# Patient Record
Sex: MALE | Race: WHITE | NOT HISPANIC OR LATINO | Employment: UNEMPLOYED | URBAN - METROPOLITAN AREA
[De-identification: names, ages, dates, MRNs, and addresses within clinical notes are randomized per-mention and may not be internally consistent; named-entity substitution may affect disease eponyms.]

---

## 2017-07-24 ENCOUNTER — ALLSCRIPTS OFFICE VISIT (OUTPATIENT)
Dept: OTHER | Facility: OTHER | Age: 37
End: 2017-07-24

## 2017-07-24 DIAGNOSIS — I10 ESSENTIAL (PRIMARY) HYPERTENSION: ICD-10-CM

## 2017-07-25 ENCOUNTER — GENERIC CONVERSION - ENCOUNTER (OUTPATIENT)
Dept: OTHER | Facility: OTHER | Age: 37
End: 2017-07-25

## 2017-07-25 LAB
A/G RATIO (HISTORICAL): 1.3 (ref 1.2–2.2)
ALBUMIN SERPL BCP-MCNC: 4.1 G/DL (ref 3.5–5.5)
ALP SERPL-CCNC: 74 IU/L (ref 39–117)
ALT SERPL W P-5'-P-CCNC: 65 IU/L (ref 0–44)
AST SERPL W P-5'-P-CCNC: 51 IU/L (ref 0–40)
BILIRUB SERPL-MCNC: 0.7 MG/DL (ref 0–1.2)
BUN SERPL-MCNC: 9 MG/DL (ref 6–20)
BUN/CREA RATIO (HISTORICAL): 12 (ref 9–20)
CALCIUM SERPL-MCNC: 9.6 MG/DL (ref 8.7–10.2)
CHLORIDE SERPL-SCNC: 100 MMOL/L (ref 96–106)
CHOLEST SERPL-MCNC: 187 MG/DL (ref 100–199)
CHOLEST/HDLC SERPL: 5.8 RATIO UNITS (ref 0–5)
CO2 SERPL-SCNC: 24 MMOL/L (ref 18–29)
CREAT SERPL-MCNC: 0.74 MG/DL (ref 0.76–1.27)
EGFR AFRICAN AMERICAN (HISTORICAL): 137 ML/MIN/1.73
EGFR-AMERICAN CALC (HISTORICAL): 119 ML/MIN/1.73
GLUCOSE SERPL-MCNC: 104 MG/DL (ref 65–99)
HDLC SERPL-MCNC: 32 MG/DL
LDLC SERPL CALC-MCNC: 128 MG/DL (ref 0–99)
POTASSIUM SERPL-SCNC: 4.9 MMOL/L (ref 3.5–5.2)
SODIUM SERPL-SCNC: 139 MMOL/L (ref 134–144)
TOT. GLOBULIN, SERUM (HISTORICAL): 3.2 G/DL (ref 1.5–4.5)
TOTAL PROTEIN (HISTORICAL): 7.3 G/DL (ref 6–8.5)
TRIGL SERPL-MCNC: 137 MG/DL (ref 0–149)
VLDLC SERPL CALC-MCNC: 27 MG/DL (ref 5–40)

## 2017-07-26 LAB — TSH SERPL DL<=0.05 MIU/L-ACNC: 1.8 UIU/ML (ref 0.45–4.5)

## 2017-07-28 ENCOUNTER — GENERIC CONVERSION - ENCOUNTER (OUTPATIENT)
Dept: OTHER | Facility: OTHER | Age: 37
End: 2017-07-28

## 2017-08-14 ENCOUNTER — APPOINTMENT (OUTPATIENT)
Dept: NUTRITION | Facility: HOSPITAL | Age: 37
End: 2017-08-14
Payer: COMMERCIAL

## 2017-08-14 PROCEDURE — 97802 MEDICAL NUTRITION INDIV IN: CPT

## 2017-08-25 ENCOUNTER — ALLSCRIPTS OFFICE VISIT (OUTPATIENT)
Dept: OTHER | Facility: OTHER | Age: 37
End: 2017-08-25

## 2017-12-04 ENCOUNTER — GENERIC CONVERSION - ENCOUNTER (OUTPATIENT)
Dept: OTHER | Facility: OTHER | Age: 37
End: 2017-12-04

## 2017-12-14 ENCOUNTER — ALLSCRIPTS OFFICE VISIT (OUTPATIENT)
Dept: OTHER | Facility: OTHER | Age: 37
End: 2017-12-14

## 2017-12-15 ENCOUNTER — GENERIC CONVERSION - ENCOUNTER (OUTPATIENT)
Dept: OTHER | Facility: OTHER | Age: 37
End: 2017-12-15

## 2017-12-15 NOTE — PROGRESS NOTES
Assessment  1  Stomach pain (536 8) (R10 9)   2  Benign essential hypertension (401 1) (I10)    Plan  Stomach pain    · 1 - Tye Sotelo MD (Gastroenterology) Co-Management  *  Status: Active  Requested for:[id number]  Care Summary provided  : Yes    Discussion/Summary    Abdominal pain w/ diarrhea: symptoms chronic w/ bouts of constant diarrhea multiple times a month  DIscussed referral to GI as pt will likely need to be scoped  Referral given  Dry mouth: discussed that pt does not have thrush and that his mouth is just dry  Encouraged more water intake  HTN: pt not here for HTN but noted on vitals to have /90 with repeat 170/90  States he takes his lisinopril regularly and that his BP last week was 120s/80s  Advised pt to take medication tomorrow and come in for BP check to make sure medication is working appropriately for him  The treatment plan was reviewed with the patient/guardian  The patient/guardian understands and agrees with the treatment plan      Chief Complaint  Patient presents with stomach pains  Also concerned about thrush  History of Present Illness  HPI: pt here for stomach pain  knows its gas pain but it feels like someone is ripping up his bowels  he gets relief when he runs to the bathroom and lets the gas out  this happens 2-3x a month  he would have bouts of diarrhea for a couple days then regular bowel movements  no blood or mucous in stool  states has had stomach issues since he was 16 but has just dealt with it  never saw a gastroenterologist  also thinks he has oral thrush as states his tongue is sometimes white  Review of Systems   Constitutional: no fever or chills, feels well, no tiredness, no recent weight loss or weight gain  ENT: no complaints of earache, no loss of hearing, no nosebleeds or nasal discharge, no sore throat or hoarseness    Cardiovascular: no complaints of slow or fast heart rate, no chest pain, no palpitations, no leg claudication or lower extremity edema  Respiratory: no complaints of shortness of breath, no wheezing or cough, no dyspnea on exertion, no orthopnea or PND  Gastrointestinal: as noted in HPI  Genitourinary: no complaints of dysuria or incontinence, no hesitancy, no nocturia, no genital lesion, no inadequacy of penile erection  Musculoskeletal: no complaints of arthralgia, no myalgia, no joint swelling or stiffness, no limb pain or swelling  Integumentary: no complaints of skin rash or lesion, no itching or dry skin, no skin wounds  Neurological: no complaints of headache, no confusion, no numbness or tingling, no dizziness or fainting  ROS reviewed  Active Problems  1  Accidental Intraoperative Endocrine Puncture (998 2)   2  ACP Staging Stage 1 Hypertension: 140-159 / 90-99 (401 9)   3  Aggressive Periodontitis (523 30)   4  Anxiety (300 00) (F41 9)   5  Benign essential hypertension (401 1) (I10)   6  BMI 50 0-59 9, adult (V85 43) (Z68 43)   7  Classic migraine with aura (346 00) (G43 109)   8  Depression (311) (F32 9)   9  Depression screen (V79 0) (Z13 89)   10  Encounter for weight loss counseling (V65 3) (Z71 3)   11  Facial pain (784 0) (R51)   12  GERD (gastroesophageal reflux disease) (530 81) (K21 9)   13  Infectious gastroenteritis (009 0) (A09)   14  Lumbago (724 2) (M54 5)   15  Methicillin resistant Staphylococcus aureus septicemia (038 12) (A41 02)   16  Migraine headache (346 90) (G43 909)   17  Mixed hyperlipidemia (272 2) (E78 2)   18  Morbid obesity (278 01) (E66 01)   19  Morbid or severe obesity due to excess calories (278 01) (E66 01)   20  Need for influenza vaccination (V04 81) (Z23)   21  Nonvenomous Insect Bite Of Elbow (913 4)   22  Nonvenomous Insect Bite Of Knee (916 4)   23  Obesity (278 00) (E66 9)   24  Sciatica (724 3) (M54 30)   25  TMJ arthralgia (524 62) (M26 629)   26  Tropical Ulcer (707 9)   27  Upper respiratory infection (465 9) (J06 9)   28   Visit for suture removal (V58 32) (Z48 02)    Past Medical History  1  History of Bell's palsy (V12 49) (Z86 69)  Active Problems And Past Medical History Reviewed: The active problems and past medical history were reviewed and updated today  Family History  Mother    1  Family history of diabetes mellitus (V18 0) (Z83 3)   2  Family history of malignant neoplasm (V16 9) (Z80 9)   3  Family history of osteoporosis (V17 81) (Z82 62)  Family History    4  Family history of Colon Cancer (V16 0)   5  Family history of Diabetes Mellitus (V18 0)   6  Family history of Hyperlipidemia   7  Family history of Hypertension (V17 49)   8  Family history of Hypothyroidism   9  Family history of Reported Family History Of Heart Disease   10  Family history of Reported Family History Of Ischemic Heart Disease   11  Family history of Valvular Heart Disease  Family History Reviewed: The family history was reviewed and updated today  Social History   · Denied: History of Alcohol Use (History)   · Always uses seat belt   · Denied: History of Drug Use   · Former smoker (V15 82) (V48 112)   · Never A Smoker   · Occasional alcohol use  The social history was reviewed and updated today  The social history was reviewed and is unchanged  Surgical History  1  History of Surgery Scrotum Drainage Of Scrotal Wall Abscess  Surgical History Reviewed: The surgical history was reviewed and updated today  Current Meds   1  Lisinopril 10 MG Oral Tablet; TAKE 1 TABLET DAILY AS DIRECTED; Therapy: [id number] to (Evaluate:22Mar2018)  Requested for: 22Nov2017; Last Rx:22Nov2017 Ordered   2  Omeprazole 40 MG Oral Capsule Delayed Release; Take 1 tablet daily; Therapy: [id number] to (Last Rx:22Nov2017)  Requested for: 22Nov2017 Ordered    The medication list was reviewed and updated today  Allergies  1  Erythromycin TABS   2  Macrolides   3  Pertussin LIQD   4  Pertussis Vaccine   5  Vancomycin HCl CAPS   6  Zithromax TABS   7  Zosyn SOLN  8  Bee sting   9   No Known Latex Allergies   10  Other   11  Pollen    Vitals   Recorded: 09YCV8863 02:15PM   Temperature 99 1 F   Heart Rate 321   Systolic 433   Diastolic 90   Height 5 ft 9 5 in   Weight 315 lb    BMI Calculated 45 85   BSA Calculated 2 52   O2 Saturation 97     Physical Exam   Constitutional  General appearance: No acute distress, well appearing and well nourished  Ears, Nose, Mouth, and Throat  External inspection of ears and nose: Abnormal    Oropharynx: Abnormal   The tongue was dry  -- no thrush noted, mouth is dry  Pulmonary  Auscultation of lungs: Clear to auscultation, equal breath sounds bilaterally, no wheezes, no rales, no rhonci  Cardiovascular  Auscultation of heart: Normal rate and rhythm, normal S1 and S2, without murmurs  Abdomen  Abdomen: Abnormal  -- 3/4 generalized abdominal tenderness  Liver and spleen: No hepatomegaly or splenomegaly  Attending Note  Attending Note [facility]: Attending Note: I interviewed, took the history and examined the patient,-- I supervised the Resident-- and-- I agree with the Resident management plan as it was presented to me  Level of Participation: I was present in clinic and examined the patient  I agree with the Resident's note  Future Appointments    Date/Time Provider Specialty Site   12/15/2017 11:15 AM Lilian Emery MD Family Medicine [facility]     Signatures   Electronically signed by : Latonya Cope MD; Dec 14 2017  3:09PM EST                       (Author)    Electronically signed by :  GIOVANA Cabello ; Dec 14 2017  3:12PM EST                       (Co-author)

## 2018-01-13 VITALS
HEART RATE: 88 BPM | HEIGHT: 70 IN | RESPIRATION RATE: 18 BRPM | TEMPERATURE: 97.5 F | SYSTOLIC BLOOD PRESSURE: 160 MMHG | DIASTOLIC BLOOD PRESSURE: 72 MMHG | OXYGEN SATURATION: 96 % | BODY MASS INDEX: 45.1 KG/M2 | WEIGHT: 315 LBS

## 2018-01-13 VITALS
BODY MASS INDEX: 45.1 KG/M2 | HEART RATE: 102 BPM | RESPIRATION RATE: 18 BRPM | TEMPERATURE: 97.8 F | WEIGHT: 315 LBS | SYSTOLIC BLOOD PRESSURE: 142 MMHG | HEIGHT: 70 IN | DIASTOLIC BLOOD PRESSURE: 78 MMHG

## 2018-01-16 NOTE — RESULT NOTES
Verified Results  (1) LIPID PANEL, FASTING [id number] 09:44AM Mely Charles     Test Name Result Flag Reference   Cholesterol, Total 187 mg/dL  100-199   Triglycerides 137 mg/dL  0-149   HDL Cholesterol 32 mg/dL L >39   VLDL Cholesterol Dayo 27 mg/dL  5-40   LDL Cholesterol Calc 128 mg/dL H 0-99   T  Chol/HDL Ratio 5 8 ratio units H 0 0-5 0   T  Chol/HDL Ratio                                                             Men  Women                                               1/2 Avg  Risk  3 4    3 3                                                   Avg Risk  5 0    4 4                                                2X Avg  Risk  9 6    7 1                                                3X Avg  Risk 23 4   11 0

## 2018-01-19 ENCOUNTER — GENERIC CONVERSION - ENCOUNTER (OUTPATIENT)
Dept: OTHER | Facility: OTHER | Age: 38
End: 2018-01-19

## 2018-01-19 DIAGNOSIS — R19.7 DIARRHEA: ICD-10-CM

## 2018-01-23 VITALS
DIASTOLIC BLOOD PRESSURE: 90 MMHG | OXYGEN SATURATION: 97 % | SYSTOLIC BLOOD PRESSURE: 176 MMHG | HEIGHT: 70 IN | BODY MASS INDEX: 45.1 KG/M2 | TEMPERATURE: 99.1 F | WEIGHT: 315 LBS | HEART RATE: 113 BPM

## 2018-01-23 NOTE — MISCELLANEOUS
Message  Return to work or school:   Coy Villatoro is under my professional care   He was seen in my office on 12/14/2017   He is able to return to work on  12/16/2017       Dr Julieanne Mcardle, MD       Signatures   Electronically signed by : Filipe Mccall MD; Dec 14 2017  3:16PM EST                       (Author)

## 2018-01-24 VITALS
BODY MASS INDEX: 47.74 KG/M2 | DIASTOLIC BLOOD PRESSURE: 80 MMHG | WEIGHT: 315 LBS | SYSTOLIC BLOOD PRESSURE: 130 MMHG | OXYGEN SATURATION: 97 % | HEART RATE: 72 BPM | TEMPERATURE: 97.6 F | RESPIRATION RATE: 14 BRPM

## 2018-01-24 VITALS
DIASTOLIC BLOOD PRESSURE: 80 MMHG | SYSTOLIC BLOOD PRESSURE: 152 MMHG | HEIGHT: 70 IN | OXYGEN SATURATION: 97 % | BODY MASS INDEX: 45.1 KG/M2 | HEART RATE: 114 BPM | WEIGHT: 315 LBS | TEMPERATURE: 98 F | RESPIRATION RATE: 16 BRPM

## 2018-01-24 VITALS
RESPIRATION RATE: 16 BRPM | SYSTOLIC BLOOD PRESSURE: 128 MMHG | TEMPERATURE: 98.2 F | DIASTOLIC BLOOD PRESSURE: 78 MMHG | BODY MASS INDEX: 45.1 KG/M2 | HEIGHT: 70 IN | WEIGHT: 315 LBS

## 2018-01-31 RX ORDER — OMEPRAZOLE 40 MG/1
40 CAPSULE, DELAYED RELEASE ORAL EVERY MORNING
COMMUNITY
End: 2018-05-01 | Stop reason: SDUPTHER

## 2018-01-31 RX ORDER — NAPROXEN SODIUM 220 MG
CAPSULE ORAL AS NEEDED
COMMUNITY
End: 2022-04-05 | Stop reason: HOSPADM

## 2018-01-31 RX ORDER — ZINC GLUCONATE 50 MG
50 TABLET ORAL DAILY
COMMUNITY
End: 2018-09-18 | Stop reason: ALTCHOICE

## 2018-01-31 RX ORDER — DIPHENOXYLATE HYDROCHLORIDE AND ATROPINE SULFATE 2.5; .025 MG/1; MG/1
1 TABLET ORAL DAILY
COMMUNITY

## 2018-01-31 RX ORDER — LISINOPRIL 20 MG/1
20 TABLET ORAL EVERY MORNING
COMMUNITY
End: 2018-04-20 | Stop reason: SDUPTHER

## 2018-01-31 RX ORDER — MULTIVIT WITH MINERALS/LUTEIN
1000 TABLET ORAL DAILY
COMMUNITY
End: 2021-10-13

## 2018-01-31 NOTE — PRE-PROCEDURE INSTRUCTIONS
Pre-Surgery Instructions:   Medication Instructions    Ascorbic Acid (VITAMIN C) 1000 MG tablet Patient was instructed by Physician and understands   lisinopril (ZESTRIL) 20 mg tablet Instructed patient per Anesthesia Guidelines   multivitamin [facility]) TABS Patient was instructed by Physician and understands   Naproxen Sodium (ALEVE) 220 MG CAPS Patient was instructed by Physician and understands   omeprazole (PriLOSEC) 40 MG capsule Instructed patient per Anesthesia Guidelines   zinc gluconate 50 mg tablet Patient was instructed by Physician and understands

## 2018-02-01 LAB
ENDOMYSIUM IGA SER QL: NEGATIVE
GLIADIN PEPTIDE IGA SER-ACNC: 5 UNITS (ref 0–19)
GLIADIN PEPTIDE IGG SER-ACNC: 1 UNITS (ref 0–19)
IGA SERPL-MCNC: 428 MG/DL (ref 90–386)
TSH SERPL DL<=0.005 MIU/L-ACNC: 1.02 UIU/ML (ref 0.45–4.5)
TTG IGA SER-ACNC: <2 U/ML (ref 0–3)
TTG IGG SER-ACNC: <2 U/ML (ref 0–5)

## 2018-02-02 ENCOUNTER — HOSPITAL ENCOUNTER (OUTPATIENT)
Facility: AMBULARY SURGERY CENTER | Age: 38
Setting detail: OUTPATIENT SURGERY
Discharge: HOME | End: 2018-02-02
Attending: INTERNAL MEDICINE | Admitting: INTERNAL MEDICINE
Payer: COMMERCIAL

## 2018-02-02 ENCOUNTER — ANESTHESIA (OUTPATIENT)
Dept: GASTROENTEROLOGY | Facility: AMBULARY SURGERY CENTER | Age: 38
End: 2018-02-02
Payer: COMMERCIAL

## 2018-02-02 VITALS
HEART RATE: 78 BPM | DIASTOLIC BLOOD PRESSURE: 59 MMHG | TEMPERATURE: 97.7 F | OXYGEN SATURATION: 98 % | RESPIRATION RATE: 18 BRPM | SYSTOLIC BLOOD PRESSURE: 127 MMHG

## 2018-02-02 DIAGNOSIS — R19.7 DIARRHEA: ICD-10-CM

## 2018-02-02 DIAGNOSIS — K21.9 GASTRO-ESOPHAGEAL REFLUX DISEASE WITHOUT ESOPHAGITIS: ICD-10-CM

## 2018-02-02 PROCEDURE — 88305 TISSUE EXAM BY PATHOLOGIST: CPT | Performed by: PATHOLOGY

## 2018-02-02 PROCEDURE — 88342 IMHCHEM/IMCYTCHM 1ST ANTB: CPT | Performed by: PATHOLOGY

## 2018-02-02 PROCEDURE — 88305 TISSUE EXAM BY PATHOLOGIST: CPT | Performed by: INTERNAL MEDICINE

## 2018-02-02 PROCEDURE — 88342 IMHCHEM/IMCYTCHM 1ST ANTB: CPT | Performed by: INTERNAL MEDICINE

## 2018-02-02 PROCEDURE — 45380 COLONOSCOPY AND BIOPSY: CPT | Performed by: INTERNAL MEDICINE

## 2018-02-02 PROCEDURE — 43239 EGD BIOPSY SINGLE/MULTIPLE: CPT | Performed by: INTERNAL MEDICINE

## 2018-02-02 RX ORDER — LIDOCAINE HYDROCHLORIDE 10 MG/ML
INJECTION, SOLUTION INFILTRATION; PERINEURAL AS NEEDED
Status: DISCONTINUED | OUTPATIENT
Start: 2018-02-02 | End: 2018-02-02 | Stop reason: SURG

## 2018-02-02 RX ORDER — MIDAZOLAM HYDROCHLORIDE 1 MG/ML
INJECTION INTRAMUSCULAR; INTRAVENOUS AS NEEDED
Status: DISCONTINUED | OUTPATIENT
Start: 2018-02-02 | End: 2018-02-02 | Stop reason: SURG

## 2018-02-02 RX ORDER — SODIUM CHLORIDE, SODIUM LACTATE, POTASSIUM CHLORIDE, CALCIUM CHLORIDE 600; 310; 30; 20 MG/100ML; MG/100ML; MG/100ML; MG/100ML
100 INJECTION, SOLUTION INTRAVENOUS CONTINUOUS
Status: DISCONTINUED | OUTPATIENT
Start: 2018-02-02 | End: 2018-02-02 | Stop reason: HOSPADM

## 2018-02-02 RX ORDER — PROPOFOL 10 MG/ML
INJECTION, EMULSION INTRAVENOUS AS NEEDED
Status: DISCONTINUED | OUTPATIENT
Start: 2018-02-02 | End: 2018-02-02 | Stop reason: SURG

## 2018-02-02 RX ORDER — PROPOFOL 10 MG/ML
INJECTION, EMULSION INTRAVENOUS CONTINUOUS PRN
Status: DISCONTINUED | OUTPATIENT
Start: 2018-02-02 | End: 2018-02-02 | Stop reason: SURG

## 2018-02-02 RX ADMIN — PROPOFOL 80 MG: 10 INJECTION, EMULSION INTRAVENOUS at 11:51

## 2018-02-02 RX ADMIN — PROPOFOL 100 MCG/KG/MIN: 10 INJECTION, EMULSION INTRAVENOUS at 11:51

## 2018-02-02 RX ADMIN — MIDAZOLAM HYDROCHLORIDE 2 MG: 1 INJECTION, SOLUTION INTRAMUSCULAR; INTRAVENOUS at 11:51

## 2018-02-02 RX ADMIN — LIDOCAINE HYDROCHLORIDE 100 MG: 10 INJECTION, SOLUTION INFILTRATION; PERINEURAL at 11:51

## 2018-02-02 RX ADMIN — SODIUM CHLORIDE, SODIUM LACTATE, POTASSIUM CHLORIDE, AND CALCIUM CHLORIDE: .6; .31; .03; .02 INJECTION, SOLUTION INTRAVENOUS at 11:37

## 2018-02-02 NOTE — ANESTHESIA PREPROCEDURE EVALUATION
Review of Systems/Medical History  Patient summary reviewed  Chart reviewed  No history of anesthetic complications     Cardiovascular  Hypertension ,    Pulmonary    Comment: Former smoker     GI/Hepatic    GERD ,             Endo/Other    Obesity  morbid obesity   GYN       Hematology   Musculoskeletal       Neurology   Psychology           Physical Exam    Airway    Mallampati score: II  TM Distance: >3 FB  Neck ROM: full     Dental   No notable dental hx     Cardiovascular  Cardiovascular exam normal    Pulmonary  Pulmonary exam normal     Other Findings        Anesthesia Plan  ASA Score- 3     Anesthesia Type- IV sedation with anesthesia with ASA Monitors  Additional Monitors:   Airway Plan:         Plan Factors-    Induction-     Postoperative Plan-     Informed Consent- Anesthetic plan and risks discussed with patient

## 2018-02-02 NOTE — OP NOTE
ESOPHAGOGASTRODUODENOSCOPY    PROCEDURE: EGD/ Biopsy    INDICATIONS: GERD    POST-OP DIAGNOSIS: See the impression below    SEDATION: Monitored anesthesia care, check anesthesia records    PHYSICAL EXAM:    Vitals:    02/02/18 1214   BP: 121/58   Pulse: 82   Resp: 20   Temp:    SpO2: 97%    There is no height or weight on file to calculate BMI  General: NAD  Heart: S1 & S2 normal, RRR  Lungs: CTA, No rales or rhonchi  Abdomen: Soft, nontender, nondistended, good bowel sounds    CONSENT:  Informed consent was obtained for the procedure, including sedation after explaining the risks and benefits of the procedure  Risks including but not limited to bleeding, perforation, infection, aspiration were discussed in detail  Also explained about less than 100% sensitivity with the exam and other alternatives  PREPARATION:   EKG tracing, pulse oximetry, blood pressure were monitored throughout the procedure  Patient was identified by myself both verbally and by visual inspection of ID band  DESCRIPTION:   Patient was placed in the left lateral decubitus position and was sedated with the above medication  The gastroscope was introduced in to the oropharynx and the esophagus was intubated under direct visualization  Scope was passed down the esophagus up to 2nd part of the duodenum  A careful inspection was made as the gastroscope was withdrawn, including a retroflexed view of the stomach; findings and interventions are described below  FINDINGS:    #1  Esophagus and GEJ- small sliding hiatal hernia    #2  Stomach- erythema was noted in the gastric body  Biopsies done to check for H pylori  #3  Duodenum- normal mucosa  Biopsies done  IMPRESSIONS:      As above    RECOMMENDATIONS:     Check pathology    Continue omeprazole    COMPLICATIONS:  None; patient tolerated the procedure well            DISPOSITION: PACU           CONDITION: Stable

## 2018-02-02 NOTE — OP NOTE
COLONOSCOPY    PROCEDURE: Colonoscopy/ Biopsy  Polypectomny (Cold Biopsy)    INDICATIONS: Diarrhea    POST-OP DIAGNOSIS: See the impression below    SEDATION: Monitored anesthesia care, check anesthesia records    PHYSICAL EXAM:    /58   Pulse 82   Temp 97 7 °F (36 5 °C) (Tympanic)   Resp 20   SpO2 97%   There is no height or weight on file to calculate BMI  General: NAD  Heart: S1 & S2 normal, RRR  Lungs: CTA, No rales or rhonchi  Abdomen: Soft, nontender, nondistended, good bowel sounds    CONSENT:  Informed consent was obtained for the procedure, including sedation after explaining the risks and benefits of the procedure  Risks including but not limited to bleeding, perforation, infection, aspiration were discussed in detail  Also explained about less than 100%$ sensitivity with the exam and other alternatives  PREPARATION:   EKG tracing, pulse oximetry, blood pressure were monitored throughout the procedure  Patient was identified by myself both verbally and by visual inspection of ID band  DESCRIPTION:   Patient was placed in the left lateral decubitus position and was sedated with the above medication  Digital rectal examination was performed  The colonoscope was introduced in to the anal canal and advanced up to terminal ileum  A careful inspection was made as the colonoscope was withdrawn, including a retroflexed view of the rectum; findings and interventions are described below  Appropriate photodocumentation was obtained  The quality of the colonic preparation was adequate  FINDINGS:    1  Terminal ileum-normal mucosa    2  Cecum and ileocecal valve-normal    3  Sigmoid colon-few diverticuli seen    4  Transverse colon-diminutive polyp was removed with cold biopsy forceps    5  Random colon biopsies were done         IMPRESSIONS:      As above    RECOMMENDATIONS:    Check pathology    COMPLICATIONS:  None; patient tolerated the procedure well      DISPOSITION: PACU CONDITION: Stable

## 2018-02-02 NOTE — H&P
History and Physical - [facility]  Josefina Chiarantyecenia 40 y o  male MRN: [id number]        HPI: 40year-old obese male with history of hypertension reports having stomach issues for few years  He is complaining about 3-4 soft bowel movements every day with episodes of loose watery bowel movements few times a week associated with cramping pain in the lower abdomen  He denies any blood or mucus in the stool  He has problems with acid reflux for which she takes omeprazole regularly  Denies any difficulty swallowing  Good appetite, no recent weight loss  Denies any nausea or vomiting  Historical Information   Past Medical History:   Diagnosis Date    GERD (gastroesophageal reflux disease)     Hypertension     Irregular heart beat     Neurocardiogenic syncope     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     pt symptomatic with extreme and sudden environmental temp  fluctuations     Past Surgical History:   Procedure Laterality Date    TESTICLE SURGERY Left     infected testicle     Social History   History   Alcohol Use    Yes     Comment: occ     History   Drug Use No     History   Smoking Status    Former Smoker    Quit date: 2010   Smokeless Tobacco    Never Used     Family History   Problem Relation Age of Onset    Supraventricular tachycardia Mother     Atrial fibrillation Mother     No Known Problems Father     ADD / ADHD Son        Meds/Allergies     Prescriptions Prior to Admission   Medication    Ascorbic Acid (VITAMIN C) 1000 MG tablet    lisinopril (ZESTRIL) 20 mg tablet    multivitamin (THERAGRAN) TABS    Naproxen Sodium (ALEVE) 220 MG CAPS    omeprazole (PriLOSEC) 40 MG capsule    zinc gluconate 50 mg tablet       Allergies   Allergen Reactions    Macrolides And Ketolides Anaphylaxis    Pertussis Vaccines Anaphylaxis    Zithromax [Azithromycin] Anaphylaxis       Objective     Blood pressure 137/70, pulse 84, temperature 97 7 °F (36 5 °C), temperature source Tympanic, resp  rate 20, SpO2 96 %      PHYSICAL EXAM:    Gen: NAD  CV: S1 & S2 normal, RRR  CHEST: Clear to auscultate  ABD: soft, NT/ND, good bowel sounds  EXT: no edema    ASSESSMENT:     GERD, abdominal pain, diarrhea    PLAN:    EGD and colonoscopy

## 2018-02-15 LAB
C DIFF TOX GENS STL QL NAA+PROBE: NEGATIVE
CAMPY SP DNA.DIARRHEA STL QL NAA+PROBE: NEGATIVE
EC STX1 + STX2 GENES STL NAA+PROBE: NEGATIVE
Lab: NORMAL
SALMONELLA DNA SPEC QL NAA+PROBE: NEGATIVE
SHIGELLA DNA SPEC QL NAA+PROBE: NEGATIVE
WBC SPEC QL GRAM STN: NORMAL

## 2018-03-05 ENCOUNTER — TELEPHONE (OUTPATIENT)
Dept: FAMILY MEDICINE CLINIC | Facility: CLINIC | Age: 38
End: 2018-03-05

## 2018-03-06 NOTE — TELEPHONE ENCOUNTER
Paper work was placed on my desk  I did not see patient for medical leave  Please direct to Physician who originally filled out paperwork  Paper work handed to nursing staff  Thanks

## 2018-04-06 ENCOUNTER — TELEPHONE (OUTPATIENT)
Dept: FAMILY MEDICINE CLINIC | Facility: CLINIC | Age: 38
End: 2018-04-06

## 2018-04-06 NOTE — TELEPHONE ENCOUNTER
Pt states that he had dropped off paper work for fmla that needed to be redone due to it not being done correctly the first time,  I did not see the form in the pts chart   Can you please call pt or complete the form

## 2018-04-09 NOTE — TELEPHONE ENCOUNTER
Patient saw Dr Val Moss for abd pain  The diability forms were received from the patient and placed on Dr Natividad Wharton desmonica for completion

## 2018-04-10 NOTE — TELEPHONE ENCOUNTER
I wont be in the office until Thursday afternoon  If patient needs forms, please have PCP fill out if possible   Thank you

## 2018-04-20 DIAGNOSIS — I10 HYPERTENSION, UNSPECIFIED TYPE: Primary | ICD-10-CM

## 2018-04-20 DIAGNOSIS — K21.9 GASTROESOPHAGEAL REFLUX DISEASE WITHOUT ESOPHAGITIS: ICD-10-CM

## 2018-04-20 RX ORDER — OMEPRAZOLE 40 MG/1
40 CAPSULE, DELAYED RELEASE ORAL EVERY MORNING
Qty: 90 CAPSULE | Refills: 3 | Status: CANCELLED | OUTPATIENT
Start: 2018-04-20

## 2018-05-01 DIAGNOSIS — K21.9 GASTROESOPHAGEAL REFLUX DISEASE, ESOPHAGITIS PRESENCE NOT SPECIFIED: Primary | ICD-10-CM

## 2018-05-01 RX ORDER — OMEPRAZOLE 40 MG/1
40 CAPSULE, DELAYED RELEASE ORAL EVERY MORNING
Qty: 30 CAPSULE | Refills: 5 | Status: SHIPPED | OUTPATIENT
Start: 2018-05-01 | End: 2018-12-14 | Stop reason: SDUPTHER

## 2018-05-01 RX ORDER — LISINOPRIL 20 MG/1
20 TABLET ORAL EVERY MORNING
Qty: 90 TABLET | Refills: 3 | Status: SHIPPED | OUTPATIENT
Start: 2018-05-01 | End: 2019-05-01 | Stop reason: SDUPTHER

## 2018-09-18 ENCOUNTER — OFFICE VISIT (OUTPATIENT)
Dept: FAMILY MEDICINE CLINIC | Facility: CLINIC | Age: 38
End: 2018-09-18
Payer: COMMERCIAL

## 2018-09-18 VITALS
HEIGHT: 69 IN | TEMPERATURE: 98.8 F | RESPIRATION RATE: 20 BRPM | BODY MASS INDEX: 46.65 KG/M2 | HEART RATE: 98 BPM | SYSTOLIC BLOOD PRESSURE: 148 MMHG | OXYGEN SATURATION: 96 % | WEIGHT: 315 LBS | DIASTOLIC BLOOD PRESSURE: 100 MMHG

## 2018-09-18 DIAGNOSIS — E66.01 MORBID OBESITY (HCC): ICD-10-CM

## 2018-09-18 DIAGNOSIS — I10 ESSENTIAL HYPERTENSION: ICD-10-CM

## 2018-09-18 DIAGNOSIS — R29.818 SUSPECTED SLEEP APNEA: ICD-10-CM

## 2018-09-18 DIAGNOSIS — Z23 NEED FOR IMMUNIZATION AGAINST INFLUENZA: ICD-10-CM

## 2018-09-18 DIAGNOSIS — R00.0 TACHYCARDIA: ICD-10-CM

## 2018-09-18 DIAGNOSIS — Z00.00 WELL ADULT EXAM: Primary | ICD-10-CM

## 2018-09-18 PROBLEM — R19.7 DIARRHEA: Status: ACTIVE | Noted: 2018-01-19

## 2018-09-18 PROBLEM — K21.9 GERD (GASTROESOPHAGEAL REFLUX DISEASE): Status: ACTIVE | Noted: 2017-07-24

## 2018-09-18 PROBLEM — K58.0 IRRITABLE BOWEL SYNDROME WITH DIARRHEA: Status: ACTIVE | Noted: 2018-01-19

## 2018-09-18 PROCEDURE — 99395 PREV VISIT EST AGE 18-39: CPT | Performed by: FAMILY MEDICINE

## 2018-09-18 PROCEDURE — 90686 IIV4 VACC NO PRSV 0.5 ML IM: CPT | Performed by: FAMILY MEDICINE

## 2018-09-18 PROCEDURE — 90471 IMMUNIZATION ADMIN: CPT | Performed by: FAMILY MEDICINE

## 2018-09-18 PROCEDURE — 3725F SCREEN DEPRESSION PERFORMED: CPT | Performed by: FAMILY MEDICINE

## 2018-09-18 RX ORDER — CARVEDILOL 3.12 MG/1
3.12 TABLET ORAL 2 TIMES DAILY WITH MEALS
Qty: 30 TABLET | Refills: 0 | Status: SHIPPED | OUTPATIENT
Start: 2018-09-18 | End: 2018-10-11 | Stop reason: SDUPTHER

## 2018-09-18 RX ORDER — DICYCLOMINE HCL 20 MG
1 TABLET ORAL 3 TIMES DAILY
COMMUNITY
Start: 2018-01-19 | End: 2019-07-26 | Stop reason: SDUPTHER

## 2018-09-18 NOTE — PROGRESS NOTES
ASSESSMENT & PLAN    Diagnoses and all orders for this visit:    Well adult exam    BMI 50 0-59 9, adult ([facility])/Morbid obesity ([facility] )  Patient has gained weight since last appointment  Morbid obesity likely 2/2 to excessive caloric intake and sedentary lifestyle  Patient does not readily admit this although on further counseling patient does acknowledge that he eats large portions and throughout the day  He does not usually exercise  I advised strict healthy well-balanced diet with strict caloric restriction to 2385-0339 calories a day  Advised at least 45 min of daily cardiovascular weight-bearing exercise with moderate to heavy intensity  I advised patient to not eat more than 1 portion at any given meal   Advised healthy snacks low in calories  Advised patient to log all food eaten throughout the day so that patient can visualize average caloric intake  -     Ambulatory referral to Nutrition Services; Future  -     Diagnostic Sleep Study; Future  -     Lipid panel; Future  -     Comprehensive metabolic panel; Future  -     Lipid panel  -     Comprehensive metabolic panel    Essential hypertension  Patient's blood pressure not at goal today  /100  Will add Coreg as patient is tachycardic during examination  -     carvedilol (COREG) 3 125 mg tablet; Take 1 tablet (3 125 mg total) by mouth 2 (two) times a day with meals  -     Diagnostic Sleep Study; Future  -     Lipid panel; Future  -     Comprehensive metabolic panel; Future  -     Lipid panel  -     Comprehensive metabolic panel    Suspected sleep apnea  -     Diagnostic Sleep Study; Future    Tachycardia  -     carvedilol (COREG) 3 125 mg tablet; Take 1 tablet (3 125 mg total) by mouth 2 (two) times a day with meals  -     Comprehensive metabolic panel;  Future  -     Comprehensive metabolic panel    Need for immunization against influenza  -     SYRINGE/SINGLE-DOSE VIAL: influenza vaccine, [id number], quadrivalent, 0 5 mL, preservative-free, for patients 3+ yr (FLUZONE)    Other orders  -     dicyclomine (BENTYL) 20 mg tablet; Take 1 tablet by mouth 3 (three) times a day  -     Discontinue: Na Sulfate-K Sulfate-Mg Sulf (SUPREP BOWEL PREP KIT) 17 5-3 13-1 6 GM/180ML SOLN; Take by mouth    Patient follow-up in 1 month for evaluation of the above chronic conditions  Patient to present sooner if there is any worsening of condition  Assessment/Plan     Counseled on lifestyle modifications related to diet and exercise to include, but not limited to: Increased consumption of fruits & vegetables, decreased consumption of processed foods (fast food, junk food, soda), increased daily water intake, goal physical activity of 3 times weekly at least 30 minutes in duration and at a minimum of moderate intensity  Leidy Aponte acknowledged understanding and agreement with the treatment plan  No Follow-up on file  SUBJECTIVE    HPI:    Visit Type: Health Maintenance    Social History   Marital Status:     Household Members: 2 children, wife    Employment Status: unemployed   Tobacco Use: No      Alcohol Use: No        Drug Use: No        General Reported Health:    Dental: Brushes teeth 2 time(s) per day, flosses 0 time(s) per day, last dental: unknown   Vision: Last eye exam approximately 1 year ago   Hearing Loss: No   Immunizations: reviewed      Lifestyle     Diet:    Fruits & Vegetables: minimal     Protein 3-4 time(s) a day    Water intake of 1-2L per day    Soda Intake: minimal     Fast Food: daily     Junk Food: daily    Exercise:    Weekly Exercise: minimal     Reproductive Health   Sexually Active: sexually active with spouse   Contraception: No    STD Testing: not indicated     Screening   Mammogram:   Colon Cancer:  Recent colonoscopy showed 1 polyp benign   Prostate Cancer:  Not indicated      Other HPI:    Patient has multiple medical conditions  Patient has gained weight since last appointment at [facility]    He initially states that he undergo severe caloric control to 2000 calories a day and exercises 2 hr a day  He does not know why he continues to gain weight  On further questioning patient admitted that he eats fast food and and on healthy diet daily  He does not calorie restricted and eats multiple portions with each sitting  Patient does not maintain a moderate to heavy cardiovascular exercise regimen daily  Patient has been compliant with his blood pressure medication  Patient's blood pressure continues to be elevated  Patient states that he is getting short of breath and dyspneic upon 3 flights of stairs  He states that he will sweat profusely  Reviewed, and if applicable, updated allergies, medications, past medical history, past surgical history, family history, social history, problem list      Review of Systems   Constitution: Positive for weight gain  Negative for chills, decreased appetite, diaphoresis, fever, weakness, malaise/fatigue and weight loss  Eyes: Negative  Cardiovascular: Positive for dyspnea on exertion (Three flights of stairs)  Negative for chest pain, claudication, irregular heartbeat, leg swelling, orthopnea, palpitations and syncope  Endocrine: Positive for heat intolerance  Negative for polydipsia, polyphagia and polyuria  Hematologic/Lymphatic: Negative  Musculoskeletal: Negative  Gastrointestinal: Positive for bloating, diarrhea, excessive appetite and heartburn  Negative for abdominal pain, change in bowel habit, nausea and vomiting  Genitourinary: Negative  Neurological: Negative  Psychiatric/Behavioral: Negative  Allergic/Immunologic: Negative  OBJECTIVE    Vitals:    09/18/18 0943   BP: 148/100   Pulse: 98   Resp: 20   Temp: 98 8 °F (37 1 °C)   SpO2: 96%       Physical Exam   Constitutional: He is oriented to person, place, and time  He appears well-developed and well-nourished  No distress     Morbidly obese, unkempt with odor   HENT: Head: Normocephalic and atraumatic  Right Ear: Tympanic membrane and external ear normal    Left Ear: Tympanic membrane and external ear normal    Nose: Nose normal    Mouth/Throat: Oropharynx is clear and moist  No oropharyngeal exudate  Eyes: Conjunctivae are normal  Pupils are equal, round, and reactive to light  Neck: Normal range of motion  Neck supple  No tracheal deviation present  Cardiovascular: Normal heart sounds and intact distal pulses  Tachycardia present  Exam reveals no gallop and no friction rub  No murmur heard  Pulmonary/Chest: Effort normal and breath sounds normal  No stridor  No respiratory distress  He has no wheezes  He has no rales  Abdominal: Soft  He exhibits no distension  There is no tenderness  Genitourinary:   Genitourinary Comments: Deferred   Musculoskeletal: He exhibits edema (Trace bilateral lower extremity)  He exhibits no tenderness or deformity  Lymphadenopathy:     He has no cervical adenopathy  Neurological: He is alert and oriented to person, place, and time  No cranial nerve deficit  Coordination normal    Skin: Skin is warm  No rash noted  He is diaphoretic  No erythema  No pallor     Diaphoretic   Psychiatric:   Patient's thought process is somewhat tangential

## 2018-09-20 LAB
ALBUMIN SERPL-MCNC: 4.5 G/DL (ref 3.5–5.5)
ALBUMIN/GLOB SERPL: 1.4 {RATIO} (ref 1.2–2.2)
ALP SERPL-CCNC: 86 IU/L (ref 39–117)
ALT SERPL-CCNC: 58 IU/L (ref 0–44)
AST SERPL-CCNC: 39 IU/L (ref 0–40)
BILIRUB SERPL-MCNC: 0.7 MG/DL (ref 0–1.2)
BUN SERPL-MCNC: 13 MG/DL (ref 6–20)
BUN/CREAT SERPL: 16 (ref 9–20)
CALCIUM SERPL-MCNC: 9.7 MG/DL (ref 8.7–10.2)
CHLORIDE SERPL-SCNC: 100 MMOL/L (ref 96–106)
CHOLEST SERPL-MCNC: 194 MG/DL (ref 100–199)
CHOLEST/HDLC SERPL: 5.9 RATIO (ref 0–5)
CO2 SERPL-SCNC: 22 MMOL/L (ref 20–29)
CREAT SERPL-MCNC: 0.83 MG/DL (ref 0.76–1.27)
GLOBULIN SER-MCNC: 3.2 G/DL (ref 1.5–4.5)
GLUCOSE SERPL-MCNC: 113 MG/DL (ref 65–99)
HDLC SERPL-MCNC: 33 MG/DL
LDLC SERPL CALC-MCNC: 117 MG/DL (ref 0–99)
POTASSIUM SERPL-SCNC: 4.5 MMOL/L (ref 3.5–5.2)
PROT SERPL-MCNC: 7.7 G/DL (ref 6–8.5)
SL AMB EGFR AFRICAN AMERICAN: 130 ML/MIN/1.73
SL AMB EGFR NON AFRICAN AMERICAN: 112 ML/MIN/1.73
SL AMB VLDL CHOLESTEROL CALC: 44 MG/DL (ref 5–40)
SODIUM SERPL-SCNC: 137 MMOL/L (ref 134–144)
TRIGL SERPL-MCNC: 220 MG/DL (ref 0–149)

## 2018-10-11 DIAGNOSIS — R00.0 TACHYCARDIA: ICD-10-CM

## 2018-10-11 DIAGNOSIS — I10 ESSENTIAL HYPERTENSION: ICD-10-CM

## 2018-10-11 RX ORDER — CARVEDILOL 3.12 MG/1
3.12 TABLET ORAL 2 TIMES DAILY WITH MEALS
Qty: 60 TABLET | Refills: 3 | Status: SHIPPED | OUTPATIENT
Start: 2018-10-11 | End: 2019-02-22 | Stop reason: SDUPTHER

## 2018-10-24 ENCOUNTER — OFFICE VISIT (OUTPATIENT)
Dept: FAMILY MEDICINE CLINIC | Facility: CLINIC | Age: 38
End: 2018-10-24
Payer: COMMERCIAL

## 2018-10-24 VITALS
OXYGEN SATURATION: 95 % | DIASTOLIC BLOOD PRESSURE: 70 MMHG | SYSTOLIC BLOOD PRESSURE: 132 MMHG | HEART RATE: 98 BPM | RESPIRATION RATE: 16 BRPM | BODY MASS INDEX: 53.04 KG/M2 | WEIGHT: 315 LBS

## 2018-10-24 DIAGNOSIS — I10 BENIGN ESSENTIAL HYPERTENSION: Primary | ICD-10-CM

## 2018-10-24 PROCEDURE — 3078F DIAST BP <80 MM HG: CPT | Performed by: FAMILY MEDICINE

## 2018-10-24 PROCEDURE — 99213 OFFICE O/P EST LOW 20 MIN: CPT | Performed by: FAMILY MEDICINE

## 2018-10-24 PROCEDURE — 3075F SYST BP GE 130 - 139MM HG: CPT | Performed by: FAMILY MEDICINE

## 2018-10-24 NOTE — PROGRESS NOTES
[facility] 41 y o  male  MRN [id number]    Assessment/Plan    Diagnoses and all orders for this visit:    Benign essential hypertension  Blood pressure 132/70  Patient's blood pressure is at goal today  I advised continuation of blood pressure medication  Will not make any changes today  Instructed to continue lisinopril 20 mg daily and Coreg 3 125 mg b i d   Patient return to clinic in 3 months for re-evaluation  Patient is to continue taking his blood pressure measurements daily  Patient advised to call if blood pressure is consistently running high  Greater than or equal to 150/90  Salvador Subramanian MD    Subjective     HPI  Isac Erickson 41 y o  male, PMHx morbid obesity, hypertension, hyperlipidemia, GERD, presents to clinic for evaluation of hypertension  Patient has been compliant with medication  He is currently on lisinopril 20 mg daily and Coreg whole 3 125 mg b i d   Patient is doing well he reports no side effects  Patient has been attempting to diet and exercise since our last appointment  Past Medical History:   Diagnosis Date    Bell's palsy     Depression     GERD (gastroesophageal reflux disease)     Hypertension     Irregular heart beat     Neurocardiogenic syncope     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     pt symptomatic with extreme and sudden environmental temp  fluctuations       Past Surgical History:   Procedure Laterality Date    COLONOSCOPY N/A 2/2/2018    Procedure: COLONOSCOPY;  Surgeon: Shakira Jacobs MD;  Location: [place] [address] GI LAB; Service: Gastroenterology    ESOPHAGOGASTRODUODENOSCOPY N/A 2/2/2018    Procedure: ESOPHAGOGASTRODUODENOSCOPY (EGD); Surgeon: Shakira Jacobs MD;  [facility];   Service: Gastroenterology    TESTICLE SURGERY Left     infected testicle       Family History   Problem Relation Age of Onset    Supraventricular tachycardia Mother     Atrial fibrillation Mother     Diabetes type II Mother  Cancer Mother     Osteoporosis Mother     Ulcerative colitis Father     Liver disease Father     ADD / ADHD Son     Colon cancer Family     Diabetes type II Family     Hyperlipidemia Family     Hypertension Family     Hypothyroidism Family     Heart disease Family        History   Alcohol Use    Yes     Comment: occ       History   Drug Use No       History   Smoking Status    Former Smoker    Quit date: 2010   Smokeless Tobacco    Never Used       Social History     Allergies   Allergen Reactions    Macrolides And Ketolides Anaphylaxis    Pertussis Vaccines Anaphylaxis    Zithromax [Azithromycin] Anaphylaxis    Bee Venom      Annotation - [id number]: wasp, hornet also    Erythromycin     Guaifenesin     Other      Annotation - [id number]:  violet    Pertussis Vaccine     Piperacillin Sod-Tazobactam So     Pollen Extract     Vancomycin        The following portions of the patient's history were reviewed and updated as appropriate: allergies, current medications, past family history, past medical history, past social history, past surgical history and problem list       Current Outpatient Prescriptions:     Ascorbic Acid (VITAMIN C) 1000 MG tablet, Take 1,000 mg by mouth daily, Disp: , Rfl:     carvedilol (COREG) 3 125 mg tablet, Take 1 tablet (3 125 mg total) by mouth 2 (two) times a day with meals, Disp: 60 tablet, Rfl: 3    dicyclomine (BENTYL) 20 mg tablet, Take 1 tablet by mouth 3 (three) times a day, Disp: , Rfl:     lisinopril (ZESTRIL) 20 mg tablet, Take 1 tablet (20 mg total) by mouth every morning, Disp: 90 tablet, Rfl: 3    multivitamin (THERAGRAN) TABS, Take 1 tablet by mouth daily, Disp: , Rfl:     Naproxen Sodium (ALEVE) 220 MG CAPS, Take by mouth, Disp: , Rfl:     omeprazole (PriLOSEC) 40 MG capsule, Take 1 capsule (40 mg total) by mouth every morning, Disp: 30 capsule, Rfl: 5    ROS  Review of Systems   Constitutional: Negative for chills, diaphoresis, fatigue and fever  HENT: Negative  Eyes: Negative  Respiratory: Negative  Cardiovascular: Negative  Gastrointestinal: Negative  Musculoskeletal: Negative  Skin: Negative  Neurological: Negative  Objective    Physical exam    Blood pressure 132/70, pulse 98, resp  rate 16, weight (!) 161 kg (354 lb), SpO2 95 %  Physical Exam   Constitutional: He is oriented to person, place, and time  He appears well-developed and well-nourished  No distress  HENT:   Head: Normocephalic and atraumatic  Right Ear: External ear normal    Left Ear: External ear normal    Mouth/Throat: No oropharyngeal exudate  Eyes: Pupils are equal, round, and reactive to light  Conjunctivae and EOM are normal  No scleral icterus  Neck: Normal range of motion  No tracheal deviation present  Cardiovascular: Normal rate, regular rhythm and normal heart sounds  Exam reveals no gallop and no friction rub  No murmur heard  Pulmonary/Chest: Effort normal  No stridor  No respiratory distress  He has no wheezes  He has no rales  Abdominal: Soft  Bowel sounds are normal  He exhibits no distension  Musculoskeletal: He exhibits no edema, tenderness or deformity  Neurological: He is alert and oriented to person, place, and time  Skin: Skin is warm and dry  No rash noted  He is not diaphoretic  No erythema  No pallor

## 2018-12-14 DIAGNOSIS — K21.9 GASTROESOPHAGEAL REFLUX DISEASE, ESOPHAGITIS PRESENCE NOT SPECIFIED: ICD-10-CM

## 2018-12-14 RX ORDER — OMEPRAZOLE 40 MG/1
40 CAPSULE, DELAYED RELEASE ORAL EVERY MORNING
Qty: 30 CAPSULE | Refills: 5 | Status: SHIPPED | OUTPATIENT
Start: 2018-12-14 | End: 2019-07-26 | Stop reason: SDUPTHER

## 2019-02-22 DIAGNOSIS — R00.0 TACHYCARDIA: ICD-10-CM

## 2019-02-22 DIAGNOSIS — I10 ESSENTIAL HYPERTENSION: ICD-10-CM

## 2019-02-26 RX ORDER — CARVEDILOL 3.12 MG/1
3.12 TABLET ORAL 2 TIMES DAILY WITH MEALS
Qty: 60 TABLET | Refills: 3 | Status: SHIPPED | OUTPATIENT
Start: 2019-02-26 | End: 2019-07-26 | Stop reason: SDUPTHER

## 2019-05-01 DIAGNOSIS — I10 HYPERTENSION, UNSPECIFIED TYPE: ICD-10-CM

## 2019-05-02 RX ORDER — LISINOPRIL 20 MG/1
20 TABLET ORAL EVERY MORNING
Qty: 90 TABLET | Refills: 3 | Status: SHIPPED | OUTPATIENT
Start: 2019-05-02 | End: 2019-07-26 | Stop reason: SDUPTHER

## 2019-07-22 ENCOUNTER — TELEPHONE (OUTPATIENT)
Dept: FAMILY MEDICINE CLINIC | Facility: CLINIC | Age: 39
End: 2019-07-22

## 2019-07-22 NOTE — TELEPHONE ENCOUNTER
Dr Gail Sosa    carvediol  Coreg 3 125 mg  Omeprazole 40 mg     [facility]    Last time pt was here was 2017 I make an apt with you

## 2019-07-26 ENCOUNTER — OFFICE VISIT (OUTPATIENT)
Dept: FAMILY MEDICINE CLINIC | Facility: CLINIC | Age: 39
End: 2019-07-26
Payer: COMMERCIAL

## 2019-07-26 VITALS
HEART RATE: 106 BPM | HEIGHT: 69 IN | SYSTOLIC BLOOD PRESSURE: 156 MMHG | BODY MASS INDEX: 46.65 KG/M2 | OXYGEN SATURATION: 92 % | WEIGHT: 315 LBS | RESPIRATION RATE: 20 BRPM | TEMPERATURE: 97 F | DIASTOLIC BLOOD PRESSURE: 80 MMHG

## 2019-07-26 DIAGNOSIS — R00.0 TACHYCARDIA: ICD-10-CM

## 2019-07-26 DIAGNOSIS — I10 HYPERTENSION, UNSPECIFIED TYPE: ICD-10-CM

## 2019-07-26 DIAGNOSIS — I10 ESSENTIAL HYPERTENSION: ICD-10-CM

## 2019-07-26 DIAGNOSIS — K58.0 IRRITABLE BOWEL SYNDROME WITH DIARRHEA: Primary | ICD-10-CM

## 2019-07-26 DIAGNOSIS — K21.9 GASTROESOPHAGEAL REFLUX DISEASE, ESOPHAGITIS PRESENCE NOT SPECIFIED: ICD-10-CM

## 2019-07-26 PROCEDURE — 3008F BODY MASS INDEX DOCD: CPT | Performed by: FAMILY MEDICINE

## 2019-07-26 PROCEDURE — 99213 OFFICE O/P EST LOW 20 MIN: CPT | Performed by: FAMILY MEDICINE

## 2019-07-26 PROCEDURE — 1036F TOBACCO NON-USER: CPT | Performed by: FAMILY MEDICINE

## 2019-07-26 RX ORDER — LISINOPRIL 20 MG/1
20 TABLET ORAL EVERY MORNING
Qty: 90 TABLET | Refills: 3 | Status: SHIPPED | OUTPATIENT
Start: 2019-07-26 | End: 2020-10-16 | Stop reason: SDUPTHER

## 2019-07-26 RX ORDER — OMEPRAZOLE 40 MG/1
40 CAPSULE, DELAYED RELEASE ORAL EVERY MORNING
Qty: 30 CAPSULE | Refills: 5 | Status: SHIPPED | OUTPATIENT
Start: 2019-07-26 | End: 2020-03-11 | Stop reason: SDUPTHER

## 2019-07-26 RX ORDER — CARVEDILOL 3.12 MG/1
3.12 TABLET ORAL 2 TIMES DAILY WITH MEALS
Qty: 60 TABLET | Refills: 3 | Status: SHIPPED | OUTPATIENT
Start: 2019-07-26 | End: 2019-12-16 | Stop reason: SDUPTHER

## 2019-07-26 RX ORDER — DICYCLOMINE HCL 20 MG
20 TABLET ORAL 3 TIMES DAILY
Qty: 90 TABLET | Refills: 1 | Status: SHIPPED | OUTPATIENT
Start: 2019-07-26 | End: 2021-02-11 | Stop reason: SDUPTHER

## 2019-07-26 NOTE — PROGRESS NOTES
Assessment/Plan:    Hypertension  Discussed importance of medication compliance  Check BP at home  Diagnoses and all orders for this visit:    Irritable bowel syndrome with diarrhea  -     dicyclomine (BENTYL) 20 mg tablet; Take 1 tablet (20 mg total) by mouth 3 (three) times a day    Essential hypertension  -     carvedilol (COREG) 3 125 mg tablet; Take 1 tablet (3 125 mg total) by mouth 2 (two) times a day with meals    Tachycardia  -     carvedilol (COREG) 3 125 mg tablet; Take 1 tablet (3 125 mg total) by mouth 2 (two) times a day with meals    Hypertension, unspecified type  -     lisinopril (ZESTRIL) 20 mg tablet; Take 1 tablet (20 mg total) by mouth every morning    Gastroesophageal reflux disease, esophagitis presence not specified  -     omeprazole (PriLOSEC) 40 MG capsule; Take 1 capsule (40 mg total) by mouth every morning          Subjective:      Patient ID: Juan Alberto Cleary is a 46 y o  male  Pt here for medication refill  Was told that he needed appt although he has been here in October  Pt has HTN on lisinopril and carvedilol, takes them but forgets some days  Did not take meds today, his BP is 150/90  Has had an intentional 20lb weight loss  No other issues      The following portions of the patient's history were reviewed and updated as appropriate: allergies, current medications, past family history, past medical history, past social history, past surgical history and problem list     Review of Systems   Constitutional: Negative  HENT: Negative  Respiratory: Negative  Gastrointestinal: Negative  Musculoskeletal: Negative  Neurological: Negative            Objective:      /80 (BP Location: Left arm, Patient Position: Sitting, Cuff Size: Adult)   Pulse (!) 106   Temp (!) 97 °F (36 1 °C) (Tympanic)   Resp 20   Ht 5' 8 5" (1 74 m)   Wt (!) 153 kg (336 lb 8 oz)   SpO2 92%   BMI 50 42 kg/m²          Physical Exam   Constitutional: He is oriented to person, place, and time  He appears well-developed and well-nourished  Morbidly obese   HENT:   Head: Normocephalic and atraumatic  Cardiovascular: Regular rhythm  Pulmonary/Chest: Effort normal and breath sounds normal    Abdominal: Soft  Bowel sounds are normal    Neurological: He is alert and oriented to person, place, and time  Skin: Skin is warm

## 2019-12-16 DIAGNOSIS — R00.0 TACHYCARDIA: ICD-10-CM

## 2019-12-16 DIAGNOSIS — I10 ESSENTIAL HYPERTENSION: ICD-10-CM

## 2019-12-16 RX ORDER — CARVEDILOL 3.12 MG/1
3.12 TABLET ORAL 2 TIMES DAILY WITH MEALS
Qty: 60 TABLET | Refills: 3 | Status: SHIPPED | OUTPATIENT
Start: 2019-12-16 | End: 2020-05-13 | Stop reason: SDUPTHER

## 2020-03-11 DIAGNOSIS — K21.9 GASTROESOPHAGEAL REFLUX DISEASE, ESOPHAGITIS PRESENCE NOT SPECIFIED: ICD-10-CM

## 2020-03-12 RX ORDER — OMEPRAZOLE 40 MG/1
40 CAPSULE, DELAYED RELEASE ORAL EVERY MORNING
Qty: 30 CAPSULE | Refills: 5 | Status: SHIPPED | OUTPATIENT
Start: 2020-03-12 | End: 2020-10-16 | Stop reason: SDUPTHER

## 2020-05-13 DIAGNOSIS — R00.0 TACHYCARDIA: ICD-10-CM

## 2020-05-13 DIAGNOSIS — I10 ESSENTIAL HYPERTENSION: ICD-10-CM

## 2020-05-13 RX ORDER — CARVEDILOL 3.12 MG/1
3.12 TABLET ORAL 2 TIMES DAILY WITH MEALS
Qty: 60 TABLET | Refills: 3 | Status: SHIPPED | OUTPATIENT
Start: 2020-05-13 | End: 2020-10-16 | Stop reason: SDUPTHER

## 2020-09-21 DIAGNOSIS — R00.0 TACHYCARDIA: ICD-10-CM

## 2020-09-21 DIAGNOSIS — I10 ESSENTIAL HYPERTENSION: ICD-10-CM

## 2020-09-21 DIAGNOSIS — I10 HYPERTENSION, UNSPECIFIED TYPE: ICD-10-CM

## 2020-09-21 RX ORDER — CARVEDILOL 3.12 MG/1
3.12 TABLET ORAL 2 TIMES DAILY WITH MEALS
Qty: 60 TABLET | Refills: 3 | OUTPATIENT
Start: 2020-09-21

## 2020-09-21 RX ORDER — LISINOPRIL 20 MG/1
20 TABLET ORAL EVERY MORNING
Qty: 90 TABLET | Refills: 3 | OUTPATIENT
Start: 2020-09-21

## 2020-10-09 DIAGNOSIS — I10 HYPERTENSION, UNSPECIFIED TYPE: ICD-10-CM

## 2020-10-09 DIAGNOSIS — K21.9 GASTROESOPHAGEAL REFLUX DISEASE: ICD-10-CM

## 2020-10-09 DIAGNOSIS — K58.0 IRRITABLE BOWEL SYNDROME WITH DIARRHEA: ICD-10-CM

## 2020-10-14 RX ORDER — DICYCLOMINE HCL 20 MG
20 TABLET ORAL 3 TIMES DAILY
Qty: 90 TABLET | Refills: 1 | OUTPATIENT
Start: 2020-10-14

## 2020-10-14 RX ORDER — COVID-19 ANTIGEN TEST
KIT MISCELLANEOUS
OUTPATIENT
Start: 2020-10-14

## 2020-10-14 RX ORDER — OMEPRAZOLE 40 MG/1
40 CAPSULE, DELAYED RELEASE ORAL EVERY MORNING
Qty: 30 CAPSULE | Refills: 5 | OUTPATIENT
Start: 2020-10-14

## 2020-10-14 RX ORDER — LISINOPRIL 20 MG/1
20 TABLET ORAL EVERY MORNING
Qty: 90 TABLET | Refills: 3 | OUTPATIENT
Start: 2020-10-14

## 2020-10-16 ENCOUNTER — OFFICE VISIT (OUTPATIENT)
Dept: FAMILY MEDICINE CLINIC | Facility: CLINIC | Age: 40
End: 2020-10-16
Payer: COMMERCIAL

## 2020-10-16 VITALS
OXYGEN SATURATION: 98 % | TEMPERATURE: 97.5 F | RESPIRATION RATE: 20 BRPM | SYSTOLIC BLOOD PRESSURE: 160 MMHG | BODY MASS INDEX: 46.65 KG/M2 | DIASTOLIC BLOOD PRESSURE: 100 MMHG | HEART RATE: 98 BPM | HEIGHT: 69 IN | WEIGHT: 315 LBS

## 2020-10-16 DIAGNOSIS — E78.5 DYSLIPIDEMIA: ICD-10-CM

## 2020-10-16 DIAGNOSIS — Z23 ENCOUNTER FOR IMMUNIZATION: ICD-10-CM

## 2020-10-16 DIAGNOSIS — K21.9 GASTROESOPHAGEAL REFLUX DISEASE: ICD-10-CM

## 2020-10-16 DIAGNOSIS — R00.0 TACHYCARDIA: ICD-10-CM

## 2020-10-16 DIAGNOSIS — I10 HYPERTENSION, UNSPECIFIED TYPE: Primary | ICD-10-CM

## 2020-10-16 DIAGNOSIS — I10 ESSENTIAL HYPERTENSION: ICD-10-CM

## 2020-10-16 PROCEDURE — 99212 OFFICE O/P EST SF 10 MIN: CPT | Performed by: FAMILY MEDICINE

## 2020-10-16 PROCEDURE — 90471 IMMUNIZATION ADMIN: CPT | Performed by: FAMILY MEDICINE

## 2020-10-16 PROCEDURE — 90682 RIV4 VACC RECOMBINANT DNA IM: CPT | Performed by: FAMILY MEDICINE

## 2020-10-16 RX ORDER — AMINO ACIDS/CHROMIUM
1 TABLET ORAL DAILY
COMMUNITY

## 2020-10-16 RX ORDER — CARVEDILOL 3.12 MG/1
3.12 TABLET ORAL 2 TIMES DAILY WITH MEALS
Qty: 60 TABLET | Refills: 3 | Status: SHIPPED | OUTPATIENT
Start: 2020-10-16 | End: 2021-02-17 | Stop reason: SDUPTHER

## 2020-10-16 RX ORDER — LISINOPRIL 20 MG/1
20 TABLET ORAL EVERY MORNING
Qty: 90 TABLET | Refills: 3 | Status: SHIPPED | OUTPATIENT
Start: 2020-10-16 | End: 2021-10-18

## 2020-10-16 RX ORDER — OMEPRAZOLE 40 MG/1
40 CAPSULE, DELAYED RELEASE ORAL EVERY MORNING
Qty: 30 CAPSULE | Refills: 5 | Status: SHIPPED | OUTPATIENT
Start: 2020-10-16 | End: 2021-05-03 | Stop reason: SDUPTHER

## 2021-01-28 ENCOUNTER — HOSPITAL ENCOUNTER (EMERGENCY)
Facility: HOSPITAL | Age: 41
Discharge: HOME | End: 2021-01-28
Attending: EMERGENCY MEDICINE | Admitting: EMERGENCY MEDICINE
Payer: COMMERCIAL

## 2021-01-28 ENCOUNTER — APPOINTMENT (EMERGENCY)
Dept: RADIOLOGY | Facility: HOSPITAL | Age: 41
End: 2021-01-28
Payer: COMMERCIAL

## 2021-01-28 VITALS
WEIGHT: 315 LBS | RESPIRATION RATE: 16 BRPM | SYSTOLIC BLOOD PRESSURE: 148 MMHG | OXYGEN SATURATION: 96 % | HEART RATE: 106 BPM | DIASTOLIC BLOOD PRESSURE: 76 MMHG | TEMPERATURE: 97.8 F | BODY MASS INDEX: 59.07 KG/M2

## 2021-01-28 DIAGNOSIS — K55.069 OMENTAL INFARCTION (HCC): ICD-10-CM

## 2021-01-28 DIAGNOSIS — R10.9 ABDOMINAL PAIN: Primary | ICD-10-CM

## 2021-01-28 DIAGNOSIS — K42.9 PERIUMBILICAL HERNIA: ICD-10-CM

## 2021-01-28 LAB
ALBUMIN SERPL BCP-MCNC: 2.8 G/DL (ref 3.5–5)
ALP SERPL-CCNC: 112 U/L (ref 46–116)
ALT SERPL W P-5'-P-CCNC: 89 U/L (ref 12–78)
ANION GAP SERPL CALCULATED.3IONS-SCNC: 5 MMOL/L (ref 4–13)
AST SERPL W P-5'-P-CCNC: 97 U/L (ref 5–45)
BASOPHILS # BLD AUTO: 0.04 THOUSANDS/ΜL (ref 0–0.1)
BASOPHILS NFR BLD AUTO: 1 % (ref 0–1)
BILIRUB SERPL-MCNC: 0.8 MG/DL (ref 0.2–1)
BUN SERPL-MCNC: 7 MG/DL (ref 5–25)
CALCIUM ALBUM COR SERPL-MCNC: 10.4 MG/DL (ref 8.3–10.1)
CALCIUM SERPL-MCNC: 9.4 MG/DL (ref 8.3–10.1)
CHLORIDE SERPL-SCNC: 99 MMOL/L (ref 100–108)
CO2 SERPL-SCNC: 31 MMOL/L (ref 21–32)
CREAT SERPL-MCNC: 0.92 MG/DL (ref 0.6–1.3)
EOSINOPHIL # BLD AUTO: 0.1 THOUSAND/ΜL (ref 0–0.61)
EOSINOPHIL NFR BLD AUTO: 2 % (ref 0–6)
ERYTHROCYTE [DISTWIDTH] IN BLOOD BY AUTOMATED COUNT: 12.9 % (ref 11.6–15.1)
GFR SERPL CREATININE-BSD FRML MDRD: 104 ML/MIN/1.73SQ M
GLUCOSE SERPL-MCNC: 293 MG/DL (ref 65–140)
HCT VFR BLD AUTO: 51.8 % (ref 36.5–49.3)
HGB BLD-MCNC: 16.1 G/DL (ref 12–17)
IMM GRANULOCYTES # BLD AUTO: 0.02 THOUSAND/UL (ref 0–0.2)
IMM GRANULOCYTES NFR BLD AUTO: 0 % (ref 0–2)
LIPASE SERPL-CCNC: 156 U/L (ref 73–393)
LYMPHOCYTES # BLD AUTO: 1.45 THOUSANDS/ΜL (ref 0.6–4.47)
LYMPHOCYTES NFR BLD AUTO: 21 % (ref 14–44)
MAGNESIUM SERPL-MCNC: 1.6 MG/DL (ref 1.6–2.6)
MCH RBC QN AUTO: 29.8 PG (ref 26.8–34.3)
MCHC RBC AUTO-ENTMCNC: 31.1 G/DL (ref 31.4–37.4)
MCV RBC AUTO: 96 FL (ref 82–98)
MONOCYTES # BLD AUTO: 0.38 THOUSAND/ΜL (ref 0.17–1.22)
MONOCYTES NFR BLD AUTO: 6 % (ref 4–12)
NEUTROPHILS # BLD AUTO: 4.77 THOUSANDS/ΜL (ref 1.85–7.62)
NEUTS SEG NFR BLD AUTO: 70 % (ref 43–75)
NRBC BLD AUTO-RTO: 0 /100 WBCS
PLATELET # BLD AUTO: 241 THOUSANDS/UL (ref 149–390)
PMV BLD AUTO: 10 FL (ref 8.9–12.7)
POTASSIUM SERPL-SCNC: 4.7 MMOL/L (ref 3.5–5.3)
PROT SERPL-MCNC: 8.5 G/DL (ref 6.4–8.2)
RBC # BLD AUTO: 5.4 MILLION/UL (ref 3.88–5.62)
SODIUM SERPL-SCNC: 135 MMOL/L (ref 136–145)
WBC # BLD AUTO: 6.76 THOUSAND/UL (ref 4.31–10.16)

## 2021-01-28 PROCEDURE — 80053 COMPREHEN METABOLIC PANEL: CPT | Performed by: EMERGENCY MEDICINE

## 2021-01-28 PROCEDURE — 83690 ASSAY OF LIPASE: CPT | Performed by: EMERGENCY MEDICINE

## 2021-01-28 PROCEDURE — 99285 EMERGENCY DEPT VISIT HI MDM: CPT | Performed by: EMERGENCY MEDICINE

## 2021-01-28 PROCEDURE — 36415 COLL VENOUS BLD VENIPUNCTURE: CPT | Performed by: EMERGENCY MEDICINE

## 2021-01-28 PROCEDURE — 83735 ASSAY OF MAGNESIUM: CPT | Performed by: EMERGENCY MEDICINE

## 2021-01-28 PROCEDURE — 99284 EMERGENCY DEPT VISIT MOD MDM: CPT

## 2021-01-28 PROCEDURE — 85025 COMPLETE CBC W/AUTO DIFF WBC: CPT | Performed by: EMERGENCY MEDICINE

## 2021-01-28 PROCEDURE — 74177 CT ABD & PELVIS W/CONTRAST: CPT

## 2021-01-28 PROCEDURE — G1004 CDSM NDSC: HCPCS

## 2021-01-28 RX ORDER — ONDANSETRON 4 MG/1
4 TABLET, ORALLY DISINTEGRATING ORAL EVERY 6 HOURS PRN
Qty: 9 TABLET | Refills: 0 | Status: SHIPPED | OUTPATIENT
Start: 2021-01-28 | End: 2021-10-13

## 2021-01-28 RX ORDER — OXYCODONE HYDROCHLORIDE AND ACETAMINOPHEN 5; 325 MG/1; MG/1
1 TABLET ORAL EVERY 8 HOURS PRN
Qty: 10 TABLET | Refills: 0 | Status: SHIPPED | OUTPATIENT
Start: 2021-01-28 | End: 2021-01-31

## 2021-01-28 RX ORDER — OXYCODONE HYDROCHLORIDE AND ACETAMINOPHEN 5; 325 MG/1; MG/1
1 TABLET ORAL ONCE
Status: COMPLETED | OUTPATIENT
Start: 2021-01-28 | End: 2021-01-28

## 2021-01-28 RX ADMIN — OXYCODONE HYDROCHLORIDE AND ACETAMINOPHEN 1 TABLET: 5; 325 TABLET ORAL at 20:33

## 2021-01-28 RX ADMIN — IOHEXOL 100 ML: 350 INJECTION, SOLUTION INTRAVENOUS at 19:19

## 2021-01-28 NOTE — ED PROVIDER NOTES
History  Chief Complaint   Patient presents with    Abdominal Pain     Pt c/o abd pain for 1 week around umbilicus  Feels a lump around the area  Intermittent constipation and diarrhea  81-year-old male presents with diffuse abdominal pain that has been ongoing for the past week sharp continuous nonradiating located primarily above his umbilicus where his hernia is  Denies constipation diarrhea fevers chills nausea vomiting dysuria urgency frequency or any other symptoms  Nothing makes the pain better or however movement and touching the hernia makes the abdominal pain worse worse it is currently 6/10  No cough congestion chest pain shortness of breath  History provided by:  Patient   used: No        Prior to Admission Medications   Prescriptions Last Dose Informant Patient Reported? Taking?    Ascorbic Acid (VITAMIN C) 1000 MG tablet Not Taking at Unknown time  Yes No   Sig: Take 1,000 mg by mouth daily   Chromium 1000 MCG TABS 1/28/2021 at Unknown time  Yes Yes   Sig: Take 1 tablet by mouth daily   Naproxen Sodium (ALEVE) 220 MG CAPS 1/28/2021 at Unknown time  Yes Yes   Sig: Take by mouth   carvedilol (COREG) 3 125 mg tablet 1/28/2021 at Unknown time  No Yes   Sig: Take 1 tablet (3 125 mg total) by mouth 2 (two) times a day with meals   dicyclomine (BENTYL) 20 mg tablet 1/28/2021 at Unknown time  No Yes   Sig: Take 1 tablet (20 mg total) by mouth 3 (three) times a day   lisinopril (ZESTRIL) 20 mg tablet 1/28/2021 at Unknown time  No Yes   Sig: Take 1 tablet (20 mg total) by mouth every morning   multivitamin (THERAGRAN) TABS 1/28/2021 at Unknown time  Yes Yes   Sig: Take 1 tablet by mouth daily   omeprazole (PriLOSEC) 40 MG capsule Not Taking at Unknown time  No No   Sig: Take 1 capsule (40 mg total) by mouth every morning   Patient not taking: Reported on 1/28/2021      Facility-Administered Medications: None       Past Medical History:   Diagnosis Date    Bell's palsy     Depression     GERD (gastroesophageal reflux disease)     Hypertension     Irregular heart beat     Neurocardiogenic syncope     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     pt symptomatic with extreme and sudden environmental temp  fluctuations       Past Surgical History:   Procedure Laterality Date    COLONOSCOPY N/A 2018    Procedure: COLONOSCOPY;  Surgeon: Gio Deras MD;  Location: [facility]; Service: Gastroenterology    ESOPHAGOGASTRODUODENOSCOPY N/A 2018    Procedure: ESOPHAGOGASTRODUODENOSCOPY (EGD); Surgeon: Gio Deras MD;  Location: [facility] GI LAB; Service: Gastroenterology    TESTICLE SURGERY Left     infected testicle       Family History   Problem Relation Age of Onset    Supraventricular tachycardia Mother     Atrial fibrillation Mother     Diabetes type II Mother     Cancer Mother     Osteoporosis Mother     Ulcerative colitis Father     Liver disease Father     ADD / ADHD Son     Colon cancer Family     Diabetes type II Family     Hyperlipidemia Family     Hypertension Family     Hypothyroidism Family     Heart disease Family      I have reviewed and agree with the history as documented  E-Cigarette/Vaping    E-Cigarette Use Never User      E-Cigarette/Vaping Substances     Social History     Tobacco Use    Smoking status: Former Smoker     Quit date:      Years since quittin 0    Smokeless tobacco: Never Used   Substance Use Topics    Alcohol use: Yes     Frequency: Monthly or less     Drinks per session: 1 or 2     Comment: occ    Drug use: No       Review of Systems   Constitutional: Negative  HENT: Negative  Eyes: Negative  Respiratory: Negative  Cardiovascular: Negative  Gastrointestinal: Positive for abdominal pain  Endocrine: Negative  Genitourinary: Negative  Musculoskeletal: Negative  Skin: Negative  Allergic/Immunologic: Negative  Neurological: Negative  Hematological: Negative      Psychiatric/Behavioral: Negative  All other systems reviewed and are negative  Physical Exam  Physical Exam  Vitals signs and nursing note reviewed  Constitutional:       Appearance: He is well-developed  HENT:      Head: Normocephalic and atraumatic  Eyes:      Pupils: Pupils are equal, round, and reactive to light  Neck:      Musculoskeletal: Normal range of motion and neck supple  Cardiovascular:      Rate and Rhythm: Normal rate and regular rhythm  Pulmonary:      Effort: Pulmonary effort is normal       Breath sounds: Normal breath sounds  Abdominal:      General: Bowel sounds are normal       Palpations: Abdomen is soft  Comments: Hernia noted above the umbilicus which is not reducible at this time  However no blue or ischemic appearance skin changes over the hernia noted  Bowel sounds present abdomen is otherwise soft non peritoneal    Musculoskeletal: Normal range of motion  Skin:     General: Skin is warm and dry  Capillary Refill: Capillary refill takes less than 2 seconds  Neurological:      Mental Status: He is alert and oriented to person, place, and time           Vital Signs  ED Triage Vitals [01/28/21 1706]   Temperature Pulse Respirations Blood Pressure SpO2   (!) 97 3 °F (36 3 °C) 97 20 143/86 100 %      Temp src Heart Rate Source Patient Position - Orthostatic VS BP Location FiO2 (%)   -- -- -- -- --      Pain Score       7           Vitals:    01/28/21 1706   BP: 143/86   Pulse: 97         Visual Acuity      ED Medications  Medications   iohexol (OMNIPAQUE) 350 MG/ML injection (SINGLE-DOSE) 100 mL (100 mL Intravenous Given 1/28/21 1919)   oxyCODONE-acetaminophen (PERCOCET) 5-325 mg per tablet 1 tablet (1 tablet Oral Given 1/28/21 2033)       Diagnostic Studies  Results Reviewed     Procedure Component Value Units Date/Time    Lipase [[id number]]  (Normal) Collected: 01/28/21 1823    Lab Status: Final result Specimen: Blood from Arm, Left Updated: 01/28/21 1849     Lipase 156 u/L Comprehensive metabolic panel [[id number]]  (Abnormal) Collected: 01/28/21 1823    Lab Status: Final result Specimen: Blood from Arm, Left Updated: 01/28/21 1849     Sodium 135 mmol/L      Potassium 4 7 mmol/L      Chloride 99 mmol/L      CO2 31 mmol/L      ANION GAP 5 mmol/L      BUN 7 mg/dL      Creatinine 0 92 mg/dL      Glucose 293 mg/dL      Calcium 9 4 mg/dL      Corrected Calcium 10 4 mg/dL      AST 97 U/L      ALT 89 U/L      Alkaline Phosphatase 112 U/L      Total Protein 8 5 g/dL      Albumin 2 8 g/dL      Total Bilirubin 0 80 mg/dL      eGFR 104 ml/min/1 73sq m     Narrative:      National Kidney Disease Foundation guidelines for Chronic Kidney Disease (CKD):     Stage 1 with normal or high GFR (GFR > 90 mL/min/1 73 square meters)    Stage 2 Mild CKD (GFR = 60-89 mL/min/1 73 square meters)    Stage 3A Moderate CKD (GFR = 45-59 mL/min/1 73 square meters)    Stage 3B Moderate CKD (GFR = 30-44 mL/min/1 73 square meters)    Stage 4 Severe CKD (GFR = 15-29 mL/min/1 73 square meters)    Stage 5 End Stage CKD (GFR <15 mL/min/1 73 square meters)  Note: GFR calculation is accurate only with a steady state creatinine    Magnesium [[id number]]  (Normal) Collected: 01/28/21 1823    Lab Status: Final result Specimen: Blood from Arm, Left Updated: 01/28/21 1849     Magnesium 1 6 mg/dL     CBC and differential [[id number]]  (Abnormal) Collected: 01/28/21 1823    Lab Status: Final result Specimen: Blood from Arm, Left Updated: 01/28/21 1828     WBC 6 76 Thousand/uL      RBC 5 40 Million/uL      Hemoglobin 16 1 g/dL      Hematocrit 51 8 %      MCV 96 fL      MCH 29 8 pg      MCHC 31 1 g/dL      RDW 12 9 %      MPV 10 0 fL      Platelets 268 Thousands/uL      nRBC 0 /100 WBCs      Neutrophils Relative 70 %      Immat GRANS % 0 %      Lymphocytes Relative 21 %      Monocytes Relative 6 %      Eosinophils Relative 2 %      Basophils Relative 1 %      Neutrophils Absolute 4 77 Thousands/µL      Immature Grans Absolute 0 02 Thousand/uL      Lymphocytes Absolute 1 45 Thousands/µL      Monocytes Absolute 0 38 Thousand/µL      Eosinophils Absolute 0 10 Thousand/µL      Basophils Absolute 0 04 Thousands/µL                  CT abdomen pelvis with contrast   Final Result by Stephanie Dennis MD (01/28 1949)      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 No Filed at: 01/28/2021 1749                    MDM  Number of Diagnoses or Management Options  Abdominal pain:   Omental infarction [facility]):   Periumbilical hernia:   Diagnosis management comments: Patient evaluated with labs CT of the abdomen pelvis  His abdominal exam was benign  I spoke to Dr Xu Tovar regarding the CT findings and the case and he recommended patient follow-up in his clinic within the week  Patient given strict instructions return to the ED for any worsening abdominal pain fevers vomiting he verbalized understanding of the instructions and had no further questions the time of discharge he was discharged on appropriate medications  He had stable vitals well-appearing at the time of discharge         Amount and/or Complexity of Data Reviewed  Clinical lab tests: ordered and reviewed  Tests in the radiology section of CPT®: ordered and reviewed  Tests in the medicine section of CPT®: ordered and reviewed    Patient Progress  Patient progress: stable      Disposition  Final diagnoses:   Abdominal pain   Omental infarction [facility])   Periumbilical hernia     Time reflects when diagnosis was documented in both MDM as applicable and the Disposition within this note     Time User Action Codes Description Comment    1/28/2021  8:10 PM Anepu, Kourtney Add [R10 9] Abdominal pain     1/28/2021  8:10 PM Anepu, Kourtney Add [K55 069] Omental infarction ([address] )     1/28/2021  8:10 PM Anepu, Carlos Jernigan Add [A65 9] Periumbilical hernia       ED Disposition     ED Disposition Condition Date/Time Comment    Discharge Stable Thu Jan 28, 2021  8:10 PM Maximilian Nicholasleón Erickson discharge to home/self care  Follow-up Information     Follow up With Specialties Details Why Contact Info Additional Information    [facility] Emergency Department Emergency Medicine  If symptoms worsen [address] [address] Emergency Department, [place], [place], [facility] Rizwan Huber MD General Surgery, Wound Care   [facility], [address] [place] [address] [phone]             Patient's Medications   Discharge Prescriptions    ONDANSETRON (ZOFRAN-ODT) 4 MG DISINTEGRATING TABLET    Take 1 tablet (4 mg total) by mouth every 6 (six) hours as needed for nausea or vomiting for up to 3 days       Start Date: 1/28/2021 End Date: 1/31/2021       Order Dose: 4 mg       Quantity: 9 tablet    Refills: 0    OXYCODONE-ACETAMINOPHEN (PERCOCET) 5-325 MG PER TABLET    Take 1 tablet by mouth every 8 (eight) hours as needed for moderate pain for up to 3 daysMax Daily Amount: 3 tablets       Start Date: 1/28/2021 End Date: 1/31/2021       Order Dose: 1 tablet       Quantity: 10 tablet    Refills: 0     No discharge procedures on file      PDMP Review     None          ED Provider  Electronically Signed by           Tip Zafar DO  01/28/21 2033

## 2021-01-29 NOTE — DISCHARGE INSTRUCTIONS
-please call general surgery clinic tomorrow at 8am as listed below  -please take percocet for pain, do not take any additional tylenol   Supplement pain with motrin or advil  -please return immediately to the ED in 12-24 hours if you have any worsening symptoms of abdominal pain, vomiting,fevers, chills,

## 2021-02-05 ENCOUNTER — CONSULT (OUTPATIENT)
Dept: SURGERY | Facility: CLINIC | Age: 41
End: 2021-02-05
Payer: COMMERCIAL

## 2021-02-05 VITALS
SYSTOLIC BLOOD PRESSURE: 144 MMHG | WEIGHT: 315 LBS | HEIGHT: 69 IN | DIASTOLIC BLOOD PRESSURE: 90 MMHG | HEART RATE: 92 BPM | BODY MASS INDEX: 46.65 KG/M2 | TEMPERATURE: 96.3 F

## 2021-02-05 DIAGNOSIS — I10 BENIGN ESSENTIAL HYPERTENSION: ICD-10-CM

## 2021-02-05 DIAGNOSIS — K43.9 VENTRAL HERNIA WITHOUT OBSTRUCTION OR GANGRENE: Primary | ICD-10-CM

## 2021-02-05 DIAGNOSIS — R19.7 DIARRHEA: ICD-10-CM

## 2021-02-05 DIAGNOSIS — K58.0 IRRITABLE BOWEL SYNDROME WITH DIARRHEA: ICD-10-CM

## 2021-02-05 DIAGNOSIS — E66.01 MORBID OBESITY (HCC): ICD-10-CM

## 2021-02-05 DIAGNOSIS — E66.01 MORBID OBESITY WITH BODY MASS INDEX (BMI) OF 50.0 TO 59.9 IN ADULT (HCC): ICD-10-CM

## 2021-02-05 DIAGNOSIS — K21.9 GERD (GASTROESOPHAGEAL REFLUX DISEASE): ICD-10-CM

## 2021-02-05 PROCEDURE — 99204 OFFICE O/P NEW MOD 45 MIN: CPT | Performed by: SPECIALIST

## 2021-02-05 RX ORDER — OXYCODONE HYDROCHLORIDE AND ACETAMINOPHEN 5; 325 MG/1; MG/1
1 TABLET ORAL EVERY 8 HOURS PRN
COMMUNITY
End: 2021-10-13

## 2021-02-05 NOTE — ASSESSMENT & PLAN NOTE
Weight reduction   follow-up with the nausea vomiting diarrhea constipation   acute onset of pain for possible surgery

## 2021-02-05 NOTE — PROGRESS NOTES
History and Physical Examination - General Surgery   [facility]  Jesus Pacheco 45 y o  male MRN: [id number]  Unit/Bed#:  Encounter: [id number] PCP: Marcos Vizcaino MD    History of Present Illness   Chief Complaint:    abdominal pain for 2 weeks patient went to ER last week thursday   I have ventral hernia I was in the ER   ER  Physician spoke to Dr Mingo Quesada regarding the CT findings and the case and he recommended patient follow-up in his clinic within the week  HPI:  Jesus Pacheco is a 45 y o  male who presents to office with  Supraumbilical recurrent abdominal pain mostly with cough  Has severe pain and came to the ER which showed  Nonobstructive says ventral hernia 2 in the defect hernia containing omentum and small bowel  some of the omentum  Over the transverse colon has some infarction and thickening this is not into the hernia sac     patient is here for follow-up   since last Thursday he is doing well except some pain on coughing has some diarrhea no nausea no vomiting no fever no chills     patient wants to lose weight he is trying but not able  I may need some help   I want to get the bariatric surgery done and want to lose weight and to gain back my life   at present patient is not working he used to work in [employer]     denies any sleep apnea    Historical Information   The following portions of the patient's history were reviewed and updated as appropriate  Past Medical History:   Diagnosis Date    Bell's palsy     Depression     GERD (gastroesophageal reflux disease)     Hypertension     Irregular heart beat     Neurocardiogenic syncope     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     pt symptomatic with extreme and sudden environmental temp  fluctuations     Past Surgical History:   Procedure Laterality Date    COLONOSCOPY N/A 2/2/2018    Procedure: COLONOSCOPY;  Surgeon: Dimitri Cardona MD;  Location: [facility] GI LAB;   Service: Gastroenterology   [facility] ESOPHAGOGASTRODUODENOSCOPY N/A 2018    Procedure: ESOPHAGOGASTRODUODENOSCOPY (EGD); Surgeon: Kelvin Wright MD;  Location: [facility] GI LAB;   Service: Gastroenterology    TESTICLE SURGERY Left     infected testicle     Social History   Social History     Substance and Sexual Activity   Alcohol Use Yes    Frequency: Monthly or less    Drinks per session: 1 or 2    Comment: occ     Social History     Substance and Sexual Activity   Drug Use No     Social History     Tobacco Use   Smoking Status Former Smoker    Quit date:     Years since quittin 1   Smokeless Tobacco Never Used     Family History:   Family History   Problem Relation Age of Onset    Supraventricular tachycardia Mother     Atrial fibrillation Mother     Diabetes type II Mother     Cancer Mother     Osteoporosis Mother     Ulcerative colitis Father     Liver disease Father     ADD / ADHD Son     Colon cancer Family     Diabetes type II Family     Hyperlipidemia Family     Hypertension Family     Hypothyroidism Family     Heart disease Family        Meds/Allergies   Allergies   Allergen Reactions    Macrolides And Ketolides Anaphylaxis    Pertussis Vaccines Anaphylaxis    Zithromax [Azithromycin] Anaphylaxis    Bee Venom      Annotation - [id number]: wasp, hornet also    Erythromycin     Guaifenesin     Other      Annotation - [id number]:  violet    Pertussis Vaccine     Piperacillin Sod-Tazobactam So     Pollen Extract     Vancomycin        Current Outpatient Medications:     carvedilol (COREG) 3 125 mg tablet, Take 1 tablet (3 125 mg total) by mouth 2 (two) times a day with meals, Disp: 60 tablet, Rfl: 3    Chromium 1000 MCG TABS, Take 1 tablet by mouth daily, Disp: , Rfl:     lisinopril (ZESTRIL) 20 mg tablet, Take 1 tablet (20 mg total) by mouth every morning, Disp: 90 tablet, Rfl: 3    multivitamin (THERAGRAN) TABS, Take 1 tablet by mouth daily, Disp: , Rfl:     Naproxen Sodium (ALEVE) 220 MG CAPS, Take by mouth, Disp: , Rfl:     omeprazole (PriLOSEC) 40 MG capsule, Take 1 capsule (40 mg total) by mouth every morning, Disp: 30 capsule, Rfl: 5    oxyCODONE-acetaminophen (PERCOCET) 5-325 mg per tablet, Take 1 tablet by mouth every 8 (eight) hours as needed for moderate pain, Disp: , Rfl:     Ascorbic Acid (VITAMIN C) 1000 MG tablet, Take 1,000 mg by mouth daily, Disp: , Rfl:     dicyclomine (BENTYL) 20 mg tablet, Take 1 tablet (20 mg total) by mouth 3 (three) times a day (Patient not taking: Reported on 2/5/2021), Disp: 90 tablet, Rfl: 1    ondansetron (ZOFRAN-ODT) 4 mg disintegrating tablet, Take 1 tablet (4 mg total) by mouth every 6 (six) hours as needed for nausea or vomiting for up to 3 days, Disp: 9 tablet, Rfl: 0     REVIEW OF SYSTEMS  Constitutional:  Denies fever or chills   Eyes:  Denies change in visual acuity   HENT:  Denies nasal congestion or sore throat   Respiratory:  Denies cough or shortness of breath   Cardiovascular:  Denies chest pain or edema   GI:   intermittent abdominal pain with coughing no, nausea, vomiting, bloody stools or  History of irritable bowel and persistent diarrhea   :  Denies dysuria, frequency, difficulty in micturition and nocturia  Musculoskeletal:  Denies back pain or joint pain   Neurologic:  Denies headache, focal weakness or sensory changes   Endocrine:  Denies polyuria or polydipsia   Lymphatic:  Denies swollen glands   Psychiatric:  Denies depression or anxiety     Objective   Current Vitals:   /90 (BP Location: Left arm, Patient Position: Sitting, Cuff Size: Large)   Pulse 92   Temp (!) 96 3 °F (35 7 °C) (Core)   Ht 5' 9" (1 753 m)   Wt (!) 182 kg (400 lb 12 8 oz)   BMI 59 19 kg/m²   Body mass index is 59 19 kg/m²  PHYSICAL EXAMS  General:  Patient is not in acute distress, laying in the bed comfortably, awake, alert responding to commands,   HEENT:  Both pupils normal-size atraumatic, normocephalic, nonicteric  Neck:  JVP not raised   Trachea central  Respiratory:  normal Breath sounds clear to auscultation,  Cardiovascular:  S1-S2 normal without any murmur   GI:  Abdomen soft nontender  Difficult examination umbilicus is not deformed   supraumbilical hernia  Soft mild tenderness  Musculoskeletal:  No back pain  Integument:  No skin rashes or ulceration  Lymphatic:  No cervical lymphadenopathy  Neurologic:  Patient is awake alert, responding to command, well-oriented to time and place and person moving all extremities ambulating well    Visit Diagnosis:   Diagnoses and all orders for this visit:    Ventral hernia without obstruction or gangrene  -     Ambulatory referral to Bariatric Surgery; Future  -     Ambulatory referral to Weight Management; Future    Morbid obesity ([facility] [address] )  -     Ambulatory referral to Bariatric Surgery; Future  -     Ambulatory referral to Weight Management; Future    Morbid obesity with body mass index (BMI) of 50 0 to 59 9 in [facility])  -     Ambulatory referral to Bariatric Surgery; Future  -     Ambulatory referral to Weight Management; Future    Benign essential hypertension  -     Ambulatory referral to Bariatric Surgery; Future  -     Ambulatory referral to Weight Management; Future    Irritable bowel syndrome with diarrhea  -     Ambulatory referral to Bariatric Surgery; Future  -     Ambulatory referral to Weight Management; Future    GERD (gastroesophageal reflux disease)  -     Ambulatory referral to Bariatric Surgery; Future  -     Ambulatory referral to Weight Management; Future    Diarrhea  -     Ambulatory referral to Bariatric Surgery; Future  -     Ambulatory referral to Weight Management; Future    Other orders  -     oxyCODONE-acetaminophen (PERCOCET) 5-325 mg per tablet;  Take 1 tablet by mouth every 8 (eight) hours as needed for moderate pain       Plan of care was discussed with patient in detail    Pertinent labs reviewed  Pertinent images and available reads personally reviewed  Procedure: Ct Abdomen Pelvis With Contrast    Result Date: 1/28/2021  Narrative: CT ABDOMEN AND PELVIS WITH IV CONTRAST INDICATION:   Abdominal pain, acute, nonlocalized abdominal pain  COMPARISON:  CT pelvis 5/16/2011  TECHNIQUE:  CT examination of the abdomen and pelvis was performed  Axial, sagittal, and coronal 2D reformatted images were created from the source data and submitted for interpretation  Radiation dose length product (DLP) for this visit:  2701 43 mGy-cm   This examination, like all CT scans performed in the [facility], was performed utilizing techniques to minimize radiation dose exposure, including the use of iterative reconstruction and automated exposure control  IV Contrast:  100 mL Omnipaque 350 Enteric Contrast:  Enteric contrast was not administered  FINDINGS: ABDOMEN LOWER CHEST:  No clinically significant abnormality identified in the visualized lower chest  LIVER/BILIARY TREE:  Hepatomegaly with fatty infiltration  No CT evidence of suspicious hepatic mass  Normal hepatic contours  No biliary dilatation  GALLBLADDER:  No calcified gallstones  No pericholecystic inflammatory change  SPLEEN:  Unremarkable  PANCREAS:  Unremarkable  ADRENAL GLANDS:  Unremarkable  KIDNEYS/URETERS:  Unremarkable  No hydronephrosis  STOMACH AND BOWEL:  Unremarkable  APPENDIX:  No findings to suggest appendicitis  ABDOMINOPELVIC CAVITY: Ill-defined fatty mass with surrounding fat stranding in the anterior mid abdomen measuring approximately 5 6 x 4 7 cm, anterior to the mid transverse colon, most compatible with omental infarct  No ascites  No pneumoperitoneum  No lymphadenopathy  VESSELS:  Unremarkable for patient's age  PELVIS REPRODUCTIVE ORGANS:  Unremarkable for patient's age  URINARY BLADDER:  Unremarkable  ABDOMINAL WALL/INGUINAL REGIONS:  Moderate-sized umbilical hernia containing a nonobstructed loop of small bowel and fluid  OSSEOUS STRUCTURES:  No acute fracture or destructive osseous lesion  Impression: Omental infarct in the anterior mid abdomen  Moderate-sized umbilical hernia containing nonobstructed small bowel loop and fluid  Hepatomegaly with fatty infiltration  Workstation performed: [id number]     Pertinent notes reviewed    Assessment/Plan   Assessment:   umbilical hernia nonobstructed content of hernia omentum and the small bowel   some omental infarction near the transverse colon   abdominal pain recurrent with coughing    Plan:  The risks, benefits, alternatives,and probabilities of success were discussed in detail with no guarantee made as to outcome  All questions were answered to the patient's satisfaction  String patient's body habitus patient has a high risk of infection bleeding nonhealing recurrence of hernia DVT PE respiratory failure requiring ventilatory support were explained to the patient     patient was made aware of incarceration worsening pain nausea vomiting constipation of if happens patient was advised to report to ER for emergent surgery      patient was advised for some weight reduction prior to surgery and patient is very keen to have a bariatric surgery done  Patient is referred to weight management clinic and bariatric surgery for possible procedure     advised to follow up in 3 months for possible hernia surgery with significant weight loss    Counseling / Coordination of Care  Expained patient in detailed about coordination of care  A description of the counseling / coordination of care:  I performed an interim history, pertinent images and labs, performed a physical examination to arrive at the plan delineated above with associated thought processes  MD Tressa Rosario    Office   Tel  [phone]  Fax   [phone]

## 2021-02-11 DIAGNOSIS — K58.0 IRRITABLE BOWEL SYNDROME WITH DIARRHEA: ICD-10-CM

## 2021-02-12 RX ORDER — DICYCLOMINE HCL 20 MG
20 TABLET ORAL 3 TIMES DAILY
Qty: 90 TABLET | Refills: 1 | Status: SHIPPED | OUTPATIENT
Start: 2021-02-12

## 2021-02-17 DIAGNOSIS — R00.0 TACHYCARDIA: ICD-10-CM

## 2021-02-17 DIAGNOSIS — I10 ESSENTIAL HYPERTENSION: ICD-10-CM

## 2021-02-17 RX ORDER — CARVEDILOL 3.12 MG/1
TABLET ORAL
Qty: 60 TABLET | Refills: 0 | Status: SHIPPED | OUTPATIENT
Start: 2021-02-17 | End: 2021-04-08 | Stop reason: SDUPTHER

## 2021-02-23 ENCOUNTER — TELEPHONE (OUTPATIENT)
Dept: BARIATRICS | Facility: CLINIC | Age: 41
End: 2021-02-23

## 2021-02-24 ENCOUNTER — OFFICE VISIT (OUTPATIENT)
Dept: BARIATRICS | Facility: CLINIC | Age: 41
End: 2021-02-24
Payer: COMMERCIAL

## 2021-02-24 VITALS
HEART RATE: 102 BPM | WEIGHT: 315 LBS | SYSTOLIC BLOOD PRESSURE: 122 MMHG | DIASTOLIC BLOOD PRESSURE: 80 MMHG | BODY MASS INDEX: 45.1 KG/M2 | TEMPERATURE: 96.7 F | HEIGHT: 70 IN

## 2021-02-24 DIAGNOSIS — K43.9 VENTRAL HERNIA WITHOUT OBSTRUCTION OR GANGRENE: ICD-10-CM

## 2021-02-24 DIAGNOSIS — K21.9 GERD (GASTROESOPHAGEAL REFLUX DISEASE): ICD-10-CM

## 2021-02-24 DIAGNOSIS — F41.9 ANXIETY: ICD-10-CM

## 2021-02-24 DIAGNOSIS — K58.0 IRRITABLE BOWEL SYNDROME WITH DIARRHEA: ICD-10-CM

## 2021-02-24 DIAGNOSIS — F32.A DEPRESSION: Primary | ICD-10-CM

## 2021-02-24 DIAGNOSIS — I10 BENIGN ESSENTIAL HYPERTENSION: ICD-10-CM

## 2021-02-24 DIAGNOSIS — E78.2 MIXED HYPERLIPIDEMIA: ICD-10-CM

## 2021-02-24 DIAGNOSIS — E66.01 MORBID OBESITY (HCC): ICD-10-CM

## 2021-02-24 DIAGNOSIS — E66.01 MORBID OBESITY WITH BODY MASS INDEX (BMI) OF 50.0 TO 59.9 IN ADULT (HCC): ICD-10-CM

## 2021-02-24 DIAGNOSIS — R19.7 DIARRHEA: ICD-10-CM

## 2021-02-24 DIAGNOSIS — R29.818 SUSPECTED SLEEP APNEA: ICD-10-CM

## 2021-02-24 PROCEDURE — 3079F DIAST BP 80-89 MM HG: CPT | Performed by: SURGERY

## 2021-02-24 PROCEDURE — 99214 OFFICE O/P EST MOD 30 MIN: CPT | Performed by: SURGERY

## 2021-02-24 PROCEDURE — 1036F TOBACCO NON-USER: CPT | Performed by: SURGERY

## 2021-02-24 NOTE — LETTER
February 24, 2021     Bernardo Hudson, [facility]    Patient: Skye Daigle   YOB: 1980   Date of Visit: 2/24/2021       Dear Dr Kaylee Schultz: Thank you for referring Devon Eason to me for evaluation for bariatric surgery  Below are my notes for this consultation  If you have questions, please do not hesitate to call me on my cell phone at [phone]  I look forward to following your patient along with you  Sincerely,      Kaylee Schultz MD, FACS, [facility]  2/24/2021  11:48 AM          CC: No Recipients  Melinda Connor MD  2/24/2021 11:47 AM  Sign when Signing Visit      [address] [place] [address] 45 y o  male MRN: [id number]  Unit/Bed#:  Encounter: [id number]      HPI:  Skye Daigle is a very pleasant 45 y o  male who presents with a longstanding history of morbid obesity and inability to sustain a meaningful weight loss  Here today to discuss bariatric options  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  Body mass index is 56 48 kg/m²  ++Suffers from HTN, GERD, anxiety, depression, HLD, IBS, migraines, tachycardia, MANUELA suspected, umbilical hernia (referred by Dr Willi Ojeda)  S/p debridement of Salvador's gangrene    Visit type: consultation     Symptoms: excess weight, weight increase, inability to loss weight and fatigue    Associated Symptoms: depressed mood and anxiety    Associated Conditions: glucose intolerance, hyperlipidemia, sleep apnea, abdominal obesity and hypergycemia  Disease Complications: hypertension and sleep apnea  Weight Loss Interest: high  Previous Diet Trials: low calorie     Exercise Frequency:infrequency  Types of Exercise: walking    Review of Systems   Constitutional: Negative  Respiratory: Negative  Cardiovascular: Negative  Gastrointestinal: Negative  Musculoskeletal: Negative  Neurological: Negative      All other systems reviewed and are negative  Historical Information   Past Medical History:   Diagnosis Date    Bell's palsy     Depression     GERD (gastroesophageal reflux disease)     Hypertension     Irregular heart beat     Morbid obesity with BMI of 60 0-69 9, adult (HCC)     Neurocardiogenic syncope     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     pt symptomatic with extreme and sudden environmental temp  fluctuations     Past Surgical History:   Procedure Laterality Date    COLONOSCOPY N/A 2018    Procedure: COLONOSCOPY;  Surgeon: Zari Forbes MD;  Location: [facility]; Service: Gastroenterology    ESOPHAGOGASTRODUODENOSCOPY N/A 2018    Procedure: ESOPHAGOGASTRODUODENOSCOPY (EGD); Surgeon: Zari Forbes MD;  Location: [facility];   Service: Gastroenterology    TESTICLE SURGERY Left     infected testicle     Social History   Social History     Substance and Sexual Activity   Alcohol Use Yes    Frequency: Monthly or less    Drinks per session: 1 or 2    Comment: occ     Social History     Substance and Sexual Activity   Drug Use No     Social History     Tobacco Use   Smoking Status Former Smoker    Quit date:     Years since quittin 1   Smokeless Tobacco Never Used     Family History:   Family History   Problem Relation Age of Onset    Supraventricular tachycardia Mother     Atrial fibrillation Mother     Diabetes type II Mother     Cancer Mother     Osteoporosis Mother     Ulcerative colitis Father     Liver disease Father    Angelica Mcguire ADD / ADHD Son     Colon cancer Family     Diabetes type II Family     Hyperlipidemia Family     Hypertension Family     Hypothyroidism Family     Heart disease Family        Meds/Allergies   all medications and allergies reviewed  Allergies   Allergen Reactions    Macrolides And Ketolides Anaphylaxis    Pertussis Vaccines Anaphylaxis    Zithromax [Azithromycin] Anaphylaxis    Bee Venom      Annotation - [id number]: wasp, hornet also    Erythromycin     Guaifenesin     Other      Annotation - [id number]:  violet    Pertussis Vaccine     Piperacillin Sod-Tazobactam So     Pollen Extract     Vancomycin        Objective     Current Vitals:   /80 (BP Location: Left arm, Patient Position: Sitting, Cuff Size: Large)   Pulse 102   Temp (!) 96 7 °F (35 9 °C) (Tympanic)   Ht 5' 9 5" (1 765 m)   Wt (!) 176 kg (388 lb)   BMI 56 48 kg/m²       Physical Exam  Constitutional:       Appearance: He is well-developed  HENT:      Head: Normocephalic  Eyes:      Extraocular Movements: Extraocular movements intact  Neck:      Musculoskeletal: Normal range of motion  Cardiovascular:      Rate and Rhythm: Normal rate  Pulmonary:      Effort: Pulmonary effort is normal    Abdominal:      General: There is no distension  Musculoskeletal: Normal range of motion  Skin:     General: Skin is warm and dry  Neurological:      Mental Status: He is alert and oriented to person, place, and time  Psychiatric:         Mood and Affect: Mood normal          Behavior: Behavior normal          Thought Content: Thought content normal          Judgment: Judgment normal          Lab Results: I have personally reviewed pertinent lab results  Imaging: I have personally reviewed pertinent reports  EKG, Pathology, and Other Studies: I have personally reviewed pertinent reports  Assessment/PLAN:    45 y o  yo male with a long standing h/o of obesity and inability to sustain any meaningful weight loss on his own despite several attempts  He is interested in the Laparoscopic mariya-en-y gastric bypass  ++Suffers from HTN, GERD, anxiety, depression, HLD, IBS, migraines, tachycardia, MANUELA suspected, umbilical hernia (referred by Dr Sadia Beltran)  S/p debridement of Salvador's gangrene    I have explained our Enhanced Recovery After Bariatric Surgery (ERABS) protocol and benefits including preoperative, intraoperative and postoperative elements  As a part of his pre op process, he will undergo evaluation appointments with out dietician, licensed care , and   After the evaluation, he may be referred to a cardiologist and for a sleep evaluation and consult  He needs an EGD to evaluate the anatomy of his GI tract  I have spent over 45 minutes with him face to face in the office today discussing his options and details of the surgery  We have seen an animation of the surgery on the computer that illustrates how the operation is done and how the anatomy will be altered with the procedure  Over 50% of this was coordinating care  I have discussed and educated the patient with regards to the components of our multidisciplinary program and the importance of compliance and follow up in the post operative period  He was given the opportunity to ask questions and I have answered all of them  The patient was also instructed with regards to the importance of behavior modification, nutritional counseling, support meeting attendance and lifestyle changes that are important to ensure success  Although there is a great statistical chance of improvement or even resolution of most of his associated comorbidities, the results vary from patient to patient and they largely depend on his commitment and compliance         Melanie Lopez MD, FACS, [facility]  2/24/2021  11:44 AM

## 2021-02-24 NOTE — PROGRESS NOTES
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  Body mass index is 56 48 kg/m²  ++Suffers from HTN, GERD, anxiety, depression, HLD, IBS, migraines, tachycardia, MANUELA suspected, umbilical hernia (referred by Dr Basilio Pugh)  S/p debridement of Salvador's gangrene    Visit type: consultation     Symptoms: excess weight, weight increase, inability to loss weight and fatigue    Associated Symptoms: depressed mood and anxiety    Associated Conditions: glucose intolerance, hyperlipidemia, sleep apnea, abdominal obesity and hypergycemia  Disease Complications: hypertension and sleep apnea  Weight Loss Interest: high  Previous Diet Trials: low calorie     Exercise Frequency:infrequency  Types of Exercise: walking    Review of Systems   Constitutional: Negative  Respiratory: Negative  Cardiovascular: Negative  Gastrointestinal: Negative  Musculoskeletal: Negative  Neurological: Negative  All other systems reviewed and are negative  Historical Information   Past Medical History:   Diagnosis Date    Bell's palsy     Depression     GERD (gastroesophageal reflux disease)     Hypertension     Irregular heart beat     Morbid obesity with BMI of 60 0-69 9, adult (HCC)     Neurocardiogenic syncope     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     pt symptomatic with extreme and sudden environmental temp  fluctuations     Past Surgical History:   Procedure Laterality Date    COLONOSCOPY N/A 2/2/2018    Procedure: COLONOSCOPY;  Surgeon: Valentino Valdes MD;  Location: [facility];   Service: Gastroenterology    ESOPHAGOGASTRODUODENOSCOPY N/A 2/2/2018 Procedure: ESOPHAGOGASTRODUODENOSCOPY (EGD); Surgeon: Peyman Garnett MD;  Location: [facility]; Service: Gastroenterology    TESTICLE SURGERY Left     infected testicle     Social History   Social History     Substance and Sexual Activity   Alcohol Use Yes    Frequency: Monthly or less    Drinks per session: 1 or 2    Comment: occ     Social History     Substance and Sexual Activity   Drug Use No     Social History     Tobacco Use   Smoking Status Former Smoker    Quit date:     Years since quittin 1   Smokeless Tobacco Never Used     Family History:   Family History   Problem Relation Age of Onset    Supraventricular tachycardia Mother     Atrial fibrillation Mother     Diabetes type II Mother     Cancer Mother     Osteoporosis Mother     Ulcerative colitis Father     Liver disease Father     ADD / ADHD Son     Colon cancer Family     Diabetes type II Family     Hyperlipidemia Family     Hypertension Family     Hypothyroidism Family     Heart disease Family        Meds/Allergies   all medications and allergies reviewed  Allergies   Allergen Reactions    Macrolides And Ketolides Anaphylaxis    Pertussis Vaccines Anaphylaxis    Zithromax [Azithromycin] Anaphylaxis    Bee Venom      Annotation - [id number]: wasp, hornet also    Erythromycin     Guaifenesin     Other      Annotation - [id number]:  violet    Pertussis Vaccine     Piperacillin Sod-Tazobactam So     Pollen Extract     Vancomycin        Objective     Current Vitals:   /80 (BP Location: Left arm, Patient Position: Sitting, Cuff Size: Large)   Pulse 102   Temp (!) 96 7 °F (35 9 °C) (Tympanic)   Ht 5' 9 5" (1 765 m)   Wt (!) 176 kg (388 lb)   BMI 56 48 kg/m²       Physical Exam  Constitutional:       Appearance: He is well-developed  HENT:      Head: Normocephalic  Eyes:      Extraocular Movements: Extraocular movements intact  Neck:      Musculoskeletal: Normal range of motion  Cardiovascular:      Rate and Rhythm: Normal rate  Pulmonary:      Effort: Pulmonary effort is normal    Abdominal:      General: There is no distension  Musculoskeletal: Normal range of motion  Skin:     General: Skin is warm and dry  Neurological:      Mental Status: He is alert and oriented to person, place, and time  Psychiatric:         Mood and Affect: Mood normal          Behavior: Behavior normal          Thought Content: Thought content normal          Judgment: Judgment normal          Lab Results: I have personally reviewed pertinent lab results  Imaging: I have personally reviewed pertinent reports  EKG, Pathology, and Other Studies: I have personally reviewed pertinent reports  Assessment/PLAN:    45 y o  yo male with a long standing h/o of obesity and inability to sustain any meaningful weight loss on his own despite several attempts  He is interested in the Laparoscopic mariya-en-y gastric bypass  ++Suffers from HTN, GERD, anxiety, depression, HLD, IBS, migraines, tachycardia, MANUELA suspected, umbilical hernia (referred by Dr Amelia Hollis)  S/p debridement of Salvador's gangrene    I have explained our Enhanced Recovery After Bariatric Surgery (ERABS) protocol and benefits including preoperative, intraoperative and postoperative elements  As a part of his pre op process, he will undergo evaluation appointments with out dietician, licensed care , and   After the evaluation, he may be referred to a cardiologist and for a sleep evaluation and consult  He needs an EGD to evaluate the anatomy of his GI tract  I have spent over 45 minutes with him face to face in the office today discussing his options and details of the surgery  We have seen an animation of the surgery on the computer that illustrates how the operation is done and how the anatomy will be altered with the procedure  Over 50% of this was coordinating care      I have discussed and educated the patient with regards to the components of our multidisciplinary program and the importance of compliance and follow up in the post operative period  He was given the opportunity to ask questions and I have answered all of them  The patient was also instructed with regards to the importance of behavior modification, nutritional counseling, support meeting attendance and lifestyle changes that are important to ensure success  Although there is a great statistical chance of improvement or even resolution of most of his associated comorbidities, the results vary from patient to patient and they largely depend on his commitment and compliance         Conchita Calvin MD, FACS, [facility]  2/24/2021  11:44 AM

## 2021-03-26 LAB
ALBUMIN SERPL-MCNC: 3.6 G/DL (ref 4–5)
ALBUMIN/GLOB SERPL: 0.8 {RATIO} (ref 1.2–2.2)
ALP SERPL-CCNC: 125 IU/L (ref 39–117)
ALT SERPL-CCNC: 104 IU/L (ref 0–44)
AST SERPL-CCNC: 116 IU/L (ref 0–40)
BILIRUB SERPL-MCNC: 0.8 MG/DL (ref 0–1.2)
BUN SERPL-MCNC: 9 MG/DL (ref 6–20)
BUN/CREAT SERPL: 11 (ref 9–20)
CALCIUM SERPL-MCNC: 9.3 MG/DL (ref 8.7–10.2)
CHLORIDE SERPL-SCNC: 95 MMOL/L (ref 96–106)
CHOLEST SERPL-MCNC: 173 MG/DL (ref 100–199)
CHOLEST/HDLC SERPL: 5.8 RATIO (ref 0–5)
CO2 SERPL-SCNC: 24 MMOL/L (ref 20–29)
CREAT SERPL-MCNC: 0.82 MG/DL (ref 0.76–1.27)
EST. AVERAGE GLUCOSE BLD GHB EST-MCNC: 263 MG/DL
GLOBULIN SER-MCNC: 4.3 G/DL (ref 1.5–4.5)
GLUCOSE SERPL-MCNC: 316 MG/DL (ref 65–99)
HBA1C MFR BLD: 10.8 % (ref 4.8–5.6)
HDLC SERPL-MCNC: 30 MG/DL
LDLC SERPL CALC-MCNC: 115 MG/DL (ref 0–99)
POTASSIUM SERPL-SCNC: 4.4 MMOL/L (ref 3.5–5.2)
PROT SERPL-MCNC: 7.9 G/DL (ref 6–8.5)
SL AMB EGFR AFRICAN AMERICAN: 129 ML/MIN/1.73
SL AMB EGFR NON AFRICAN AMERICAN: 111 ML/MIN/1.73
SL AMB VLDL CHOLESTEROL CALC: 28 MG/DL (ref 5–40)
SODIUM SERPL-SCNC: 134 MMOL/L (ref 134–144)
TRIGL SERPL-MCNC: 159 MG/DL (ref 0–149)
TSH SERPL DL<=0.005 MIU/L-ACNC: 1.88 UIU/ML (ref 0.45–4.5)

## 2021-03-29 ENCOUNTER — TELEPHONE (OUTPATIENT)
Dept: BARIATRICS | Facility: CLINIC | Age: 41
End: 2021-03-29

## 2021-03-29 NOTE — TELEPHONE ENCOUNTER
Called and spoke to pt  Reviewed blood work, most importantly the elevated A1c of 10 8  Pt states he does have a family h/o of DM and his mom is in the medical field therefore as soon as his results came in she noticed the high number  Advised pt to f/u with PCP about A1c over 10 for management  Advised pt we would like that under 10 for surgery  Pt has appt for 3hr bariatric evaluation on 4/1/21 at 1pm  RD will review necessary diet changes at that time  Pt w/o further questions at this time

## 2021-03-31 DIAGNOSIS — Z23 ENCOUNTER FOR IMMUNIZATION: ICD-10-CM

## 2021-04-01 ENCOUNTER — CLINICAL SUPPORT (OUTPATIENT)
Dept: BARIATRICS | Facility: CLINIC | Age: 41
End: 2021-04-01

## 2021-04-01 VITALS
HEIGHT: 69 IN | WEIGHT: 315 LBS | TEMPERATURE: 97.1 F | DIASTOLIC BLOOD PRESSURE: 80 MMHG | SYSTOLIC BLOOD PRESSURE: 158 MMHG | HEART RATE: 92 BPM | BODY MASS INDEX: 46.65 KG/M2

## 2021-04-01 DIAGNOSIS — K21.9 GERD (GASTROESOPHAGEAL REFLUX DISEASE): ICD-10-CM

## 2021-04-01 DIAGNOSIS — E66.01 MORBID OBESITY (HCC): ICD-10-CM

## 2021-04-01 DIAGNOSIS — I10 BENIGN ESSENTIAL HYPERTENSION: ICD-10-CM

## 2021-04-01 DIAGNOSIS — R19.7 DIARRHEA: ICD-10-CM

## 2021-04-01 DIAGNOSIS — Z98.84 BARIATRIC SURGERY STATUS: ICD-10-CM

## 2021-04-01 DIAGNOSIS — K43.9 VENTRAL HERNIA WITHOUT OBSTRUCTION OR GANGRENE: ICD-10-CM

## 2021-04-01 DIAGNOSIS — E66.01 MORBID OBESITY WITH BODY MASS INDEX (BMI) OF 50.0 TO 59.9 IN ADULT (HCC): Primary | ICD-10-CM

## 2021-04-01 DIAGNOSIS — K58.0 IRRITABLE BOWEL SYNDROME WITH DIARRHEA: ICD-10-CM

## 2021-04-01 PROCEDURE — RECHECK

## 2021-04-01 PROCEDURE — 3008F BODY MASS INDEX DOCD: CPT | Performed by: SURGERY
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
## 2021-04-01 NOTE — PROGRESS NOTES
Bariatric Nutrition Assessment Note    Type of surgery    Preop (6 months wt checks)  Surgery Date: TBD-leaning toward RYGB  Surgeon: Dr Toney Monge  45 y o   male     Wt with BMI of 25: 168 6lbs  Pre-Op Excess Wt: 219 2lbs  Blood pressure 158/80, pulse 92, temperature (!) 97 1 °F (36 2 °C), height 5' 9" (1 753 m), weight (!) 174 kg (384 lb)  Body mass index is 56 71 kg/m²  Weight History   Onset of Obesity: Childhood, around age 16 started to gain weight quickly  Family history of obesity: Yes  Wt Loss Attempts: Commercial Programs ([employer], Blaire Ciro, etc )  Exercise  Fasting  High Protein/Low CHO diets (Atkins, Union, etc )  Self Created Diets (Portion Control, Healthy Food Choices, etc )  Keto  Maximum Wt Lost: -40lbs    Review of History and Medications   Newly dx DM with A1c 10 8 on 3/25/21--advised to talk to PCP to get started on meds  Past Medical History:   Diagnosis Date    Bell's palsy     Depression     Diabetes mellitus ([facility] ) 03/25/21    Blood work    GERD (gastroesophageal reflux disease)     Hypertension     Irregular heart beat     Morbid obesity with BMI of 60 0-69 9, adult ([facility] [facility] )     Neurocardiogenic syncope     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     pt symptomatic with extreme and sudden environmental temp  fluctuations     Past Surgical History:   Procedure Laterality Date    COLONOSCOPY N/A 2/2/2018    Procedure: COLONOSCOPY;  Surgeon: Gwendolyn Forrester MD;  Location: [facility] GI LAB; Service: Gastroenterology    ESOPHAGOGASTRODUODENOSCOPY N/A 2/2/2018    Procedure: ESOPHAGOGASTRODUODENOSCOPY (EGD); Surgeon: Gwendolyn Forrester MD;  Location: [facility] GI LAB;   Service: Gastroenterology    TESTICLE SURGERY Left     infected testicle     Social History     Socioeconomic History    Marital status: /Civil Union     Spouse name: None    Number of children: None    Years of education: None    Highest education level: None   Occupational History    None   Social Needs    Financial resource strain: None    Food insecurity     Worry: None     Inability: None    Transportation needs     Medical: None     Non-medical: None   Tobacco Use    Smoking status: Former Smoker     Packs/day: 0 25     Years: 2 00     Pack years: 0 50     Types: Cigarettes     Start date: 7/15/1999     Quit date: 2010     Years since quitting: 10 6    Smokeless tobacco: Never Used   Substance and Sexual Activity    Alcohol use: Not Currently     Alcohol/week: 0 0 standard drinks     Frequency: Monthly or less     Drinks per session: 1 or 2     Comment: occ    Drug use: No    Sexual activity: Not Currently     Partners: Female     Birth control/protection: Abstinence, I U D     Lifestyle    Physical activity     Days per week: None     Minutes per session: None    Stress: None   Relationships    Social connections     Talks on phone: None     Gets together: None     Attends Temple service: None     Active member of club or organization: None     Attends meetings of clubs or organizations: None     Relationship status: None    Intimate partner violence     Fear of current or ex partner: None     Emotionally abused: None     Physically abused: None     Forced sexual activity: None   Other Topics Concern    None   Social History Narrative    Denied: History of alcohol use (history) - as per [employer]    Always uses seat belt    Former smoker - as per [employer]    Never a smoker - as per [employer]       Current Outpatient Medications:     carvedilol (COREG) 3 125 mg tablet, TAKE 1 TABLET BY MOUTH TWICE DAILY WITH MEALS, Disp: 60 tablet, Rfl: 0    Chromium 1000 MCG TABS, Take 1 tablet by mouth daily, Disp: , Rfl:     dicyclomine (BENTYL) 20 mg tablet, Take 1 tablet (20 mg total) by mouth 3 (three) times a day (Patient taking differently: Take 20 mg by mouth as needed ), Disp: 90 tablet, Rfl: 1    lisinopril (ZESTRIL) 20 mg tablet, Take 1 tablet (20 mg total) by mouth every morning, Disp: 90 tablet, Rfl: 3    multivitamin (THERAGRAN) TABS, Take 1 tablet by mouth daily, Disp: , Rfl:     Naproxen Sodium (ALEVE) 220 MG CAPS, Take by mouth as needed , Disp: , Rfl:     omeprazole (PriLOSEC) 40 MG capsule, Take 1 capsule (40 mg total) by mouth every morning, Disp: 30 capsule, Rfl: 5    Ascorbic Acid (VITAMIN C) 1000 MG tablet, Take 1,000 mg by mouth daily, Disp: , Rfl:     ondansetron (ZOFRAN-ODT) 4 mg disintegrating tablet, Take 1 tablet (4 mg total) by mouth every 6 (six) hours as needed for nausea or vomiting for up to 3 days, Disp: 9 tablet, Rfl: 0    oxyCODONE-acetaminophen (PERCOCET) 5-325 mg per tablet, Take 1 tablet by mouth every 8 (eight) hours as needed for moderate pain, Disp: , Rfl:     Food Intake and Lifestyle Assessment   Food Intake Assessment completed via usual diet recall  Gets $700 per month on food stamps  No set meal schedule    Wake: 7am--was working at [employer], not in work since December  Breakfast: 8:30am 20oz coffee 1/4 cup of sugar (200 cals, 50gm sugars/carbs)--often no food at this time  Snack: -   Lunch: often first meal around 1pm- yesterday-1 pack of marshal cheddar broccoli rice pack    More picks/grazes, not set meal often  Snack: homemade trailmix (almonds, honey roasted cashews, honey roasted peanuts, raisins, yogurt covered raisins, pistachios)---grazes OR veggie straws  Dinner: 5-6pm-last night:  Salads when can get them OR 9 perogies fried in butter OR Ramen  Snack: 1am-2 ham and cheese sandwiches + full bag of ranch flavored mini rice cakes  Beverage intake: water, sweetened beverages, juice, regular soda and coffee/tea  Protein supplement: none at this time  Estimated protein intake per day: 80-100gm  Estimated fluid intake per day: 64oz water 2-3 times per day, can drink 1/2 gal of bolthouse smoothie/juice in one sitting, 20oz soda per day, powdered lemonade/iced tea/gatorade mixed with water  Meals eaten away from home: once a week-[employer]-wings and pizza or chinese food  Typical meal pattern: 2 meals per day and grazes on food, no set meals  Eating Behaviors: Consumption of high calorie/ high fat foods, Consumption of high calorie beverages, Large portion sizes, Frequent snacking/ grazing and Mindless eating  Food allergies or intolerances: Allergies   Allergen Reactions    Macrolides And Ketolides Anaphylaxis    Pertussis Vaccines Anaphylaxis    Zithromax [Azithromycin] Anaphylaxis    Bee Venom      Annotation - [id number]: wasp, hornet also    Erythromycin     Guaifenesin     Other      Annotation - [id number]:  violet    Pertussis Vaccine     Piperacillin Sod-Tazobactam So     Pollen Extract     Vancomycin      Cultural or Christian considerations: none    Physical Assessment  Physical Activity  Types of exercise: None, has a bowflex, but doesn't use it  Current physical limitations: SOB quickly     Psychosocial Assessment   Support systems: spouse and children friend(s) relative(s)  Socioeconomic factors: on food stamps    Nutrition Diagnosis  Diagnosis: Overweight / Obesity (NC-3 3)  Related to: Physical inactivity and Excessive energy intake  As Evidenced by: BMI >25     Nutrition Prescription: Recommend the following diet  Regular    Interventions and Teaching   Discussed pre-op and post-op nutrition guidelines  Patient educated and handouts provided    Surgical changes to stomach / GI  Capacity of post-surgery stomach  Diet progression  Adequate hydration  Sugar and fat restriction to decrease "dumping syndrome"  Fat restriction to decrease steatorrhea  Expected weight loss  Weight loss plateaus/ possibility of weight regain  Exercise  Suggestions for pre-op diet  Nutrition considerations after surgery  Protein supplements  Meal planning and preparation  Appropriate carbohydrate, protein, and fat intake, and food/fluid choices to maximize safe weight loss, nutrient intake, and tolerance   Dietary and lifestyle changes  Possible problems with poor eating habits  Intuitive eating  Techniques for self monitoring and keeping daily food journal  Potential for food intolerance after surgery, and ways to deal with them including: lactose intolerance, nausea, reflux, vomiting, diarrhea, food intolerance, appetite changes, gas  Vitamin / Mineral supplementation of Multivitamin with minerals and Vitamin D pre-op    Education provided to: patient    Barriers to learning: No barriers identified  Readiness to change: preparation    Prior research on procedure: books, internet, discussed with provider and friends or family    Comprehension: verbalizes understanding     Expected Compliance: good    Recommendations  Pt is an appropriate candidate for surgery  Yes    Evaluation / Monitoring  Dietitian to Monitor: Eating pattern as discussed Tolerance of nutrition prescription Body weight Lab values Physical activity    Pre-op weight loss:  Do Not Gain weight  10% by of surgery:  -39lbs (349lbs)  5% in order to submit:  -19 5 (368 5lbs)    Goals  Eliminate sugar sweetened beverages--gave handout with non-geoff options    Food journal via baritastic  Exercise 30 minutes 5 times per week--split into 10 mins 3x/day if needed  Complete lesson plans 1-6  Eat 3 meals per day--can use protein shake as meal replacement  Eliminate mindless snacking    Time Spent:   1 Hour

## 2021-04-02 ENCOUNTER — TELEPHONE (OUTPATIENT)
Dept: BARIATRICS | Facility: CLINIC | Age: 41
End: 2021-04-02

## 2021-04-02 DIAGNOSIS — E66.01 MORBID OBESITY (HCC): Primary | ICD-10-CM

## 2021-04-08 ENCOUNTER — CONSULT (OUTPATIENT)
Dept: CARDIOLOGY CLINIC | Facility: CLINIC | Age: 41
End: 2021-04-08
Payer: COMMERCIAL

## 2021-04-08 VITALS
OXYGEN SATURATION: 95 % | WEIGHT: 315 LBS | HEIGHT: 69 IN | TEMPERATURE: 97.6 F | BODY MASS INDEX: 46.65 KG/M2 | RESPIRATION RATE: 18 BRPM | DIASTOLIC BLOOD PRESSURE: 104 MMHG | HEART RATE: 102 BPM | SYSTOLIC BLOOD PRESSURE: 170 MMHG

## 2021-04-08 DIAGNOSIS — R00.0 TACHYCARDIA: ICD-10-CM

## 2021-04-08 DIAGNOSIS — I10 ESSENTIAL HYPERTENSION: ICD-10-CM

## 2021-04-08 DIAGNOSIS — E66.01 MORBID OBESITY (HCC): Primary | ICD-10-CM

## 2021-04-08 PROCEDURE — 3077F SYST BP >= 140 MM HG: CPT | Performed by: INTERNAL MEDICINE

## 2021-04-08 PROCEDURE — 3080F DIAST BP >= 90 MM HG: CPT | Performed by: INTERNAL MEDICINE

## 2021-04-08 PROCEDURE — 1036F TOBACCO NON-USER: CPT | Performed by: INTERNAL MEDICINE

## 2021-04-08 PROCEDURE — 93000 ELECTROCARDIOGRAM COMPLETE: CPT | Performed by: INTERNAL MEDICINE

## 2021-04-08 PROCEDURE — 99203 OFFICE O/P NEW LOW 30 MIN: CPT | Performed by: INTERNAL MEDICINE

## 2021-04-08 RX ORDER — CARVEDILOL 3.12 MG/1
3.12 TABLET ORAL 2 TIMES DAILY WITH MEALS
Qty: 60 TABLET | Refills: 1 | Status: SHIPPED | OUTPATIENT
Start: 2021-04-08 | End: 2021-06-21 | Stop reason: SDUPTHER

## 2021-04-08 NOTE — PROGRESS NOTES
Consultation - Cardiology Office  [facility]  Maddie Soler 45 y o  male MRN: [id number]  : 1980  Unit/Bed#:  Encounter: [id number]      ASSESSMENT:  Perioperative cardiac risk assessment for weight loss surgery possibly sometimes in November  Patient has significant obesity and dyspnea on minimal exertion and baseline tachycardia  I would recommend doing an echocardiogram to evaluate his LV function prior to determining his perioperative cardiac risk    Severe obesity:  BMI is 56 71    GERD    Hyperlipidemia    Hypertension  BP today is 170/104 mmHg with heart rate of 102 per minute  Currently on Coreg 3 125 mg b i d , lisinopril 20 mg daily  As per patient he has not been able to get a refill of his Coreg and has not taken it for about 2 weeks  Once he is on Coreg his blood pressure ranges from systolic of 438 is too low 140s  I am refilling his Coreg and will see him back while on medication and with an echocardiogram to make further recommendations for blood pressure control    RECOMMENDATIONS:  Restart  Coreg 3 125 mg b i d  and titrate as needed  Echo to evaluate LV function and regional wall motion      Thank you for your consultation  If you have any question please call me at [phone]      Primary Care Physician Requesting Consult: Bethanie Cohen MD      Reason for Consult / Principal Problem:  Perioperative cardiac risk assessment    HPI :   Maddie Soler is a 45y o  year old male who was referred by primary care doctor for evaluation of perioperative cardiac risk for bariatric surgery which she is planning to have possibly sometimes around November  Patient has severe obesity with a BMI of more than 56  He also has hypertension and has been on Coreg and lisinopril  However he has not been able to get a refill of his Coreg and has missed taking it for about 2 weeks    As such his blood pressure is 170/104 with a resting heart rate of 102 per minute  He denies any chest pain  He does have dyspnea on minimal exertion which could be secondary to his    REVIEW OF SYSTEMS:    Constitutional:  Severely obese  HENT: Negative for congestion, sore throat and trouble swallowing  Eyes: Negative for discharge and redness  Respiratory: Negative for apnea, cough, chest tightness, dyspnea on mild exertion  Cardiovascular: Negative for chest pain, shortness of breath, palpitations and leg swelling  Gastrointestinal: Negative for abdominal distention, abdominal pain, anal bleeding, blood in stool, constipation, diarrhea, nausea and vomiting  Endocrine: Negative for polydipsia, polyphagia and polyuria  Genitourinary: Negative for difficulty urinating, dysuria, flank pain and hematuria  Musculoskeletal: Negative for arthralgias, myalgias and neck stiffness  Skin: Negative for pallor and rash  Allergic/Immunologic: Negative for environmental allergies  Neurological: Negative for dizziness, syncope, light-headedness, numbness and headaches  Hematological: Negative for adenopathy  Does not bruise/bleed easily  Psychiatric/Behavioral: Negative for confusion and hallucinations  The patient is not nervous/anxious  Historical Information   Past Medical History:   Diagnosis Date    Bell's palsy     Depression     Diabetes mellitus ([facility] ) 03/25/21    Blood work    GERD (gastroesophageal reflux disease)     Hypertension     Irregular heart beat     Morbid obesity with BMI of 60 0-69 9, adult [facility]     Neurocardiogenic syncope     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     pt symptomatic with extreme and sudden environmental temp  fluctuations     Past Surgical History:   Procedure Laterality Date    COLONOSCOPY N/A 2/2/2018    Procedure: COLONOSCOPY;  Surgeon: Raymond Kenny MD;  Location: [facility] GI LAB; Service: Gastroenterology    ESOPHAGOGASTRODUODENOSCOPY N/A 2/2/2018    Procedure: ESOPHAGOGASTRODUODENOSCOPY (EGD);   Surgeon: Raymond Kenny MD;  Location: [facility] GI LAB;   Service: Gastroenterology    TESTICLE SURGERY Left     infected testicle     Social History     Substance and Sexual Activity   Alcohol Use Not Currently    Alcohol/week: 0 0 standard drinks    Frequency: Monthly or less    Drinks per session: 1 or 2    Comment: occ     Social History     Substance and Sexual Activity   Drug Use No     Social History     Tobacco Use   Smoking Status Former Smoker    Packs/day: 0 25    Years: 2 00    Pack years: 0 50    Types: Cigarettes    Start date: 7/15/1999   Catrachito Salter Quit date: 2010    Years since quitting: 10 6   Smokeless Tobacco Never Used     Family History:   Family History   Problem Relation Age of Onset    Supraventricular tachycardia Mother     Atrial fibrillation Mother     Diabetes type II Mother     Cancer Mother     Osteoporosis Mother     Ulcerative colitis Father     Liver disease Father     ADD / ADHD Son     Colon cancer Family     Diabetes type II Family     Hyperlipidemia Family     Hypertension Family     Hypothyroidism Family     Heart disease Family        Meds/Allergies     Allergies   Allergen Reactions    Macrolides And Ketolides Anaphylaxis    Pertussis Vaccines Anaphylaxis    Zithromax [Azithromycin] Anaphylaxis    Bee Venom      Annotation - [id number]: wasp, hornet also    Erythromycin     Guaifenesin     Other      Annotation - [id number]: african violet    Pertussis Vaccine     Piperacillin Sod-Tazobactam So     Pollen Extract     Vancomycin        Current Outpatient Medications:     Ascorbic Acid (VITAMIN C) 1000 MG tablet, Take 1,000 mg by mouth daily, Disp: , Rfl:     carvedilol (COREG) 3 125 mg tablet, TAKE 1 TABLET BY MOUTH TWICE DAILY WITH MEALS, Disp: 60 tablet, Rfl: 0    Chromium 1000 MCG TABS, Take 1 tablet by mouth daily, Disp: , Rfl:     dicyclomine (BENTYL) 20 mg tablet, Take 1 tablet (20 mg total) by mouth 3 (three) times a day (Patient taking differently: Take 20 mg by mouth as needed ), Disp: 90 tablet, Rfl: 1    lisinopril (ZESTRIL) 20 mg tablet, Take 1 tablet (20 mg total) by mouth every morning, Disp: 90 tablet, Rfl: 3    multivitamin (THERAGRAN) TABS, Take 1 tablet by mouth daily, Disp: , Rfl:     Naproxen Sodium (ALEVE) 220 MG CAPS, Take by mouth as needed , Disp: , Rfl:     omeprazole (PriLOSEC) 40 MG capsule, Take 1 capsule (40 mg total) by mouth every morning, Disp: 30 capsule, Rfl: 5    ondansetron (ZOFRAN-ODT) 4 mg disintegrating tablet, Take 1 tablet (4 mg total) by mouth every 6 (six) hours as needed for nausea or vomiting for up to 3 days, Disp: 9 tablet, Rfl: 0    oxyCODONE-acetaminophen (PERCOCET) 5-325 mg per tablet, Take 1 tablet by mouth every 8 (eight) hours as needed for moderate pain, Disp: , Rfl:     Vitals: Resp  rate 18, height 5' 9" (1 753 m)  Body mass index is 56 71 kg/m²  /104 mmHg  HR 1 2/Min  BP Readings from Last 3 Encounters:   04/01/21 158/80   02/24/21 122/80   02/05/21 144/90         PHYSICAL EXAMINATION:  Neurologic:  Alert & oriented x 3, no new focal deficits, Not in any acute distress,  Constitutional:  Well developed, well nourished, non-toxic appearance   Eyes:  Pupil equal and reacting to light, conjunctiva normal, No JVP, No LNP   HENT:  Atraumatic, oropharynx moist, Neck- normal range of motion, no tenderness,  Neck supple   Respiratory:  Bilateral air entry, mostly clear to auscultation  Cardiovascular: S1-S2 regular with a I/VI systolic murmur   GI:  Soft, nondistended, normal bowel sounds, nontender, no hepatosplenomegaly appreciated  Musculoskeletal:  No edema, no tenderness, no deformities  Skin:  Well hydrated, no rash   Lymphatic:  No lymphadenopathy noted   Extremities:  No edema and distal pulses are present    Diagnostic Studies Review Cardio:      EKG:  Sinus tachycardia, heart rate 102 per minute, low-voltage QRS    Cardiac testing:   No results found for this or any previous visit        Imaging:  Chest X-Ray:   No Chest XR results available for this patient  CT-scan of the chest:     No CTA results available for this patient  Lab Review   Lab Results   Component Value Date    WBC 6 76 01/28/2021    HGB 16 1 01/28/2021    HCT 51 8 (H) 01/28/2021    MCV 96 01/28/2021    RDW 12 9 01/28/2021     01/28/2021     BMP:  Lab Results   Component Value Date    SODIUM 134 03/25/2021    K 4 4 03/25/2021    CL 95 (L) 03/25/2021    CO2 24 03/25/2021    BUN 9 03/25/2021    CREATININE 0 82 03/25/2021    GLUC 316 (H) 03/25/2021    CALCIUM 9 4 01/28/2021    CORRECTEDCA 10 4 (H) 01/28/2021    EGFR 104 01/28/2021    MG 1 6 01/28/2021     LFT:  Lab Results   Component Value Date     (H) 03/25/2021     (H) 03/25/2021    ALKPHOS 112 01/28/2021    TP 7 9 03/25/2021    ALB 3 6 (L) 03/25/2021      Lab Results   Component Value Date    BUB0GCWBKMLO 1 800 07/25/2017     No components found for: [employer] [facility]  Lab Results   Component Value Date    HGBA1C 10 8 (H) 03/25/2021     Lipid Profile:   Lab Results   Component Value Date    CHOLESTEROL 173 03/25/2021    HDL 30 (L) 03/25/2021    LDLCALC 115 (H) 03/25/2021    TRIG 159 (H) 03/25/2021     Lab Results   Component Value Date    CHOLESTEROL 173 03/25/2021    CHOLESTEROL 194 09/19/2018     No results found for: CKTOTAL, CKMB, CKMBINDEX, TROPONINI  No results found for: NTBNP   Recent Results (from the past 672 hour(s))   Hemoglobin A1C    Collection Time: 03/25/21 10:37 AM   Result Value Ref Range    Hemoglobin A1C 10 8 (H) 4 8 - 5 6 %    Estimated Average Glucose 263 mg/dL   TSH, 3rd generation    Collection Time: 03/25/21 10:37 AM   Result Value Ref Range    TSH 1 880 0 450 - 4 500 uIU/mL   Lipid panel    Collection Time: 03/25/21 10:37 AM   Result Value Ref Range    Cholesterol, Total 173 100 - 199 mg/dL    Triglycerides 159 (H) 0 - 149 mg/dL    HDL 30 (L) >39 mg/dL    VLDL Cholesterol Calculated 28 5 - 40 mg/dL    LDL Calculated 115 (H) 0 - 99 mg/dL    T   Chol/HDL Ratio 5 8 (H) 0 0 - 5 0 ratio Comprehensive metabolic panel    Collection Time: 03/25/21 10:37 AM   Result Value Ref Range    Glucose, Random 316 (H) 65 - 99 mg/dL    BUN 9 6 - 20 mg/dL    Creatinine 0 82 0 76 - 1 27 mg/dL    eGFR Non  111 >59 mL/min/1 73    eGFR  129 >59 mL/min/1 73    SL AMB BUN/CREATININE RATIO 11 9 - 20    Sodium 134 134 - 144 mmol/L    Potassium 4 4 3 5 - 5 2 mmol/L    Chloride 95 (L) 96 - 106 mmol/L    CO2 24 20 - 29 mmol/L    CALCIUM 9 3 8 7 - 10 2 mg/dL    Protein, Total 7 9 6 0 - 8 5 g/dL    Albumin 3 6 (L) 4 0 - 5 0 g/dL    Globulin, Total 4 3 1 5 - 4 5 g/dL    Albumin/Globulin Ratio 0 8 (L) 1 2 - 2 2    TOTAL BILIRUBIN 0 8 0 0 - 1 2 mg/dL    Alk Phos Isoenzymes 125 (H) 39 - 117 IU/L     (H) 0 - 40 IU/L     (H) 0 - 44 IU/L           Dr Rivera De La Torre MD, [facility] [place]      "This note has been constructed using a voice recognition system  Therefore there may be syntax, spelling, and/or grammatical errors   Please call if you have any questions  "

## 2021-04-21 ENCOUNTER — TELEPHONE (OUTPATIENT)
Dept: BARIATRICS | Facility: CLINIC | Age: 41
End: 2021-04-21

## 2021-04-21 ENCOUNTER — IMMUNIZATIONS (OUTPATIENT)
Dept: FAMILY MEDICINE CLINIC | Facility: HOSPITAL | Age: 41
End: 2021-04-21

## 2021-04-21 DIAGNOSIS — Z23 ENCOUNTER FOR IMMUNIZATION: Primary | ICD-10-CM

## 2021-04-21 PROCEDURE — 0011A SARS-COV-2 / COVID-19 MRNA VACCINE (MODERNA) 100 MCG: CPT

## 2021-04-21 PROCEDURE — 91301 SARS-COV-2 / COVID-19 MRNA VACCINE (MODERNA) 100 MCG: CPT

## 2021-04-21 NOTE — TELEPHONE ENCOUNTER
Message left to follow up on progress patient is making in connecting with an office for mental health treatment  SW asked him to call back when he can to discuss and for her to share resources if needed  Appointment with ANNE and SOPHIE on 5/3 was also confirmed

## 2021-04-23 NOTE — TELEPHONE ENCOUNTER
ANNE spoke with patient about whether he connected with psychiatrist office  he reported that he has been focusing on changing eating habits but it is on his list of items to work on   SW let patient know about the difficulties patients have had connecting with a provider that take his insurance  Contact information for [facility] in [facility] was given, he was encouraged to call their office to schedule an appointment    Patient confirmed his next appointment with ANNE and SOPHIE on 5/3

## 2021-05-03 ENCOUNTER — TELEMEDICINE (OUTPATIENT)
Dept: FAMILY MEDICINE CLINIC | Facility: CLINIC | Age: 41
End: 2021-05-03
Payer: COMMERCIAL

## 2021-05-03 ENCOUNTER — OFFICE VISIT (OUTPATIENT)
Dept: BARIATRICS | Facility: CLINIC | Age: 41
End: 2021-05-03

## 2021-05-03 VITALS — WEIGHT: 315 LBS | BODY MASS INDEX: 46.65 KG/M2 | HEIGHT: 69 IN

## 2021-05-03 VITALS — WEIGHT: 315 LBS | TEMPERATURE: 96.9 F | BODY MASS INDEX: 55.79 KG/M2

## 2021-05-03 DIAGNOSIS — E66.01 MORBID OBESITY (HCC): ICD-10-CM

## 2021-05-03 DIAGNOSIS — Z53.20 NUTRITION COUNSELING DECLINED: ICD-10-CM

## 2021-05-03 DIAGNOSIS — E11.9 DM2 (DIABETES MELLITUS, TYPE 2) (HCC): Primary | ICD-10-CM

## 2021-05-03 DIAGNOSIS — E66.01 MORBID OBESITY WITH BODY MASS INDEX (BMI) OF 50.0 TO 59.9 IN ADULT (HCC): ICD-10-CM

## 2021-05-03 DIAGNOSIS — E66.01 MORBID (SEVERE) OBESITY DUE TO EXCESS CALORIES (HCC): Primary | ICD-10-CM

## 2021-05-03 DIAGNOSIS — K43.9 VENTRAL HERNIA WITHOUT OBSTRUCTION OR GANGRENE: ICD-10-CM

## 2021-05-03 DIAGNOSIS — K21.9 GASTROESOPHAGEAL REFLUX DISEASE: ICD-10-CM

## 2021-05-03 DIAGNOSIS — Z71.3 DIABETIC NUTRITIONAL COUNSELING COMPLETED: ICD-10-CM

## 2021-05-03 DIAGNOSIS — E66.01 MORBID OBESITY WITH BODY MASS INDEX (BMI) OF 50.0 TO 59.9 IN ADULT (HCC): Primary | ICD-10-CM

## 2021-05-03 PROCEDURE — RECHECK

## 2021-05-03 PROCEDURE — 3008F BODY MASS INDEX DOCD: CPT | Performed by: INTERNAL MEDICINE

## 2021-05-03 PROCEDURE — 99213 OFFICE O/P EST LOW 20 MIN: CPT | Performed by: FAMILY MEDICINE

## 2021-05-03 RX ORDER — INSULIN GLARGINE 100 [IU]/ML
10 INJECTION, SOLUTION SUBCUTANEOUS
Qty: 15 ML | Refills: 0 | Status: SHIPPED | OUTPATIENT
Start: 2021-05-03 | End: 2021-05-05

## 2021-05-03 RX ORDER — LANCETS 30 GAUGE
EACH MISCELLANEOUS
Qty: 100 EACH | Refills: 3 | Status: SHIPPED | OUTPATIENT
Start: 2021-05-03 | End: 2022-02-23

## 2021-05-03 NOTE — PROGRESS NOTES
Virtual Brief Visit    Assessment/Plan:    Problem List Items Addressed This Visit        Other    Morbid obesity with body mass index (BMI) of 50 0 to 59 9 in adult [facility])    Relevant Medications    insulin glargine (Lantus SoloStar) 100 units/mL injection pen    Blood Glucose Monitoring Suppl w/Device KIT    Lancets MISC    metFORMIN (GLUCOPHAGE) 500 mg tablet    Other Relevant Orders    Glucometer test strips    Morbid obesity (HCC)    Relevant Medications    insulin glargine (Lantus SoloStar) 100 units/mL injection pen    Blood Glucose Monitoring Suppl w/Device KIT    Lancets MISC    metFORMIN (GLUCOPHAGE) 500 mg tablet    Other Relevant Orders    Glucometer test strips    Ventral hernia without obstruction or gangrene    Relevant Medications    insulin glargine (Lantus SoloStar) 100 units/mL injection pen    Blood Glucose Monitoring Suppl w/Device KIT    Lancets MISC    metFORMIN (GLUCOPHAGE) 500 mg tablet    Other Relevant Orders    Glucometer test strips      Other Visit Diagnoses     DM2 (diabetes mellitus, type 2) ([facility])    -  Primary    Relevant Medications    insulin glargine (Lantus SoloStar) 100 units/mL injection pen    Blood Glucose Monitoring Suppl w/Device KIT    Lancets MISC    metFORMIN (GLUCOPHAGE) 500 mg tablet    Other Relevant Orders    Glucometer test strips    Nutrition counseling declined        Relevant Medications    insulin glargine (Lantus SoloStar) 100 units/mL injection pen    Blood Glucose Monitoring Suppl w/Device KIT    Lancets MISC    metFORMIN (GLUCOPHAGE) 500 mg tablet    Other Relevant Orders    Glucometer test strips    Diabetic nutritional counseling completed        Relevant Medications    insulin glargine (Lantus SoloStar) 100 units/mL injection pen    Blood Glucose Monitoring Suppl w/Device KIT    Lancets MISC    metFORMIN (GLUCOPHAGE) 500 mg tablet    Other Relevant Orders    Glucometer test strips        New diagnosed Diabetes with HBA1c 10 8     Extensively counseled on nutrition and weighty loss to benefit Diabetes  -patient is educated on insulin administration as his father had DM2 and was on insulin    -Ordering Lantus 10U QHS to start along with Metformin 500mg QD and will slowly increase to max dose of 100mg BID  Patient will follow up in office in 2 weeks with fasting and post-meal blood sugar logs  Educated on hypoglycemia and precautions to take along with treatment regimen  Additional nutritional counseling at next visit and antihyperglycemic regimen modification  Reason for visit is   Chief Complaint   Patient presents with    Virtual Brief Visit        Encounter provider Janel Johnston MD    Provider located at [address] [place] [id number]    Recent Visits  No visits were found meeting these conditions  Showing recent visits within past 7 days and meeting all other requirements     Today's Visits  Date Type Provider Dept   05/03/21 Telemedicine Corby Saeed MD  [facility]    Showing today's visits and meeting all other requirements     Future Appointments  No visits were found meeting these conditions  Showing future appointments within next 150 days and meeting all other requirements        After connecting through telephone, the patient was identified by name and date of birth  Fran Rosenberg was informed that this is a telemedicine visit and that the visit is being conducted through telephone  My office door was closed  No one else was in the room  He acknowledged consent and understanding of privacy and security of the platform  The patient has agreed to participate and understands he can discontinue the visit at any time  Patient is aware this is a billable service  Subjective    Fran Rosenberg is a 45 y o  male with abnormal hba1c      HPI   Patient is a 44 yo male who was being followed by weight management due to need for weight loss prior to ventral hernia repair  Patient had routine labs ordered which included HBA1c and was found to be 10 8 with average blood glucose of 239  He denies any chest pain, shortness of breath, abdominal pain currently  He is gated on insulin treatment due to his father having diabetes and being on insulin  Has never been on any antihyperglycemics before and is a new diagnosis of type 2 diabetes  Past Medical History:   Diagnosis Date    Bell's palsy     Depression     Diabetes mellitus ([facility] ) 03/25/21    Blood work    GERD (gastroesophageal reflux disease)     Hypertension     Irregular heart beat     Morbid obesity with BMI of 60 0-69 9, adult ([facility] )     Neurocardiogenic syncope     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     pt symptomatic with extreme and sudden environmental temp  fluctuations       Past Surgical History:   Procedure Laterality Date    COLONOSCOPY N/A 2/2/2018    Procedure: COLONOSCOPY;  Surgeon: Shalini Mg MD;  Location: [facility] GI LAB; Service: Gastroenterology    ESOPHAGOGASTRODUODENOSCOPY N/A 2/2/2018    Procedure: ESOPHAGOGASTRODUODENOSCOPY (EGD); Surgeon: Shalini Mg MD;  Location: [facility] GI LAB; Service: Gastroenterology    TESTICLE SURGERY Left     infected testicle       Current Outpatient Medications   Medication Sig Dispense Refill    Ascorbic Acid (VITAMIN C) 1000 MG tablet Take 1,000 mg by mouth daily      Blood Glucose Monitoring Suppl w/Device KIT Measure blood sugar twice daily   Morning after overnight fast and 2 hours after a meal 1 kit 0    carvedilol (COREG) 3 125 mg tablet Take 1 tablet (3 125 mg total) by mouth 2 (two) times a day with meals 60 tablet 1    Chromium 1000 MCG TABS Take 1 tablet by mouth daily      dicyclomine (BENTYL) 20 mg tablet Take 1 tablet (20 mg total) by mouth 3 (three) times a day (Patient taking differently: Take 20 mg by mouth as needed ) 90 tablet 1    insulin glargine (Lantus SoloStar) 100 units/mL injection pen Inject 10 Units under the skin daily at bedtime 15 mL 0    Lancets MISC Measure blood sugar twice daily  Morning after overnight fast and 2 hours after a meal 100 each 3    lisinopril (ZESTRIL) 20 mg tablet Take 1 tablet (20 mg total) by mouth every morning 90 tablet 3    metFORMIN (GLUCOPHAGE) 500 mg tablet Take 1 tablet (500 mg total) by mouth daily 60 tablet 3    multivitamin (THERAGRAN) TABS Take 1 tablet by mouth daily      Naproxen Sodium (ALEVE) 220 MG CAPS Take by mouth as needed       omeprazole (PriLOSEC) 40 MG capsule Take 1 capsule (40 mg total) by mouth every morning 30 capsule 5    ondansetron (ZOFRAN-ODT) 4 mg disintegrating tablet Take 1 tablet (4 mg total) by mouth every 6 (six) hours as needed for nausea or vomiting for up to 3 days 9 tablet 0    oxyCODONE-acetaminophen (PERCOCET) 5-325 mg per tablet Take 1 tablet by mouth every 8 (eight) hours as needed for moderate pain       No current facility-administered medications for this visit  Allergies   Allergen Reactions    Macrolides And Ketolides Anaphylaxis    Pertussis Vaccines Anaphylaxis    Zithromax [Azithromycin] Anaphylaxis    Bee Venom      Annotation - [id number]: wasp, hornet also    Erythromycin     Guaifenesin     Other      Annotation - [id number]:  violet    Pertussis Vaccine     Piperacillin Sod-Tazobactam So     Pollen Extract     Vancomycin        Review of Systems  Per HPI    There were no vitals filed for this visit  I spent 25 minutes directly with the patient during this visit    VIRTUAL VISIT DISCLAIMER    Elisabeth Hamlin acknowledges that he has consented to an online visit or consultation  He understands that the online visit is based solely on information provided by him, and that, in the absence of a face-to-face physical evaluation by the physician, the diagnosis he receives is both limited and provisional in terms of accuracy and completeness   This is not intended to replace a full medical face-to-face evaluation by the physician  Josefina Thomas understands and accepts these terms

## 2021-05-03 NOTE — PROGRESS NOTES
Patient presented to 2 of 6 weight check with a 6 2lb weight loss from last visit  He reports that he has cut calories from his beverages and is no longer having any caffeine  He has been drinking water and having his protein shakes although he says he does mix the powder with chocolate milk  He has cut his portions and is being more conscientious of food choices  He says he will have 3-4 small meals a day with some snacks, he will have a protein bar as a snack  He said he is feeling a little more satiated and is trying to eat slower with smaller utensils  He used to track his food in My [id number] [employer] but stopped after he wasn't able to lose more weight  He does want to start tracking again but says he's been busy, he's been tracking in his head  SW pointed out the benefits of tracking whether it be on paper or in an jessica, gives him an opportunity to look back on his progress, reflect on choices and keeps him accountable  Patient will review food composition and combinations with RD  Patient reports he is active 3-4 days a week, going for walks at [facility] a few times a week, going outside with his kids and walking up and down his stairs  SW encouraged patient to continue his activity, to set a minimum for himself of 1-2 times a week of purposeful active and work up from there in intensity, frequency or duration  Patient says he's getting his rest, about 6 hours a night but he does feel more tired now that he's not having any caffeine  He sometimes wakes up with a headache but isn't sure if he really has sleep apnea  SW encouraged patient to call sleep medicine to schedule appointment since it may take several weeks to months to get in for a consult  Patient also has the phone number for [phone] to call to connect to treatment  ANNE let patient know that once that is done and SW gathers information on his treatment progress, his EGD can be scheduled    Patient reports no anxiety or depression, he does have some normal stress and his coping skills are reading, watching TV or playing video games  He does notice some bored eating, ANNE encouraged patient to focus on mindful eating  Patient's workflow reviewed, he also has the link for the podcasts to complete  3 of 6 weight check scheduled for June with ANNE and SOPHIE

## 2021-05-03 NOTE — PROGRESS NOTES
Bariatric Nutrition Follow-up Note    Type of surgery    Preop (6 months wt checks--2 of 6 today)  Surgery Date: TBD-leaning toward RYGB  Surgeon: Dr Ammy Ellsworth  45 y o   male     [place] with BMI of 25: 168 6lbs  Pre-Op Excess Wt: 219 2lbs  Height 5' 9" (1 753 m), weight (!) 171 kg (377 lb 12 8 oz)  Body mass index is 55 79 kg/m²  Weight change x 1 month:  -6 2  Net weight loss from start:  -10lbs    Review of History and Medications   Newly dx DM with A1c 10 8 on 3/25/21--advised to talk to PCP to get started on meds (as of 5/3 still hasn't called PCP therefore is not started on meds at this time)    Past Medical History:   Diagnosis Date    Bell's palsy     Depression     Diabetes mellitus ([facility] ) 03/25/21    Blood work    GERD (gastroesophageal reflux disease)     Hypertension     Irregular heart beat     Morbid obesity with BMI of 60 0-69 9, adult (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     pt symptomatic with extreme and sudden environmental temp  fluctuations     Past Surgical History:   Procedure Laterality Date    COLONOSCOPY N/A 2/2/2018    Procedure: COLONOSCOPY;  Surgeon: Shazia Hernandez MD;  Location: [place] [address] GI LAB; Service: Gastroenterology    ESOPHAGOGASTRODUODENOSCOPY N/A 2/2/2018    Procedure: ESOPHAGOGASTRODUODENOSCOPY (EGD); Surgeon: Shazia Hernandez MD;  Location: [facility] GI LAB;   Service: Gastroenterology    TESTICLE SURGERY Left     infected testicle     Social History     Socioeconomic History    Marital status: /Civil Union     Spouse name: Not on file    Number of children: Not on file    Years of education: Not on file    Highest education level: Not on file   Occupational History    Not on file   Social Needs    Financial resource strain: Not on file    Food insecurity     Worry: Not on file     Inability: Not on file    Transportation needs     Medical: Not on file     Non-medical: Not on file   Tobacco Use    Smoking status: Former Smoker     Packs/day: 0 25     Years: 2 00     Pack years: 0 50     Types: Cigarettes     Start date: 7/15/1999     Quit date: 2010     Years since quitting: 10 6    Smokeless tobacco: Never Used   Substance and Sexual Activity    Alcohol use: Not Currently     Alcohol/week: 0 0 standard drinks     Frequency: Monthly or less     Drinks per session: 1 or 2     Comment: occ    Drug use: No    Sexual activity: Not Currently     Partners: Female     Birth control/protection: Abstinence, I U D     Lifestyle    Physical activity     Days per week: Not on file     Minutes per session: Not on file    Stress: Not on file   Relationships    Social connections     Talks on phone: Not on file     Gets together: Not on file     Attends Anabaptism service: Not on file     Active member of club or organization: Not on file     Attends meetings of clubs or organizations: Not on file     Relationship status: Not on file    Intimate partner violence     Fear of current or ex partner: Not on file     Emotionally abused: Not on file     Physically abused: Not on file     Forced sexual activity: Not on file   Other Topics Concern    Not on file   Social History Narrative    Denied: History of alcohol use (history) - as per [employer]    Always uses seat belt    Former smoker - as per [employer]    Never a smoker - as per [employer]       Current Outpatient Medications:     Ascorbic Acid (VITAMIN C) 1000 MG tablet, Take 1,000 mg by mouth daily, Disp: , Rfl:     carvedilol (COREG) 3 125 mg tablet, Take 1 tablet (3 125 mg total) by mouth 2 (two) times a day with meals, Disp: 60 tablet, Rfl: 1    Chromium 1000 MCG TABS, Take 1 tablet by mouth daily, Disp: , Rfl:     dicyclomine (BENTYL) 20 mg tablet, Take 1 tablet (20 mg total) by mouth 3 (three) times a day (Patient taking differently: Take 20 mg by mouth as needed ), Disp: 90 tablet, Rfl: 1    lisinopril (ZESTRIL) 20 mg tablet, Take 1 tablet (20 mg total) by mouth every morning, Disp: 90 tablet, Rfl: 3    multivitamin (THERAGRAN) TABS, Take 1 tablet by mouth daily, Disp: , Rfl:     Naproxen Sodium (ALEVE) 220 MG CAPS, Take by mouth as needed , Disp: , Rfl:     omeprazole (PriLOSEC) 40 MG capsule, Take 1 capsule (40 mg total) by mouth every morning, Disp: 30 capsule, Rfl: 5    ondansetron (ZOFRAN-ODT) 4 mg disintegrating tablet, Take 1 tablet (4 mg total) by mouth every 6 (six) hours as needed for nausea or vomiting for up to 3 days, Disp: 9 tablet, Rfl: 0    oxyCODONE-acetaminophen (PERCOCET) 5-325 mg per tablet, Take 1 tablet by mouth every 8 (eight) hours as needed for moderate pain, Disp: , Rfl:     Food Intake and Lifestyle Assessment   Food Intake Assessment completed via usual diet recall  · Gets $700 per month on food stamps  · Trying to meet 3 meals per day, to cut down the bread, cut out the sweet drinks    Wake: 7am--was working at [employer], not in work since December  Breakfast: 8:30am: Now eating breakfast--protein shake or 1/2 cup of uncooked grits (1 cup cooked) + 4 eggs +  3/4 cup cheese OR 2 crack an egg---portion are still larger  Since last visit did cut out the coffee therefore is no longer doing the 1/4 cup of sugar mixed in it (200 cals, 50gm sugars/carbs)  Snack: -   Lunch: jamir lettuce with tomato salad OR when does a sandwich will use whole grain bread from [employer]  Snack: [employer]unch snack size protein bar (190-210 cals, 16gm protein)--advised to stick with the snack size and not full size bar due to calorie content  Dinner: 5-6pm-last night:  Chicken breast 6oz over a salad (lettuce, tomatoes, banana peppers, wish bone lemon craig dressing  Trying to do more lean meats, fish  Gets  Box for most of this meats  Snack: 1am-2 ham and cheese sandwiches + full bag of ranch flavored mini rice cakes reported at last visit    Last night had a protein shake:  2 scoops protein powder, 2 cups 2% milk (sometimes will add 8oz chocolate milk---advised would prefer cynthia protein powder for the flavor), 2 tbsp PB2  Beverage intake: water, sweetened beverages, juice, regular soda and coffee/tea (cut out most of all the sugary drinks!)  Protein supplement: whey protein isolate powder with PB2  Estimated protein intake per day: 80-100gm  Estimated fluid intake per day: 64oz water 2-3 times per day, can drink 1/2 gal of bolthouse smoothie/juice in one sitting, 20oz soda per day, powdered lemonade/iced tea/gatorade mixed with water  Meals eaten away from home: once a week-[employer]-w[employer] and pizza or chinese food  Typical meal pattern: 2 meals per day and grazes on food, no set meals  Eating Behaviors: Consumption of high calorie/ high fat foods, Consumption of high calorie beverages, Large portion sizes, Frequent snacking/ grazing and Mindless eating    Food allergies or intolerances: Allergies   Allergen Reactions    Macrolides And Ketolides Anaphylaxis    Pertussis Vaccines Anaphylaxis    Zithromax [Azithromycin] Anaphylaxis    Bee Venom      Annotation - [id number]: wasp, hornet also    Erythromycin     Guaifenesin     Other      Annotation - [id number]:  violet    Pertussis Vaccine     Piperacillin Sod-Tazobactam So     Pollen Extract     Vancomycin      Cultural or Pentecostalism considerations: none    Physical Assessment  Physical Activity  Types of exercise: None  Walks once a week in the park with the kids  Current physical limitations: SOB quickly     Psychosocial Assessment   Support systems: spouse and children friend(s) relative(s)  Socioeconomic factors: on food stamps    Nutrition Diagnosis  Diagnosis: Overweight / Obesity (NC-3 3)  Related to: Physical inactivity and Excessive energy intake  As Evidenced by: BMI >25     Nutrition Prescription: Recommend the following diet  Regular    Interventions and Teaching   Discussed pre-op and post-op nutrition guidelines         Patient educated and handouts provided  Capacity of post-surgery stomach  Diet progression  Adequate hydration  Expected weight loss  Weight loss plateaus/ possibility of weight regain  Exercise  Suggestions for pre-op diet  Nutrition considerations after surgery  Protein supplements  Meal planning and preparation  Appropriate carbohydrate, protein, and fat intake, and food/fluid choices to maximize safe weight loss, nutrient intake, and tolerance   Dietary and lifestyle changes  Possible problems with poor eating habits  Intuitive eating  Techniques for self monitoring and keeping daily food journal  Carb counting    Education provided to: patient    Barriers to learning: No barriers identified  Readiness to change: preparation    Prior research on procedure: books, internet, discussed with provider and friends or family    Comprehension: verbalizes understanding     Expected Compliance: good    Evaluation / Monitoring  Dietitian to Monitor: Eating pattern as discussed Tolerance of nutrition prescription Body weight Lab values Physical activity    Workflow:   PCP Letter: completed   Support Group: not completed   6 Month Pre-Operative Program: 2 of 6 today   Bloodwork: will need repeat blood work closer to linkedFA, needs to get A1c under 10 0 for sx   Cardiac Risk Assessment: consult was on 4/8, echo on 6/2   Sleep Studies: will call to schedule   EGD: needs psych first   Weight Loss:  Do Not Gain weight, 10% by of surgery:  -39lbs (349lbs) 5% in order to submit:  -19 5 (368 5lbs)   Psych:  Needs to schedule    Pt has made some changes since last visit resulting in some weight loss  He portions might be smaller than what he was doing, but they are still fairly large  Estimate his grits with eggs and cheese is as much as 900 calories due to the portion of what he puts in it  He is trying to focus more on veggies and do salad for at least one meal per day    He did purchase a protein powder, but again will drink a 16oz shake instead of only 8oz  Suggested to use the chocolate powder instead of getting the added sugar from chocolate milk  Pt reports he still has not connected with his PCP since was dx with DM back in January  Pt's PCP is part of [facility] therefore encouraged to stop by the office on his way out from today's visit  Reviewed carb counting and reading food label (60g carbs at each meal, 30gm carbs at each snack (2 snacks))         Goals  · Continue to avoid the sugary drinks  · Food journal via baritastic  · Exercise 30 minutes 5 times per week--split into 10 mins 3x/day if needed  · Complete lesson plans 1-6  · Eat 3 meals per day--can use protein shake as meal replacement  · 2400 cals, 240g carbs per day:  60gm carbs at each meal and 30gm at each snack (2 snacks per day)  · Connect with PCP about newly dx DM (A1c 10 8 in Jan)  · F/U with RD and SW in 1 month    Time Spent:   30  mins

## 2021-05-05 ENCOUNTER — TELEPHONE (OUTPATIENT)
Dept: FAMILY MEDICINE CLINIC | Facility: CLINIC | Age: 41
End: 2021-05-05

## 2021-05-05 DIAGNOSIS — E11.9 DM2 (DIABETES MELLITUS, TYPE 2) (HCC): Primary | ICD-10-CM

## 2021-05-05 RX ORDER — OMEPRAZOLE 40 MG/1
40 CAPSULE, DELAYED RELEASE ORAL EVERY MORNING
Qty: 30 CAPSULE | Refills: 5 | Status: SHIPPED | OUTPATIENT
Start: 2021-05-05 | End: 2021-10-28

## 2021-05-05 RX ORDER — INSULIN GLARGINE 100 [IU]/ML
10 INJECTION, SOLUTION SUBCUTANEOUS
Qty: 15 ML | Refills: 1 | Status: SHIPPED | OUTPATIENT
Start: 2021-05-05 | End: 2021-05-24

## 2021-05-05 NOTE — TELEPHONE ENCOUNTER
Hi, I sent order for Basaglar instead of Lantus  Also for glucometer test strips  Can anyone help me with prior authorization for Lancets  This is a newly diagnosed Daibetis with HBA1c over 10

## 2021-05-05 NOTE — TELEPHONE ENCOUNTER
[facility] called about Toby  Explained there is a note about [employer] being preferred already  Pharmacist also said lancets require prior auth and they need Rx for test strips

## 2021-05-05 NOTE — TELEPHONE ENCOUNTER
Dr Worthy Plane: pls resend RX  For Basaglar instead of lantus due to the insurance coverage      Thanks

## 2021-05-10 ENCOUNTER — TELEPHONE (OUTPATIENT)
Dept: FAMILY MEDICINE CLINIC | Facility: CLINIC | Age: 41
End: 2021-05-10

## 2021-05-10 DIAGNOSIS — E11.9 TYPE 2 DIABETES MELLITUS WITHOUT COMPLICATION, WITHOUT LONG-TERM CURRENT USE OF INSULIN (HCC): Primary | ICD-10-CM

## 2021-05-24 ENCOUNTER — OFFICE VISIT (OUTPATIENT)
Dept: FAMILY MEDICINE CLINIC | Facility: CLINIC | Age: 41
End: 2021-05-24
Payer: COMMERCIAL

## 2021-05-24 ENCOUNTER — IMMUNIZATIONS (OUTPATIENT)
Dept: FAMILY MEDICINE CLINIC | Facility: HOSPITAL | Age: 41
End: 2021-05-24

## 2021-05-24 VITALS
TEMPERATURE: 97.9 F | HEIGHT: 69 IN | HEART RATE: 90 BPM | DIASTOLIC BLOOD PRESSURE: 80 MMHG | OXYGEN SATURATION: 93 % | RESPIRATION RATE: 20 BRPM | WEIGHT: 315 LBS | BODY MASS INDEX: 46.65 KG/M2 | SYSTOLIC BLOOD PRESSURE: 136 MMHG

## 2021-05-24 DIAGNOSIS — Z71.89 DIABETES EDUCATION, ENCOUNTER FOR: ICD-10-CM

## 2021-05-24 DIAGNOSIS — E78.2 MIXED HYPERLIPIDEMIA: ICD-10-CM

## 2021-05-24 DIAGNOSIS — K43.9 VENTRAL HERNIA WITHOUT OBSTRUCTION OR GANGRENE: ICD-10-CM

## 2021-05-24 DIAGNOSIS — E66.01 MORBID OBESITY WITH BODY MASS INDEX (BMI) OF 50.0 TO 59.9 IN ADULT (HCC): ICD-10-CM

## 2021-05-24 DIAGNOSIS — E11.9 TYPE 2 DIABETES MELLITUS WITHOUT COMPLICATION, WITHOUT LONG-TERM CURRENT USE OF INSULIN (HCC): Primary | ICD-10-CM

## 2021-05-24 DIAGNOSIS — Z71.3 DIABETIC NUTRITIONAL COUNSELING COMPLETED: ICD-10-CM

## 2021-05-24 DIAGNOSIS — Z23 ENCOUNTER FOR IMMUNIZATION: Primary | ICD-10-CM

## 2021-05-24 DIAGNOSIS — E11.9 DM2 (DIABETES MELLITUS, TYPE 2) (HCC): ICD-10-CM

## 2021-05-24 DIAGNOSIS — E66.01 MORBID OBESITY (HCC): ICD-10-CM

## 2021-05-24 DIAGNOSIS — Z53.20 NUTRITION COUNSELING DECLINED: ICD-10-CM

## 2021-05-24 DIAGNOSIS — Z28.21 TETANUS, DIPHTHERIA, AND ACELLULAR PERTUSSIS (TDAP) VACCINATION DECLINED: ICD-10-CM

## 2021-05-24 PROCEDURE — 99213 OFFICE O/P EST LOW 20 MIN: CPT | Performed by: FAMILY MEDICINE

## 2021-05-24 PROCEDURE — 3725F SCREEN DEPRESSION PERFORMED: CPT | Performed by: FAMILY MEDICINE

## 2021-05-24 PROCEDURE — 0012A SARS-COV-2 / COVID-19 MRNA VACCINE (MODERNA) 100 MCG: CPT

## 2021-05-24 PROCEDURE — 91301 SARS-COV-2 / COVID-19 MRNA VACCINE (MODERNA) 100 MCG: CPT

## 2021-05-24 RX ORDER — INSULIN GLARGINE 100 [IU]/ML
15 INJECTION, SOLUTION SUBCUTANEOUS
Qty: 15 ML | Refills: 1 | Status: SHIPPED | OUTPATIENT
Start: 2021-05-24 | End: 2021-07-23 | Stop reason: SDUPTHER

## 2021-05-24 NOTE — PROGRESS NOTES
Diabetic Foot Exam    Patient's shoes and socks removed  Right Foot/Ankle   Right Foot Inspection  Skin Exam: skin normal and skin intact no dry skin, no warmth, no callus, no erythema, no maceration, no abnormal color, no pre-ulcer, no ulcer and no callus                          Toe Exam: no swelling  Sensory       Monofilament testing: intact  Vascular  Capillary refills: < 3 seconds  The right DP pulse is 2+  Left Foot/Ankle  Left Foot Inspection  Skin Exam: skin normal and skin intactno dry skin, no warmth, no erythema, no maceration, normal color, no pre-ulcer, no ulcer and no callus                         Toe Exam: no swelling                   Sensory       Monofilament: intact  Vascular  Capillary refills: < 3 seconds  The left DP pulse is 2+  Assign Risk Category:  ; No loss of protective sensation;  No weak pulses       Risk: 0

## 2021-05-24 NOTE — PROGRESS NOTES
Assessment/Plan:     Diagnoses and all orders for this visit:    Type 2 diabetes mellitus without complication, without long-term current use of insulin ([facility] )    Diabetic nutritional counseling completed    Diabetes education, encounter for    Mixed hyperlipidemia    Morbid obesity with body mass index (BMI) of 50 0 to 59 9 in adult ([facility] )    Ventral hernia without obstruction or gangrene    Other orders  -     TDAP VACCINE GREATER THAN OR EQUAL TO 6YO IM      -Patient will schedule his annual eye exam soon but has been having difficulty given his insurance and states he has to pay out of pocket  -urine microalbumin/Cr ratio ordered   -Will increase his Basaglar to 15units QHS and continue to check his blood sugar twice daily after overnight fast and 2 hours after a meal  Current blood sugars range from 140-200    -although currently tolerating Metformin 500mg BID well, given his hx of IBS, will keep at current regimen and consider increasing at later time  -patient will follow up in 3-4 weeks with BS logs    Subjective:      Patient ID: Ayan Madsen is a 45 y o  male  HPI  Patient is a 44 yo newly diagnosed Diabetic who was started 2 weeks ago on Basaglar 10U QHS as well as Metformin 500mg QD  Patient was also instructed on keeping a blood sugar log  He presents today with blood sugar logs between 140 and 200  Morning fasting about 140-180 and 2 hours post meal 160-200 on average  He reports that he is taking his Basaglar 10U QHS but is taking Metformin 500mg BID with currently no GI side effects  Patient has had moderate improvement in diet and addition of fruits and vegetables  He has even planted his own lettuce with his wife  Is staying away from processed foods and processed sugars  Patient is trying to change his lifestyle due to having two morbidly obese children as well and trying to improve his children's overall lifestyles  tBMI Counseling: Body mass index is 54 49 kg/m²   The BMI is above normal  Nutrition recommendations include decreasing portion sizes, encouraging healthy choices of fruits and vegetables, decreasing fast food intake, consuming healthier snacks, limiting drinks that contain sugar, moderation in carbohydrate intake and increasing intake of lean protein  Exercise recommendations include exercising 3-5 times per week  No pharmacotherapy was ordered  Patient referred to weight management due to patient being overweight  Patient is seeing weight management already and needs to lose weight prior to Gastric bypass surgery and needs weight loss prior to umbilical hernia repair  Patient needs to cut weight to about 349lbs prior to Ventral hernia repair can be planned  The following portions of the patient's history were reviewed and updated as appropriate: allergies, current medications, past family history, past medical history, past social history and problem list     Review of Systems   Constitutional: Negative for fever  HENT: Negative for sore throat  Eyes: Negative for pain and visual disturbance  Respiratory: Negative for chest tightness and shortness of breath  Cardiovascular: Negative for chest pain and palpitations  Gastrointestinal: Negative for abdominal pain, blood in stool, constipation and diarrhea  Every once and a while has IBS with diarrhea but not currently   Genitourinary: Negative for dysuria and hematuria  Neurological: Negative for dizziness, numbness and headaches  Objective:      /80 (BP Location: Left arm, Patient Position: Sitting, Cuff Size: Large)   Pulse 90   Temp 97 9 °F (36 6 °C) (Tympanic)   Resp 20   Ht 5' 9" (1 753 m)   Wt (!) 167 kg (369 lb)   SpO2 93%   BMI 54 49 kg/m²          Physical Exam  Constitutional:       Appearance: He is obese  Eyes:      General: No scleral icterus  Pupils: Pupils are equal, round, and reactive to light  Cardiovascular:      Rate and Rhythm: Regular rhythm  Tachycardia present  Pulses: Normal pulses  Pulmonary:      Effort: Pulmonary effort is normal       Breath sounds: Normal breath sounds  Abdominal:      Palpations: Abdomen is soft  Tenderness: There is no abdominal tenderness  There is no guarding  Comments: Very large abdomen/ panus  Ventral hernia reducible  Musculoskeletal:      Right lower leg: No edema  Left lower leg: No edema  Skin:     General: Skin is warm  Capillary Refill: Capillary refill takes less than 2 seconds  Neurological:      General: No focal deficit present  Mental Status: He is alert and oriented to person, place, and time  Sensory: No sensory deficit  Psychiatric:         Mood and Affect: Mood normal          Behavior: Behavior normal          Thought Content:  Thought content normal          Judgment: Judgment normal

## 2021-06-01 ENCOUNTER — OFFICE VISIT (OUTPATIENT)
Dept: BARIATRICS | Facility: CLINIC | Age: 41
End: 2021-06-01

## 2021-06-01 VITALS — TEMPERATURE: 97.9 F | BODY MASS INDEX: 46.65 KG/M2 | HEIGHT: 69 IN | WEIGHT: 315 LBS

## 2021-06-01 DIAGNOSIS — E66.01 MORBID (SEVERE) OBESITY DUE TO EXCESS CALORIES (HCC): Primary | ICD-10-CM

## 2021-06-01 DIAGNOSIS — E66.01 MORBID OBESITY WITH BODY MASS INDEX (BMI) OF 50.0 TO 59.9 IN ADULT (HCC): Primary | ICD-10-CM

## 2021-06-01 PROCEDURE — RECHECK

## 2021-06-01 NOTE — PROGRESS NOTES
Patient presents to office visit, he maintained his weight but he feels he's making a lot of changes that are going to help his weight loss journey  He talked with RD about some switches she can be making with his food choices to decrease carbs and to eat on a smaller plate  Patient plans to get savannah boxes to help with portion, ANNE pointed out that they could also be used to take food with him if he's going to be away from home  Patient hasn't been tracking, ANNE pointed out the benefits of tracking foods to stay on track with calorie count and to make informed decisions about balanced meals  ANNE also talked about note section of the [employer] to help with mood journaling  Patient said he has always struggled with journaling, he can't write down exactly what is in his mind  SW talked to him about video journaling but he said it's hard for him to express what is going on in his mind and to talk with others  SW pointed out that he's doing well talking about his journey and that talking with others about what his struggles are will help him throughout this journey  Patient has not yet called [address] [place] [address] to connect with a mental health provider, he says he puts it on the back burner in favor of taking care of others but he knows he needs to make his appointment  ANNE pointed out the importance of self care and making these appointments is a part of that along with helping him through his bariatric journey  Patient plans to get out this summer with his kids to be active, they are going to be going for walks and hiking  ANNE recommended tracking, being active and connecting with mental health treatment over the next month, patient to return to follow up with ANNE and SOPHIE

## 2021-06-01 NOTE — PROGRESS NOTES
Bariatric Nutrition Follow-up Note    Type of surgery    Preop (6 months wt checks--3 of 6 today)  Surgery Date: TBD-leaning toward RYGB  Surgeon: Dr Deangelo Conti  45 y o   male     Wt with BMI of 25: 168 6lbs  Pre-Op Excess Wt: 219 2lbs  Temperature 97 9 °F (36 6 °C), height 5' 9" (1 753 m), weight (!) 171 kg (377 lb 9 6 oz)  Body mass index is 55 76 kg/m²  Weight change x 1 month:  -0 2  Net weight loss from start:  -10 4lbs    Review of History and Medications   Newly dx DM with A1c 10 8 on 3/25/21--saw PCP and started on Metformin and HS insulin    Past Medical History:   Diagnosis Date    Bell's palsy     Depression     Diabetes mellitus ([facility] ) 03/25/21    Blood work    GERD (gastroesophageal reflux disease)     Hypertension     Irregular heart beat     Morbid obesity with BMI of 60 0-69 9, adult ([facility] )     Neurocardiogenic syncope     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     pt symptomatic with extreme and sudden environmental temp  fluctuations     Past Surgical History:   Procedure Laterality Date    COLONOSCOPY N/A 2/2/2018    Procedure: COLONOSCOPY;  Surgeon: Fred Olguin MD;  Location: [facility] GI LAB; Service: Gastroenterology    ESOPHAGOGASTRODUODENOSCOPY N/A 2/2/2018    Procedure: ESOPHAGOGASTRODUODENOSCOPY (EGD); Surgeon: Fred Olguin MD;  Location: [facility] GI LAB;   Service: Gastroenterology    TESTICLE SURGERY Left     infected testicle     Social History     Socioeconomic History    Marital status: /Civil Union     Spouse name: Not on file    Number of children: Not on file    Years of education: Not on file    Highest education level: Not on file   Occupational History    Not on file   Social Needs    Financial resource strain: Not on file    Food insecurity     Worry: Not on file     Inability: Not on file    Transportation needs     Medical: Not on file     Non-medical: Not on file   Tobacco Use    Smoking status: Former Smoker     Packs/day: 0 25     Years: 2 00     Pack years: 0 50     Types: Cigarettes     Start date: 7/15/1999     Quit date: 2010     Years since quitting: 10 7    Smokeless tobacco: Never Used   Substance and Sexual Activity    Alcohol use: Not Currently     Alcohol/week: 0 0 standard drinks     Frequency: Monthly or less     Drinks per session: 1 or 2     Comment: occ    Drug use: No    Sexual activity: Not Currently     Partners: Female     Birth control/protection: Abstinence, I U D  Lifestyle    Physical activity     Days per week: Not on file     Minutes per session: Not on file    Stress: Not on file   Relationships    Social connections     Talks on phone: Not on file     Gets together: Not on file     Attends [employer] service: Not on file     Active member of club or organization: Not on file     Attends meetings of clubs or organizations: Not on file     Relationship status: Not on file    Intimate partner violence     Fear of current or ex partner: Not on file     Emotionally abused: Not on file     Physically abused: Not on file     Forced sexual activity: Not on file   Other Topics Concern    Not on file   Social History Narrative    Denied: History of alcohol use (history) - as per [employer]    Always uses seat belt    Former smoker - as per [employer]    Never a smoker - as per [employer]       Current Outpatient Medications:     Ascorbic Acid (VITAMIN C) 1000 MG tablet, Take 1,000 mg by mouth daily, Disp: , Rfl:     Blood Glucose Monitoring Suppl w/Device KIT, Measure blood sugar twice daily   Morning after overnight fast and 2 hours after a meal, Disp: 1 kit, Rfl: 0    carvedilol (COREG) 3 125 mg tablet, Take 1 tablet (3 125 mg total) by mouth 2 (two) times a day with meals, Disp: 60 tablet, Rfl: 1    Chromium 1000 MCG TABS, Take 1 tablet by mouth daily, Disp: , Rfl:     dicyclomine (BENTYL) 20 mg tablet, Take 1 tablet (20 mg total) by mouth 3 (three) times a day (Patient taking differently: Take 20 mg by mouth as needed ), Disp: 90 tablet, Rfl: 1    glucose blood test strip, Use as instructed, Disp: 100 strip, Rfl: 3    insulin glargine (Basaglar KwikPen) 100 units/mL injection pen, Inject 15 Units under the skin daily at bedtime, Disp: 15 mL, Rfl: 1    Insulin Pen Needle 32G X 4 MM MISC, Use daily at bedtime, Disp: 100 each, Rfl: 3    Lancets MISC, Measure blood sugar twice daily   Morning after overnight fast and 2 hours after a meal, Disp: 100 each, Rfl: 3    lisinopril (ZESTRIL) 20 mg tablet, Take 1 tablet (20 mg total) by mouth every morning, Disp: 90 tablet, Rfl: 3    metFORMIN (GLUCOPHAGE) 500 mg tablet, Take 1 tablet (500 mg total) by mouth 2 (two) times a day with meals, Disp: 60 tablet, Rfl: 3    multivitamin (THERAGRAN) TABS, Take 1 tablet by mouth daily, Disp: , Rfl:     Naproxen Sodium (Aleve) 220 MG CAPS, Take by mouth as needed , Disp: , Rfl:     omeprazole (PriLOSEC) 40 MG capsule, Take 1 capsule (40 mg total) by mouth every morning, Disp: 30 capsule, Rfl: 5    ondansetron (ZOFRAN-ODT) 4 mg disintegrating tablet, Take 1 tablet (4 mg total) by mouth every 6 (six) hours as needed for nausea or vomiting for up to 3 days, Disp: 9 tablet, Rfl: 0    oxyCODONE-acetaminophen (PERCOCET) 5-325 mg per tablet, Take 1 tablet by mouth every 8 (eight) hours as needed for moderate pain, Disp: , Rfl:     Food Intake and Lifestyle Assessment   Food Intake Assessment completed via usual diet recall  · Gets $700 per month on food stamps  · Feels is in the habit of "cleaning his plate" and he wants to change that  · Got a Nutribullet:  Makes smoothies:  1 pack frozen smoothie fruit (+ 8oz apple juice + 4oz lemon juice OR protein powder with 2% milk (8oz) and fruit  · BS fasting and then 2 hrs after meal:  Between 125-165    Wake: 7am--was working at [employer], not in work since December  Breakfast: 8:30am: Cut portions, now doing only 1 cup cooked grits and 2 eggs (smaller portion than last visit)  This morning had 1 pop tart and 16oz shake  Snack: -   Lunch: jamir lettuce with tomato salad with chicken OR fish or chicken or hot dog w/o bun  Snack: [employer] snack size protein bar (190-210 cals, 16gm protein)--advised to stick with the snack size and not full size bar due to calorie content  Dinner: 5-6pm-last night:  Chicken breast 6oz over a salad (lettuce, tomatoes, banana peppers, wish bone lemon craig dressing  Trying to do more lean meats, fish  Gets  Box for most of this meats  Ramen soup (often has 2, but often 1 is filling enough so will work on only consuming 1 pack)  Snack: 10-11pm:  2 hot dogs w/o bun or Ramen    Beverage intake: water, sweetened beverages, juice, regular soda and coffee/tea-advised to use water instead of the apple juice in the smoothie as it is providing over 200 cals  Protein supplement: whey protein isolate powder with PB2  Estimated protein intake per day: 80-100gm  Estimated fluid intake per day: 64oz water 2-3 times per day, can drink 1/2 gal of bolthouse smoothie/juice in one sitting, 20oz soda per day, powdered lemonade/iced tea/gatorade mixed with water  Meals eaten away from home: once a week-p[employer]-w[employer] and pizza or chinese food  Typical meal pattern: 2 meals per day and grazes on food, no set meals  Eating Behaviors: Consumption of high calorie/ high fat foods, Consumption of high calorie beverages, Large portion sizes, Frequent snacking/ grazing and Mindless eating    Food allergies or intolerances:    Allergies   Allergen Reactions    Macrolides And Ketolides Anaphylaxis    Pertussis Vaccines Anaphylaxis    Zithromax [Azithromycin] Anaphylaxis    Bee Venom      Annotation - [id number]: wasp, hornet also    Erythromycin     Guaifenesin     Other      Annotation - [id number]:  violet    Pertussis Vaccine     Piperacillin Sod-Tazobactam So     Pollen Extract     Vancomycin      Cultural or Restorationism considerations: none    Physical Assessment  Physical Activity  Types of exercise: None  Walks once a week in the park with the kids  Current physical limitations: SOB quickly     Psychosocial Assessment   Support systems: spouse and children friend(s) relative(s)  Socioeconomic factors: on food stamps    Nutrition Diagnosis  Diagnosis: Overweight / Obesity (NC-3 3)  Related to: Physical inactivity and Excessive energy intake  As Evidenced by: BMI >25     Nutrition Prescription: Recommend the following diet  Regular    Interventions and Teaching   Discussed pre-op and post-op nutrition guidelines  Patient educated and handouts provided    Capacity of post-surgery stomach  Diet progression  Adequate hydration  Expected weight loss  Weight loss plateaus/ possibility of weight regain  Exercise  Suggestions for pre-op diet  Nutrition considerations after surgery  Protein supplements  Meal planning and preparation  Appropriate carbohydrate, protein, and fat intake, and food/fluid choices to maximize safe weight loss, nutrient intake, and tolerance   Dietary and lifestyle changes  Possible problems with poor eating habits  Intuitive eating  Techniques for self monitoring and keeping daily food journal  Carb counting    Education provided to: patient    Barriers to learning: No barriers identified  Readiness to change: preparation    Prior research on procedure: books, internet, discussed with provider and friends or family    Comprehension: verbalizes understanding     Expected Compliance: good    Evaluation / Monitoring  Dietitian to Monitor: Eating pattern as discussed Tolerance of nutrition prescription Body weight Lab values Physical activity    Workflow:   PCP Letter: completed   Support Group: not completed   6 Month Pre-Operative Program: 3 of 6 today   Bloodwork: will need repeat blood work closer to [place], needs to get A1c under 10 0 for sx--did talk to PCP and started on DM meds in May   Cardiac Risk Assessment: consult was on 4/8, echo on 6/2   Sleep Studies: will call to schedule   EGD: needs psych first   Weight Loss:  Do Not Gain weight, 10% by of surgery:  -39lbs (349lbs) 5% in order to submit:  -19 5 (368 5lbs)   Psych:  Needs to schedule    Pt overall has maintained his weight from last visit  He does report to have met with his PCP who started him on some DM medications for his A1c over 10  Reviewed food log and it appears he is mostly eating 3 meals per day, but portions are still larger and he is still consuming beverages with calories  Advised to avoid the juice in his smoothie and stick with water  Also advised to avoid all other sugary drinks to decrease calorie intake and better control his blood sugars  Also discussed his feeling of needing to clean is plate  Suggested instead to use smaller plates which can automatically control the portions he is consuming despite filling his plate       Goals  · Continue to avoid the sugary drinks  · Exercise 30 minutes 3-5 times per week--split into 10 mins 3x/day if needed  · Complete lesson plans 1-6  · Eat 3 meals per day--can use protein shake as meal replacement  · 2400 cals, 240g carbs per day:  60gm carbs at each meal and 30gm at each snack (2 snacks per day)  · Avoid juice in smoothie; use water instead  · Measure portions--use smaller plate    Time Spent:   30  mins

## 2021-06-02 ENCOUNTER — HOSPITAL ENCOUNTER (OUTPATIENT)
Dept: NON INVASIVE DIAGNOSTICS | Facility: HOSPITAL | Age: 41
Discharge: HOME | End: 2021-06-02
Attending: INTERNAL MEDICINE
Payer: COMMERCIAL

## 2021-06-02 DIAGNOSIS — R00.0 TACHYCARDIA: ICD-10-CM

## 2021-06-02 DIAGNOSIS — I10 ESSENTIAL HYPERTENSION: ICD-10-CM

## 2021-06-02 PROCEDURE — 93306 TTE W/DOPPLER COMPLETE: CPT | Performed by: INTERNAL MEDICINE

## 2021-06-02 PROCEDURE — 93306 TTE W/DOPPLER COMPLETE: CPT

## 2021-06-03 ENCOUNTER — TELEPHONE (OUTPATIENT)
Dept: CARDIOLOGY CLINIC | Facility: CLINIC | Age: 41
End: 2021-06-03

## 2021-06-03 NOTE — TELEPHONE ENCOUNTER
----- Message from Mason Pérez MD sent at 6/3/2021  8:10 AM EDT -----  Please call and inform patient that the Echocardiogram showed normal pumping function of the heart  No significant valve abnormality was seen

## 2021-06-11 ENCOUNTER — OFFICE VISIT (OUTPATIENT)
Dept: CARDIOLOGY CLINIC | Facility: CLINIC | Age: 41
End: 2021-06-11
Payer: COMMERCIAL

## 2021-06-11 VITALS
HEART RATE: 103 BPM | HEIGHT: 69 IN | SYSTOLIC BLOOD PRESSURE: 112 MMHG | DIASTOLIC BLOOD PRESSURE: 70 MMHG | BODY MASS INDEX: 46.65 KG/M2 | WEIGHT: 315 LBS | OXYGEN SATURATION: 95 % | TEMPERATURE: 99 F

## 2021-06-11 DIAGNOSIS — Z01.810 PREOPERATIVE CARDIOVASCULAR EXAMINATION: ICD-10-CM

## 2021-06-11 DIAGNOSIS — E66.01 MORBID OBESITY (HCC): ICD-10-CM

## 2021-06-11 DIAGNOSIS — I10 ESSENTIAL HYPERTENSION: Primary | ICD-10-CM

## 2021-06-11 PROCEDURE — 99214 OFFICE O/P EST MOD 30 MIN: CPT | Performed by: INTERNAL MEDICINE

## 2021-06-11 PROCEDURE — 1036F TOBACCO NON-USER: CPT | Performed by: INTERNAL MEDICINE

## 2021-06-11 PROCEDURE — 3074F SYST BP LT 130 MM HG: CPT | Performed by: INTERNAL MEDICINE

## 2021-06-11 PROCEDURE — 3078F DIAST BP <80 MM HG: CPT | Performed by: INTERNAL MEDICINE

## 2021-06-11 PROCEDURE — 3008F BODY MASS INDEX DOCD: CPT | Performed by: INTERNAL MEDICINE

## 2021-06-11 NOTE — PROGRESS NOTES
Progress Note - Cardiology Office  [facility]    Ben Crawford 45 y o  male MRN: [id number]  : 1980  Encounter: [id number]      ASSESSMENT:   Perioperative cardiac risk assessment for weight loss surgery possibly sometimes in November  An echocardiogram performed because of dyspnea on exertion revealed  Echo, 2021:  EF 65%, no regional wall motion abnormalities, normal diastolic function, trace MR and TR  His dyspnea on exertion is most likely secondary to obesity and deconditioning  Patient denies any chest pain, or syncope  His cardiac examination is benign  His blood pressure is now well controlled at 112/70 mmHg  His previous EKG revealed no evidence of ischemia or prior infarction    Patient has low to intermediate/acceptable risk of perioperative cardiac complications with bariatric surgery and may proceed without any further cardiac testing or intervention    Severe obesity:  BMI is 54 61     GERD     Hyperlipidemia     Hypertension  BP today is 112/70 mmHg    Family history of cardiac problems  Mother had CAD/MI atrial fibrillation, ventricular tachycardia and stroke       RECOMMENDATIONS:  Continue Coreg and lisinopril     Patient has low to intermediate/acceptable risk of perioperative cardiac complications with bariatric surgery and may proceed without any further cardiac testing or intervention      Please call [phone] if any questions  HPI :     Ben Crawford is a 45y o  year old male who came for follow up  He was previously seen for perioperative cardiac risk assessment and because of his dyspnea on mild exertion an echocardiogram was performed which revealed normal LV systolic and diastolic function  Patient denies any chest pain or syncope      REVIEW OF SYSTEMS:  Review of Systems   Respiratory: Positive for shortness of breath (Dyspnea on exertion)  All other systems reviewed and are negative          Historical Information   Past Medical History:   Diagnosis Date    Bell's palsy     Depression     Diabetes mellitus ([facility] ) 03/25/21    Blood work    GERD (gastroesophageal reflux disease)     Hypertension     Irregular heart beat     Morbid obesity with BMI of 60 0-69 9, adult ([facility] )     Neurocardiogenic syncope     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     pt symptomatic with extreme and sudden environmental temp  fluctuations     Past Surgical History:   Procedure Laterality Date    COLONOSCOPY N/A 2/2/2018    Procedure: COLONOSCOPY;  Surgeon: Bushra Melgoza MD;  Location: [place] [address] GI LAB; Service: Gastroenterology    ESOPHAGOGASTRODUODENOSCOPY N/A 2/2/2018    Procedure: ESOPHAGOGASTRODUODENOSCOPY (EGD); Surgeon: Bushra Melgoza MD;  Location: [facility] GI [facility];   Service: Gastroenterology    TESTICLE SURGERY Left     infected testicle     Social History     Substance and Sexual Activity   Alcohol Use Not Currently    Alcohol/week: 0 0 standard drinks    Frequency: Monthly or less    Drinks per session: 1 or 2    Comment: occ     Social History     Substance and Sexual Activity   Drug Use No     Social History     Tobacco Use   Smoking Status Former Smoker    Packs/day: 0 25    Years: 2 00    Pack years: 0 50    Types: Cigarettes    Start date: 7/15/1999   Clifm Luan Quit date: 2010    Years since quitting: 10 7   Smokeless Tobacco Never Used     Family History:   Family History   Problem Relation Age of Onset    Supraventricular tachycardia Mother     Atrial fibrillation Mother     Diabetes type II Mother     Cancer Mother     Osteoporosis Mother     Ulcerative colitis Father     Liver disease Father     ADD / ADHD Son     Colon cancer Family     Diabetes type II Family     Hyperlipidemia Family     Hypertension Family     Hypothyroidism Family     Heart disease Family        Meds/Allergies     Allergies   Allergen Reactions    Macrolides And Ketolides Anaphylaxis    Pertussis Vaccines Anaphylaxis    Zithromax [Azithromycin] Anaphylaxis    Bee Venom      Annotation - [id number]: wasp, hornet also    Erythromycin     Guaifenesin     Other      Annotation - [id number]:  violet    Pertussis Vaccine     Piperacillin Sod-Tazobactam So     Pollen Extract     Vancomycin        Current Outpatient Medications:     Ascorbic Acid (VITAMIN C) 1000 MG tablet, Take 1,000 mg by mouth daily, Disp: , Rfl:     Blood Glucose Monitoring Suppl w/Device KIT, Measure blood sugar twice daily  Morning after overnight fast and 2 hours after a meal, Disp: 1 kit, Rfl: 0    carvedilol (COREG) 3 125 mg tablet, Take 1 tablet (3 125 mg total) by mouth 2 (two) times a day with meals, Disp: 60 tablet, Rfl: 1    Chromium 1000 MCG TABS, Take 1 tablet by mouth daily, Disp: , Rfl:     dicyclomine (BENTYL) 20 mg tablet, Take 1 tablet (20 mg total) by mouth 3 (three) times a day (Patient taking differently: Take 20 mg by mouth as needed ), Disp: 90 tablet, Rfl: 1    glucose blood test strip, Use as instructed, Disp: 100 strip, Rfl: 3    insulin glargine (Basaglar KwikPen) 100 units/mL injection pen, Inject 15 Units under the skin daily at bedtime, Disp: 15 mL, Rfl: 1    Insulin Pen Needle 32G X 4 MM MISC, Use daily at bedtime, Disp: 100 each, Rfl: 3    Lancets MISC, Measure blood sugar twice daily   Morning after overnight fast and 2 hours after a meal, Disp: 100 each, Rfl: 3    lisinopril (ZESTRIL) 20 mg tablet, Take 1 tablet (20 mg total) by mouth every morning, Disp: 90 tablet, Rfl: 3    metFORMIN (GLUCOPHAGE) 500 mg tablet, Take 1 tablet (500 mg total) by mouth 2 (two) times a day with meals, Disp: 60 tablet, Rfl: 3    multivitamin (THERAGRAN) TABS, Take 1 tablet by mouth daily, Disp: , Rfl:     Naproxen Sodium (Aleve) 220 MG CAPS, Take by mouth as needed , Disp: , Rfl:     omeprazole (PriLOSEC) 40 MG capsule, Take 1 capsule (40 mg total) by mouth every morning, Disp: 30 capsule, Rfl: 5    oxyCODONE-acetaminophen (PERCOCET) 5-325 mg per tablet, Take 1 tablet by mouth every 8 (eight) hours as needed for moderate pain, Disp: , Rfl:     ondansetron (ZOFRAN-ODT) 4 mg disintegrating tablet, Take 1 tablet (4 mg total) by mouth every 6 (six) hours as needed for nausea or vomiting for up to 3 days, Disp: 9 tablet, Rfl: 0    Vitals: Blood pressure 112/70, pulse 103, temperature 99 °F (37 2 °C), temperature source Temporal, height 5' 9" (1 753 m), weight (!) 168 kg (370 lb), SpO2 95 %  Body mass index is 54 64 kg/m²  Vitals:    21 1011   Weight: (!) 168 kg (370 lb)     BP Readings from Last 3 Encounters:   21 112/70   21 136/80   21 (!) 170/104       Physical Exam:  Neurologic:  Alert & oriented x 3, no new focal deficits, Not in any acute distress,  Constitutional:  Morbidly obese  Eyes:  Pupil equal and reacting to light, conjunctiva normal,   HENT:  Atraumatic, oropharynx moist, Neck- normal range of motion, no tenderness,  Neck supple, No JVP, No LNP   Respiratory:  Bilateral air entry, mostly clear to auscultation  Cardiovascular: S1-S2 regular with a I/VI ejection systolic murmur   GI:  Soft, nondistended, normal bowel sounds, nontender, no hepatosplenomegaly appreciated  Musculoskeletal:  No edema, no tenderness, no deformities     Skin:  Well hydrated, no rash   Lymphatic:  No lymphadenopathy noted   Extremities:  No edema and distal pulses are present        Diagnostic Studies Review Cardio:      EKG:  Sinus tachycardia, heart rate 102 per minute, low-voltage QRS    Cardiac testing:   Results for orders placed during the hospital encounter of 21   Echo complete with contrast if indicated    Narrative [address]  [address] [facility] [phone]    Transthoracic Echocardiogram  2D, M-mode, Doppler, and Color Doppler    Study date:  2021    Patient: Beatris Ritchie  MR number: [id number]  Account number: [de-identified]  : 1980  Age: 45 years  Gender: Male  Status: Outpatient  Location: Echo lab  Height: 69 in  Weight: 368 3 lb  BP: 134/ 82 mmHg    Indications: Hypertension    Diagnoses: 401 9 - HYPERTENSION NOS    Sonographer:  CHRIS Hawkins  Primary Physician:  Corby Gregg MD  Referring Physician:  Esha Esposito MD  [facility]  [facility]  Interpreting Physician:  Esha Esposito MD    SUMMARY    LEFT VENTRICLE:  Normal LV chamber size and wall thickness  Preserved LV systolic function with ejection fraction of about 65%  No definite regional wall motion abnormality seen  Normal diastolic filling pattern    RIGHT VENTRICLE:  Normal right ventricular size and systolic function  TAPSE is 2 2 cm    MITRAL VALVE:  There was trace regurgitation  TRICUSPID VALVE:  There is trace tricuspid regurgitation  PA pressure is normal    HISTORY: PRIOR HISTORY: HTN,  OObesity, SA, GERD, IBS, DM, Migraine    PROCEDURE: The procedure was performed in the echo lab  This was a routine study  The transthoracic approach was used  The study included complete 2D imaging, M-mode, complete spectral Doppler, and color Doppler  The heart rate was 89 bpm,  at the start of the study  Echocardiographic views were limited due to restricted patient mobility, poor acoustic window availability, and decreased penetration  This was a technically difficult study  LEFT VENTRICLE: Normal LV chamber size and wall thickness  Preserved LV systolic function with ejection fraction of about 65%  No definite regional wall motion abnormality seen  Normal diastolic filling pattern    RIGHT VENTRICLE: Normal right ventricular size and systolic function  TAPSE is 2 2 cm    LEFT ATRIUM: Size was normal     RIGHT ATRIUM: Size was normal     MITRAL VALVE: Valve structure was normal  There was normal leaflet separation  DOPPLER: The transmitral velocity was within the normal range  There was no evidence for stenosis  There was trace regurgitation  AORTIC VALVE: The valve was trileaflet   Leaflets exhibited normal thickness and normal cuspal separation  DOPPLER: Transaortic velocity was within the normal range  There was no evidence for stenosis  There was no regurgitation  TRICUSPID VALVE: There is trace tricuspid regurgitation  PA pressure is normal    PULMONIC VALVE: No significant pulmonary regurgitation seen    PERICARDIUM: No pericardial effusion seen    AORTA: The root exhibited normal size  SYSTEM MEASUREMENT TABLES    2D  EF (Teich): 68 16 %  %FS: 38 42 %  Ao Diam: 3 79 cm  EDV(Teich): 143 84 ml  ESV(Teich): 45 8 ml  IVSd: 1 cm  LA Area: 16 2 cm2  LA Diam: 4 25 cm  LVEDV MOD A4C: 81 8 ml  LVEF MOD A4C: 46 38 %  LVESV MOD A4C: 43 86 ml  LVIDd: 5 44 cm  LVIDs: 3 35 cm  LVLd A4C: 7 65 cm  LVLs A4C: 6 86 cm  LVPWd: 1 13 cm  RA Area: 15 21 cm2  RVIDd: 3 53 cm  SV (Teich): 98 04 ml  SV MOD A4C: 37 94 ml    CW  TR Vmax: 2 12 m/s  TR maxP 99 mmHg    MM  TAPSE: 2 18 cm    PW  MV E/A Ratio: 2 01  E' Sept: 0 16 m/s  E/E' Sept: 6 95  MV A Colin: 0 54 m/s  MV Dec [place]: 5 21 m/s2  MV DecT: 213 14 ms  MV E Colin: 1 09 m/s    [facility] Accredited Echocardiography Laboratory    Prepared and electronically signed by    Vaishali Sinha MD  Signed 2021 15:27:18       No results found for this or any previous visit  No results found for this or any previous visit  No results found for this or any previous visit  No results found for this or any previous visit  No results found for this or any previous visit  Imaging:  Chest X-Ray:   No Chest XR results available for this patient  CT-scan of the chest:     No CTA results available for this patient    Lab Review   Lab Results   Component Value Date    WBC 6 76 2021    HGB 16 1 2021    HCT 51 8 (H) 2021    MCV 96 2021    RDW 12 9 2021     2021     BMP:  Lab Results   Component Value Date    SODIUM 134 2021    K 4 4 2021    CL 95 (L) 2021    CO2 24 2021    BUN 9 2021 CREATININE 0 82 03/25/2021    GLUC 316 (H) 03/25/2021    CALCIUM 9 4 01/28/2021    CORRECTEDCA 10 4 (H) 01/28/2021    EGFR 104 01/28/2021    MG 1 6 01/28/2021     LFT:  Lab Results   Component Value Date     (H) 03/25/2021     (H) 03/25/2021    ALKPHOS 112 01/28/2021    TP 7 9 03/25/2021    ALB 3 6 (L) 03/25/2021      No components found for: [facility] [facility]  Lab Results   Component Value Date    PCD6DKDHNUBH 1 800 07/25/2017     Lab Results   Component Value Date    HGBA1C 10 8 (H) 03/25/2021     Lipid Profile:   Lab Results   Component Value Date    CHOLESTEROL 173 03/25/2021    HDL 30 (L) 03/25/2021    LDLCALC 115 (H) 03/25/2021    TRIG 159 (H) 03/25/2021     Lab Results   Component Value Date    CHOLESTEROL 173 03/25/2021    CHOLESTEROL 194 09/19/2018     No results found for: CKTOTAL, CKMB, CKMBINDEX, TROPONINI  No results found for: NTBNP   No results found for this or any previous visit (from the past 672 hour(s))  Dr Makenzie Espinoza MD, [facility]      "This note has been constructed using a voice recognition system  Therefore there may be syntax, spelling, and/or grammatical errors   Please call if you have any questions  "

## 2021-06-21 DIAGNOSIS — R00.0 TACHYCARDIA: ICD-10-CM

## 2021-06-21 DIAGNOSIS — I10 ESSENTIAL HYPERTENSION: ICD-10-CM

## 2021-06-21 RX ORDER — CARVEDILOL 3.12 MG/1
3.12 TABLET ORAL 2 TIMES DAILY WITH MEALS
Qty: 60 TABLET | Refills: 1 | Status: SHIPPED | OUTPATIENT
Start: 2021-06-21 | End: 2021-08-24 | Stop reason: SDUPTHER

## 2021-06-29 ENCOUNTER — TELEPHONE (OUTPATIENT)
Dept: BARIATRICS | Facility: CLINIC | Age: 41
End: 2021-06-29

## 2021-06-29 NOTE — TELEPHONE ENCOUNTER
Patient reported that he spoke with [address] [place] [address] who said SW needs to send over patient information in order to get paperwork started for intake  SW emailed Rachel Gaytan at [address] [place] [address] about patient being one of ours and asking if she could follow up on getting him scheduled

## 2021-06-30 NOTE — ASSESSMENT & PLAN NOTE
-Lipids: high LDL, low HDL, high trigs  -Avoid fried foods and trans fat, limit saturated fats and refined carbohydrates  -Increase fish/omega 3 FA consumption  -Increase physical activity  -May improve s/p bariatric surgery

## 2021-06-30 NOTE — ASSESSMENT & PLAN NOTE
-Elevated; reports he rushed to appointment; BP typically runs in 383-768'I systolic range; advised him to monitor at home and f/u with PCP if consistently elevated   -On Carvedilol and lisinopril  -May improve s/p bariatric surgery  -Continue monitoring and management with prescribing provider

## 2021-06-30 NOTE — ASSESSMENT & PLAN NOTE
-Advised him to call sleep medicine asap  -Discussed risks of untreated sleep apnea such as sudden cardiac death by arrhythmia, uncontrolled hypertension, difficulty with weight loss, decreased quality sleep, increased insulin resistance, and stroke    -Should improve s/p bariatric surgery

## 2021-06-30 NOTE — ASSESSMENT & PLAN NOTE
· Interested in [address] with Dr Mitchell Chilel; here for 4/6 pre-op weight check    · 10% Weight loss-349lbs day of surgery goal   · Screening labs-CBC in January and rest of labs done in March, but HgbA1C >10, will need repeat labs prior to surgery  · Support Group-Not Completed; advised him to complete podcasts  · Cardiac Risk Assessment-Completed and cleared  · Upper Endoscopy-will schedule after Psych clearance  · Sleep Medicine Assessment-Advised him to schedule  · Alcohol and nicotine use is not recommended following bariatric surgery  · NSAIDs should be avoided following bariatric surgery  · Psych - needs clearance; has f/u with RAMILA Camp today

## 2021-06-30 NOTE — ASSESSMENT & PLAN NOTE
+Anxiety  -Referred to [facility]; reminded him to call and schedule

## 2021-06-30 NOTE — ASSESSMENT & PLAN NOTE
Lab Results   Component Value Date    HGBA1C 10 8 (H) 03/25/2021     -Newly diagnosed  -On metformin and insulin  -Will require repeat A1C and tighter blood sugar control pre-op  -Should improve s/p RYGB

## 2021-06-30 NOTE — ASSESSMENT & PLAN NOTE
-Uses Bentyl prn  -Denies food or mood triggers  -Recommended increasing soluble fiber, avoid gastric irritants/stimulants, caffeine, ETOH  -Try Sacchro b probiotic  -Continue monitoring and management with GI

## 2021-06-30 NOTE — ASSESSMENT & PLAN NOTE
-On omeprazole 40mg daily   -Advised avoidance of food triggers and gastric irritants, follow anti-reflux diet and lifestyle measures  -Should improve s/p RYGB

## 2021-07-01 ENCOUNTER — OFFICE VISIT (OUTPATIENT)
Dept: BARIATRICS | Facility: CLINIC | Age: 41
End: 2021-07-01
Payer: COMMERCIAL

## 2021-07-01 ENCOUNTER — OFFICE VISIT (OUTPATIENT)
Dept: BARIATRICS | Facility: CLINIC | Age: 41
End: 2021-07-01

## 2021-07-01 VITALS
HEIGHT: 69 IN | WEIGHT: 315 LBS | BODY MASS INDEX: 46.65 KG/M2 | HEART RATE: 94 BPM | DIASTOLIC BLOOD PRESSURE: 90 MMHG | TEMPERATURE: 96.7 F | SYSTOLIC BLOOD PRESSURE: 152 MMHG

## 2021-07-01 VITALS — WEIGHT: 315 LBS | BODY MASS INDEX: 54.61 KG/M2

## 2021-07-01 DIAGNOSIS — Z01.818 PRE-OP TESTING: ICD-10-CM

## 2021-07-01 DIAGNOSIS — E66.01 MORBID OBESITY WITH BODY MASS INDEX (BMI) OF 50.0 TO 59.9 IN ADULT (HCC): Primary | ICD-10-CM

## 2021-07-01 DIAGNOSIS — K21.9 GERD (GASTROESOPHAGEAL REFLUX DISEASE): ICD-10-CM

## 2021-07-01 DIAGNOSIS — E78.2 MIXED HYPERLIPIDEMIA: ICD-10-CM

## 2021-07-01 DIAGNOSIS — K58.0 IRRITABLE BOWEL SYNDROME WITH DIARRHEA: ICD-10-CM

## 2021-07-01 DIAGNOSIS — E11.9 TYPE 2 DIABETES MELLITUS WITHOUT COMPLICATION, WITHOUT LONG-TERM CURRENT USE OF INSULIN (HCC): ICD-10-CM

## 2021-07-01 DIAGNOSIS — F32.A DEPRESSION: ICD-10-CM

## 2021-07-01 DIAGNOSIS — I10 BENIGN ESSENTIAL HYPERTENSION: ICD-10-CM

## 2021-07-01 DIAGNOSIS — R29.818 SUSPECTED SLEEP APNEA: ICD-10-CM

## 2021-07-01 PROCEDURE — RECHECK

## 2021-07-01 PROCEDURE — 3077F SYST BP >= 140 MM HG: CPT | Performed by: PHYSICIAN ASSISTANT

## 2021-07-01 PROCEDURE — 3008F BODY MASS INDEX DOCD: CPT | Performed by: PHYSICIAN ASSISTANT

## 2021-07-01 PROCEDURE — 99214 OFFICE O/P EST MOD 30 MIN: CPT | Performed by: PHYSICIAN ASSISTANT

## 2021-07-01 PROCEDURE — 1036F TOBACCO NON-USER: CPT | Performed by: PHYSICIAN ASSISTANT

## 2021-07-01 NOTE — PROGRESS NOTES
Patient presented for 4 of 6 weight check, reporting efforts to manage food choices and portions, decreasing take out foods and snacking, increasing activity  Patient tries to eat all meals at home, on occasion grabbing food on the go if the family is out  Patient does have the savannah boxes but has not started to use them yet, instead using smaller plates and bowls for meals  SW suggested preparing the savannah boxes for food on the go  Patient is working on the 30/60 rule, focusing on his protein, will use fruit and vegetables to help him feel more hydrated during a meal instead of taking a drink  Patient also working on getting hydration, having alkaline water on occasion but mostly having regular water  Patient is increasing activity with his kids now that they are out of school  Patient denies any mood changes, no emotional eating or mindless snacking  He did make contact with [address] [place] [address] and they are in process of getting his intake paperwork prepared and will schedule appointment once he completes and returns in  Patient hasn't scheduled sleep consult yet, SW encouraged him to do this soon as they fill up quickly especially if he does need study  Patient's sleep has gotten better lately, sleeping 8 instead of 6 hours  Patient will return in a month for 5 of 6 weight check with ANNE and SOPHIE

## 2021-07-01 NOTE — PROGRESS NOTES
Assessment/Plan: Morbid obesity with body mass index (BMI) of 50 0 to 59 9 in adult [facility])  · Interested in Selin-En-Y Gastric Bypass with Dr Raymundo Pablo; here for 4/6 pre-op weight check  · 10% Weight loss-349lbs day of surgery goal   · Screening labs-CBC in January and rest of labs done in March, but HgbA1C >10, will need repeat labs prior to surgery  · Support Group-Not Completed; advised him to complete podcasts  · Cardiac Risk Assessment-Completed and cleared  · Upper Endoscopy-will schedule after Psych clearance  · Sleep Medicine Assessment-Advised him to schedule  · Alcohol and nicotine use is not recommended following bariatric surgery  · NSAIDs should be avoided following bariatric surgery  · Psych - needs clearance; [facility]      GERD (gastroesophageal reflux disease)  -On omeprazole 40mg daily   -Advised avoidance of food triggers and gastric irritants, follow anti-reflux diet and lifestyle measures  -Should improve s/p RYGB    Type 2 diabetes mellitus without complication, without long-term [facility] use [facility]    Lab Results   Component Value Date    HGBA1C 10 8 (H) 03/25/2021     -Newly diagnosed  -On metformin and insulin  -Will require repeat A1C and tighter blood sugar control pre-op  -Should improve s/p RYGB    Benign essential hypertension  -Elevated; reports he rushed to appointment; BP typically runs in 329-429'W systolic range; advised him to monitor at home and f/u with PCP if consistently elevated   -On Carvedilol and lisinopril  -May improve s/p bariatric surgery  -Continue monitoring and management with prescribing provider      Suspected sleep apnea  -Advised him to call sleep medicine asap  -Discussed risks of untreated sleep apnea such as sudden cardiac death by arrhythmia, uncontrolled hypertension, difficulty with weight loss, decreased quality sleep, increased insulin resistance, and stroke    -Should improve s/p bariatric surgery      Mixed hyperlipidemia  -Lipids: high LDL, low HDL, high trigs  -Avoid fried foods and trans fat, limit saturated fats and refined carbohydrates  -Increase fish/omega 3 FA consumption  -Increase physical activity  -May improve s/p bariatric surgery    Depression  +Anxiety  -Referred to [facility]; reminded him to call and schedule    Irritable bowel syndrome with diarrhea  -Uses Bentyl prn  -Denies food or mood triggers  -Recommended increasing soluble fiber, avoid gastric irritants/stimulants, caffeine, ETOH  -Try Sacchro b probiotic  -Continue monitoring and management with GI    Follow up in approximately 1 month with Surgical Dietician  25 minute visit, >50% face-to-face time spent counseling patient on diet behavior and exercise modification for weight loss  Reviewed the importance of following the lessons in the manual and the dietary, behavioral, and exercise changes for long-term success following bariatric surgery  Reviewed potential complications if not following the recommendations in the manual, such as dumping syndrome and vomiting  GOALS:  · Food log (ie ) www myfitnesspal com,sparkpeople  com,loseit com,calorieking  com, [employer], etc    · No sugary beverages  At least 64oz of water daily  · Increase exercise by 10 minutes per day  Gradually increase physical activity to a goal of 5 days per week for 30 minutes of MODERATE intensity PLUS 2 days per week of FULL BODY resistance training  · Follow the 30/60 minute rule  · Follow lessons 1-6 in the bariatric manual   · No carbonated beverages    · Sacchromyces boulardi (sacchro b) - "probiotic"  · Support Group-Not Completed; advised him to complete podcasts  · Upper Endoscopy-will schedule after Psych clearance  · Sleep Medicine Assessment-Advised him to schedule  · Will need repeat labs prior to surgery  · Support Group-Not Completed; advised him to complete podcasts  · Psych - needs clearance     Diagnoses and all orders for this visit:    Morbid obesity with body mass index (BMI) of 50 0 to 59 9 in adult (HCC)  -     CBC and Platelet; Future  -     Comprehensive metabolic panel; Future  -     HEMOGLOBIN A1C W/ EAG ESTIMATION; Future  -     TSH, 3rd generation with Free T4 reflex; Future  -     CBC and Platelet  -     Comprehensive metabolic panel  -     HEMOGLOBIN A1C W/ EAG ESTIMATION  -     TSH, 3rd generation with Free T4 reflex    GERD (gastroesophageal reflux disease)    Type 2 diabetes mellitus without complication, without long-term current use of insulin (HCC)  -     HEMOGLOBIN A1C W/ EAG ESTIMATION; Future  -     HEMOGLOBIN A1C W/ EAG ESTIMATION    Benign essential hypertension    Suspected sleep apnea    Mixed hyperlipidemia    Depression    Irritable bowel syndrome with diarrhea    Pre-op testing  -     CBC and Platelet; Future  -     Comprehensive metabolic panel; Future  -     HEMOGLOBIN A1C W/ EAG ESTIMATION; Future  -     TSH, 3rd generation with Free T4 reflex; Future  -     CBC and Platelet  -     Comprehensive metabolic panel  -     HEMOGLOBIN A1C W/ EAG ESTIMATION  -     TSH, 3rd generation with Free T4 reflex          Subjective:   Chief Complaint   Patient presents with    Weight Check     4/6 wt check  Patient ID: Amaya Hudson  is a 45 y o  male with excess weight/obesity here to pursue weight managment  Patient is pursuing Selin-En-Y Gastric Bypass  HPI:  He is down 8lbs in the last month; down 18lbs total since his initial visit in February  He reports doing more walking and playing outside with his 2 sons more in the last month  He has eliminated soda and coffee and drinking more water  Struggles with 30/60 minute rule d/t dry mouth  He has reduced portions, decreased snacking, and trying to make healthy choices    The patient has been:  Following the lessons in the manual- yes  Practicing the 30/60 minute rule- no, agrees to start  Food logging- no, agrees to download jessica and start tracking  Exercise- yes    The following portions of the patient's history were reviewed and updated as appropriate: allergies, current medications, past family history, past medical history, past social history, past surgical history and problem list     Review of Systems   Constitutional: Negative for chills and fever  Unexpected weight change: planned weight loss  HENT: Positive for dental problem and postnasal drip  Negative for trouble swallowing  Respiratory: Negative for cough and shortness of breath  Cardiovascular: Negative for chest pain and palpitations  Gastrointestinal: Positive for diarrhea  Negative for abdominal pain, constipation, nausea and vomiting  Allergic/Immunologic: Positive for environmental allergies  Neurological: Negative for dizziness  Psychiatric/Behavioral: The patient is nervous/anxious  Denies anxiety and depression       Objective:    /90   Pulse 94   Temp (!) 96 7 °F (35 9 °C)   Ht 5' 9" (1 753 m)   Wt (!) 168 kg (369 lb 12 8 oz)   BMI 54 61 kg/m²      Physical Exam  Vitals reviewed  Constitutional:       General: He is not in acute distress  Appearance: He is well-developed  He is obese  HENT:      Head: Normocephalic and atraumatic  Eyes:      General: No scleral icterus  Cardiovascular:      Rate and Rhythm: Normal rate and regular rhythm  Pulmonary:      Effort: Pulmonary effort is normal  No respiratory distress  Abdominal:      General: There is no distension  Palpations: Abdomen is soft  Tenderness: There is no abdominal tenderness  Skin:     General: Skin is warm and dry  Neurological:      Mental Status: He is alert and oriented to person, place, and time     Psychiatric:         Mood and Affect: Mood normal          Behavior: Behavior normal

## 2021-07-01 NOTE — PATIENT INSTRUCTIONS
GOALS:  · Food log (ie ) www myfitnesspal com,sparkpeople  com,loseit com,calorieking  com, [employer], etc    · No sugary beverages  At least 64oz of water daily  · Increase exercise by 10 minutes per day  Gradually increase physical activity to a goal of 5 days per week for 30 minutes of MODERATE intensity PLUS 2 days per week of FULL BODY resistance training  · Follow the 30/60 minute rule  · Follow lessons 1-6 in the bariatric manual   · No carbonated beverages    · Sacchromyces boulardi (sacchro b) - "probiotic" for diarrhea  · Support Group-Not Completed; advised him to complete podcasts  · Upper Endoscopy-will schedule after Psych clearance  · Sleep Medicine Assessment-Advised him to schedule  · Will need repeat labs prior to surgery  · Support Group-Not Completed; advised him to complete podcasts  · Psych - needs clearance

## 2021-07-22 LAB
ALBUMIN/CREAT UR: <5 MG/G CREAT (ref 0–29)
CREAT UR-MCNC: 58 MG/DL
MICROALBUMIN UR-MCNC: <3 UG/ML

## 2021-07-22 NOTE — PROGRESS NOTES
Bariatric Nutrition Follow-up Note    Type of surgery    Preop (6 months wt checks--5 of 6 today)  Surgery Date: TBD-leaning toward RYGB  Surgeon: Dr Chavo Petit  37 y o   male     [place] with BMI of 25: 168 6lbs  Pre-Op Excess Wt: 219 2lbs  Height 5' 9" (1 753 m), weight (!) 166 kg (366 lb 6 4 oz)  Body mass index is 54 11 kg/m²  Weight change x 1 month:  -3 4lbs  Net weight loss from start:  -21 6lbs (6%)    Review of History and Medications   Newly dx DM with A1c 10 8 on 3/25/21--saw PCP and started on Metformin and HS insulin  -up to 1,000mg in AM and PM    Past Medical History:   Diagnosis Date    Bell's palsy     Depression     Diabetes mellitus ([facility] ) 03/25/21    Blood work    GERD (gastroesophageal reflux disease)     Hypertension     Irregular heart beat     Morbid obesity with BMI of 60 0-69 9, adult ([facility] )     Neurocardiogenic syncope     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     pt symptomatic with extreme and sudden environmental temp  fluctuations     Past Surgical History:   Procedure Laterality Date    COLONOSCOPY N/A 2/2/2018    Procedure: COLONOSCOPY;  Surgeon: Jaziel Light MD;  [facility] GI LAB; Service: Gastroenterology    ESOPHAGOGASTRODUODENOSCOPY N/A 2/2/2018    Procedure: ESOPHAGOGASTRODUODENOSCOPY (EGD); Surgeon: Jaziel Light MD;  [facility];   Service: Gastroenterology    TESTICLE SURGERY Left     infected testicle     Social History     Socioeconomic History    Marital status: /Civil Union     Spouse name: Not on file    Number of children: Not on file    Years of education: Not on file    Highest education level: Not on file   Occupational History    Not on file   Tobacco Use    Smoking status: Former Smoker     Packs/day: 0 25     Years: 2 00     Pack years: 0 50     Types: Cigarettes     Start date: 7/15/1999     Quit date: 2010     Years since quitting: 10 9    Smokeless tobacco: Never Used Vaping Use    Vaping Use: Never used   Substance and Sexual Activity    Alcohol use: Not Currently     Alcohol/week: 0 0 standard drinks     Comment: occ    Drug use: No    Sexual activity: Not Currently     Partners: Female     Birth control/protection: Abstinence, I U D  Other Topics Concern    Not on file   Social History Narrative    Denied: History of alcohol use (history) - as per [employer]    Always uses seat belt    Former smoker - as per [employer]    Never a smoker - as per [employer]     Social Determinants of Health     Financial Resource Strain:     Difficulty of Paying Living Expenses:    Food Insecurity:     Worried About Running Out of Food in the Last Year:     [address] in the Last Year:    Transportation Needs:     Lack of Transportation (Medical):  Lack of Transportation (Non-Medical):    Physical Activity:     Days of Exercise per Week:     Minutes of Exercise per Session:    Stress:     Feeling of Stress :    Social Connections:     Frequency of Communication with Friends and Family:     Frequency of Social Gatherings with Friends and Family:     Attends [employer] Services:     Active Member of Clubs or Organizations:     Attends Club or Organization Meetings:     Marital Status:    Intimate Partner Violence:     Fear of Current or Ex-Partner:     Emotionally Abused:     Physically Abused:     Sexually Abused:        Current Outpatient Medications:     Ascorbic Acid (VITAMIN C) 1000 MG tablet, Take 1,000 mg by mouth daily, Disp: , Rfl:     Blood Glucose Monitoring Suppl w/Device KIT, Measure blood sugar twice daily   Morning after overnight fast and 2 hours after a meal, Disp: 1 kit, Rfl: 0    carvedilol (COREG) 3 125 mg tablet, Take 1 tablet (3 125 mg total) by mouth 2 (two) times a day with meals, Disp: 60 tablet, Rfl: 1    Chromium 1000 MCG TABS, Take 1 tablet by mouth daily, Disp: , Rfl:     dicyclomine (BENTYL) 20 mg tablet, Take 1 tablet (20 mg total) by mouth 3 (three) times a day (Patient taking differently: Take 20 mg by mouth as needed ), Disp: 90 tablet, Rfl: 1    glucose blood test strip, Use as instructed, Disp: 100 strip, Rfl: 3    insulin glargine (Basaglar KwikPen) 100 units/mL injection pen, Inject 15 Units under the skin daily at bedtime, Disp: 15 mL, Rfl: 1    Insulin Pen Needle 32G X 4 MM MISC, Use daily at bedtime, Disp: 100 each, Rfl: 3    Lancets MISC, Measure blood sugar twice daily   Morning after overnight fast and 2 hours after a meal, Disp: 100 each, Rfl: 3    lisinopril (ZESTRIL) 20 mg tablet, Take 1 tablet (20 mg total) by mouth every morning, Disp: 90 tablet, Rfl: 3    metFORMIN (GLUCOPHAGE) 500 mg tablet, Take 2 tablets (1,000 mg total) by mouth 2 (two) times a day with meals, Disp: 120 tablet, Rfl: 4    multivitamin (THERAGRAN) TABS, Take 1 tablet by mouth daily, Disp: , Rfl:     Naproxen Sodium (Aleve) 220 MG CAPS, Take by mouth as needed , Disp: , Rfl:     omeprazole (PriLOSEC) 40 MG capsule, Take 1 capsule (40 mg total) by mouth every morning, Disp: 30 capsule, Rfl: 5    ondansetron (ZOFRAN-ODT) 4 mg disintegrating tablet, Take 1 tablet (4 mg total) by mouth every 6 (six) hours as needed for nausea or vomiting for up to 3 days, Disp: 9 tablet, Rfl: 0    oxyCODONE-acetaminophen (PERCOCET) 5-325 mg per tablet, Take 1 tablet by mouth every 8 (eight) hours as needed for moderate pain, Disp: , Rfl:    -reports taking his MVI, needs to start vitamin D    Food Intake and Lifestyle Assessment -updates in bold  Food Intake Assessment completed via usual diet recall  · Gets $700 per month on food stamps  · Feels is in the habit of "cleaning his plate" and he wants to change that  · Got a Nutribullet:  Makes smoothies:  1 pack frozen smoothie fruit (+ 8oz apple juice + 4oz lemon juice OR protein powder with 2% milk (8oz) and fruit  · BS fasting and then 2 hrs after meal:  Between 125-165    Wake: 9-10am due to summer -was working at [employer], not in work since December 2020  Breakfast: 8:30am 11:30-12: Cut portions, now doing only 1 cup cooked grits and 2 eggs (smaller portion than last visit)  bowl of cereal (filipe charms with 2% milk), drank water  Snack: -   Lunch: 4:30p-7:30 jamir lettuce with tomato salad with chicken OR fish or chicken or hot dog w/o bun [employer] cob salads with 6 hard boiled eggs and pickled garlic, italian dressing (trying to avoid creamy salad dressing)  Dinner 10:30pm homemade macaroni salad (macaroni noodles, ann, onion, celery, carrot, brown mustard, vinegar, 2 pickles)    Beverage intake: water, Rubén drinks, [place] sweeteners  Protein supplement: whey protein isolate powder with PB2    Estimated protein intake per day: 80-100gm  Estimated fluid intake per day: 64oz water 2-3 times per day, can drink 1/2 gal of bolthouse smoothie/juice in one sittin no longer doing, 20oz soda per day, powdered lemonade/iced tea/gatorade mixed with water eliminated  Meals eaten away from home: once a week-papa ottoniel[employer]-wings and pizza or chinese food no change  Typical meal pattern: 2 meals per day and grazes on food, no set meals  Eating Behaviors: Consumption of high calorie/ high fat foods, Consumption of high calorie beverages, Large portion sizes, Frequent snacking/ grazing and Mindless eating    Food allergies or intolerances:  Sucralose, can use stevia  Allergies   Allergen Reactions    Macrolides And Ketolides Anaphylaxis    Pertussis Vaccines Anaphylaxis    Zithromax [Azithromycin] Anaphylaxis    Bee Venom      Annotation - [id number]: wasp, hornet also    Erythromycin     Guaifenesin     Other      Annotation - [id number]:  violet    Pertussis Vaccine     Piperacillin Sod-Tazobactam So     Pollen Extract     Vancomycin      Cultural or Mu-ism considerations: none    Physical Assessment-updates in bold  Physical Activity  Types of exercise: None    Walks once a week in the park with the kids   recently invested in fitness watches for himself and his family, as of August 1st planning to start a fitness challenge ("Outbreak" to escape the nikole adorno, need to walk 60 miles in 30 days to escape), trying for 5,000 steps a day or 2 miles a day  -reports he is currently getting 3,000-4,000  Current physical limitations: SOB quickly     Psychosocial Assessment   Support systems: spouse and children friend(s) relative(s)  Socioeconomic factors: on food stamps    Nutrition Diagnosis- continued  Diagnosis: Overweight / Obesity (NC-3 3)  Related to: Physical inactivity and Excessive energy intake  As Evidenced by: BMI >25     Nutrition Prescription: Recommend the following diet  Regular    Interventions and Teaching   Discussed pre-op and post-op nutrition guidelines  Patient educated and handouts provided    Capacity of post-surgery stomach  Diet progression  Adequate hydration  Expected weight loss  Weight loss plateaus/ possibility of weight regain  Exercise  Suggestions for pre-op diet  Nutrition considerations after surgery  Protein supplements  Meal planning and preparation  Appropriate carbohydrate, protein, and fat intake, and food/fluid choices to maximize safe weight loss, nutrient intake, and tolerance   Dietary and lifestyle changes  Possible problems with poor eating habits  Intuitive eating  Techniques for self monitoring and keeping daily food journal  Carb counting    Education provided to: patient    Barriers to learning: No barriers identified  Readiness to change: preparation    Prior research on procedure: books, internet, discussed with provider and friends or family    Comprehension: verbalizes understanding     Expected Compliance: good    Evaluation / Monitoring  Dietitian to Monitor: Eating pattern as discussed Tolerance of nutrition prescription Body weight Lab values Physical activity    Workflow:   PCP Letter: completed   Support Group: not completed   6 Month Pre-Operative Program: 5 of 6 today   Bloodwork: will need repeat blood work closer to sx (labs drawn 3/25/2021- will need repeated in 9/25/2021), HgbA1C 6 8 on 7/23/2021 WNL for surgery   Cardiac Risk Assessment: completed 6/11/2021   Sleep Studies: scheduled for 9/28/2021   EGD: needs psych first   Weight Loss:  Do Not Gain weight, 10% by of surgery:  -39lbs (349lbs) 5% in order to submit:  -19 5 (368 5lbs)   Psych:  Needs to schedule    Patient lost 3 4# since last month, overall he has reach 6% weight loss  Recent blood work showed improved in HgbA1C that would not be WNL for surgery  Discussed timeline and when will need labs repeated  Continues to work with his PCP on DM management  Encouraged patient to carry out his plans for increasing activity  Reports that his is using smaller plates but that his has not yet started measuring portions  Reports that he continues to struggle with the 30/60 rule especially due to lack of schedule  Encouraged patient to work on setting a schedule      Goals  · Continue to avoid the sugary drinks  · Meet at least 5,000 steps per day  · Complete lesson plans 1-6  · Eat 3 meals per day--can use protein shake as meal replacement- work on a daily schedule so giselle holder practice 30/60 rule  · 2400 cals, 240g carbs per day:  60gm carbs at each meal and 30gm at each snack (2 snacks per day)  · Measure portions    Time Spent:   30  mins

## 2021-07-23 ENCOUNTER — OFFICE VISIT (OUTPATIENT)
Dept: FAMILY MEDICINE CLINIC | Facility: CLINIC | Age: 41
End: 2021-07-23
Payer: COMMERCIAL

## 2021-07-23 VITALS
TEMPERATURE: 97.8 F | RESPIRATION RATE: 20 BRPM | SYSTOLIC BLOOD PRESSURE: 136 MMHG | BODY MASS INDEX: 46.65 KG/M2 | DIASTOLIC BLOOD PRESSURE: 80 MMHG | OXYGEN SATURATION: 97 % | WEIGHT: 315 LBS | HEART RATE: 103 BPM | HEIGHT: 69 IN

## 2021-07-23 DIAGNOSIS — E11.9 DM2 (DIABETES MELLITUS, TYPE 2) (HCC): ICD-10-CM

## 2021-07-23 DIAGNOSIS — Z71.3 DIABETIC NUTRITIONAL COUNSELING COMPLETED: ICD-10-CM

## 2021-07-23 DIAGNOSIS — E66.01 MORBID OBESITY (HCC): ICD-10-CM

## 2021-07-23 DIAGNOSIS — Z53.20 NUTRITION COUNSELING DECLINED: ICD-10-CM

## 2021-07-23 DIAGNOSIS — Z79.4 TYPE 2 DIABETES MELLITUS WITHOUT COMPLICATION, WITH LONG-TERM CURRENT USE OF INSULIN (HCC): Primary | ICD-10-CM

## 2021-07-23 DIAGNOSIS — E11.9 TYPE 2 DIABETES MELLITUS WITHOUT COMPLICATION, WITHOUT LONG-TERM CURRENT USE OF INSULIN (HCC): ICD-10-CM

## 2021-07-23 DIAGNOSIS — E11.9 TYPE 2 DIABETES MELLITUS WITHOUT COMPLICATION, WITH LONG-TERM CURRENT USE OF INSULIN (HCC): Primary | ICD-10-CM

## 2021-07-23 LAB — SL AMB POCT HEMOGLOBIN AIC: 6.8 (ref ?–6.5)

## 2021-07-23 PROCEDURE — 3044F HG A1C LEVEL LT 7.0%: CPT | Performed by: FAMILY MEDICINE

## 2021-07-23 PROCEDURE — 83036 HEMOGLOBIN GLYCOSYLATED A1C: CPT | Performed by: FAMILY MEDICINE

## 2021-07-23 PROCEDURE — 99213 OFFICE O/P EST LOW 20 MIN: CPT | Performed by: FAMILY MEDICINE

## 2021-07-23 RX ORDER — INSULIN GLARGINE 100 [IU]/ML
15 INJECTION, SOLUTION SUBCUTANEOUS
Qty: 15 ML | Refills: 1 | Status: SHIPPED | OUTPATIENT
Start: 2021-07-23 | End: 2022-02-23 | Stop reason: SDUPTHER

## 2021-07-23 NOTE — PATIENT INSTRUCTIONS
10% - bad control"> 10% - bad control,Hemoglobin A1c (HbA1c) greater than 10% indicating poor diabetic control,Haemoglobin A1c greater than 10% indicating poor diabetic control">   Diabetes Mellitus Type 2 in Adults, Ambulatory Care   GENERAL INFORMATION:   Diabetes mellitus type 2  is a disease that affects how your body uses glucose (sugar)  Insulin helps move sugar out of the blood so it can be used for energy  Normally, when the blood sugar level increases, the pancreas makes more insulin  Type 2 diabetes develops because either the body cannot make enough insulin, or it cannot use the insulin correctly  After many years, your pancreas may stop making insulin  Common symptoms include the following:   · More hunger or thirst than usual     · Frequent urination     · Weight loss without trying     · Blurred vision  Seek immediate care for the following symptoms:   · Severe abdominal pain, or pain that spreads to your back  You may also be vomiting  · Trouble staying awake or focusing    · Shaking or sweating    · Blurred or double vision    · Breath has a fruity, sweet smell    · Breathing is deep and labored, or rapid and shallow    · Heartbeat is fast and weak  Treatment for diabetes mellitus type 2  includes keeping your blood sugar at a normal level  You must eat the right foods, and exercise regularly  You may also need medicine if you cannot control your blood sugar level with nutrition and exercise  Manage diabetes mellitus type 2:   · Check your blood sugar level  You will be taught how to check a small drop of blood in a glucose monitor  Ask your healthcare provider when and how often to check during the day  Ask your healthcare provider what your blood sugar levels should be when you check them  · Keep track of carbohydrates (sugar and starchy foods)  Your blood sugar level can get too high if you eat too many carbohydrates   Your dietitian will help you plan meals and snacks that have the right amount of carbohydrates  · Eat low-fat foods  Some examples are skinless chicken and low-fat milk  · Eat less sodium (salt)  Some examples of high-sodium foods to limit are soy sauce, potato chips, and soup  Do not add salt to food you cook  Limit your use of table salt  · Eat high-fiber foods  Foods that are a good source of fiber include vegetables, whole grain bread, and beans  · Limit alcohol  Alcohol affects your blood sugar level and can make it harder to manage your diabetes  Women should limit alcohol to 1 drink a day  Men should limit alcohol to 2 drinks a day  A drink of alcohol is 12 ounces of beer, 5 ounces of wine, or 1½ ounces of liquor  · Get regular exercise  Exercise can help keep your blood sugar level steady, decrease your risk of heart disease, and help you lose weight  Exercise for at least 30 minutes, 5 days a week  Include muscle strengthening activities 2 days each week  Work with your healthcare provider to create an exercise plan  · Check your feet each day  for injuries or open sores  Ask your healthcare provider for activities you can do if you have an open sore  · Quit smoking  If you smoke, it is never too late to quit  Smoking can worsen the problems that may occur with diabetes  Ask your healthcare provider for information about how to stop smoking if you are having trouble quitting  · Ask about your weight:  Ask healthcare providers if you need to lose weight, and how much to lose  Ask them to help you with a weight loss program  Even a 10 to 15 pound weight loss can help you manage your blood sugar level  · Carry medical alert identification  Wear medical alert jewelry or carry a card that says you have diabetes  Ask your healthcare provider where to get these items  · Ask about vaccines  Diabetes puts you at risk of serious illness if you get the flu, pneumonia, or hepatitis   Ask your healthcare provider if you should get a flu, pneumonia, or hepatitis B vaccine, and when to get the vaccine  Follow up with your healthcare provider as directed:  Write down your questions so you remember to ask them during your visits  CARE AGREEMENT:   You have the right to help plan your care  Learn about your health condition and how it may be treated  Discuss treatment options with your caregivers to decide what care you want to receive  You always have the right to refuse treatment  The above information is an  only  It is not intended as medical advice for individual conditions or treatments  Talk to your doctor, nurse or pharmacist before following any medical regimen to see if it is safe and effective for you  © 2014 [address] is for End User's use only and may not be sold, redistributed or otherwise used for commercial purposes  All illustrations and images included in CareNotes® are the copyrighted property of [employer] M [employer]  or [employer] M [employer]

## 2021-07-23 NOTE — PROGRESS NOTES
Assessment/Plan:     Diagnoses and all orders for this visit:    Type 2 diabetes mellitus without complication, with long-term current use of insulin ([facility])  -     POCT hemoglobin A1c    DM2 (diabetes mellitus, type 2) ([facility])  -     insulin glargine (Basaglar KwikPen) 100 units/mL injection pen; Inject 15 Units under the skin daily at bedtime  -     metFORMIN (GLUCOPHAGE) 500 mg tablet; Take 2 tablets (1,000 mg total) by mouth 2 (two) times a day with meals    Morbid obesity ([facility])  -     metFORMIN (GLUCOPHAGE) 500 mg tablet; Take 2 tablets (1,000 mg total) by mouth 2 (two) times a day with meals    Nutrition counseling declined  -     metFORMIN (GLUCOPHAGE) 500 mg tablet; Take 2 tablets (1,000 mg total) by mouth 2 (two) times a day with meals    Diabetic nutritional counseling completed  -     metFORMIN (GLUCOPHAGE) 500 mg tablet; Take 2 tablets (1,000 mg total) by mouth 2 (two) times a day with meals      -Patient will return in 3 months for annual physical  -Metformin was increased to 1000 BID today   -Continue on Glargine 15U QHS  -Patient was commended for great work in lifestyle changes include diet and exercise  -HBA1c significantly improved to 6 8 today from 10 8 prior  Subjective:      Patient ID: Sima Chavarria is a 37 y o  male  HPI  Patient is a 38 yo male with PMH of DM 2 insulin dependant , HTN, Morbid Obesity, Imbalanced Nutrition, GERD, and IBS,  who presents for follow up of his Diabetes  HBA1c today is 6 8  He reports his blood sugar in the morning has been about 130-200 fasting  Patient has lost about 10 pounds since May 3rd visit  Patient sees weight management and needs have weight below 349 in order to undergo gastric bypass  He has been watching his diet  Changes include more exercise, 2% milk, limiting carbs, exercise, getting a fitness tracker  Virtual fitness challenges with his wife       The following portions of the patient's history were reviewed and updated as appropriate: allergies, current medications, past family history, past medical history, past social history, past surgical history and problem list     Review of Systems   Constitutional: Negative for fever  HENT: Negative for sore throat  Eyes: Negative for visual disturbance  Respiratory: Negative for cough and shortness of breath  Cardiovascular: Negative for chest pain  Gastrointestinal: Negative for abdominal pain and blood in stool  Diarrhea: Loose stools he says from IBS  Endocrine: Negative for polyphagia and polyuria  Genitourinary: Negative for dysuria and hematuria  Objective:      /80 (BP Location: Left arm, Patient Position: Sitting, Cuff Size: Large)   Pulse 103   Temp 97 8 °F (36 6 °C) (Tympanic)   Resp 20   Ht 5' 9" (1 753 m)   Wt (!) 167 kg (368 lb)   SpO2 97%   BMI 54 34 kg/m²          Physical Exam  Constitutional:       Appearance: Normal appearance  He is obese  Cardiovascular:      Rate and Rhythm: Normal rate and regular rhythm  Pulses: Normal pulses  Heart sounds: Normal heart sounds  Pulmonary:      Effort: Pulmonary effort is normal  No respiratory distress  Breath sounds: Normal breath sounds  No wheezing or rales  Abdominal:      General: Bowel sounds are normal       Palpations: Abdomen is soft  Tenderness: There is no abdominal tenderness  There is no guarding  Musculoskeletal:      Right lower leg: No edema  Left lower leg: No edema  Skin:     General: Skin is warm  Capillary Refill: Capillary refill takes less than 2 seconds  Neurological:      General: No focal deficit present  Mental Status: He is alert and oriented to person, place, and time     Psychiatric:         Mood and Affect: Mood normal

## 2021-07-27 ENCOUNTER — OFFICE VISIT (OUTPATIENT)
Dept: BARIATRICS | Facility: CLINIC | Age: 41
End: 2021-07-27

## 2021-07-27 VITALS — HEIGHT: 69 IN | BODY MASS INDEX: 46.65 KG/M2 | WEIGHT: 315 LBS

## 2021-07-27 DIAGNOSIS — E66.01 MORBID (SEVERE) OBESITY DUE TO EXCESS CALORIES (HCC): Primary | ICD-10-CM

## 2021-07-27 PROCEDURE — RECHECK

## 2021-07-27 PROCEDURE — RECHECK: Performed by: DIETITIAN, REGISTERED

## 2021-07-27 NOTE — PROGRESS NOTES
Patient presented for 5 of 6 weight check with an 8 4lbs weight loss since last visit  Patient has continued to focus on physical activity, he and his family invested in activity trackers and are enjoying the challenges provided  He's busy taking care of his kids so there are days he may not stop to have food until later in the day  Body cues are not indicating that he's hungry so he reports eating larger portions later in the day to compensate  ANNE reiterated the importance of three meals a day to stabilize metabolism, suggesting he work on a routine, setting alarms to remind himself to have food  Patient agreed that he's going to work on regular meals along with portioning and logging  Patient hadn't received any paperwork from [address] [place] [address] to get an appointment scheduled with the mental health provider, he checked his email during visit but didn't receive anything  [facility] had emailed office about patient being referred to them, ANNE suggested he call the office to follow up and another email was sent to them  Patient will return in a month to follow up with ANNE and SOPHIE for weight check 6 of 6

## 2021-08-09 ENCOUNTER — OFFICE VISIT (OUTPATIENT)
Dept: FAMILY MEDICINE CLINIC | Facility: CLINIC | Age: 41
End: 2021-08-09
Payer: COMMERCIAL

## 2021-08-09 VITALS
BODY MASS INDEX: 46.65 KG/M2 | SYSTOLIC BLOOD PRESSURE: 138 MMHG | WEIGHT: 315 LBS | HEIGHT: 69 IN | HEART RATE: 95 BPM | TEMPERATURE: 98.8 F | DIASTOLIC BLOOD PRESSURE: 90 MMHG

## 2021-08-09 DIAGNOSIS — L60.3 DYSTROPHIC NAIL: Primary | ICD-10-CM

## 2021-08-09 PROCEDURE — 99213 OFFICE O/P EST LOW 20 MIN: CPT | Performed by: FAMILY MEDICINE

## 2021-08-09 PROCEDURE — 3008F BODY MASS INDEX DOCD: CPT | Performed by: FAMILY MEDICINE

## 2021-08-09 NOTE — PROGRESS NOTES
[id number] Note  Idalmis Bocanegra MD, 21     Gardenia Arndt MRN: [id number] : 1980 Age: 37 y o  Assessment/Plan      No diagnosis found  36 y/o w/ PMH of HTN, DM, obesity presents to office for same day visit after splitting thickened right great toe nail when trying to trim it at home  · R great toe nail is thickened nondiscolored, with full thickness crack of nail plate  Other toe nails normal in appearance  · Providing referral to podiatry for evaluation of thickened cracked nail  · Advise patient to try soaking nail before trimming at home to soften toe nail and prevent cracking  Gardenia Arndt acknowledged understanding of treatment plan, all questions answered  Reminded of office on-call physician availability  for any concerns  Plan discussed with attending physician Dr Marvis Bence  Subjective      Gardenia Arndt is a 37 y o  male  Patient has same day visit for complaint of injury to right great toe nail  Patient reports that his nail is thickened following removal and silver nitrate application in  by podiatry  Since then patient has used wire cutters to trim his right toe nail  Last night when cutting toe nail, the nail split and began bleeding  The following portions of the patient's history were reviewed and updated as appropriate: allergies, current medications, past family history, past medical history, past social history, past surgical history and problem list     Review of Systems   All other systems reviewed and are negative          Past Medical History:   Diagnosis Date    Bell's palsy     Depression     Diabetes mellitus ([facility] ) 21    Blood work    GERD (gastroesophageal reflux disease)     Hypertension     Irregular heart beat     Morbid obesity with BMI of 60 0-69 9, adult ([facility] )     Neurocardiogenic syncope     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     pt symptomatic with extreme and sudden environmental temp  fluctuations       Current Outpatient Medications   Medication Sig Dispense Refill    Ascorbic Acid (VITAMIN C) 1000 MG tablet Take 1,000 mg by mouth daily      Blood Glucose Monitoring Suppl w/Device KIT Measure blood sugar twice daily  Morning after overnight fast and 2 hours after a meal 1 kit 0    carvedilol (COREG) 3 125 mg tablet Take 1 tablet (3 125 mg total) by mouth 2 (two) times a day with meals 60 tablet 1    Chromium 1000 MCG TABS Take 1 tablet by mouth daily      glucose blood test strip Use as instructed 100 strip 3    insulin glargine (Basaglar KwikPen) 100 units/mL injection pen Inject 15 Units under the skin daily at bedtime 15 mL 1    Insulin Pen Needle 32G X 4 MM MISC Use daily at bedtime 100 each 3    Lancets MISC Measure blood sugar twice daily  Morning after overnight fast and 2 hours after a meal 100 each 3    lisinopril (ZESTRIL) 20 mg tablet Take 1 tablet (20 mg total) by mouth every morning 90 tablet 3    metFORMIN (GLUCOPHAGE) 500 mg tablet Take 2 tablets (1,000 mg total) by mouth 2 (two) times a day with meals 120 tablet 4    multivitamin (THERAGRAN) TABS Take 1 tablet by mouth daily      Naproxen Sodium (Aleve) 220 MG CAPS Take by mouth as needed       omeprazole (PriLOSEC) 40 MG capsule Take 1 capsule (40 mg total) by mouth every morning 30 capsule 5    oxyCODONE-acetaminophen (PERCOCET) 5-325 mg per tablet Take 1 tablet by mouth every 8 (eight) hours as needed for moderate pain      dicyclomine (BENTYL) 20 mg tablet Take 1 tablet (20 mg total) by mouth 3 (three) times a day (Patient not taking: Reported on 8/9/2021) 90 tablet 1    ondansetron (ZOFRAN-ODT) 4 mg disintegrating tablet Take 1 tablet (4 mg total) by mouth every 6 (six) hours as needed for nausea or vomiting for up to 3 days 9 tablet 0     No current facility-administered medications for this visit       Objective      /90 (BP Location: Left arm, Patient Position: Sitting, Cuff Size: Large) Pulse 95   Temp 98 8 °F (37 1 °C) (Tympanic)   Ht 5' 9" (1 753 m)   Wt (!) 166 kg (365 lb 11 2 oz)   BMI 54 00 kg/m²     Physical Exam  Vitals reviewed  Constitutional:       Appearance: Normal appearance  Cardiovascular:      Rate and Rhythm: Normal rate and regular rhythm  Pulses: Normal pulses  Heart sounds: Normal heart sounds  Pulmonary:      Effort: Pulmonary effort is normal       Breath sounds: Normal breath sounds  Neurological:      Mental Status: He is alert

## 2021-08-17 ENCOUNTER — OFFICE VISIT (OUTPATIENT)
Dept: PODIATRY | Facility: CLINIC | Age: 41
End: 2021-08-17
Payer: COMMERCIAL

## 2021-08-17 VITALS — WEIGHT: 315 LBS | RESPIRATION RATE: 18 BRPM | BODY MASS INDEX: 46.65 KG/M2 | HEIGHT: 69 IN

## 2021-08-17 DIAGNOSIS — Z79.4 TYPE 2 DIABETES MELLITUS WITHOUT COMPLICATION, WITH LONG-TERM CURRENT USE OF INSULIN (HCC): Primary | ICD-10-CM

## 2021-08-17 DIAGNOSIS — L60.0 INGROWN TOENAIL: ICD-10-CM

## 2021-08-17 DIAGNOSIS — E11.9 TYPE 2 DIABETES MELLITUS WITHOUT COMPLICATION, WITH LONG-TERM CURRENT USE OF INSULIN (HCC): Primary | ICD-10-CM

## 2021-08-17 DIAGNOSIS — B35.1 ONYCHOMYCOSIS: ICD-10-CM

## 2021-08-17 DIAGNOSIS — L03.031 PARONYCHIA OF TOENAIL OF RIGHT FOOT: ICD-10-CM

## 2021-08-17 PROCEDURE — 99203 OFFICE O/P NEW LOW 30 MIN: CPT | Performed by: PODIATRIST

## 2021-08-17 NOTE — PROGRESS NOTES
Assessment/Plan:Pain upon ambulation  Chronic paronychia right hallux  Dystrophy of nail  Patient with diabetes without complication  Plan  Foot exam performed  Patient educated on care of the diabetic foot  Right hallux nail debrided  Patient would like to nail removed permanently  We will schedule total nail matrixectomy  Diagnoses and all orders for this visit:    Type 2 diabetes mellitus without complication, with long-term current use of insulin (HCC)    Onychomycosis    Paronychia of toenail of right foot    Ingrown toenail            Subjective  Patient is seen on referral   Patient has complaint of pain in his right big toe  He has had ingrown toenails on and off for the last several years  He wants the nail permanently removed  He has already had attempted prior partial nail avulsion and matrixectomy  Allergies   Allergen Reactions    Macrolides And Ketolides Anaphylaxis    Pertussis Vaccines Anaphylaxis    Zithromax [Azithromycin] Anaphylaxis    Bee Venom      Annotation - [id number]: wasp, hornet also    Erythromycin     Guaifenesin     Other      Annotation - [id number]:  violet    Pertussis Vaccine     Piperacillin Sod-Tazobactam So     Pollen Extract     Vancomycin          Current Outpatient Medications:     Ascorbic Acid (VITAMIN C) 1000 MG tablet, Take 1,000 mg by mouth daily, Disp: , Rfl:     Blood Glucose Monitoring Suppl w/Device KIT, Measure blood sugar twice daily   Morning after overnight fast and 2 hours after a meal, Disp: 1 kit, Rfl: 0    carvedilol (COREG) 3 125 mg tablet, Take 1 tablet (3 125 mg total) by mouth 2 (two) times a day with meals, Disp: 60 tablet, Rfl: 1    Chromium 1000 MCG TABS, Take 1 tablet by mouth daily, Disp: , Rfl:     dicyclomine (BENTYL) 20 mg tablet, Take 1 tablet (20 mg total) by mouth 3 (three) times a day (Patient not taking: Reported on 8/9/2021), Disp: 90 tablet, Rfl: 1    glucose blood test strip, Use as instructed, Disp: 100 strip, Rfl: 3    insulin glargine (Basaglar KwikPen) 100 units/mL injection pen, Inject 15 Units under the skin daily at bedtime, Disp: 15 mL, Rfl: 1    Insulin Pen Needle 32G X 4 MM MISC, Use daily at bedtime, Disp: 100 each, Rfl: 3    Lancets MISC, Measure blood sugar twice daily  Morning after overnight fast and 2 hours after a meal, Disp: 100 each, Rfl: 3    lisinopril (ZESTRIL) 20 mg tablet, Take 1 tablet (20 mg total) by mouth every morning, Disp: 90 tablet, Rfl: 3    metFORMIN (GLUCOPHAGE) 500 mg tablet, Take 2 tablets (1,000 mg total) by mouth 2 (two) times a day with meals, Disp: 120 tablet, Rfl: 4    multivitamin (THERAGRAN) TABS, Take 1 tablet by mouth daily, Disp: , Rfl:     Naproxen Sodium (Aleve) 220 MG CAPS, Take by mouth as needed , Disp: , Rfl:     omeprazole (PriLOSEC) 40 MG capsule, Take 1 capsule (40 mg total) by mouth every morning, Disp: 30 capsule, Rfl: 5    ondansetron (ZOFRAN-ODT) 4 mg disintegrating tablet, Take 1 tablet (4 mg total) by mouth every 6 (six) hours as needed for nausea or vomiting for up to 3 days, Disp: 9 tablet, Rfl: 0    oxyCODONE-acetaminophen (PERCOCET) 5-325 mg per tablet, Take 1 tablet by mouth every 8 (eight) hours as needed for moderate pain, Disp: , Rfl:     Patient Active Problem List   Diagnosis    Morbid obesity with body mass index (BMI) of 50 0 to 59 9 in adult (HCC)    Morbid obesity (HCC)    Benign essential hypertension    Suspected sleep apnea    Tachycardia    Anxiety    Diarrhea    GERD (gastroesophageal reflux disease)    Irritable bowel syndrome with diarrhea    Low back pain    Migraine headache    Mixed hyperlipidemia    Ventral hernia without obstruction or gangrene    Type 2 diabetes mellitus without complication, with long-term current use of insulin ([facility] )          Patient ID: Lorin Manning is a 37 y o  male      HPI    The following portions of the patient's history were reviewed and updated as appropriate: He  has a past medical history of Bell's palsy, Depression, Diabetes mellitus ([facility] ) (03/25/21), GERD (gastroesophageal reflux disease), Hypertension, Irregular heart beat, Morbid obesity with BMI of 60 0-69 9, adult ([facility] ), Neurocardiogenic syncope, and 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  He   Patient Active Problem List    Diagnosis Date Noted    Type 2 diabetes mellitus without complication, with long-term current use of insulin ([place] [address] ) 05/24/2021    Ventral hernia without obstruction or gangrene 02/05/2021    Morbid obesity with body mass index (BMI) of 50 0 to 59 9 in adult [facility]) 09/18/2018    Morbid obesity ([place] [address] ) 09/18/2018    Benign essential hypertension 09/18/2018    Suspected sleep apnea 09/18/2018    Tachycardia 09/18/2018    Diarrhea 01/19/2018    Irritable bowel syndrome with diarrhea 01/19/2018    GERD (gastroesophageal reflux disease) 07/24/2017    Low back pain 08/13/2014    Migraine headache 08/13/2014    Anxiety 12/20/2013    Mixed hyperlipidemia 11/21/2013     He  has a past surgical history that includes Testicle surgery (Left); Esophagogastroduodenoscopy (N/A, 2/2/2018); and Colonoscopy (N/A, 2/2/2018)  His family history includes ADD / ADHD in his son; Atrial fibrillation in his mother; Cancer in his mother; Colon cancer in his family; Diabetes type II in his family and mother; Heart disease in his family; Hyperlipidemia in his family; Hypertension in his family; Hypothyroidism in his family; Liver disease in his father; Osteoporosis in his mother; Supraventricular tachycardia in his mother; Ulcerative colitis in his father  He  reports that he quit smoking about 10 years ago  His smoking use included cigarettes  He started smoking about 22 years ago  He has a 0 50 pack-year smoking history  He has never used smokeless tobacco  He reports previous alcohol use  He reports that he does not use drugs    Current Outpatient Medications   Medication Sig Dispense Refill    Ascorbic Acid (VITAMIN C) 1000 MG tablet Take 1,000 mg by mouth daily      Blood Glucose Monitoring Suppl w/Device KIT Measure blood sugar twice daily  Morning after overnight fast and 2 hours after a meal 1 kit 0    carvedilol (COREG) 3 125 mg tablet Take 1 tablet (3 125 mg total) by mouth 2 (two) times a day with meals 60 tablet 1    Chromium 1000 MCG TABS Take 1 tablet by mouth daily      dicyclomine (BENTYL) 20 mg tablet Take 1 tablet (20 mg total) by mouth 3 (three) times a day (Patient not taking: Reported on 8/9/2021) 90 tablet 1    glucose blood test strip Use as instructed 100 strip 3    insulin glargine (Basaglar KwikPen) 100 units/mL injection pen Inject 15 Units under the skin daily at bedtime 15 mL 1    Insulin Pen Needle 32G X 4 MM MISC Use daily at bedtime 100 each 3    Lancets MISC Measure blood sugar twice daily  Morning after overnight fast and 2 hours after a meal 100 each 3    lisinopril (ZESTRIL) 20 mg tablet Take 1 tablet (20 mg total) by mouth every morning 90 tablet 3    metFORMIN (GLUCOPHAGE) 500 mg tablet Take 2 tablets (1,000 mg total) by mouth 2 (two) times a day with meals 120 tablet 4    multivitamin (THERAGRAN) TABS Take 1 tablet by mouth daily      Naproxen Sodium (Aleve) 220 MG CAPS Take by mouth as needed       omeprazole (PriLOSEC) 40 MG capsule Take 1 capsule (40 mg total) by mouth every morning 30 capsule 5    ondansetron (ZOFRAN-ODT) 4 mg disintegrating tablet Take 1 tablet (4 mg total) by mouth every 6 (six) hours as needed for nausea or vomiting for up to 3 days 9 tablet 0    oxyCODONE-acetaminophen (PERCOCET) 5-325 mg per tablet Take 1 tablet by mouth every 8 (eight) hours as needed for moderate pain       No current facility-administered medications for this visit       Current Outpatient Medications on File Prior to Visit   Medication Sig    Ascorbic Acid (VITAMIN C) 1000 MG tablet Take 1,000 mg by mouth daily    Blood Glucose Monitoring Suppl w/Device KIT Measure blood sugar twice daily  Morning after overnight fast and 2 hours after a meal    carvedilol (COREG) 3 125 mg tablet Take 1 tablet (3 125 mg total) by mouth 2 (two) times a day with meals    Chromium 1000 MCG TABS Take 1 tablet by mouth daily    dicyclomine (BENTYL) 20 mg tablet Take 1 tablet (20 mg total) by mouth 3 (three) times a day (Patient not taking: Reported on 8/9/2021)    glucose blood test strip Use as instructed    insulin glargine (Basaglar KwikPen) 100 units/mL injection pen Inject 15 Units under the skin daily at bedtime    Insulin Pen Needle 32G X 4 MM MISC Use daily at bedtime    Lancets MISC Measure blood sugar twice daily  Morning after overnight fast and 2 hours after a meal    lisinopril (ZESTRIL) 20 mg tablet Take 1 tablet (20 mg total) by mouth every morning    metFORMIN (GLUCOPHAGE) 500 mg tablet Take 2 tablets (1,000 mg total) by mouth 2 (two) times a day with meals    multivitamin (THERAGRAN) TABS Take 1 tablet by mouth daily    Naproxen Sodium (Aleve) 220 MG CAPS Take by mouth as needed     omeprazole (PriLOSEC) 40 MG capsule Take 1 capsule (40 mg total) by mouth every morning    ondansetron (ZOFRAN-ODT) 4 mg disintegrating tablet Take 1 tablet (4 mg total) by mouth every 6 (six) hours as needed for nausea or vomiting for up to 3 days    oxyCODONE-acetaminophen (PERCOCET) 5-325 mg per tablet Take 1 tablet by mouth every 8 (eight) hours as needed for moderate pain    [DISCONTINUED] carvedilol (COREG) 3 125 mg tablet Take 1 tablet (3 125 mg total) by mouth 2 (two) times a day with meals     No current facility-administered medications on file prior to visit  He is allergic to macrolides and ketolides, pertussis vaccines, zithromax [azithromycin], bee venom, erythromycin, guaifenesin, other, pertussis vaccine, piperacillin sod-tazobactam so, pollen extract, and vancomycin       Vitals:    08/17/21 0855   Resp: 18       Review of Systems      Objective:  Patient's shoes and socks removed  Foot Exam    General  General Appearance: appears stated age and healthy   Orientation: alert and oriented to person, place, and time   Affect: appropriate       Right Foot/Ankle     Inspection and Palpation  Swelling: dorsum   Arch: pes planus    Neurovascular  Dorsalis pedis: 3+  Posterior tibial: 3+  Saphenous nerve sensation: normal  Tibial nerve sensation: normal  Superficial peroneal nerve sensation: normal  Deep peroneal nerve sensation: normal  Sural nerve sensation: normal      Left Foot/Ankle      Inspection and Palpation  Swelling: dorsum   Arch: pes planus    Neurovascular  Dorsalis pedis: 3+  Posterior tibial: 3+  Saphenous nerve sensation: normal  Tibial nerve sensation: normal  Superficial peroneal nerve sensation: normal  Deep peroneal nerve sensation: normal  Sural nerve sensation: normal        Physical Exam  Vitals and nursing note reviewed  Constitutional:       Appearance: Normal appearance  Cardiovascular:      Rate and Rhythm: Normal rate and regular rhythm  Pulses: no weak pulses          Dorsalis pedis pulses are 3+ on the right side and 3+ on the left side  Posterior tibial pulses are 3+ on the right side and 3+ on the left side  Skin:     Capillary Refill: Capillary refill takes less than 2 seconds  Comments:   Right hallux demonstrates wide incurvated toenail  It is thickened  It is dystrophic  Has distal mycosis  Mild paronychia noted  Negative pus   Neurological:      Mental Status: He is alert  Psychiatric:         Mood and Affect: Mood normal          Behavior: Behavior normal          Thought Content: Thought content normal          Judgment: Judgment normal      Patient's shoes and socks removed  Right Foot/Ankle   Right Foot Inspection        Vascular    The right DP pulse is 3+  The right PT pulse is 3+     Right Toe  - Comprehensive Exam  Arch: pes planus  Swelling: dorsum         Left Foot/Ankle  Left Foot Inspection Vascular    The left DP pulse is 3+  The left PT pulse is 3+  Left Toe  - Comprehensive Exam  Arch: pes planus  Swelling: dorsum       Assign Risk Category:  Deformity present; No loss of protective sensation;  No weak pulses       Risk: 1

## 2021-08-18 ENCOUNTER — TELEPHONE (OUTPATIENT)
Dept: BARIATRICS | Facility: CLINIC | Age: 41
End: 2021-08-18

## 2021-08-18 NOTE — TELEPHONE ENCOUNTER
ANNE informed patient that she did get a return email from [employer]  at [facility] [facility] that she did call him back in June but had not heard from him  Patient said he too had called the office but hadn't received a return call  ANNE confirmed that patient does have contact information for their office and he said he would call shortly to discuss scheduling appointment with mental health provider  Patient reminded of next appointment with the office on 8/24

## 2021-08-24 ENCOUNTER — OFFICE VISIT (OUTPATIENT)
Dept: BARIATRICS | Facility: CLINIC | Age: 41
End: 2021-08-24

## 2021-08-24 VITALS — WEIGHT: 315 LBS | BODY MASS INDEX: 54.52 KG/M2

## 2021-08-24 VITALS — BODY MASS INDEX: 46.65 KG/M2 | WEIGHT: 315 LBS | HEIGHT: 69 IN

## 2021-08-24 DIAGNOSIS — E66.01 MORBID (SEVERE) OBESITY DUE TO EXCESS CALORIES (HCC): Primary | ICD-10-CM

## 2021-08-24 DIAGNOSIS — I10 ESSENTIAL HYPERTENSION: ICD-10-CM

## 2021-08-24 DIAGNOSIS — R00.0 TACHYCARDIA: ICD-10-CM

## 2021-08-24 PROCEDURE — 3008F BODY MASS INDEX DOCD: CPT | Performed by: FAMILY MEDICINE

## 2021-08-24 PROCEDURE — RECHECK: Performed by: DIETITIAN, REGISTERED

## 2021-08-24 PROCEDURE — RECHECK

## 2021-08-24 RX ORDER — CARVEDILOL 3.12 MG/1
3.12 TABLET ORAL 2 TIMES DAILY WITH MEALS
Qty: 60 TABLET | Refills: 1 | Status: SHIPPED | OUTPATIENT
Start: 2021-08-24 | End: 2021-10-28

## 2021-08-24 NOTE — PROGRESS NOTES
Bariatric Nutrition Follow-up Note    Type of surgery    Preop (6 months wt checks--6 of 6 today)  Surgery Date: TBD-leaning toward RYGB  Surgeon: Dr Jamey Araya  37 y o   male     [place] with BMI of 25: 168 6lbs  Pre-Op Excess Wt: 219 2lbs  Height 5' 9" (1 753 m), weight (!) 167 kg (369 lb 3 2 oz)  Body mass index is 54 52 kg/m²  Weight change x 1 month:  +2 8lbs  Net weight loss from start:  -18 7lbs (5%)    Review of History and Medications   Newly dx DM with A1c 10 8 on 3/25/21--saw PCP and started on Metformin and HS insulin  -up to 1,000mg in AM and PM    Past Medical History:   Diagnosis Date    Bell's palsy     Depression     [facility] mellitus [facility] ) 03/25/21    Blood work    GERD (gastroesophageal reflux disease)     Hypertension     Irregular heart beat     Morbid obesity with BMI of 60 0-69 9, adult ([facility] )     Neurocardiogenic syncope     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     pt symptomatic with extreme and sudden environmental temp  fluctuations     Past Surgical History:   Procedure Laterality Date    COLONOSCOPY N/A 2/2/2018    Procedure: COLONOSCOPY;  Surgeon: Julia Agudelo MD;  Location: [facility] GI LAB; Service: Gastroenterology    ESOPHAGOGASTRODUODENOSCOPY N/A 2/2/2018    Procedure: ESOPHAGOGASTRODUODENOSCOPY (EGD); Surgeon: Julia Agudelo MD;  Location: [facility] GI LAB;   Service: Gastroenterology    TESTICLE SURGERY Left     infected testicle     Social History     Socioeconomic History    Marital status: /Civil Union     Spouse name: Not on file    Number of children: Not on file    Years of education: Not on file    Highest education level: Not on file   Occupational History    Not on file   Tobacco Use    Smoking status: Former Smoker     Packs/day: 0 25     Years: 2 00     Pack years: 0 50     Types: Cigarettes     Start date: 7/15/1999     Quit date: 2010     Years since quitting: 10 9    Smokeless tobacco: Never Used Vaping Use    Vaping Use: Never used   Substance and Sexual Activity    Alcohol use: Not Currently     Alcohol/week: 0 0 standard drinks     Comment: occ    Drug use: No    Sexual activity: Not Currently     Partners: Female     Birth control/protection: Abstinence, I U D  Other Topics Concern    Not on file   Social History Narrative    Denied: History of alcohol use (history) - as per [employer]    Always uses seat belt    Former smoker - as per [employer]    Never a smoker - as per [employer]     Social Determinants of Health     Financial Resource Strain:     Difficulty of Paying Living Expenses:    Food Insecurity:     Worried About Running Out of Food in the Last Year:     [address] in the Last Year:    Transportation Needs:     Lack of Transportation (Medical):  Lack of Transportation (Non-Medical):    Physical Activity:     Days of Exercise per Week:     Minutes of Exercise per Session:    Stress:     Feeling of Stress :    Social Connections:     Frequency of Communication with Friends and Family:     Frequency of Social Gatherings with Friends and Family:     Attends Sikh Services:     Active Member of Clubs or Organizations:     Attends Club or Organization Meetings:     Marital Status:    Intimate Partner Violence:     Fear of Current or Ex-Partner:     Emotionally Abused:     Physically Abused:     Sexually Abused:        Current Outpatient Medications:     Ascorbic Acid (VITAMIN C) 1000 MG tablet, Take 1,000 mg by mouth daily, Disp: , Rfl:     Blood Glucose Monitoring Suppl w/Device KIT, Measure blood sugar twice daily   Morning after overnight fast and 2 hours after a meal, Disp: 1 kit, Rfl: 0    carvedilol (COREG) 3 125 mg tablet, Take 1 tablet (3 125 mg total) by mouth 2 (two) times a day with meals, Disp: 60 tablet, Rfl: 1    Chromium 1000 MCG TABS, Take 1 tablet by mouth daily, Disp: , Rfl:     dicyclomine (BENTYL) 20 mg tablet, Take 1 tablet (20 mg total) by mouth 3 (three) times a day (Patient not taking: Reported on 8/9/2021), Disp: 90 tablet, Rfl: 1    glucose blood test strip, Use as instructed, Disp: 100 strip, Rfl: 3    insulin glargine (Basaglar KwikPen) 100 units/mL injection pen, Inject 15 Units under the skin daily at bedtime, Disp: 15 mL, Rfl: 1    Insulin Pen Needle 32G X 4 MM MISC, Use daily at bedtime, Disp: 100 each, Rfl: 3    Lancets MISC, Measure blood sugar twice daily   Morning after overnight fast and 2 hours after a meal, Disp: 100 each, Rfl: 3    lisinopril (ZESTRIL) 20 mg tablet, Take 1 tablet (20 mg total) by mouth every morning, Disp: 90 tablet, Rfl: 3    metFORMIN (GLUCOPHAGE) 500 mg tablet, Take 2 tablets (1,000 mg total) by mouth 2 (two) times a day with meals, Disp: 120 tablet, Rfl: 4    multivitamin (THERAGRAN) TABS, Take 1 tablet by mouth daily, Disp: , Rfl:     Naproxen Sodium (Aleve) 220 MG CAPS, Take by mouth as needed , Disp: , Rfl:     omeprazole (PriLOSEC) 40 MG capsule, Take 1 capsule (40 mg total) by mouth every morning, Disp: 30 capsule, Rfl: 5    ondansetron (ZOFRAN-ODT) 4 mg disintegrating tablet, Take 1 tablet (4 mg total) by mouth every 6 (six) hours as needed for nausea or vomiting for up to 3 days, Disp: 9 tablet, Rfl: 0    oxyCODONE-acetaminophen (PERCOCET) 5-325 mg per tablet, Take 1 tablet by mouth every 8 (eight) hours as needed for moderate pain, Disp: , Rfl:    -reports taking his MVI, needs to start vitamin D    Food Intake and Lifestyle Assessment -updates in bold  Food Intake Assessment completed via 24 hr recall  · Gets $700 per month on food stamps  · Feels is in the habit of "cleaning his plate" and he wants to change that  · Got a Nutribullet:  Makes smoothies:  1 pack frozen smoothie fruit (+ 8oz apple juice + 4oz lemon juice OR protein powder with 2% milk (8oz) and fruit  · BS fasting and then 2 hrs after meal:  Between 125-165    Wake: 10am due to summer once school starts will get up 6: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-was working at [employer], not in work since December 2020    Breakfast: 8:30am 12: bowl of cereal (filipe charms with 2% milk), drank water healthy choice power bowls, 2 chobani flips, drank water  -wants to try Cornelious [place] "just crack an egg" x 2 once school starts  Snack: - chobani flips  Lunch: 4:30p-7:30  [facility] cob salads with 6 hard boiled eggs and pickled garlic, italian dressing (trying to avoid creamy salad dressing) Switched to healthy choice power bowls (350 calories), will have a chobani flip (1-2)  4p went and walked around [facility] dry gingerale with lemonade soda  8-9p 6oz of steak , health choice power bowl      Dinner 10:30pm homemade macaroni salad (macaroni noodles, ann, onion, celery, carrot, brown mustard, vinegar, 2 pickles)1am 2- 100 calorie italian ice    Beverage intake: water, Rubén drinks, Wever sweeteners  Protein supplement: whey protein isolate powder with PB2    Estimated protein intake per day: 80-100gm  Estimated fluid intake per day: 100oz per day of water 12 oz soda per day since last encounter  Meals eaten away from home: once a week-[employer]-wings and pizza or chinese food no change  Typical meal pattern: 2 meals per day and grazes on food, no set meals     Eating Behaviors: Consumption of high calorie/ high fat foods, Consumption of high calorie beverages, Large portion sizes, Frequent snacking/ grazing and Mindless eating    Food allergies or intolerances:  Sucralose, can use stevia  Allergies   Allergen Reactions    Macrolides And Ketolides Anaphylaxis    Pertussis Vaccines Anaphylaxis    Zithromax [Azithromycin] Anaphylaxis    Bee Venom      Annotation - [id number]: wasp, hornet also    Erythromycin     Guaifenesin     Other      Annotation - [id number]:  violet    Pertussis Vaccine     Piperacillin Sod-Tazobactam So     Pollen Extract     Vancomycin      Cultural or Congregational considerations: none    Physical Assessment-updates in bold  Physical Activity  Types of exercise: None  Walks once a week in the park with the kids   recently invested in fitness watches for himself and his family, as of August 1st planning to start a fitness challenge ("Outbreak" to escape the [place], need to walk 60 miles in 30 days to escape- will finish by Friday), trying for 5,000 steps a day or 2 miles a day  -reports he is currently getting 4,000 was getting 7,000-8,000 in beginning but heat wave decreased his steps  Current physical limitations: SOB quickly     Psychosocial Assessment   Support systems: spouse and children friend(s) relative(s)  Socioeconomic factors: on food stamps    Nutrition Diagnosis- continued  Diagnosis: Overweight / Obesity (NC-3 3)  Related to: Physical inactivity and Excessive energy intake  As Evidenced by: BMI >25     Nutrition Prescription: Recommend the following diet  Regular    Interventions and Teaching   Discussed pre-op and post-op nutrition guidelines  Patient educated and handouts provided    Capacity of post-surgery stomach  Diet progression  Adequate hydration  Expected weight loss  Weight loss plateaus/ possibility of weight regain  Exercise  Suggestions for pre-op diet  Nutrition considerations after surgery  Protein supplements  Meal planning and preparation  Appropriate carbohydrate, protein, and fat intake, and food/fluid choices to maximize safe weight loss, nutrient intake, and tolerance   Dietary and lifestyle changes  Possible problems with poor eating habits  Intuitive eating  Techniques for self monitoring and keeping daily food journal  Carb counting    Education provided to: patient    Barriers to learning: No barriers identified  Readiness to change: preparation    Prior research on procedure: books, internet, discussed with provider and friends or family    Comprehension: verbalizes understanding     Expected Compliance: good    Evaluation / Monitoring  Dietitian to Monitor: Eating pattern as discussed Tolerance of nutrition prescription Body weight Lab values Physical activity    Workflow:   PCP Letter: completed   Support Group: not completed   6 Month Pre-Operative Program: 6 of 6 today   Bloodwork: will need repeat blood work closer to [facility] (labs drawn 3/25/2021- will need repeated in 9/25/2021), HgbA1C 6 8 on 7/23/2021 WNL for surgery   Cardiac Risk Assessment: completed 6/11/2021   Sleep Studies: scheduled for 9/28/2021   EGD: needs psych first   Weight Loss:  Do Not Gain weight, 10% by of surgery:  -39lbs (349lbs) 5% in order to submit:  -19 5 (368 5lbs)   Psych:  Needs to schedule    Patient gained 2 8# since last month, overall he has reach 5% weight loss  Session spent discussing dietary changes (switched to protein bowls)  Reports increased gas- bowls do include more fiber than patient was previously intaking  Discussed if gas does not improved rec patient to f/u with his PCP  Actvity is improved   Will f/u in 2 weels    Goals  · Return to eliminating soda  · Meet at least 5,000 steps per day  · Complete lesson plans 1-6  · 2400 cals, 240g carbs per day:  60gm carbs at each meal and 30gm at each snack (2 snacks per day)  · Measure portions    Time Spent:   30  mins
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
## 2021-08-24 NOTE — PATIENT INSTRUCTIONS
- Set a minimum for activity, tasks that need to be done, healthy choices   - Make call to [facility] for appointment [phone]  - going to bed earlier to get up earlier in the morning  - smaller portions, eating slower, tuning into body cues for satiation, small snack if still hungry (fruit or veggies, protein shake)

## 2021-09-07 ENCOUNTER — PROCEDURE VISIT (OUTPATIENT)
Dept: PODIATRY | Facility: CLINIC | Age: 41
End: 2021-09-07
Payer: COMMERCIAL

## 2021-09-07 VITALS
WEIGHT: 315 LBS | HEIGHT: 69 IN | SYSTOLIC BLOOD PRESSURE: 147 MMHG | DIASTOLIC BLOOD PRESSURE: 87 MMHG | BODY MASS INDEX: 46.65 KG/M2

## 2021-09-07 DIAGNOSIS — E11.42 DIABETIC POLYNEUROPATHY ASSOCIATED WITH TYPE 2 DIABETES MELLITUS (HCC): Primary | ICD-10-CM

## 2021-09-07 DIAGNOSIS — L03.031 PARONYCHIA OF TOENAIL OF RIGHT FOOT: ICD-10-CM

## 2021-09-07 DIAGNOSIS — M79.671 RIGHT FOOT PAIN: ICD-10-CM

## 2021-09-07 DIAGNOSIS — B35.1 ONYCHOMYCOSIS: ICD-10-CM

## 2021-09-07 DIAGNOSIS — L60.0 INGROWN TOENAIL: ICD-10-CM

## 2021-09-07 PROCEDURE — 99213 OFFICE O/P EST LOW 20 MIN: CPT | Performed by: PODIATRIST

## 2021-09-07 RX ORDER — CIPROFLOXACIN 500 MG/1
500 TABLET, FILM COATED ORAL EVERY 12 HOURS SCHEDULED
Qty: 14 TABLET | Refills: 0 | Status: SHIPPED | OUTPATIENT
Start: 2021-09-07 | End: 2021-09-14

## 2021-09-07 NOTE — PROGRESS NOTES
Assessment/Plan: chronic ingrown toenail right hallux  Paronychia  Pain  Diabetic neuropathy  Rule out radiculopathy  Plan  Foot exam performed  Patient educated on conditions  Patient is tired of chronic pain of his right big toe  It has been ongoing for 25 years  Today he opts to undergo total nail avulsion  This is by request   He consents to surgery understanding all the possible risks benefits alternatives and complications understanding no guarantees have been given  After achieving anesthesia with the hallux nerve block of 6 cc 2% lidocaine plain total right hallux nail avulsion performed without pain or complication  Nail matrixectomy performed via 3, 30 seconds application of phenol to nail root  Patient underwent procedure without complication  Vascular status returned after procedure  Patient be placed on both oral and topical anti biotic  He has been given postoperative instructions  He will return for follow-up  Diagnoses and all orders for this visit:    Diabetic polyneuropathy associated with type 2 diabetes mellitus ([facility] [address] )    Onychomycosis    Paronychia of toenail of right foot  -     ciprofloxacin (CIPRO) 500 mg tablet; Take 1 tablet (500 mg total) by mouth every 12 (twelve) hours for 7 days  -     silver sulfadiazine (Silvadene) 1 % cream; Apply topically daily    Ingrown toenail  -     ciprofloxacin (CIPRO) 500 mg tablet; Take 1 tablet (500 mg total) by mouth every 12 (twelve) hours for 7 days  -     silver sulfadiazine (Silvadene) 1 % cream; Apply topically daily    Right foot pain          Subjective:   Patient has chronic pain of his right big toe  He gets periodic ingrown toenails  He wants permanent fix  He is requesting total nail avulsion      Allergies   Allergen Reactions    Macrolides And Ketolides Anaphylaxis    Pertussis Vaccines Anaphylaxis    Zithromax [Azithromycin] Anaphylaxis    Bee Venom      [facility] - [id number]: wasp, hornet also    Erythromycin     Guaifenesin     Other      [facility] - [id number]:  violet    Pertussis Vaccine     Piperacillin Sod-Tazobactam So     Pollen Extract     Vancomycin          Current Outpatient Medications:     Ascorbic Acid (VITAMIN C) 1000 MG tablet, Take 1,000 mg by mouth daily, Disp: , Rfl:     Blood Glucose Monitoring Suppl w/Device KIT, Measure blood sugar twice daily  Morning after overnight fast and 2 hours after a meal, Disp: 1 kit, Rfl: 0    carvedilol (COREG) 3 125 mg tablet, Take 1 tablet (3 125 mg total) by mouth 2 (two) times a day with meals, Disp: 60 tablet, Rfl: 1    Chromium 1000 MCG TABS, Take 1 tablet by mouth daily, Disp: , Rfl:     ciprofloxacin (CIPRO) 500 mg tablet, Take 1 tablet (500 mg total) by mouth every 12 (twelve) hours for 7 days, Disp: 14 tablet, Rfl: 0    dicyclomine (BENTYL) 20 mg tablet, Take 1 tablet (20 mg total) by mouth 3 (three) times a day (Patient not taking: Reported on 8/9/2021), Disp: 90 tablet, Rfl: 1    glucose blood test strip, Use as instructed, Disp: 100 strip, Rfl: 3    insulin glargine (Basaglar KwikPen) 100 units/mL injection pen, Inject 15 Units under the skin daily at bedtime, Disp: 15 mL, Rfl: 1    Insulin Pen Needle 32G X 4 MM MISC, Use daily at bedtime, Disp: 100 each, Rfl: 3    Lancets MISC, Measure blood sugar twice daily   Morning after overnight fast and 2 hours after a meal, Disp: 100 each, Rfl: 3    lisinopril (ZESTRIL) 20 mg tablet, Take 1 tablet (20 mg total) by mouth every morning, Disp: 90 tablet, Rfl: 3    metFORMIN (GLUCOPHAGE) 500 mg tablet, Take 2 tablets (1,000 mg total) by mouth 2 (two) times a day with meals, Disp: 120 tablet, Rfl: 4    multivitamin (THERAGRAN) TABS, Take 1 tablet by mouth daily, Disp: , Rfl:     Naproxen Sodium (Aleve) 220 MG CAPS, Take by mouth as needed , Disp: , Rfl:     omeprazole (PriLOSEC) 40 MG capsule, Take 1 capsule (40 mg total) by mouth every morning, Disp: 30 capsule, Rfl: 5   ondansetron (ZOFRAN-ODT) 4 mg disintegrating tablet, Take 1 tablet (4 mg total) by mouth every 6 (six) hours as needed for nausea or vomiting for up to 3 days, Disp: 9 tablet, Rfl: 0    oxyCODONE-acetaminophen (PERCOCET) 5-325 mg per tablet, Take 1 tablet by mouth every 8 (eight) hours as needed for moderate pain, Disp: , Rfl:     silver sulfadiazine (Silvadene) 1 % cream, Apply topically daily, Disp: 50 g, Rfl: 0    Patient Active Problem List   Diagnosis    Morbid obesity with body mass index (BMI) of 50 0 to 59 9 in adult (HCC)    Morbid obesity (HCC)    Benign essential hypertension    Suspected sleep apnea    Tachycardia    Anxiety    Diarrhea    GERD (gastroesophageal reflux disease)    Irritable bowel syndrome with diarrhea    Low back pain    Migraine headache    Mixed hyperlipidemia    Ventral hernia without obstruction or gangrene    Type 2 diabetes mellitus without complication, with long-term current use of insulin ([facility] )          Patient ID: Jacklyn Daly is a 37 y o  male  HPI    The following portions of the patient's history were reviewed and updated as appropriate:     family history includes ADD / ADHD in his son; Atrial fibrillation in his mother; Cancer in his mother; Colon cancer in his family; Diabetes type II in his family and mother; Heart disease in his family; Hyperlipidemia in his family; Hypertension in his family; Hypothyroidism in his family; Liver disease in his father; Osteoporosis in his mother; Supraventricular tachycardia in his mother; Ulcerative colitis in his father  reports that he quit smoking about 11 years ago  His smoking use included cigarettes  He started smoking about 22 years ago  He has a 0 50 pack-year smoking history  He has never used smokeless tobacco  He reports previous alcohol use  He reports that he does not use drugs      Vitals:    09/07/21 1551   BP: 147/87       Review of Systems      Objective:  Patient's shoes and socks removed  Foot ExamPhysical Exam      General  General Appearance: appears stated age and healthy   Orientation: alert and oriented to person, place, and time   Affect: appropriate         Right Foot/Ankle      Inspection and Palpation  Swelling: dorsum   Arch: pes planus     Neurovascular  Dorsalis pedis: 3+  Posterior tibial: 3+  Saphenous nerve sensation: normal  Tibial nerve sensation: normal  Superficial peroneal nerve sensation: normal  Deep peroneal nerve sensation: normal  Sural nerve sensation: normal        Left Foot/Ankle       Inspection and Palpation  Swelling: dorsum   Arch: pes planus     Neurovascular  Dorsalis pedis: 3+  Posterior tibial: 3+  Saphenous nerve sensation: normal  Tibial nerve sensation: normal  Superficial peroneal nerve sensation: normal  Deep peroneal nerve sensation: normal  Sural nerve sensation: normal           Physical Exam  Vitals and nursing note reviewed  Constitutional:       Appearance: Normal appearance  Cardiovascular:      Rate and Rhythm: Normal rate and regular rhythm  Pulses: no weak pulses          Dorsalis pedis pulses are 3+ on the right side and 3+ on the left side  Posterior tibial pulses are 3+ on the right side and 3+ on the left side  Skin:     Capillary Refill: Capillary refill takes less than 2 seconds  Comments:   Right hallux demonstrates wide incurvated toenail  It is thickened  It is dystrophic  Has distal mycosis  Mild paronychia noted  Negative pus   Is ingrown on all aspects, tibial, fibular and distally  Nail is thickened  It is dystrophic  Neurological:      Mental Status: He is alert  Psychiatric:         Mood and Affect: Mood normal          Behavior: Behavior normal          Thought Content: Thought content normal          Judgment: Judgment normal       Patient's shoes and socks removed  Right Foot/Ankle   Right Foot Inspection           Vascular     The right DP pulse is 3+  The right PT pulse is 3+     Right Toe  - Comprehensive Exam  Arch: pes planus  Swelling: dorsum            Left Foot/Ankle  Left Foot Inspection                                                Vascular     The left DP pulse is 3+  The left PT pulse is 3+  Left Toe  - Comprehensive Exam  Arch: pes planus  Swelling: dorsum         Assign Risk Category:  Deformity present; No loss of protective sensation;  No weak pulses       Risk: 1

## 2021-09-21 ENCOUNTER — OFFICE VISIT (OUTPATIENT)
Dept: PODIATRY | Facility: CLINIC | Age: 41
End: 2021-09-21
Payer: COMMERCIAL

## 2021-09-21 ENCOUNTER — OFFICE VISIT (OUTPATIENT)
Dept: BARIATRICS | Facility: CLINIC | Age: 41
End: 2021-09-21

## 2021-09-21 VITALS — BODY MASS INDEX: 46.65 KG/M2 | HEIGHT: 69 IN | WEIGHT: 315 LBS

## 2021-09-21 VITALS — HEIGHT: 69 IN | BODY MASS INDEX: 46.65 KG/M2 | WEIGHT: 315 LBS

## 2021-09-21 VITALS — WEIGHT: 315 LBS | BODY MASS INDEX: 53.52 KG/M2

## 2021-09-21 DIAGNOSIS — S91.109A OPEN WOUND OF TOE OF RIGHT FOOT: ICD-10-CM

## 2021-09-21 DIAGNOSIS — M21.961 ACQUIRED DEFORMITY OF RIGHT FOOT: Primary | ICD-10-CM

## 2021-09-21 DIAGNOSIS — Z01.818 PRE-OP TESTING: Primary | ICD-10-CM

## 2021-09-21 DIAGNOSIS — M21.41 ACQUIRED FLAT FOOT, RIGHT: ICD-10-CM

## 2021-09-21 DIAGNOSIS — E11.42 DIABETIC POLYNEUROPATHY ASSOCIATED WITH TYPE 2 DIABETES MELLITUS (HCC): ICD-10-CM

## 2021-09-21 DIAGNOSIS — M21.42 ACQUIRED FLAT FOOT, LEFT: ICD-10-CM

## 2021-09-21 DIAGNOSIS — M21.962 ACQUIRED DEFORMITY OF LEFT FOOT: ICD-10-CM

## 2021-09-21 DIAGNOSIS — E66.01 MORBID OBESITY WITH BODY MASS INDEX (BMI) OF 50.0 TO 59.9 IN ADULT (HCC): Primary | ICD-10-CM

## 2021-09-21 DIAGNOSIS — E66.01 MORBID (SEVERE) OBESITY DUE TO EXCESS CALORIES (HCC): ICD-10-CM

## 2021-09-21 PROCEDURE — 99213 OFFICE O/P EST LOW 20 MIN: CPT | Performed by: PODIATRIST

## 2021-09-21 PROCEDURE — L3020 FOOT LONGITUD/METATARSAL SUP: HCPCS | Performed by: PODIATRIST

## 2021-09-21 PROCEDURE — RECHECK

## 2021-09-21 RX ORDER — GABAPENTIN 100 MG/1
100 CAPSULE ORAL 3 TIMES DAILY
Qty: 90 CAPSULE | Refills: 0 | Status: SHIPPED | OUTPATIENT
Start: 2021-09-21 | End: 2021-10-18

## 2021-09-21 NOTE — PROGRESS NOTES
Patient presents for 6 of 6 weight check with a weight loss of 6 8lbs since last visit  Eating behaviors/food choices: Patient reports that he is still having regular meals but instead of three meals a day he is having four small ones  Still using pre-portioned frozen meals and has added a noodle chow mein dish that is between 500-600 calories  He also allows himself to have candy corn, getting a smaller pack that has 3 servings in it but eating the entire thing for about 300 calories  Patient justifies this by stating his blood sugars are well controlled and he doesn't have any other carbs for that day and if he is hungry in the evening he will have bone broth  He is not tracking meals, forgets to do so but acknowledges this is something he could be doing through pre-planning  SW encouraged patient to be conscientious of foods he's choosing and what he's sacrificing to have the higher calorie snacks  SW also encouraged patient to get more familiar with portioning and counting out items to stay within a calorie range  Activity/Exercise:  Patient completed his activity challenge and earned his metal which he was really proud of  He and his wife found another activity challenge  for September but he did have to take two weeks off activity due to having toe surgery  He plans to get clearance today from his doctor to increase his activity more  Sleep/Rest:  Patient is still trying to get on a regular routine with sleeping and waking hours, trying to manage episodes of insomnia  He will be following up with pulmonology for his sleep consult next week  Mental Health/Wellness:  Patient has had some financial stress due to misfiling of his unemployment paperwork  He is working with the unemployment office to work it out and is not worried about any major financial issues but just the initial shock of not having the steady income as he did    He is considering going back to work but was worried how he would balance work with his bariatric surgery process  ANNE talked with patient about the importance of small steps and changes to incorporate this into his life as it will be a lifelong change  Patient completed his intake paperwork for [address] [place] [address] and ANNE emailed in to admin to get appointment  Workflow review:    Labs and PCP: needs labs, PCP done  Psych and EGD: needs eval done and then can schedule EGD  Nicotine: NA  Support Group: done   Cardiology: done  Sleep: consult scheduled for 9/28/2021  Weight and Weight Checks: goal weight is 349lbs, six weight checks    Goals:  Patient will continue efforts to have regular healthy meals, consider checking in on portions and tracking meals for increased accountability   Patient will schedule appointment with mental health provider at [address] [place] [address]    Next Appointment:  Pre-op check in with ANNE and SOPHIE on 10/19

## 2021-09-21 NOTE — PROGRESS NOTES
Bariatric Nutrition Follow-up Note    Type of surgery    Preop (6 months wt checks--6 of 6 today)  Surgery Date: TBD-leaning toward RYGB  Surgeon: Dr Jamie Jain  37 y o   male     [place] with BMI of 25: 168 6lbs  Pre-Op Excess Wt: 219 2lbs  Height 5' 9" (1 753 m), weight (!) 164 kg (362 lb 6 4 oz)  Body mass index is 53 52 kg/m²  Weight change x 1 month:  -7lbs  Net weight loss from start:  -25 6lbs    Review of History and Medications   Newly dx DM with A1c 10 8 on 3/25/21--saw PCP and started on Metformin and HS insulin  -up to 1,000mg in AM and PM    Past Medical History:   Diagnosis Date    Bell's palsy     Depression     Diabetes mellitus ([facility] ) 21    Blood work    GERD (gastroesophageal reflux disease)     Hypertension     Irregular heart beat     Morbid obesity with BMI of 60 0-69 9, adult (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     pt symptomatic with extreme and sudden environmental temp  fluctuations     Past Surgical History:   Procedure Laterality Date    COLONOSCOPY N/A 2018    Procedure: COLONOSCOPY;  Surgeon: Jo Patel MD;  Location: [facility]; Service: Gastroenterology    ESOPHAGOGASTRODUODENOSCOPY N/A 2018    Procedure: ESOPHAGOGASTRODUODENOSCOPY (EGD); Surgeon: Jo Patel MD;  Location: [facility];   Service: Gastroenterology    TESTICLE SURGERY Left     infected testicle     Social History     Socioeconomic History    Marital status: /Civil Union     Spouse name: Not on file    Number of children: Not on file    Years of education: Not on file    Highest education level: Not on file   Occupational History    Not on file   Tobacco Use    Smoking status: Former Smoker     Packs/day: 0 25     Years: 2 00     Pack years: 0 50     Types: Cigarettes     Start date: 7/15/1999     Quit date:      Years since quittin 0    Smokeless tobacco: Never Used   Vaping Use    Vaping Use: Never used   Substance and Sexual Activity    Alcohol use: Not Currently     Alcohol/week: 0 0 standard drinks     Comment: occ    Drug use: No    Sexual activity: Not Currently     Partners: Female     Birth control/protection: Abstinence, I U D  Other Topics Concern    Not on file   Social History Narrative    Denied: History of alcohol use (history) - as per [employer]    Always uses seat belt    Former smoker - as per [employer]    Never a smoker - as per [employer]     Social Determinants of Health     Financial Resource Strain:     Difficulty of Paying Living Expenses:    Food Insecurity:     Worried About Running Out of Food in the Last Year:     [address] in the Last Year:    Transportation Needs:     Lack of Transportation (Medical):  Lack of Transportation (Non-Medical):    Physical Activity:     Days of Exercise per Week:     Minutes of Exercise per Session:    Stress:     Feeling of Stress :    Social Connections:     Frequency of Communication with Friends and Family:     Frequency of Social Gatherings with Friends and Family:     Attends [employer] Services:     Active Member of Clubs or Organizations:     Attends Club or Organization Meetings:     Marital Status:    Intimate Partner Violence:     Fear of Current or Ex-Partner:     Emotionally Abused:     Physically Abused:     Sexually Abused:        Current Outpatient Medications:     Ascorbic Acid (VITAMIN C) 1000 MG tablet, Take 1,000 mg by mouth daily, Disp: , Rfl:     Blood Glucose Monitoring Suppl w/Device KIT, Measure blood sugar twice daily   Morning after overnight fast and 2 hours after a meal, Disp: 1 kit, Rfl: 0    carvedilol (COREG) 3 125 mg tablet, Take 1 tablet (3 125 mg total) by mouth 2 (two) times a day with meals, Disp: 60 tablet, Rfl: 1    Chromium 1000 MCG TABS, Take 1 tablet by mouth daily, Disp: , Rfl:     dicyclomine (BENTYL) 20 mg tablet, Take 1 tablet (20 mg total) by mouth 3 (three) times a day (Patient not taking: Reported on 8/9/2021), Disp: 90 tablet, Rfl: 1    glucose blood test strip, Use as instructed, Disp: 100 strip, Rfl: 3    insulin glargine (Basaglar KwikPen) 100 units/mL injection pen, Inject 15 Units under the skin daily at bedtime, Disp: 15 mL, Rfl: 1    Insulin Pen Needle 32G X 4 MM MISC, Use daily at bedtime, Disp: 100 each, Rfl: 3    Lancets MISC, Measure blood sugar twice daily   Morning after overnight fast and 2 hours after a meal, Disp: 100 each, Rfl: 3    lisinopril (ZESTRIL) 20 mg tablet, Take 1 tablet (20 mg total) by mouth every morning, Disp: 90 tablet, Rfl: 3    metFORMIN (GLUCOPHAGE) 500 mg tablet, Take 2 tablets (1,000 mg total) by mouth 2 (two) times a day with meals, Disp: 120 tablet, Rfl: 4    multivitamin (THERAGRAN) TABS, Take 1 tablet by mouth daily, Disp: , Rfl:     Naproxen Sodium (Aleve) 220 MG CAPS, Take by mouth as needed , Disp: , Rfl:     omeprazole (PriLOSEC) 40 MG capsule, Take 1 capsule (40 mg total) by mouth every morning, Disp: 30 capsule, Rfl: 5    ondansetron (ZOFRAN-ODT) 4 mg disintegrating tablet, Take 1 tablet (4 mg total) by mouth every 6 (six) hours as needed for nausea or vomiting for up to 3 days, Disp: 9 tablet, Rfl: 0    oxyCODONE-acetaminophen (PERCOCET) 5-325 mg per tablet, Take 1 tablet by mouth every 8 (eight) hours as needed for moderate pain, Disp: , Rfl:     silver sulfadiazine (Silvadene) 1 % cream, Apply topically daily, Disp: 50 g, Rfl: 0   -reports taking his MVI, started vitamin D and added probiotic    Food Intake and Lifestyle Assessment -updates in bold  Food Intake Assessment completed via 24 hr recall  · Gets $700 per month on food stamps  · Feels is in the habit of "cleaning his plate" and he wants to change that    Breakfast: 8:30am:  Yesterday:  Terykai beef chow mein bowl OR bowl of cereal (filipe charms with 2% milk) OR  healthy choice power bowls, 2 chobani flips, water  -Still needs to try [employer] "just crack an egg" x 2 once school starts  Snack: - chobani flips  Lunch: 1:30pm- yesterday [employer] seafood salad or [employer] cob salads with 6 hard boiled eggs and pickled garlic, italian dressing (trying to avoid creamy salad dressing) Switched to healthy choice power bowls (350 calories), will have a chobani flip (1-2) or chow mein pre-made meal (about 500 cals)  4p went and walked around [employer]  7-9p: 4-6oz of pork loin wrapped in montague and BBQ sauce (nothing with it)  Late night: bullion cubes (1 cube = 800mg sodium)--suggested Swason bone broth instead (400mg sodium)    Reports candy corn is his weakness around [facility]    Beverage intake: water, Rubén drinks, Vel sweeteners  Was back into the soda at last visit, but cut out again over the last month  Protein supplement: whey protein isolate powder with PB2    Estimated protein intake per day: 80-100gm  Estimated fluid intake per day: 100oz per day of water 12 oz soda per day since last encounter--cut out again  Meals eaten away from home: once a week-papa [employer]-wings and pizza or chinese food no change  Typical meal pattern: 2 meals per day and grazes on food, no set meals  Eating Behaviors: Consumption of high calorie/ high fat foods, Consumption of high calorie beverages, Large portion sizes, Frequent snacking/ grazing and Mindless eating    Food allergies or intolerances:  Sucralose, can use stevia  Allergies   Allergen Reactions    Macrolides And Ketolides Anaphylaxis    Pertussis Vaccines Anaphylaxis    Zithromax [Azithromycin] Anaphylaxis    Bee Venom      Annotation - [id number]: wasp, hornet also    Erythromycin     Guaifenesin     Other      Annotation - [id number]:  violet    Pertussis Vaccine     Piperacillin Sod-Tazobactam So     Pollen Extract     Vancomycin      Cultural or Gnosticist considerations: none    Physical Assessment-updates in bold  Physical Activity  Types of exercise: None    Walks once a week in the park with the kids   recently invested in fitness watches for himself and his family, as of August 1st planning to start a fitness challenge ("Outbreak" to escape the nikole adorno, need to walk 60 miles in 30 days to escape- Finished), trying for 5,000 steps a day or 2 miles a day  -reports he is currently getting 4,000 was getting 7,000-8,000 in beginning --did 60 miles of walking in 26 days (fitness challenge)  Current physical limitations: SOB quickly     Psychosocial Assessment   Support systems: spouse and children friend(s) relative(s)  Socioeconomic factors: on food stamps    Nutrition Diagnosis- continued  Diagnosis: Overweight / Obesity (NC-3 3)  Related to: Physical inactivity and Excessive energy intake  As Evidenced by: BMI >25     Nutrition Prescription: Recommend the following diet  Regular    Interventions and Teaching   Discussed pre-op and post-op nutrition guidelines  Patient educated and handouts provided    Capacity of post-surgery stomach  Diet progression  Adequate hydration  Expected weight loss  Weight loss plateaus/ possibility of weight regain  Exercise  Suggestions for pre-op diet  Nutrition considerations after surgery  Protein supplements  Meal planning and preparation  Appropriate carbohydrate, protein, and fat intake, and food/fluid choices to maximize safe weight loss, nutrient intake, and tolerance   Dietary and lifestyle changes  Possible problems with poor eating habits  Intuitive eating  Techniques for self monitoring and keeping daily food journal  Carb counting    Education provided to: patient    Barriers to learning: No barriers identified  Readiness to change: preparation    Prior research on procedure: books, internet, discussed with provider and friends or family    Comprehension: verbalizes understanding     Expected Compliance: good    Evaluation / Monitoring  Dietitian to Monitor: Eating pattern as discussed Tolerance of nutrition prescription Body weight Lab values Physical activity    Workflow:   PCP Letter: completed   Support Group: completed podcast on    6 Month Pre-Operative Program: 6 of 6 today   Bloodwork: will need repeat blood work closer to [employer] [facility] (labs drawn 3/25/2021- will need repeated in 2021), HgbA1C 6 8 on 2021 WNL for surgery--provided with rx on    Cardiac Risk Assessment: completed 2021   Sleep Studies: scheduled for 2021   EGD: needs psych first   Weight Loss:  Do Not Gain weight, 10% by of surgery:  -39lbs (349lbs) 5% in order to submit:  -19 5 (368 5lbs)   Psych:  Needs to schedule    Patient lost 7lbs since last month  He continues to monitor his portions and uses pre-op meals to help  He did cut out the soda again and is now just drinking water  He does report doing 1-2 bullion cubes before bed at night so he isn't hungry  Looked up the sodium and advised each is 800mg sodium plus what he does in the pre-made meals that he is most likely getting too much sodium during the day  Suggested at least change to [facility] handheld bone broth where one is 400mg sodium  Pt has completed his first fitness challenge and now signed up for another because he felt it kept him motivated  Pt labs from march  therefore provided with lab rx to repeat labs  Goals  · Continue to avoid the soda  · Meet at least 5,000 steps per day  · Complete lesson plans 1-6  · 2400 cals, 240g carbs per day:  60gm carbs at each meal and 30gm at each snack (2 snacks per day)  · Measure portions  · Decrease sodium intake   Try the Swason bone broth  · Repeat labs    Time Spent:   30  mins

## 2021-09-21 NOTE — PROGRESS NOTES
Assessment/Plan: acquired deformity of foot bilateral   Acquired pes planus bilateral   Pain upon ambulation  Rule out diabetic neuropathy  Wound  Right big toe  Plan  Foot exam performed  Hallux wound debrided  Gentian violet dry sterile dressing applied  Patient will bandage daily for 1 week  We will give the patient a trial of gabapentin  Hopefully this will help with his neuralgia pain  In addition he would benefit from pronation control  His feet have been casted for custom molded foot orthotics  Diagnoses and all orders for this visit:    Acquired deformity of right foot    Acquired deformity of left foot    Acquired flat foot, right    Acquired flat foot, left    Diabetic polyneuropathy associated with type 2 diabetes mellitus (HCC)  -     gabapentin (NEURONTIN) 100 mg capsule; Take 1 capsule (100 mg total) by mouth 3 (three) times a day    Open wound of toe of right foot          Subjective:   Patient is doing well status post procedure  His biggest complaint today is pain and numbness in his feet  He is diabetic  He knows he has flat feet  Allergies   Allergen Reactions    Macrolides And Ketolides Anaphylaxis    Pertussis Vaccines Anaphylaxis    Zithromax [Azithromycin] Anaphylaxis    Bee Venom      Annotation - [id number]: wasp, hornet also    Erythromycin     Guaifenesin     Other      Annotation - [id number]:  violet    Pertussis Vaccine     Piperacillin Sod-Tazobactam So     Pollen Extract     Vancomycin          Current Outpatient Medications:     Ascorbic Acid (VITAMIN C) 1000 MG tablet, Take 1,000 mg by mouth daily, Disp: , Rfl:     Blood Glucose Monitoring Suppl w/Device KIT, Measure blood sugar twice daily   Morning after overnight fast and 2 hours after a meal, Disp: 1 kit, Rfl: 0    carvedilol (COREG) 3 125 mg tablet, Take 1 tablet (3 125 mg total) by mouth 2 (two) times a day with meals, Disp: 60 tablet, Rfl: 1    Chromium 1000 MCG TABS, Take 1 tablet by mouth daily, Disp: , Rfl:     dicyclomine (BENTYL) 20 mg tablet, Take 1 tablet (20 mg total) by mouth 3 (three) times a day (Patient not taking: Reported on 8/9/2021), Disp: 90 tablet, Rfl: 1    gabapentin (NEURONTIN) 100 mg capsule, Take 1 capsule (100 mg total) by mouth 3 (three) times a day, Disp: 90 capsule, Rfl: 0    glucose blood test strip, Use as instructed, Disp: 100 strip, Rfl: 3    insulin glargine (Basaglar KwikPen) 100 units/mL injection pen, Inject 15 Units under the skin daily at bedtime, Disp: 15 mL, Rfl: 1    Insulin Pen Needle 32G X 4 MM MISC, Use daily at bedtime, Disp: 100 each, Rfl: 3    Lancets MISC, Measure blood sugar twice daily   Morning after overnight fast and 2 hours after a meal, Disp: 100 each, Rfl: 3    lisinopril (ZESTRIL) 20 mg tablet, Take 1 tablet (20 mg total) by mouth every morning, Disp: 90 tablet, Rfl: 3    metFORMIN (GLUCOPHAGE) 500 mg tablet, Take 2 tablets (1,000 mg total) by mouth 2 (two) times a day with meals, Disp: 120 tablet, Rfl: 4    multivitamin (THERAGRAN) TABS, Take 1 tablet by mouth daily, Disp: , Rfl:     Naproxen Sodium (Aleve) 220 MG CAPS, Take by mouth as needed , Disp: , Rfl:     omeprazole (PriLOSEC) 40 MG capsule, Take 1 capsule (40 mg total) by mouth every morning, Disp: 30 capsule, Rfl: 5    ondansetron (ZOFRAN-ODT) 4 mg disintegrating tablet, Take 1 tablet (4 mg total) by mouth every 6 (six) hours as needed for nausea or vomiting for up to 3 days, Disp: 9 tablet, Rfl: 0    oxyCODONE-acetaminophen (PERCOCET) 5-325 mg per tablet, Take 1 tablet by mouth every 8 (eight) hours as needed for moderate pain, Disp: , Rfl:     silver sulfadiazine (Silvadene) 1 % cream, Apply topically daily, Disp: 50 g, Rfl: 0    Patient Active Problem List   Diagnosis    Morbid obesity with body mass index (BMI) of 50 0 to 59 9 in adult (HCC)    Morbid obesity (HCC)    Benign essential hypertension    Suspected sleep apnea    Tachycardia    Anxiety  Diarrhea    GERD (gastroesophageal reflux disease)    Irritable bowel syndrome with diarrhea    Low back pain    Migraine headache    Mixed hyperlipidemia    Ventral hernia without obstruction or gangrene    Type 2 diabetes mellitus without complication, with long-term current use of insulin ([facility] )          Patient ID: Alejandra Hill is a 37 y o  male  HPI    The following portions of the patient's history were reviewed and updated as appropriate:     family history includes ADD / ADHD in his son; Atrial fibrillation in his mother; Cancer in his mother; Colon cancer in his family; Diabetes type II in his family and mother; Heart disease in his family; Hyperlipidemia in his family; Hypertension in his family; Hypothyroidism in his family; Liver disease in his father; Osteoporosis in his mother; Supraventricular tachycardia in his mother; Ulcerative colitis in his father  reports that he quit smoking about 11 years ago  His smoking use included cigarettes  He started smoking about 22 years ago  He has a 0 50 pack-year smoking history  He has never used smokeless tobacco  He reports previous alcohol use  He reports that he does not use drugs  There were no vitals filed for this visit  Review of Systems      Objective:  Patient's shoes and socks removed     Foot ExamPhysical Exam

## 2021-09-23 ENCOUNTER — DOCUMENTATION (OUTPATIENT)
Dept: PULMONOLOGY | Facility: CLINIC | Age: 41
End: 2021-09-23

## 2021-09-23 NOTE — PROGRESS NOTES
Pt needed to be rescheduled due to provider being out of office  Pt stating he his planning to have weight loss surgery and wanted to see a provider as soon as possible in case any extra testing is needed  Pt stated ok with going to [facility] office  Informed pt NP appointment will be scheduled in [facility], and if follow sleep appointments are needed, he can possibly be referred back to the [facility] office  Pt confirmed understanding

## 2021-09-30 ENCOUNTER — TELEPHONE (OUTPATIENT)
Dept: PULMONOLOGY | Facility: CLINIC | Age: 41
End: 2021-09-30

## 2021-09-30 ENCOUNTER — OFFICE VISIT (OUTPATIENT)
Dept: PULMONOLOGY | Facility: CLINIC | Age: 41
End: 2021-09-30
Payer: COMMERCIAL

## 2021-09-30 VITALS
SYSTOLIC BLOOD PRESSURE: 130 MMHG | BODY MASS INDEX: 46.65 KG/M2 | DIASTOLIC BLOOD PRESSURE: 80 MMHG | TEMPERATURE: 96.6 F | OXYGEN SATURATION: 95 % | RESPIRATION RATE: 18 BRPM | HEIGHT: 69 IN | WEIGHT: 315 LBS | HEART RATE: 86 BPM

## 2021-09-30 DIAGNOSIS — R29.818 SUSPECTED SLEEP APNEA: Primary | ICD-10-CM

## 2021-09-30 DIAGNOSIS — R94.2 ABNORMAL FLOW VOLUME LOOP: Primary | ICD-10-CM

## 2021-09-30 DIAGNOSIS — E66.01 MORBID OBESITY (HCC): ICD-10-CM

## 2021-09-30 DIAGNOSIS — Z01.811 PRE-OPERATIVE RESPIRATORY EXAMINATION: ICD-10-CM

## 2021-09-30 DIAGNOSIS — R94.2 ABNORMAL FLOW VOLUME LOOP: ICD-10-CM

## 2021-09-30 DIAGNOSIS — E66.01 MORBID OBESITY WITH BODY MASS INDEX (BMI) OF 50.0 TO 59.9 IN ADULT (HCC): ICD-10-CM

## 2021-09-30 PROCEDURE — 3008F BODY MASS INDEX DOCD: CPT | Performed by: FAMILY MEDICINE

## 2021-09-30 PROCEDURE — 94010 BREATHING CAPACITY TEST: CPT | Performed by: INTERNAL MEDICINE

## 2021-09-30 PROCEDURE — 99205 OFFICE O/P NEW HI 60 MIN: CPT | Performed by: INTERNAL MEDICINE

## 2021-09-30 NOTE — PROGRESS NOTES
Assessment:    Morbid obesity with body mass index (BMI) of 50 0 to 59 9 in [facility])  He is currently working with weight loss management  Plan is for eventual bariatric surgery  At this time I do not see any reason that he would not be appropriate from a pulmonary standpoint once we rule out airway obstruction with abnormal flow volume loop  Suspected sleep apnea  He has excessive daytime fatigue with [place] Sleepiness Scale of 10  He has known snoring  Will do sleep study to assess for sleep apnea prior to weight loss surgery    Abnormal flow volume loop concerning for fixed airway obstruction  Flow volume loop shows flattening of inspiratory and expiratory limb concerning for fixed airway obstruction  While this may be secondary to redundant posterior pharyngeal tissue secondary to obesity, need to rule out other airway obstruction prior to surgery  Will plan for CT neck and chest       Plan:    Diagnoses and all orders for this visit:    Suspected sleep apnea  -     Cancel: Home Study; Future  -     Diagnostic Sleep Study; Future    Morbid obesity ([facility] )  -     Ambulatory referral to Sleep Medicine  -     POCT spirometry    Pre-operative respiratory examination  -     POCT spirometry  -     CT neck and chest w contrast; Future    Abnormal flow volume loop  -     CT neck and chest w contrast; Future    Morbid obesity with body mass index (BMI) of 50 0 to 59 9 in [facility])        Follow-up:  3-4 months      HPI:  He is here for evaluation of sleep apnea  He has had doctors tell them they are concerned he has it - hasn't had a sleep study  He does have snoring periodically - mostly when he is congested, when lying on his back or falls asleep in [employer]  He has never woken up gasping for air or with am headaches  His wife has never told him that he looks like he stopped breathing     Occasional has some daytime lethargy - he has some issues falling asleep at night but tries to average 6 hours per night  He is currently working with weight management and possibly may have bariatric surgery  He does have some dyspnea if he over-exerts himself  He will have some dyspnea when he gets heart palpitations  He follows with a Cardiologist    When he was a child he was diagnosed with "asthmatic symptoms" but not asthma  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  When he would stop running his breathing would resolve  When he was playing football in [facility] he was on Advair but then stopped it  He never needed a rescue inhaler  Review of Systems   Constitutional: Positive for fatigue  Negative for activity change and unexpected weight change  HENT: Positive for congestion and postnasal drip  Respiratory: Positive for shortness of breath  Negative for wheezing  Cardiovascular: Positive for palpitations  Negative for chest pain and leg swelling  Gastrointestinal: Negative for nausea and vomiting  Musculoskeletal: Negative for gait problem  Neurological: Positive for syncope  Psychiatric/Behavioral: Negative for sleep disturbance         The following portions of the patient's history were reviewed and updated as appropriate: allergies, current medications, past family history, past medical history, past social history, past surgical history and problem list     VITALS:  Vitals:    09/30/21 0812 09/30/21 0814   BP: 130/80    BP Location: Left arm    Patient Position: Sitting    Cuff Size: Large    Pulse: 86    Resp: 18    Temp: (!) 96 6 °F (35 9 °C)    TempSrc: Temporal    SpO2: 95% 95%   Weight: (!) 168 kg (371 lb)    Height: 5' 9" (1 753 m)        Physical Exam  General:  Patient is awake, alert, non-toxic and in no acute respiratory distress  Neck: No JVD, no stridor  CV:  Regular, +S1 and S2, No murmurs, gallops or rubs appreciated  Lungs: CTA bilateral without wheeze  Abdomen: Soft, +BS, Non-tender, non-distended  Extremities: No clubbing, cyanosis or edema  Neuro: No focal deficits        Diagnostic Testing:    [place] Sleepiness Scale:    Scale: 0-No chance of dozing, 1-slight chance of dozing, 2-Moderate chance of dozing, 3-High chance of dozing    Sitting and reading - 2  Watching television - 2  Sitting inactive in a public place - 1  Sitting for an hour as a passenger in a car - 0  Lying down in the afternoon to rest - 3  Sitting and talking to another person - 1  Sitting quietly after a lunch (no alcohol at lunch) - 1  Sitting in a car, stopped for a few minutes due to traffic - 0    Total criteria point count - 10    Results Scale:  1-6 points: Normal Sleep  7-8 points: Average sleepiness  9-24 points: Abnormal (possibly pathologic) sleepiness      Office Spirometry Results:     Spirometry:  FEV1/FVC Ratio is 77  FEV1 is 4 10L which is 80% predicted  FVC is 3 15L which is 77% predicted       IMPRESSION:  Normal spirometry  Flow volume loop with flattening of inspiratory and expiratory limb concerning for fixed airway obstruction      CBC:  Lab Results   Component Value Date    WBC 6 76 01/28/2021    HGB 16 1 01/28/2021    HCT 51 8 (H) 01/28/2021    MCV 96 01/28/2021     01/28/2021         BMP:   Lab Results   Component Value Date     07/25/2017    K 4 4 03/25/2021    CL 95 (L) 03/25/2021    CO2 24 03/25/2021    BUN 9 03/25/2021    CREATININE 0 82 03/25/2021    GLUCOSE 104 (H) 07/25/2017    CALCIUM 9 4 01/28/2021    CORRECTEDCA 10 4 (H) 01/28/2021     (H) 03/25/2021     (H) 03/25/2021    ALKPHOS 112 01/28/2021    PROT 7 3 07/25/2017    BILITOT 0 7 07/25/2017    EGFR 104 01/28/2021         Imaging studies:  No chest imaging    Echo 6/2021  SUMMARY     LEFT VENTRICLE:  Normal LV chamber size and wall thickness  Preserved LV systolic function with ejection fraction of about 65%  No definite regional wall motion abnormality seen  Normal diastolic filling pattern     RIGHT VENTRICLE:  Normal right ventricular size and systolic function  TAPSE is 2 2 cm     MITRAL VALVE:  There was trace regurgitation      TRICUSPID VALVE:  There is trace tricuspid regurgitation  PA pressure is normal      Rickey Kanner, DO

## 2021-09-30 NOTE — ASSESSMENT & PLAN NOTE
He is currently working with weight loss management  Plan is for eventual bariatric surgery  At this time I do not see any reason that he would not be appropriate from a pulmonary standpoint once we rule out airway obstruction with abnormal flow volume loop

## 2021-09-30 NOTE — ASSESSMENT & PLAN NOTE
He has excessive daytime fatigue with [place] Sleepiness Scale of 10    He has known snoring  Will do sleep study to assess for sleep apnea prior to weight loss surgery

## 2021-10-05 ENCOUNTER — TELEPHONE (OUTPATIENT)
Dept: SLEEP CENTER | Facility: CLINIC | Age: 41
End: 2021-10-05

## 2021-10-06 ENCOUNTER — HOSPITAL ENCOUNTER (OUTPATIENT)
Dept: RADIOLOGY | Facility: HOSPITAL | Age: 41
Discharge: HOME | End: 2021-10-06
Attending: INTERNAL MEDICINE

## 2021-10-06 DIAGNOSIS — R94.2 ABNORMAL FLOW VOLUME LOOP: ICD-10-CM

## 2021-10-06 DIAGNOSIS — Z01.811 PRE-OPERATIVE RESPIRATORY EXAMINATION: ICD-10-CM

## 2021-10-13 ENCOUNTER — OFFICE VISIT (OUTPATIENT)
Dept: FAMILY MEDICINE CLINIC | Facility: CLINIC | Age: 41
End: 2021-10-13
Payer: COMMERCIAL

## 2021-10-13 VITALS
SYSTOLIC BLOOD PRESSURE: 138 MMHG | TEMPERATURE: 97.8 F | DIASTOLIC BLOOD PRESSURE: 76 MMHG | OXYGEN SATURATION: 95 % | HEIGHT: 69 IN | BODY MASS INDEX: 46.65 KG/M2 | HEART RATE: 84 BPM | WEIGHT: 315 LBS | RESPIRATION RATE: 24 BRPM

## 2021-10-13 DIAGNOSIS — R74.01 TRANSAMINITIS: ICD-10-CM

## 2021-10-13 DIAGNOSIS — E11.9 TYPE 2 DIABETES MELLITUS WITHOUT COMPLICATION, WITH LONG-TERM CURRENT USE OF INSULIN (HCC): Primary | ICD-10-CM

## 2021-10-13 DIAGNOSIS — Z71.3 DIABETIC NUTRITIONAL COUNSELING COMPLETED: ICD-10-CM

## 2021-10-13 DIAGNOSIS — Z11.59 NEED FOR HEPATITIS C SCREENING TEST: ICD-10-CM

## 2021-10-13 DIAGNOSIS — Z11.59 NEED FOR HEPATITIS B SCREENING TEST: ICD-10-CM

## 2021-10-13 DIAGNOSIS — Z23 ENCOUNTER FOR IMMUNIZATION: ICD-10-CM

## 2021-10-13 DIAGNOSIS — Z79.4 TYPE 2 DIABETES MELLITUS WITHOUT COMPLICATION, WITH LONG-TERM CURRENT USE OF INSULIN (HCC): Primary | ICD-10-CM

## 2021-10-13 DIAGNOSIS — Z79.4 INSULIN DEPENDENT TYPE 2 DIABETES MELLITUS, CONTROLLED (HCC): ICD-10-CM

## 2021-10-13 DIAGNOSIS — E11.9 INSULIN DEPENDENT TYPE 2 DIABETES MELLITUS, CONTROLLED (HCC): ICD-10-CM

## 2021-10-13 DIAGNOSIS — Z71.89 DIABETES EDUCATION, ENCOUNTER FOR: ICD-10-CM

## 2021-10-13 PROCEDURE — 99213 OFFICE O/P EST LOW 20 MIN: CPT | Performed by: FAMILY MEDICINE

## 2021-10-13 PROCEDURE — 1036F TOBACCO NON-USER: CPT | Performed by: FAMILY MEDICINE

## 2021-10-13 PROCEDURE — 90686 IIV4 VACC NO PRSV 0.5 ML IM: CPT

## 2021-10-13 PROCEDURE — 90471 IMMUNIZATION ADMIN: CPT

## 2021-10-15 DIAGNOSIS — I10 HYPERTENSION, UNSPECIFIED TYPE: ICD-10-CM

## 2021-10-15 DIAGNOSIS — E11.42 DIABETIC POLYNEUROPATHY ASSOCIATED WITH TYPE 2 DIABETES MELLITUS (HCC): ICD-10-CM

## 2021-10-16 LAB
ALBUMIN SERPL-MCNC: 4.3 G/DL (ref 4–5)
ALBUMIN/GLOB SERPL: 1.2 {RATIO} (ref 1.2–2.2)
ALP SERPL-CCNC: 75 IU/L (ref 44–121)
ALT SERPL-CCNC: 69 IU/L (ref 0–44)
AST SERPL-CCNC: 53 IU/L (ref 0–40)
BILIRUB SERPL-MCNC: 0.5 MG/DL (ref 0–1.2)
BUN SERPL-MCNC: 9 MG/DL (ref 6–24)
BUN SERPL-MCNC: 9 MG/DL (ref 6–24)
BUN/CREAT SERPL: 13 (ref 9–20)
CALCIUM SERPL-MCNC: 9.9 MG/DL (ref 8.7–10.2)
CHLORIDE SERPL-SCNC: 102 MMOL/L (ref 96–106)
CHOLEST SERPL-MCNC: 182 MG/DL (ref 100–199)
CHOLEST/HDLC SERPL: 5.2 RATIO (ref 0–5)
CO2 SERPL-SCNC: 24 MMOL/L (ref 20–29)
CREAT SERPL-MCNC: 0.69 MG/DL (ref 0.76–1.27)
CREAT SERPL-MCNC: 0.73 MG/DL (ref 0.76–1.27)
ERYTHROCYTE [DISTWIDTH] IN BLOOD BY AUTOMATED COUNT: 12.8 % (ref 11.6–15.4)
EST. AVERAGE GLUCOSE BLD GHB EST-MCNC: 137 MG/DL
GLOBULIN SER-MCNC: 3.5 G/DL (ref 1.5–4.5)
GLUCOSE SERPL-MCNC: 112 MG/DL (ref 65–99)
HBA1C MFR BLD: 6.4 % (ref 4.8–5.6)
HBV SURFACE AG SERPL QL IA: NEGATIVE
HCT VFR BLD AUTO: 48.3 % (ref 37.5–51)
HCV AB S/CO SERPL IA: 0.1 S/CO RATIO (ref 0–0.9)
HDLC SERPL-MCNC: 35 MG/DL
HGB BLD-MCNC: 16.1 G/DL (ref 13–17.7)
LDLC SERPL CALC-MCNC: 120 MG/DL (ref 0–99)
MCH RBC QN AUTO: 29.5 PG (ref 26.6–33)
MCHC RBC AUTO-ENTMCNC: 33.3 G/DL (ref 31.5–35.7)
MCV RBC AUTO: 89 FL (ref 79–97)
PLATELET # BLD AUTO: 213 X10E3/UL (ref 150–450)
POTASSIUM SERPL-SCNC: 4.3 MMOL/L (ref 3.5–5.2)
PROT SERPL-MCNC: 7.8 G/DL (ref 6–8.5)
RBC # BLD AUTO: 5.46 X10E6/UL (ref 4.14–5.8)
SL AMB EGFR AFRICAN AMERICAN: 134 ML/MIN/1.73
SL AMB EGFR AFRICAN AMERICAN: 137 ML/MIN/1.73
SL AMB EGFR NON AFRICAN AMERICAN: 116 ML/MIN/1.73
SL AMB EGFR NON AFRICAN AMERICAN: 119 ML/MIN/1.73
SL AMB VLDL CHOLESTEROL CALC: 27 MG/DL (ref 5–40)
SODIUM SERPL-SCNC: 141 MMOL/L (ref 134–144)
TRIGL SERPL-MCNC: 151 MG/DL (ref 0–149)
TSH SERPL DL<=0.005 MIU/L-ACNC: 1.56 UIU/ML (ref 0.45–4.5)
WBC # BLD AUTO: 6 X10E3/UL (ref 3.4–10.8)

## 2021-10-16 PROCEDURE — 3044F HG A1C LEVEL LT 7.0%: CPT | Performed by: FAMILY MEDICINE

## 2021-10-18 PROCEDURE — 4010F ACE/ARB THERAPY RXD/TAKEN: CPT | Performed by: FAMILY MEDICINE

## 2021-10-18 RX ORDER — GABAPENTIN 100 MG/1
CAPSULE ORAL
Qty: 90 CAPSULE | Refills: 0 | Status: SHIPPED | OUTPATIENT
Start: 2021-10-18 | End: 2021-11-10

## 2021-10-18 RX ORDER — LISINOPRIL 20 MG/1
TABLET ORAL
Qty: 90 TABLET | Refills: 0 | Status: SHIPPED | OUTPATIENT
Start: 2021-10-18 | End: 2022-01-17

## 2021-10-19 ENCOUNTER — OFFICE VISIT (OUTPATIENT)
Dept: BARIATRICS | Facility: CLINIC | Age: 41
End: 2021-10-19

## 2021-10-19 ENCOUNTER — OFFICE VISIT (OUTPATIENT)
Dept: PODIATRY | Facility: CLINIC | Age: 41
End: 2021-10-19
Payer: COMMERCIAL

## 2021-10-19 VITALS
HEIGHT: 69 IN | RESPIRATION RATE: 18 BRPM | BODY MASS INDEX: 46.65 KG/M2 | SYSTOLIC BLOOD PRESSURE: 145 MMHG | DIASTOLIC BLOOD PRESSURE: 74 MMHG | WEIGHT: 315 LBS | HEART RATE: 107 BPM

## 2021-10-19 VITALS — WEIGHT: 315 LBS | HEIGHT: 69 IN | BODY MASS INDEX: 46.65 KG/M2

## 2021-10-19 DIAGNOSIS — M77.41 METATARSALGIA OF BOTH FEET: ICD-10-CM

## 2021-10-19 DIAGNOSIS — E11.42 DIABETIC POLYNEUROPATHY ASSOCIATED WITH TYPE 2 DIABETES MELLITUS (HCC): Primary | ICD-10-CM

## 2021-10-19 DIAGNOSIS — M77.42 METATARSALGIA OF BOTH FEET: ICD-10-CM

## 2021-10-19 DIAGNOSIS — E66.01 MORBID OBESITY WITH BODY MASS INDEX (BMI) OF 50.0 TO 59.9 IN ADULT (HCC): Primary | ICD-10-CM

## 2021-10-19 DIAGNOSIS — S91.109A OPEN WOUND OF TOE OF RIGHT FOOT: ICD-10-CM

## 2021-10-19 DIAGNOSIS — E66.01 MORBID (SEVERE) OBESITY DUE TO EXCESS CALORIES (HCC): Primary | ICD-10-CM

## 2021-10-19 PROCEDURE — 3008F BODY MASS INDEX DOCD: CPT | Performed by: FAMILY MEDICINE

## 2021-10-19 PROCEDURE — 99213 OFFICE O/P EST LOW 20 MIN: CPT | Performed by: PODIATRIST

## 2021-10-19 PROCEDURE — RECHECK

## 2021-10-19 RX ORDER — GABAPENTIN 300 MG/1
300 CAPSULE ORAL 2 TIMES DAILY
Qty: 60 CAPSULE | Refills: 0 | Status: SHIPPED | OUTPATIENT
Start: 2021-10-19 | End: 2022-01-26

## 2021-10-21 ENCOUNTER — HOSPITAL ENCOUNTER (OUTPATIENT)
Dept: SLEEP CENTER | Facility: CLINIC | Age: 41
Discharge: HOME | End: 2021-10-21
Payer: COMMERCIAL

## 2021-10-21 ENCOUNTER — TELEPHONE (OUTPATIENT)
Dept: SLEEP CENTER | Facility: CLINIC | Age: 41
End: 2021-10-21

## 2021-10-21 DIAGNOSIS — R29.818 SUSPECTED SLEEP APNEA: ICD-10-CM

## 2021-10-21 PROCEDURE — 95810 POLYSOM 6/> YRS 4/> PARAM: CPT | Performed by: INTERNAL MEDICINE

## 2021-10-21 PROCEDURE — 95810 POLYSOM 6/> YRS 4/> PARAM: CPT

## 2021-10-25 DIAGNOSIS — G47.33 OSA (OBSTRUCTIVE SLEEP APNEA): Primary | ICD-10-CM

## 2021-10-26 ENCOUNTER — TELEPHONE (OUTPATIENT)
Dept: SLEEP CENTER | Facility: CLINIC | Age: 41
End: 2021-10-26

## 2021-10-27 ENCOUNTER — HOSPITAL ENCOUNTER (OUTPATIENT)
Dept: RADIOLOGY | Facility: HOSPITAL | Age: 41
Discharge: HOME | End: 2021-10-27
Attending: INTERNAL MEDICINE
Payer: COMMERCIAL

## 2021-10-27 DIAGNOSIS — Z01.811 PRE-OPERATIVE RESPIRATORY EXAMINATION: ICD-10-CM

## 2021-10-27 DIAGNOSIS — R94.2 ABNORMAL FLOW VOLUME LOOP: ICD-10-CM

## 2021-10-27 PROCEDURE — G1004 CDSM NDSC: HCPCS

## 2021-10-27 PROCEDURE — 70491 CT SOFT TISSUE NECK W/DYE: CPT

## 2021-10-27 PROCEDURE — 71260 CT THORAX DX C+: CPT

## 2021-10-27 RX ADMIN — IOHEXOL 100 ML: 350 INJECTION, SOLUTION INTRAVENOUS at 10:49

## 2021-10-28 DIAGNOSIS — I10 ESSENTIAL HYPERTENSION: ICD-10-CM

## 2021-10-28 DIAGNOSIS — R00.0 TACHYCARDIA: ICD-10-CM

## 2021-10-28 DIAGNOSIS — K21.9 GASTROESOPHAGEAL REFLUX DISEASE: ICD-10-CM

## 2021-10-28 RX ORDER — CARVEDILOL 3.12 MG/1
TABLET ORAL
Qty: 60 TABLET | Refills: 0 | Status: SHIPPED | OUTPATIENT
Start: 2021-10-28 | End: 2021-11-26

## 2021-10-28 RX ORDER — OMEPRAZOLE 40 MG/1
CAPSULE, DELAYED RELEASE ORAL
Qty: 30 CAPSULE | Refills: 0 | Status: SHIPPED | OUTPATIENT
Start: 2021-10-28 | End: 2021-11-26

## 2021-11-10 ENCOUNTER — OFFICE VISIT (OUTPATIENT)
Dept: PULMONOLOGY | Facility: CLINIC | Age: 41
End: 2021-11-10
Payer: COMMERCIAL

## 2021-11-10 VITALS
OXYGEN SATURATION: 95 % | WEIGHT: 315 LBS | SYSTOLIC BLOOD PRESSURE: 130 MMHG | HEIGHT: 69 IN | HEART RATE: 100 BPM | TEMPERATURE: 98.2 F | BODY MASS INDEX: 46.65 KG/M2 | DIASTOLIC BLOOD PRESSURE: 76 MMHG

## 2021-11-10 DIAGNOSIS — E66.01 MORBID OBESITY WITH BODY MASS INDEX (BMI) OF 50.0 TO 59.9 IN ADULT (HCC): ICD-10-CM

## 2021-11-10 DIAGNOSIS — G47.33 OSA (OBSTRUCTIVE SLEEP APNEA): Primary | ICD-10-CM

## 2021-11-10 DIAGNOSIS — R94.2 ABNORMAL FLOW VOLUME LOOP: ICD-10-CM

## 2021-11-10 PROCEDURE — 99214 OFFICE O/P EST MOD 30 MIN: CPT | Performed by: INTERNAL MEDICINE

## 2021-11-10 PROCEDURE — 1036F TOBACCO NON-USER: CPT | Performed by: INTERNAL MEDICINE

## 2021-11-16 ENCOUNTER — OFFICE VISIT (OUTPATIENT)
Dept: PODIATRY | Facility: CLINIC | Age: 41
End: 2021-11-16
Payer: COMMERCIAL

## 2021-11-16 VITALS
HEART RATE: 100 BPM | BODY MASS INDEX: 46.65 KG/M2 | SYSTOLIC BLOOD PRESSURE: 130 MMHG | HEIGHT: 69 IN | RESPIRATION RATE: 18 BRPM | WEIGHT: 315 LBS | DIASTOLIC BLOOD PRESSURE: 76 MMHG

## 2021-11-16 DIAGNOSIS — M54.16 RADICULOPATHY OF LUMBAR REGION: ICD-10-CM

## 2021-11-16 DIAGNOSIS — M77.41 METATARSALGIA OF BOTH FEET: Primary | ICD-10-CM

## 2021-11-16 DIAGNOSIS — M77.42 METATARSALGIA OF BOTH FEET: Primary | ICD-10-CM

## 2021-11-16 DIAGNOSIS — E11.42 DIABETIC POLYNEUROPATHY ASSOCIATED WITH TYPE 2 DIABETES MELLITUS (HCC): ICD-10-CM

## 2021-11-16 PROCEDURE — 99213 OFFICE O/P EST LOW 20 MIN: CPT | Performed by: PODIATRIST

## 2021-11-16 RX ORDER — GABAPENTIN 600 MG/1
600 TABLET ORAL 2 TIMES DAILY
Qty: 60 TABLET | Refills: 0 | Status: SHIPPED | OUTPATIENT
Start: 2021-11-16 | End: 2021-12-20

## 2021-11-19 ENCOUNTER — OFFICE VISIT (OUTPATIENT)
Dept: BARIATRICS | Facility: CLINIC | Age: 41
End: 2021-11-19

## 2021-11-19 VITALS — BODY MASS INDEX: 46.65 KG/M2 | WEIGHT: 315 LBS | HEIGHT: 69 IN

## 2021-11-19 VITALS — WEIGHT: 315 LBS | BODY MASS INDEX: 53.28 KG/M2

## 2021-11-19 DIAGNOSIS — E66.01 MORBID (SEVERE) OBESITY DUE TO EXCESS CALORIES (HCC): Primary | ICD-10-CM

## 2021-11-19 DIAGNOSIS — E66.01 MORBID OBESITY WITH BODY MASS INDEX (BMI) OF 50.0 TO 59.9 IN ADULT (HCC): Primary | ICD-10-CM

## 2021-11-19 PROCEDURE — 3008F BODY MASS INDEX DOCD: CPT | Performed by: INTERNAL MEDICINE

## 2021-11-19 PROCEDURE — RECHECK

## 2021-11-26 DIAGNOSIS — R00.0 TACHYCARDIA: ICD-10-CM

## 2021-11-26 DIAGNOSIS — I10 ESSENTIAL HYPERTENSION: ICD-10-CM

## 2021-11-26 DIAGNOSIS — K21.9 GASTROESOPHAGEAL REFLUX DISEASE: ICD-10-CM

## 2021-11-26 RX ORDER — CARVEDILOL 3.12 MG/1
TABLET ORAL
Qty: 60 TABLET | Refills: 0 | Status: SHIPPED | OUTPATIENT
Start: 2021-11-26 | End: 2021-12-27

## 2021-11-26 RX ORDER — OMEPRAZOLE 40 MG/1
CAPSULE, DELAYED RELEASE ORAL
Qty: 30 CAPSULE | Refills: 0 | Status: SHIPPED | OUTPATIENT
Start: 2021-11-26 | End: 2021-12-27

## 2021-12-02 ENCOUNTER — PREP FOR PROCEDURE (OUTPATIENT)
Dept: BARIATRICS | Facility: CLINIC | Age: 41
End: 2021-12-02

## 2021-12-02 DIAGNOSIS — E66.01 MORBID (SEVERE) OBESITY DUE TO EXCESS CALORIES (HCC): Primary | ICD-10-CM

## 2021-12-10 ENCOUNTER — IMMUNIZATIONS (OUTPATIENT)
Dept: FAMILY MEDICINE CLINIC | Facility: HOSPITAL | Age: 41
End: 2021-12-10

## 2021-12-10 DIAGNOSIS — Z23 ENCOUNTER FOR IMMUNIZATION: Primary | ICD-10-CM

## 2021-12-10 PROCEDURE — 0064A COVID-19 MODERNA VACC 0.25 ML BOOSTER: CPT

## 2021-12-10 PROCEDURE — 91306 COVID-19 MODERNA VACC 0.25 ML BOOSTER: CPT

## 2021-12-20 DIAGNOSIS — M77.41 METATARSALGIA OF BOTH FEET: ICD-10-CM

## 2021-12-20 DIAGNOSIS — M54.16 RADICULOPATHY OF LUMBAR REGION: ICD-10-CM

## 2021-12-20 DIAGNOSIS — E11.42 DIABETIC POLYNEUROPATHY ASSOCIATED WITH TYPE 2 DIABETES MELLITUS (HCC): ICD-10-CM

## 2021-12-20 DIAGNOSIS — M77.42 METATARSALGIA OF BOTH FEET: ICD-10-CM

## 2021-12-20 RX ORDER — GABAPENTIN 600 MG/1
TABLET ORAL
Qty: 60 TABLET | Refills: 0 | Status: SHIPPED | OUTPATIENT
Start: 2021-12-20 | End: 2022-01-04 | Stop reason: SDUPTHER

## 2021-12-21 ENCOUNTER — OFFICE VISIT (OUTPATIENT)
Dept: BARIATRICS | Facility: CLINIC | Age: 41
End: 2021-12-21

## 2021-12-21 VITALS — BODY MASS INDEX: 46.65 KG/M2 | HEIGHT: 69 IN | WEIGHT: 315 LBS

## 2021-12-21 VITALS — BODY MASS INDEX: 53.55 KG/M2 | WEIGHT: 315 LBS

## 2021-12-21 DIAGNOSIS — E66.01 MORBID (SEVERE) OBESITY DUE TO EXCESS CALORIES (HCC): Primary | ICD-10-CM

## 2021-12-21 PROCEDURE — RECHECK

## 2021-12-21 PROCEDURE — 3008F BODY MASS INDEX DOCD: CPT | Performed by: INTERNAL MEDICINE

## 2021-12-27 DIAGNOSIS — K21.9 GASTROESOPHAGEAL REFLUX DISEASE: ICD-10-CM

## 2021-12-27 DIAGNOSIS — R00.0 TACHYCARDIA: ICD-10-CM

## 2021-12-27 DIAGNOSIS — I10 ESSENTIAL HYPERTENSION: ICD-10-CM

## 2021-12-27 RX ORDER — CARVEDILOL 3.12 MG/1
TABLET ORAL
Qty: 60 TABLET | Refills: 0 | Status: SHIPPED | OUTPATIENT
Start: 2021-12-27 | End: 2022-01-25

## 2021-12-27 RX ORDER — OMEPRAZOLE 40 MG/1
CAPSULE, DELAYED RELEASE ORAL
Qty: 30 CAPSULE | Refills: 0 | Status: SHIPPED | OUTPATIENT
Start: 2021-12-27 | End: 2022-02-02

## 2021-12-30 DIAGNOSIS — E66.01 MORBID OBESITY (HCC): ICD-10-CM

## 2021-12-30 DIAGNOSIS — E11.9 DM2 (DIABETES MELLITUS, TYPE 2) (HCC): ICD-10-CM

## 2021-12-30 DIAGNOSIS — Z53.20 NUTRITION COUNSELING DECLINED: ICD-10-CM

## 2021-12-30 DIAGNOSIS — Z71.3 DIABETIC NUTRITIONAL COUNSELING COMPLETED: ICD-10-CM

## 2022-01-04 ENCOUNTER — OFFICE VISIT (OUTPATIENT)
Dept: PODIATRY | Facility: CLINIC | Age: 42
End: 2022-01-04
Payer: COMMERCIAL

## 2022-01-04 VITALS — HEIGHT: 69 IN | WEIGHT: 315 LBS | BODY MASS INDEX: 46.65 KG/M2

## 2022-01-04 DIAGNOSIS — M54.16 RADICULOPATHY OF LUMBAR REGION: ICD-10-CM

## 2022-01-04 DIAGNOSIS — M77.41 METATARSALGIA OF BOTH FEET: ICD-10-CM

## 2022-01-04 DIAGNOSIS — M77.42 METATARSALGIA OF BOTH FEET: ICD-10-CM

## 2022-01-04 DIAGNOSIS — E11.42 DIABETIC POLYNEUROPATHY ASSOCIATED WITH TYPE 2 DIABETES MELLITUS (HCC): ICD-10-CM

## 2022-01-04 PROCEDURE — 99213 OFFICE O/P EST LOW 20 MIN: CPT | Performed by: PODIATRIST

## 2022-01-04 RX ORDER — GABAPENTIN 600 MG/1
600 TABLET ORAL 2 TIMES DAILY
Qty: 60 TABLET | Refills: 0 | Status: SHIPPED | OUTPATIENT
Start: 2022-01-04 | End: 2022-03-16 | Stop reason: ALTCHOICE

## 2022-01-04 NOTE — PROGRESS NOTES
Assessment/Plan:   metatarsalgia secondary to diabetic neuropathy  Rule out radiculopathy as secondary etiology  Morbid obesity       Plan  Foot exam performed  Patient educated on condition  Spine x-rays ordered  Will maintain gabapentin dosing to 600 mg b i d  Return for follow-up  I agree with patient's desire for bariatric surgery  Weight loss with significantly ill patient's conditions          Diagnoses and all orders for this visit:     Metatarsalgia of both feet  -     gabapentin (NEURONTIN) 600 MG tablet; Take 1 tablet (600 mg total) by mouth 2 (two) times a day     Diabetic polyneuropathy associated with type 2 diabetes mellitus (HCC)  -     gabapentin (NEURONTIN) 600 MG tablet; Take 1 tablet (600 mg total) by mouth 2 (two) times a day     Radiculopathy of lumbar region  -     XR spine lumbar complete w bending minimum 6 views; Future  -     gabapentin (NEURONTIN) 600 MG tablet; Take 1 tablet (600 mg total) by mouth 2 (two) times a day            Subjective:  Patient still has numbness in the ball of his feet  The gabapentin is helping but he gets breakthrough symptoms  Patient relates a history of low back pain  Patient has not had back x-rays directed           Allergies   Allergen Reactions    Bee Venom Anaphylaxis       Annotation - [id number]: wasp, hornet also    Macrolides And Ketolides Anaphylaxis and Rash    Other Hives, Shortness Of Breath and Wheezing       Annotation - [id number]:  violet    Pertussis Vaccine Anaphylaxis    Pertussis Vaccines Anaphylaxis and Rash    Vancomycin Shortness Of Breath    Zithromax [Azithromycin] Anaphylaxis    Erythromycin Rash    Pollen Extract Sneezing            Current Outpatient Medications:     Blood Glucose Monitoring Suppl w/Device KIT, Measure blood sugar twice daily   Morning after overnight fast and 2 hours after a meal, Disp: 1 kit, Rfl: 0    carvedilol (COREG) 3 125 mg tablet, TAKE 1 TABLET BY MOUTH TWICE DAILY WITH MEALS, Disp: 60 tablet, Rfl: 0    Chromium 1000 MCG TABS, Take 1 tablet by mouth daily, Disp: , Rfl:     dicyclomine (BENTYL) 20 mg tablet, Take 1 tablet (20 mg total) by mouth 3 (three) times a day, Disp: 90 tablet, Rfl: 1    gabapentin (NEURONTIN) 300 mg capsule, Take 1 capsule (300 mg total) by mouth 2 (two) times a day, Disp: 60 capsule, Rfl: 0    gabapentin (NEURONTIN) 600 MG tablet, Take 1 tablet (600 mg total) by mouth 2 (two) times a day, Disp: 60 tablet, Rfl: 0    glucose blood test strip, Use as instructed, Disp: 100 strip, Rfl: 3    insulin glargine (Basaglar KwikPen) 100 units/mL injection pen, Inject 15 Units under the skin daily at bedtime, Disp: 15 mL, Rfl: 1    Insulin Pen Needle 32G X 4 MM MISC, Use daily at bedtime, Disp: 100 each, Rfl: 3    Lancets MISC, Measure blood sugar twice daily   Morning after overnight fast and 2 hours after a meal, Disp: 100 each, Rfl: 3    lisinopril (ZESTRIL) 20 mg tablet, TAKE 1 TABLET BY MOUTH ONCE DAILY IN THE MORNING, Disp: 90 tablet, Rfl: 0    metFORMIN (GLUCOPHAGE) 500 mg tablet, Take 2 tablets (1,000 mg total) by mouth 2 (two) times a day with meals, Disp: 120 tablet, Rfl: 4    multivitamin (THERAGRAN) TABS, Take 1 tablet by mouth daily, Disp: , Rfl:     Naproxen Sodium (Aleve) 220 MG CAPS, Take by mouth as needed , Disp: , Rfl:     omeprazole (PriLOSEC) 40 MG capsule, Take 1 capsule by mouth in the morning, Disp: 30 capsule, Rfl: 0         Patient Active Problem List   Diagnosis    Morbid obesity with body mass index (BMI) of 50 0 to 59 9 in adult ([facility])    Benign essential hypertension    Tachycardia    Anxiety    Diarrhea    GERD (gastroesophageal reflux disease)    Irritable bowel syndrome with diarrhea    Low back pain    Migraine headache    Mixed hyperlipidemia    Ventral hernia without obstruction or gangrene    Type 2 diabetes mellitus without complication, with long-term current use of insulin ([facility])    Abnormal flow volume loop concerning for fixed airway obstruction    MANUELA (obstructive sleep apnea)             Patient ID: Ayan Madsen is a 37 y o  male      HPI     The following portions of the patient's history were reviewed and updated as appropriate:      family history includes ADD / ADHD in his son; Atrial fibrillation in his mother; COPD in his paternal grandfather; Cancer in his mother; Colon cancer in his family; Diabetes type II in his family and mother; Heart disease in his family; Hyperlipidemia in his family; Hypertension in his family; Hypothyroidism in his family; Liver disease in his father; Osteoporosis in his mother; Supraventricular tachycardia in his mother; Ulcerative colitis in his father        reports that he quit smoking about 11 years ago  His smoking use included cigarettes  He started smoking about 22 years ago  He has a 2 50 pack-year smoking history  He has never used smokeless tobacco  He reports current alcohol use  He reports that he does not use drugs      Objective:  Patient's shoes and socks removed     Foot ExamPhysical Exam          General  General Appearance: appears stated age and healthy   Orientation: alert and oriented to person, place, and time   Affect: appropriate         Right Foot/Ankle      Inspection and Palpation  Tenderness: metatarsals   Swelling: dorsum   Arch: pes planus  Hammertoes: fifth toe  Hallux limitus: yes     Neurovascular  Dorsalis pedis: 3+  Posterior tibial: 3+  Saphenous nerve sensation: diminished  Tibial nerve sensation: diminished  Superficial peroneal nerve sensation: diminished  Deep peroneal nerve sensation: diminished  Sural nerve sensation: diminished        Left Foot/Ankle       Inspection and Palpation  Tenderness: metatarsals   Swelling: dorsum   Arch: pes planus  Hammertoes: fifth toe  Hallux limitus: yes     Neurovascular  Dorsalis pedis: 3+  Posterior tibial: 3+  Saphenous nerve sensation: diminished  Tibial nerve sensation: diminished  Superficial peroneal nerve sensation: diminished  Deep peroneal nerve sensation: diminished  Sural nerve sensation: diminished           Physical Exam  Vitals and nursing note reviewed  Constitutional:       Appearance: Normal appearance  Cardiovascular:      Rate and Rhythm: Normal rate and regular rhythm       Pulses:           Dorsalis pedis pulses are 3+ on the right side and 3+ on the left side         Posterior tibial pulses are 3+ on the right side and 3+ on the left side  Skin:     Capillary Refill: Capillary refill takes less than 2 seconds       Neurological:      Mental Status: He is alert       Comments:   Decreased vibratory sensation noted bilateral   Psychiatric:         Mood and Affect: Mood normal          Behavior: Behavior normal          Thought Content:  Thought content normal          Judgment: Judgment normal

## 2022-01-17 DIAGNOSIS — I10 HYPERTENSION, UNSPECIFIED TYPE: ICD-10-CM

## 2022-01-17 RX ORDER — LISINOPRIL 20 MG/1
TABLET ORAL
Qty: 90 TABLET | Refills: 0 | Status: SHIPPED | OUTPATIENT
Start: 2022-01-17 | End: 2022-04-26

## 2022-01-21 ENCOUNTER — TELEPHONE (OUTPATIENT)
Dept: PREADMISSION TESTING | Facility: HOSPITAL | Age: 42
End: 2022-01-21

## 2022-01-25 ENCOUNTER — HOSPITAL ENCOUNTER (OUTPATIENT)
Dept: RADIOLOGY | Facility: HOSPITAL | Age: 42
Discharge: HOME | End: 2022-01-25
Attending: PODIATRIST
Payer: COMMERCIAL

## 2022-01-25 ENCOUNTER — OFFICE VISIT (OUTPATIENT)
Dept: BARIATRICS | Facility: CLINIC | Age: 42
End: 2022-01-25

## 2022-01-25 VITALS — BODY MASS INDEX: 54.26 KG/M2 | WEIGHT: 315 LBS

## 2022-01-25 VITALS — HEIGHT: 69 IN | BODY MASS INDEX: 46.65 KG/M2 | WEIGHT: 315 LBS

## 2022-01-25 DIAGNOSIS — I10 ESSENTIAL HYPERTENSION: ICD-10-CM

## 2022-01-25 DIAGNOSIS — M54.16 RADICULOPATHY OF LUMBAR REGION: ICD-10-CM

## 2022-01-25 DIAGNOSIS — R00.0 TACHYCARDIA: ICD-10-CM

## 2022-01-25 DIAGNOSIS — E66.01 MORBID (SEVERE) OBESITY DUE TO EXCESS CALORIES (HCC): Primary | ICD-10-CM

## 2022-01-25 DIAGNOSIS — E66.01 MORBID OBESITY WITH BODY MASS INDEX (BMI) OF 50.0 TO 59.9 IN ADULT (HCC): Primary | ICD-10-CM

## 2022-01-25 PROCEDURE — RECHECK

## 2022-01-25 PROCEDURE — 72110 X-RAY EXAM L-2 SPINE 4/>VWS: CPT

## 2022-01-25 RX ORDER — CARVEDILOL 3.12 MG/1
TABLET ORAL
Qty: 60 TABLET | Refills: 0 | Status: SHIPPED | OUTPATIENT
Start: 2022-01-25 | End: 2022-02-15 | Stop reason: CLARIF

## 2022-01-25 NOTE — PROGRESS NOTES
Bariatric Nutrition Follow-up Note    Type of surgery    Preop (6 months wt checks--6 of 6 completed)--today is pre-op support  Surgery Date: TBD-leaning toward RYGB  Surgeon: Dr Marivel Milan  37 y o   male     [place] with BMI of 25: 168 6lbs  Pre-Op Excess Wt: 219 2lbs  Height 5' 9" (1 753 m), weight (!) 166 kg (367 lb)  Body mass index is 54 2 kg/m²  Weight change x 1 month:  +5lbs  Net weight loss from start:  -20 7lbs    Review of History and Medications   Newly dx DM with A1c 10 8 on 3/25/21--saw PCP and started on Metformin and HS insulin  A1c in Oct now down to 6 4  Past Medical History:   Diagnosis Date    Abdominal wall hernia     Bell's palsy     2 bouts - idiopathic, possible stress induced    Depression     Diabetes mellitus [facility] ) 03/25/21    Blood work    GERD (gastroesophageal reflux disease)     Hypertension     Irregular heart beat     Morbid obesity with BMI of 60 0-69 9, adult ([facility] )     Neurocardiogenic syncope     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     pt symptomatic with extreme and sudden environmental temp  fluctuations     Past Surgical History:   Procedure Laterality Date    COLONOSCOPY N/A 2/2/2018    Procedure: COLONOSCOPY;  Surgeon: Lorenza Pinto MD;  Location: [place] [address] GI LAB; Service: Gastroenterology    ESOPHAGOGASTRODUODENOSCOPY N/A 2/2/2018    Procedure: ESOPHAGOGASTRODUODENOSCOPY (EGD); Surgeon: Lorenza Pinto MD;  Location: [facility] GI [facility];   Service: Gastroenterology    TESTICLE SURGERY Left     infected testicle    TOENAIL EXCISION Right 09/07/2021     Social History     Socioeconomic History    Marital status: /Civil Union     Spouse name: Not on file    Number of children: Not on file    Years of education: Not on file    Highest education level: Not on file   Occupational History    Not on file   Tobacco Use    Smoking status: Former Smoker     Packs/day: 0 25     Years: 10 00     Pack years: 2 50 Types: Cigarettes     Start date: 7/15/1999     Quit date:      Years since quittin 4    Smokeless tobacco: Never Used   Vaping Use    Vaping Use: Never used   Substance and Sexual Activity    Alcohol use: Yes     Alcohol/week: 0 0 standard drinks     Comment: social on occasion    Drug use: No    Sexual activity: Not Currently     Partners: Female     Birth control/protection: Abstinence, I U D  Other Topics Concern    Not on file   Social History Narrative    Denied: History of alcohol use (history) - as per [employer]    Always uses seat belt    Former smoker - as per [employer]    Never a smoker - as per [employer]     Social Determinants of Health     Financial Resource Strain: Not on file   Food Insecurity: Not on file   Transportation Needs: Not on file   Physical Activity: Not on file   Stress: Not on file   Social Connections: Not on file   Intimate Partner Violence: Not on file   Housing Stability: Not on file       Current Outpatient Medications:     Blood Glucose Monitoring Suppl w/Device KIT, Measure blood sugar twice daily   Morning after overnight fast and 2 hours after a meal, Disp: 1 kit, Rfl: 0    carvedilol (COREG) 3 125 mg tablet, TAKE 1 TABLET BY MOUTH TWICE DAILY WITH MEALS, Disp: 60 tablet, Rfl: 0    Chromium 1000 MCG TABS, Take 1 tablet by mouth daily, Disp: , Rfl:     dicyclomine (BENTYL) 20 mg tablet, Take 1 tablet (20 mg total) by mouth 3 (three) times a day, Disp: 90 tablet, Rfl: 1    gabapentin (NEURONTIN) 300 mg capsule, Take 1 capsule (300 mg total) by mouth 2 (two) times a day, Disp: 60 capsule, Rfl: 0    gabapentin (NEURONTIN) 600 MG tablet, Take 1 tablet (600 mg total) by mouth 2 (two) times a day, Disp: 60 tablet, Rfl: 0    glucose blood test strip, Use as instructed, Disp: 100 strip, Rfl: 3    insulin glargine (Basaglar KwikPen) 100 units/mL injection pen, Inject 15 Units under the skin daily at bedtime, Disp: 15 mL, Rfl: 1    Insulin Pen Needle 32G X 4 MM MISC, Use daily at bedtime, Disp: 100 each, Rfl: 3    Lancets MISC, Measure blood sugar twice daily  Morning after overnight fast and 2 hours after a meal, Disp: 100 each, Rfl: 3    lisinopril (ZESTRIL) 20 mg tablet, TAKE 1 TABLET BY MOUTH ONCE DAILY IN THE MORNING, Disp: 90 tablet, Rfl: 0    metFORMIN (GLUCOPHAGE) 500 mg tablet, Take 2 tablets (1,000 mg total) by mouth 2 (two) times a day with meals, Disp: 120 tablet, Rfl: 4    multivitamin (THERAGRAN) TABS, Take 1 tablet by mouth daily, Disp: , Rfl:     Naproxen Sodium (Aleve) 220 MG CAPS, Take by mouth as needed , Disp: , Rfl:     omeprazole (PriLOSEC) 40 MG capsule, Take 1 capsule by mouth in the morning, Disp: 30 capsule, Rfl: 0   -reports taking his MVI, started vitamin D and added probiotic    Food Intake and Lifestyle Assessment   Food Intake Assessment completed via usual recall    Breakfast: 8:30am: today had 6 thin slices (states 2 slices is about 515 cals) of pork roll with mustard  Snack: - chobani flips  Lunch: 12pm- one day did a sandwich on Al bread with 8 thin slices of bologna, 2 slices of pepper jack cheese, hormel hard salami 8 slices and little mustard  Dinner:  9pm Pulled pork with pepper jack cheese and pickles in a bowl    Beverage intake: water, Mihaela drinks, [place] sweeteners  Staying away from soda, coffee and OJ  Protein supplement: whey protein isolate powder with PB2, not often at this time    Estimated protein intake per day: 80-100gm  Estimated fluid intake per day: 64oz per day of water   Meals eaten away from home: once a week-[employer]-w[employer] and pizza or chinese food   Typical meal pattern: 2 meals per day and grazes on food, no set meals     Eating Behaviors: Consumption of high calorie/ high fat foods, Consumption of high calorie beverages, Large portion sizes, Frequent snacking/ grazing and Mindless eating    Food allergies or intolerances:  Sucralose, can use stevia  Allergies   Allergen Reactions    Bee Venom Anaphylaxis     Annotation - [id number]: wasp, hornet also    Macrolides And Ketolides Anaphylaxis and Rash    Other Hives, Shortness Of Breath and Wheezing     Annotation - [id number]:  violet    Pertussis Vaccine Anaphylaxis    Pertussis Vaccines Anaphylaxis and Rash    Vancomycin Shortness Of Breath    Zithromax [Azithromycin] Anaphylaxis    Erythromycin Rash    Pollen Extract Sneezing     Cultural or Anabaptist considerations: none    Physical Assessment-updates in bold  Physical Activity  Types of exercise: Has an elliptical that he wants to get back into it, but still hasn't  Current physical limitations: SOB quickly     Psychosocial Assessment   Support systems: spouse and children friend(s) relative(s)  Socioeconomic factors: on food stamps    Nutrition Diagnosis- continued  Diagnosis: Overweight / Obesity (NC-3 3)  Related to: Physical inactivity and Excessive energy intake  As Evidenced by: BMI >25     Nutrition Prescription: Recommend the following diet  Regular    Interventions and Teaching   Discussed pre-op and post-op nutrition guidelines  Patient educated and handouts provided    Capacity of post-surgery stomach  Diet progression  Adequate hydration  Expected weight loss  Weight loss plateaus/ possibility of weight regain  Exercise  Suggestions for pre-op diet  Nutrition considerations after surgery  Protein supplements  Meal planning and preparation  Appropriate carbohydrate, protein, and fat intake, and food/fluid choices to maximize safe weight loss, nutrient intake, and tolerance   Dietary and lifestyle changes  Possible problems with poor eating habits  Intuitive eating  Techniques for self monitoring and keeping daily food journal  Carb counting    Education provided to: patient    Barriers to learning: No barriers identified  Readiness to change: preparation    Prior research on procedure: books, internet, discussed with provider and friends or family    Comprehension: verbalizes understanding     Expected Compliance: good    Evaluation / Monitoring  Dietitian to Monitor: Eating pattern as discussed Tolerance of nutrition prescription Body weight Lab values Physical activity    Workflow:   PCP Letter: completed   Support Group: completed podcast on 7/28   6 Month Pre-Operative Program: 6 of 6 completed   Bloodwork: completed and acceptable on 10/15--good until  April   Cardiac Risk Assessment: completed 6/11/2021--needs repeat   Sleep Studies: CPAP ordered, received on Jan 12th, 2022 and is wearing   EGD: scheduled on 2/4/21   Weight Loss:  Do Not Gain weight, 10% by of surgery:  -39lbs (349lbs) 5% in order to submit:  -19 5 (368 5lbs)   Psych: completed    Pt presents with a 5lb weight gain in the past month  He, overall, is still down 20lbs, but he still struggles with the same obstacles as last month  He appears to rely on a lot of convenience foods  States it becomes costly to buy healthy foods  Suggested to get a family pack of chicken and cook it up to use instead of processed and frozen foods  Pt is also continuing to consume high fat proteins ie: pork roll, bologna, salami, pulled pork, etc   Remined pt he needs to find lean protein options for meals after surgery therefore encouraged to get into the habit now ie: turkey, chicken, fish, low fat ham, etc   Provided pt with lean protein handout again  He often continues to make excesses why he doesn't ie: my one son doesn't like them, my wife prefers to other options, etc   Reminded pt this is his journey and if he wants to be success there needs to be a compromise with the family  Requested pt keep a food log for 2 weeks  Provided with paper log       Goals  · Continue to avoid the soda  · 2400 cals, 240g carbs per day:  60gm carbs at each meal and 30gm at each snack (2 snacks per day)  · 3 meals per day with lean protein  · Limit the convenience foods  · Purchase family pack of chicken to cook up and use instead of processed foods  · Choose lean proteins  · Eat within 1-2 hrs of waking  · F/U in 2 weeks and bring food log    Time Spent:   30  mins

## 2022-01-25 NOTE — PROGRESS NOTES
Patient presents for pre-op check in, current weight 367 4lbs  Eating behaviors/food choices: Patient reports he feels he's making healthy changes and watching portions but he forgets to eat  His focus is being pulled in other directions such as his fiances and taking care of his family, he will go long periods of time without eating and portions may be larger  Patient also depending on fast convenient meals that are higher in calories  Patient reports barriers of not having hot water and broken  as contributing factors to not wanting to cook meals  SW reviewed with patient whether he is able to refocus on this process with his current demands, starting with planning out meals, setting timers to remind him to pay attention to his schedule and increasing follow through on meal prep and activity  Patient said he will try, SW encouraged his follow through and to at minimum plan out and set timers for breakfast and lunch  Activity/Exercise:  Patient says it's been too cold to get outside for walks  He feels the winter is harder for him to be focused on his weight loss and that with warmer weather his motivation will increase  SW pointed out that there will still be cold weather after surgery and to look at activities indoors that could help  Also consider bundling up to go out for walks as well  Sleep/Rest: Patient reports he has been using his CPAP machine for the past two weeks, he's still adjusting to the mask but overall he is getting in the required treatment time  Mental Health/Wellness:  Patient has been experiencing high levels of stress which have gotten progressively worse  He is experiencing financial difficulties, trying to help his wife get disability, preparing to go back to work and making sure his kids get to school and are cared for   When suggestions to manage weight management journey are offered, patient states he already tries them but without evidence that he is following through  ANNE encouraged patient to evaluate whether he can return his focus to this process due to all of the other demands he has  Patient feels he can devote time to this, he wants surgery to he can return to work  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, when he is working it gives him the structure he needs to be focused  ANNE suggested patient set up his day as if he is working, carving out that necessary structure to keep meals and activity in place  Workflow review:    Labs and PCP: both done  Psych and EGD: eval done and cleared, EGD scheduled   Nicotine: NA  Support Group: done   Cardiology: needs to follow up with cardiology, risk assessment   Sleep: getting CPAP machine  Weight and Weight Checks: goal weight is 349lbs, six weight checks    Goals:    - set up structure similar to him having a job, plan and schedule meals, set alarms to stop and having regular meals throughout the day  - increase activity  - evaluate responsibilities and where his weight management journey fits into his life      Next Appointment:  With ANNE and SOPHIE on

## 2022-01-26 NOTE — PRE-PROCEDURE INSTRUCTIONS
Pre-Surgery Instructions:   Medication Instructions    Blood Glucose Monitoring Suppl w/Device KIT Patient was instructed by Physician and understands   carvedilol (COREG) 3 125 mg tablet Instructed patient per Anesthesia Guidelines   Chromium 1000 MCG TABS Patient was instructed by Physician and understands   dicyclomine (BENTYL) 20 mg tablet Patient was instructed by Physician and understands   gabapentin (NEURONTIN) 600 MG tablet Patient was instructed by Physician and understands   glucose blood test strip Patient was instructed by Physician and understands   insulin glargine (Basaglar KwikPen) 100 units/mL injection pen Patient was instructed by Physician and understands   Insulin Pen Needle 32G X 4 MM MISC Patient was instructed by Physician and understands   Lancets MISC Patient was instructed by Physician and understands   lisinopril (ZESTRIL) 20 mg tablet Patient was instructed by Physician and understands   metFORMIN (GLUCOPHAGE) 500 mg tablet Patient was instructed by Physician and understands   multivitamin [facility]) TABS Patient was instructed by Physician and understands   Naproxen Sodium (Aleve) 220 MG CAPS Patient was instructed by Physician and understands   omeprazole (PriLOSEC) 40 MG capsule Patient was instructed by Physician and understands  Pt to take carvedilol a m of procedure

## 2022-02-01 ENCOUNTER — OFFICE VISIT (OUTPATIENT)
Dept: PODIATRY | Facility: CLINIC | Age: 42
End: 2022-02-01
Payer: COMMERCIAL

## 2022-02-01 VITALS — HEIGHT: 69 IN | RESPIRATION RATE: 18 BRPM | BODY MASS INDEX: 46.65 KG/M2 | WEIGHT: 315 LBS

## 2022-02-01 DIAGNOSIS — K21.9 GASTROESOPHAGEAL REFLUX DISEASE: ICD-10-CM

## 2022-02-01 DIAGNOSIS — M77.41 METATARSALGIA OF BOTH FEET: Primary | ICD-10-CM

## 2022-02-01 DIAGNOSIS — M77.42 METATARSALGIA OF BOTH FEET: Primary | ICD-10-CM

## 2022-02-01 DIAGNOSIS — M54.16 RADICULOPATHY OF LUMBAR REGION: ICD-10-CM

## 2022-02-01 DIAGNOSIS — E11.42 DIABETIC POLYNEUROPATHY ASSOCIATED WITH TYPE 2 DIABETES MELLITUS (HCC): ICD-10-CM

## 2022-02-01 PROCEDURE — 99213 OFFICE O/P EST LOW 20 MIN: CPT | Performed by: PODIATRIST

## 2022-02-01 RX ORDER — GABAPENTIN 800 MG/1
800 TABLET ORAL 2 TIMES DAILY
Qty: 60 TABLET | Refills: 2 | Status: SHIPPED | OUTPATIENT
Start: 2022-02-01 | End: 2022-05-11 | Stop reason: SDUPTHER

## 2022-02-01 NOTE — PROGRESS NOTES
Assessment/Plan:  Metatarsalgia secondary to diabetic neuropathy and radiculopathy  Plan  Foot exam performed  Patient educated on condition  We will increase gabapentin dosing to 800 mg b i d   In addition patient has been referred to pain management       Diagnoses and all orders for this visit:    Metatarsalgia of both feet  -     gabapentin (NEURONTIN) 800 mg tablet; Take 1 tablet (800 mg total) by mouth 2 (two) times a day    Diabetic polyneuropathy associated with type 2 diabetes mellitus (HCC)  -     gabapentin (NEURONTIN) 800 mg tablet; Take 1 tablet (800 mg total) by mouth 2 (two) times a day    Radiculopathy of lumbar region  -     gabapentin (NEURONTIN) 800 mg tablet; Take 1 tablet (800 mg total) by mouth 2 (two) times a day  -     Ambulatory Referral to Pain Management; Future          Subjective:  Patient is getting some relief with gabapentin dosing  He is aware of x-ray results  He does have low back pain  Patient is diabetic    Allergies   Allergen Reactions    Bee Venom Anaphylaxis     Annotation - [id number]: wasp, hornet also    Macrolides And Ketolides Anaphylaxis and Rash    Other Hives, Shortness Of Breath and Wheezing     Annotation - [id number]:  violet    Pertussis Vaccine Anaphylaxis    Pertussis Vaccines Anaphylaxis and Rash    Vancomycin Shortness Of Breath    Zithromax [Azithromycin] Anaphylaxis    Erythromycin Rash    Pollen Extract Sneezing         Current Outpatient Medications:     Blood Glucose Monitoring Suppl w/Device KIT, Measure blood sugar twice daily   Morning after overnight fast and 2 hours after a meal, Disp: 1 kit, Rfl: 0    carvedilol (COREG) 3 125 mg tablet, TAKE 1 TABLET BY MOUTH TWICE DAILY WITH MEALS, Disp: 60 tablet, Rfl: 0    Chromium 1000 MCG TABS, Take 1 tablet by mouth daily, Disp: , Rfl:     dicyclomine (BENTYL) 20 mg tablet, Take 1 tablet (20 mg total) by mouth 3 (three) times a day (Patient taking differently: Take 20 mg by mouth as needed  ), Disp: 90 tablet, Rfl: 1    gabapentin (NEURONTIN) 600 MG tablet, Take 1 tablet (600 mg total) by mouth 2 (two) times a day, Disp: 60 tablet, Rfl: 0    gabapentin (NEURONTIN) 800 mg tablet, Take 1 tablet (800 mg total) by mouth 2 (two) times a day, Disp: 60 tablet, Rfl: 2    glucose blood test strip, Use as instructed, Disp: 100 strip, Rfl: 3    insulin glargine (Basaglar KwikPen) 100 units/mL injection pen, Inject 15 Units under the skin daily at bedtime, Disp: 15 mL, Rfl: 1    Insulin Pen Needle 32G X 4 MM MISC, Use daily at bedtime, Disp: 100 each, Rfl: 3    Lancets MISC, Measure blood sugar twice daily  Morning after overnight fast and 2 hours after a meal, Disp: 100 each, Rfl: 3    lisinopril (ZESTRIL) 20 mg tablet, TAKE 1 TABLET BY MOUTH ONCE DAILY IN THE MORNING, Disp: 90 tablet, Rfl: 0    metFORMIN (GLUCOPHAGE) 500 mg tablet, Take 2 tablets (1,000 mg total) by mouth 2 (two) times a day with meals, Disp: 120 tablet, Rfl: 4    multivitamin (THERAGRAN) TABS, Take 1 tablet by mouth daily, Disp: , Rfl:     Naproxen Sodium (Aleve) 220 MG CAPS, Take by mouth as needed , Disp: , Rfl:     omeprazole (PriLOSEC) 40 MG capsule, Take 1 capsule by mouth in the morning, Disp: 30 capsule, Rfl: 0    Patient Active Problem List   Diagnosis    Morbid obesity with body mass index (BMI) of 50 0 to 59 9 in adult ([facility])    Benign essential hypertension    Tachycardia    Anxiety    Diarrhea    GERD (gastroesophageal reflux disease)    Irritable bowel syndrome with diarrhea    Low back pain    Migraine headache    Mixed hyperlipidemia    Ventral hernia without obstruction or gangrene    Type 2 diabetes mellitus without complication, with long-term current use of insulin ([facility])    Abnormal flow volume loop concerning for fixed airway obstruction    MANUELA (obstructive sleep apnea)          Patient ID: Carisa Encarnacion is a 37 y o  male      HPI    The following portions of the patient's history were reviewed and updated as appropriate:     family history includes ADD / ADHD in his son; Atrial fibrillation in his mother; COPD in his paternal grandfather; Cancer in his mother; Colon cancer in his family; Diabetes type II in his family and mother; Heart disease in his family; Hyperlipidemia in his family; Hypertension in his family; Hypothyroidism in his family; Liver disease in his father; Osteoporosis in his mother; Supraventricular tachycardia in his mother; Ulcerative colitis in his father  reports that he quit smoking about 11 years ago  His smoking use included cigarettes  He started smoking about 22 years ago  He has a 2 50 pack-year smoking history  He has never used smokeless tobacco  He reports current alcohol use  He reports that he does not use drugs  Vitals:    02/01/22 0929   Resp: 18       Review of Systems      Objective:  Patient's shoes and socks removed     Foot ExamPhysical Exam  Cardiovascular:      Pulses: no weak pulses            General  General Appearance: appears stated age and healthy   Orientation: alert and oriented to person, place, and time   Affect: appropriate         Right Foot/Ankle      Inspection and Palpation  Tenderness: metatarsals   Swelling: dorsum   Arch: pes planus  Hammertoes: fifth toe  Hallux limitus: yes     Neurovascular  Dorsalis pedis: 3+  Posterior tibial: 3+  Saphenous nerve sensation: diminished  Tibial nerve sensation: diminished  Superficial peroneal nerve sensation: diminished  Deep peroneal nerve sensation: diminished  Sural nerve sensation: diminished        Left Foot/Ankle       Inspection and Palpation  Tenderness: metatarsals   Swelling: dorsum   Arch: pes planus  Hammertoes: fifth toe  Hallux limitus: yes     Neurovascular  Dorsalis pedis: 3+  Posterior tibial: 3+  Saphenous nerve sensation: diminished  Tibial nerve sensation: diminished  Superficial peroneal nerve sensation: diminished  Deep peroneal nerve sensation: diminished  Sural nerve sensation: diminished           Physical Exam  Vitals and nursing note reviewed  Constitutional:       Appearance: Normal appearance  Cardiovascular:      Rate and Rhythm: Normal rate and regular rhythm       Pulses:           Dorsalis pedis pulses are 3+ on the right side and 3+ on the left side         Posterior tibial pulses are 3+ on the right side and 3+ on the left side  Skin:     Capillary Refill: Capillary refill takes less than 2 seconds       Neurological:      Mental Status: He is alert       Comments:   Decreased vibratory sensation noted bilateral   Psychiatric:         Mood and Affect: Mood normal          Behavior: Behavior normal          Thought Content: Thought content normal          Judgment: Judgment normal      Patient's shoes and socks removed          Assign Risk Category  Deformity present  Loss of protective sensation  No weak pulses  Risk: 1

## 2022-02-02 RX ORDER — OMEPRAZOLE 40 MG/1
CAPSULE, DELAYED RELEASE ORAL
Qty: 30 CAPSULE | Refills: 0 | Status: SHIPPED | OUTPATIENT
Start: 2022-02-02 | End: 2022-03-03

## 2022-02-03 ENCOUNTER — TELEPHONE (OUTPATIENT)
Dept: SURGERY | Facility: HOSPITAL | Age: 42
End: 2022-02-03

## 2022-02-04 ENCOUNTER — HOSPITAL ENCOUNTER (OUTPATIENT)
Dept: PERIOP | Facility: HOSPITAL | Age: 42
Setting detail: OUTPATIENT SURGERY
Discharge: HOME | End: 2022-02-04
Attending: SURGERY
Payer: COMMERCIAL

## 2022-02-04 ENCOUNTER — ANESTHESIA EVENT (OUTPATIENT)
Dept: PERIOP | Facility: HOSPITAL | Age: 42
End: 2022-02-04

## 2022-02-04 ENCOUNTER — ANESTHESIA (OUTPATIENT)
Dept: PERIOP | Facility: HOSPITAL | Age: 42
End: 2022-02-04

## 2022-02-04 VITALS
WEIGHT: 315 LBS | RESPIRATION RATE: 16 BRPM | HEIGHT: 69 IN | OXYGEN SATURATION: 95 % | HEART RATE: 87 BPM | BODY MASS INDEX: 46.65 KG/M2 | TEMPERATURE: 96.4 F | SYSTOLIC BLOOD PRESSURE: 134 MMHG | DIASTOLIC BLOOD PRESSURE: 61 MMHG

## 2022-02-04 DIAGNOSIS — E66.01 MORBID (SEVERE) OBESITY DUE TO EXCESS CALORIES (HCC): ICD-10-CM

## 2022-02-04 LAB — GLUCOSE SERPL-MCNC: 125 MG/DL (ref 65–140)

## 2022-02-04 PROCEDURE — 82948 REAGENT STRIP/BLOOD GLUCOSE: CPT

## 2022-02-04 PROCEDURE — 88305 TISSUE EXAM BY PATHOLOGIST: CPT | Performed by: PATHOLOGY

## 2022-02-04 PROCEDURE — 43239 EGD BIOPSY SINGLE/MULTIPLE: CPT | Performed by: SURGERY

## 2022-02-04 RX ORDER — ONDANSETRON 2 MG/ML
4 INJECTION INTRAMUSCULAR; INTRAVENOUS ONCE AS NEEDED
Status: CANCELLED | OUTPATIENT
Start: 2022-02-04

## 2022-02-04 RX ORDER — PROPOFOL 10 MG/ML
INJECTION, EMULSION INTRAVENOUS AS NEEDED
Status: DISCONTINUED | OUTPATIENT
Start: 2022-02-04 | End: 2022-02-04

## 2022-02-04 RX ORDER — SODIUM CHLORIDE, SODIUM LACTATE, POTASSIUM CHLORIDE, CALCIUM CHLORIDE 600; 310; 30; 20 MG/100ML; MG/100ML; MG/100ML; MG/100ML
125 INJECTION, SOLUTION INTRAVENOUS CONTINUOUS
Status: DISCONTINUED | OUTPATIENT
Start: 2022-02-04 | End: 2022-02-08 | Stop reason: HOSPADM

## 2022-02-04 RX ORDER — LIDOCAINE HYDROCHLORIDE 20 MG/ML
INJECTION, SOLUTION EPIDURAL; INFILTRATION; INTRACAUDAL; PERINEURAL AS NEEDED
Status: DISCONTINUED | OUTPATIENT
Start: 2022-02-04 | End: 2022-02-04

## 2022-02-04 RX ORDER — GLYCOPYRROLATE 0.2 MG/ML
INJECTION INTRAMUSCULAR; INTRAVENOUS AS NEEDED
Status: DISCONTINUED | OUTPATIENT
Start: 2022-02-04 | End: 2022-02-04

## 2022-02-04 RX ADMIN — SODIUM CHLORIDE, SODIUM LACTATE, POTASSIUM CHLORIDE, AND CALCIUM CHLORIDE 125 ML/HR: .6; .31; .03; .02 INJECTION, SOLUTION INTRAVENOUS at 08:45

## 2022-02-04 RX ADMIN — GLYCOPYRROLATE 0.2 MG: 0.2 INJECTION, SOLUTION INTRAMUSCULAR; INTRAVENOUS at 09:17

## 2022-02-04 RX ADMIN — LIDOCAINE HYDROCHLORIDE 100 MG: 20 INJECTION, SOLUTION EPIDURAL; INFILTRATION; INTRACAUDAL; PERINEURAL at 09:17

## 2022-02-04 RX ADMIN — PROPOFOL 100 MG: 10 INJECTION, EMULSION INTRAVENOUS at 09:19

## 2022-02-04 RX ADMIN — PROPOFOL 200 MG: 10 INJECTION, EMULSION INTRAVENOUS at 09:17

## 2022-02-04 NOTE — ANESTHESIA POSTPROCEDURE EVALUATION
Post-Op Assessment Note    CV Status:  Stable  Pain Score: 0    Pain management: adequate     Mental Status:  Sleepy and arousable   Hydration Status:  Stable   PONV Controlled:  None   Airway Patency:  Patent and adequate      Post Op Vitals Reviewed: Yes      Staff: CRNA         No complications documented      BP      Temp     Pulse     Resp      SpO2

## 2022-02-04 NOTE — PERIOPERATIVE NURSING NOTE
Patient stated he drove himself to the hospital today, and does not have ride home  Patient admits that he lied to the admission nurse about having a ride home and was planning to drive himself home  Patient educated that he is not allowed to drive for next 24 hrs as per anesthesia instructions and will need a family member to escort him home  Educated the patient that he can go home in a taxi but with a family member as an escort  Dr Vergara Bliss is aware, Emily Shaffer director of perioperative services counseled the patient as well

## 2022-02-04 NOTE — H&P
This is a 37 y o  male with a history of morbid obesity and Body mass index is 53 96 kg/m²  Here for an EGD to evaluate the anatomy of the GI tract and to rule out the presence of H  pylori  Physical Exam    /74   Pulse 91   Temp (!) 96 4 °F (35 8 °C) (Temporal)   Resp 20   Ht 5' 9" (1 753 m)   Wt (!) 166 kg (365 lb 6 4 oz)   SpO2 96%   BMI 53 96 kg/m²    AAOx3  RRR  CTA B  Abdomen obese  Benign  A/P:    This is a 37 y o  male with a history of morbid obesity and Body mass index is 53 96 kg/m²       Will proceed with the EGD and biopsies        Zoila Perera MD  2/4/2022  9:04 AM

## 2022-02-04 NOTE — ANESTHESIA PREPROCEDURE EVALUATION
Procedure:  EGD    Relevant Problems   ANESTHESIA (within normal limits)      CARDIO   (+) Benign essential hypertension   (+) Migraine headache   (+) Mixed hyperlipidemia      ENDO   (+) Type 2 diabetes mellitus without complication, with long-term current use of insulin (HCC)      GI/HEPATIC   (+) GERD (gastroesophageal reflux disease)      MUSCULOSKELETAL   (+) Low back pain      NEURO/PSYCH   (+) Anxiety   (+) Migraine headache      PULMONARY   (+) MANUELA (obstructive sleep apnea)        Physical Exam    Airway    Mallampati score: II  TM Distance: >3 FB  Neck ROM: full     Dental   Comment: Upper partial denture ,     Cardiovascular  Rhythm: regular, Rate: normal,     Pulmonary  Breath sounds clear to auscultation,     Other Findings        Anesthesia Plan  ASA Score- 3     Anesthesia Type- IV sedation with anesthesia with ASA Monitors  Additional Monitors:   Airway Plan:           Plan Factors-Exercise tolerance (METS): >4 METS  Chart reviewed  EKG reviewed  Existing labs reviewed  Patient summary reviewed  Patient is not a current smoker  Induction-     Postoperative Plan-     Informed Consent- Anesthetic plan and risks discussed with patient  I personally reviewed this patient with the CRNA  Discussed and agreed on the Anesthesia Plan with the TREVOR Nichole

## 2022-02-08 ENCOUNTER — OFFICE VISIT (OUTPATIENT)
Dept: BARIATRICS | Facility: CLINIC | Age: 42
End: 2022-02-08

## 2022-02-08 VITALS — WEIGHT: 315 LBS | HEIGHT: 69 IN | BODY MASS INDEX: 46.65 KG/M2

## 2022-02-08 VITALS — BODY MASS INDEX: 52.31 KG/M2 | WEIGHT: 315 LBS

## 2022-02-08 DIAGNOSIS — E66.01 MORBID OBESITY WITH BODY MASS INDEX (BMI) OF 50.0 TO 59.9 IN ADULT (HCC): Primary | ICD-10-CM

## 2022-02-08 DIAGNOSIS — E66.01 MORBID (SEVERE) OBESITY DUE TO EXCESS CALORIES (HCC): Primary | ICD-10-CM

## 2022-02-08 PROCEDURE — RECHECK

## 2022-02-08 NOTE — PROGRESS NOTES
Patient presents for pre-op check in, current weight 354 2lbs  Eating behaviors/food choices: Patient reports he is having three meals a day and cutting back on snacking, he did log his foods to review with RD  He says that his schedule still remains unpredictable so there are days where he might have to have his lunch later than the four hours or miss a meal but that is rare  Encouraged to set a schedule and try to protect it as a form of self care, to plan and prep foods to take with him if he is going to be out in the community during a meal time  Activity/Exercise:  Patient hasn't changed his activity other than the walking he does when he's at the grocery store or out running errands  He is talking with his wife about doing another activity challenge when the weather gets warmer and also wants to move his elliptical to the living room to be active while he watches TV  Encouraged to make that a priority to increase activity  Sleep/Rest:  Patient still using CPAP machine nightly, got an email from sleep medicine that his machine stats meet the criteria for them to continue to pay for machine  Encouraged to continue using machine for treatment  Mental Health/Wellness:  Patient denies any mood changes, anxiety or depression, stress is still there with finances but he did receive an unemployment payment that helped them catch up on bills  He is feeling more hopeful that his financial struggles with resolve themselves, encouraged to continue his own self care, to focus on healthy meals and activity with rest for his overall health and wellness      Workflow review:    Labs and PCP: both done  Psych and EGD: eval done and cleared, EGD done  Nicotine: NA  Support Group: done   Cardiology: scheduled 2/15  Sleep: getting CPAP machine  Weight and Weight Checks: goal weight is 349lbs, six weight checks    Goals:    - create schedule for meals, protect meal time for his own self care, be mindful of food choices and portions  - increase activity, locate elliptical for optimum use  - continue using CPAP machine  - use non food coping skills to manage stress    Next Appointment:  With RD on 3/8

## 2022-02-08 NOTE — PROGRESS NOTES
Bariatric Nutrition Follow-up Note    Type of surgery    Preop (6 months wt checks--6 of 6 completed)--today is pre-op support  Surgery Date: TBD-leaning toward RYGB  Surgeon: Dr Rayo Olmos  37 y o   male     [place] with BMI of 25: 168 6lbs  Pre-Op Excess Wt: 219 2lbs  Height 5' 9" (1 753 m), weight (!) 161 kg (354 lb 3 2 oz)  Body mass index is 52 31 kg/m²  Weight change x 2 weeks:  -13lbs  Net weight loss from start:  -33 7lbs    Review of History and Medications   Newly dx DM with A1c 10 8 on 3/25/21--saw PCP and started on Metformin and HS insulin  A1c in Oct now down to 6 4  Past Medical History:   Diagnosis Date    Abdominal wall hernia     Bell's palsy     2 bouts - idiopathic, possible stress induced    Depression     [facility]    Blood work    GERD (gastroesophageal reflux disease)     Hypertension     Irregular heart beat     Morbid obesity with BMI of 60 0-69 9, adult ([facility]     Neurocardiogenic syncope     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     pt symptomatic with extreme and sudden environmental temp  fluctuations     Past Surgical History:   Procedure Laterality Date    COLONOSCOPY N/A 2/2/2018    Procedure: COLONOSCOPY;  Surgeon: Meghna Tyson MD;  [facility] GI LAB; Service: Gastroenterology    ESOPHAGOGASTRODUODENOSCOPY N/A 2/2/2018    Procedure: ESOPHAGOGASTRODUODENOSCOPY (EGD); Surgeon: Meghna Tyson MD;  Location: [facility] GI LAB;   Service: Gastroenterology    TESTICLE SURGERY Left     infected testicle    TOENAIL EXCISION Right 09/07/2021     Social History     Socioeconomic History    Marital status: /Civil Union     Spouse name: Not on file    Number of children: Not on file    Years of education: Not on file    Highest education level: Not on file   Occupational History    Not on file   Tobacco Use    Smoking status: Former Smoker     Packs/day: 0 25     Years: 10 00     Pack years: 2 50     Types: Cigarettes     Start date: 7/15/1999     Quit date:      Years since quittin 4    Smokeless tobacco: Never Used   Vaping Use    Vaping Use: Never used   Substance and Sexual Activity    Alcohol use: Yes     Alcohol/week: 0 0 standard drinks     Comment: social on occasion    Drug use: No    Sexual activity: Not Currently     Partners: Female     Birth control/protection: Abstinence, I U D  Other Topics Concern    Not on file   Social History Narrative    Denied: History of alcohol use (history) - as per [employer]    Always uses seat belt    Former smoker - as per [employer]    Never a smoker - as per [employer]     Social Determinants of Health     Financial Resource Strain: Not on file   Food Insecurity: Not on file   Transportation Needs: Not on file   Physical Activity: Not on file   Stress: Not on file   Social Connections: Not on file   Intimate Partner Violence: Not on file   Housing Stability: Not on file       Current Outpatient Medications:     Blood Glucose Monitoring Suppl w/Device KIT, Measure blood sugar twice daily   Morning after overnight fast and 2 hours after a meal, Disp: 1 kit, Rfl: 0    carvedilol (COREG) 3 125 mg tablet, TAKE 1 TABLET BY MOUTH TWICE DAILY WITH MEALS, Disp: 60 tablet, Rfl: 0    Chromium 1000 MCG TABS, Take 1 tablet by mouth daily, Disp: , Rfl:     dicyclomine (BENTYL) 20 mg tablet, Take 1 tablet (20 mg total) by mouth 3 (three) times a day (Patient taking differently: Take 20 mg by mouth as needed  ), Disp: 90 tablet, Rfl: 1    gabapentin (NEURONTIN) 600 MG tablet, Take 1 tablet (600 mg total) by mouth 2 (two) times a day, Disp: 60 tablet, Rfl: 0    gabapentin (NEURONTIN) 800 mg tablet, Take 1 tablet (800 mg total) by mouth 2 (two) times a day, Disp: 60 tablet, Rfl: 2    glucose blood test strip, Use as instructed, Disp: 100 strip, Rfl: 3    insulin glargine (Basaglar KwikPen) 100 units/mL injection pen, Inject 15 Units under the skin daily at bedtime, Disp: 15 mL, Rfl: 1    Insulin Pen Needle 32G X 4 MM MISC, Use daily at bedtime, Disp: 100 each, Rfl: 3    Lancets MISC, Measure blood sugar twice daily  Morning after overnight fast and 2 hours after a meal, Disp: 100 each, Rfl: 3    lisinopril (ZESTRIL) 20 mg tablet, TAKE 1 TABLET BY MOUTH ONCE DAILY IN THE MORNING, Disp: 90 tablet, Rfl: 0    metFORMIN (GLUCOPHAGE) 500 mg tablet, Take 2 tablets (1,000 mg total) by mouth 2 (two) times a day with meals, Disp: 120 tablet, Rfl: 4    multivitamin (THERAGRAN) TABS, Take 1 tablet by mouth daily, Disp: , Rfl:     Naproxen Sodium (Aleve) 220 MG CAPS, Take by mouth as needed , Disp: , Rfl:     omeprazole (PriLOSEC) 40 MG capsule, Take 1 capsule by mouth in the morning, Disp: 30 capsule, Rfl: 0  No current facility-administered medications for this visit  -reports taking his MVI, started vitamin D and added probiotic    Food Intake and Lifestyle Assessment   Food Intake Assessment completed via written food log    Breakfast: 8:30am: Grits and 4 eggs OR 1 pack of chicken Ramen OR pasta w/Italian Dressing OR Large coffee cake muffin OR eggs and applesauce OR   Snack: NONE  Lunch: 12pm- chicken Caesar salad w/ Al dressing OR chicken and rice OR chicken enchilada bowl OR Garlic chicken meal OR Teriyaki Chicken with small  [place] soup OR Dinner:  9pm Pulled pork with pepper jack cheese and pickles in a bowl  Snack:  NONE  Dinner: 1 can of chili OR pasta and sauce OR Chicken and rice OR pasta and italian dressing and sausage, egg and cheese biscuit OR Eggplant parm OR Steak OR Grilled cheese    Beverage intake: water, Mihaela drinks, Vel sweeteners   Staying away from soda, coffee and OJ  Protein supplement: whey protein isolate powder with PB2, not often at this time  Estimated protein intake per day: 80-100gm  Estimated fluid intake per day: 64oz per day of water, seltzer  Meals eaten away from home: once a week-[employer]-wings and pizza or [place] food   Typical meal pattern: 2 meals per day and grazes on food, no set meals  Eating Behaviors: Consumption of high calorie/ high fat foods, Consumption of high calorie beverages, Large portion sizes, Frequent snacking/ grazing and Mindless eating    Food allergies or intolerances:  Sucralose, can use stevia  Allergies   Allergen Reactions    Bee Venom Anaphylaxis     Annotation - [id number]: wasp, hornet also    Macrolides And Ketolides Anaphylaxis and Rash    Other Hives, Shortness Of Breath and Wheezing     Annotation - [id number]:  violet    Pertussis Vaccine Anaphylaxis    Pertussis Vaccines Anaphylaxis and Rash    Vancomycin Shortness Of Breath    Zithromax [Azithromycin] Anaphylaxis    Erythromycin Rash    Pollen Extract Sneezing     Cultural or Cheondoism considerations: none    Physical Assessment-updates in bold  Physical Activity  Types of exercise: Has an elliptical that he wants to get back into it, but still hasn't  Current physical limitations: SOB quickly     Psychosocial Assessment   Support systems: spouse and children friend(s) relative(s)  Socioeconomic factors: on food stamps    Nutrition Diagnosis- continued  Diagnosis: Overweight / Obesity (NC-3 3)  Related to: Physical inactivity and Excessive energy intake  As Evidenced by: BMI >25     Nutrition Prescription: Recommend the following diet  Regular    Interventions and Teaching   Discussed pre-op and post-op nutrition guidelines  Patient educated and handouts provided    Capacity of post-surgery stomach  Diet progression  Adequate hydration  Expected weight loss  Weight loss plateaus/ possibility of weight regain  Exercise  Suggestions for pre-op diet  Nutrition considerations after surgery  Protein supplements  Meal planning and preparation  Appropriate carbohydrate, protein, and fat intake, and food/fluid choices to maximize safe weight loss, nutrient intake, and tolerance   Dietary and lifestyle changes  Possible problems with poor eating habits  Intuitive eating  Techniques for self monitoring and keeping daily food journal  Carb counting    Education provided to: patient    Barriers to learning: No barriers identified  Readiness to change: preparation    Prior research on procedure: books, internet, discussed with provider and friends or family    Comprehension: verbalizes understanding     Expected Compliance: good    Evaluation / Monitoring  Dietitian to Monitor: Eating pattern as discussed Tolerance of nutrition prescription Body weight Lab values Physical activity    Workflow:   PCP Letter: completed   Support Group: completed podcast on 7/28   6 Month Pre-Operative Program: 6 of 6 completed   Bloodwork: completed and acceptable on 10/15--good until  April   Cardiac Risk Assessment: completed 6/11/2021--needs repeat--scheduled for 2/15/22   Sleep Studies: CPAP ordered, received on Jan 12th, 2022 and is wearing   EGD: completed on 2/4/21   Weight Loss:  Do Not Gain weight, 10% by of surgery:  -39lbs (349lbs) 5% in order to submit:  -19 5 (368 5lbs)   Psych: completed    Pt presents with 13lb weight loss in the past 2 weeks  Pt started keeping track of his intake and reports has cut out the snacking  He is eating three meals per day but not evenly spaced through the day  Reviewed food log and pt is eating 3 meals, but often very unbalanced--either all carbs or all protein, very little veggies, and still higher sodium prepared foods  Reinforced the need to choose lean proteins and incorporate veggies to the diet  Today reviewed post-op vitamins w/ pt, advised must be esha vitamins and chewable for the first 3 months  Provided pt with samples of procare and celebrate brand since both have the option of 18mg iron  Also provided with bariatric calcium chewy bites to sample         Goals  · Continue to avoid the soda  · 2400 cals, 240g carbs per day:  60gm carbs at each meal and 30gm at each snack (2 snacks per day)  · 3 meals per day with lean protein  · Limit the convenience foods  · Eat within 1-2 hrs of waking and about every 4 hrs from there  · Try samples of vitamins  · F/U in one month an will review pre-op diet    Time Spent:   30  mins

## 2022-02-15 ENCOUNTER — OFFICE VISIT (OUTPATIENT)
Dept: CARDIOLOGY CLINIC | Facility: CLINIC | Age: 42
End: 2022-02-15
Payer: COMMERCIAL

## 2022-02-15 VITALS
SYSTOLIC BLOOD PRESSURE: 140 MMHG | OXYGEN SATURATION: 95 % | DIASTOLIC BLOOD PRESSURE: 70 MMHG | WEIGHT: 315 LBS | BODY MASS INDEX: 46.65 KG/M2 | TEMPERATURE: 97.6 F | HEART RATE: 98 BPM | HEIGHT: 69 IN

## 2022-02-15 DIAGNOSIS — I10 ESSENTIAL HYPERTENSION: ICD-10-CM

## 2022-02-15 DIAGNOSIS — I10 BENIGN ESSENTIAL HYPERTENSION: Primary | ICD-10-CM

## 2022-02-15 DIAGNOSIS — R00.0 TACHYCARDIA: ICD-10-CM

## 2022-02-15 PROCEDURE — 1036F TOBACCO NON-USER: CPT | Performed by: INTERNAL MEDICINE

## 2022-02-15 PROCEDURE — 99214 OFFICE O/P EST MOD 30 MIN: CPT | Performed by: INTERNAL MEDICINE

## 2022-02-15 PROCEDURE — 93000 ELECTROCARDIOGRAM COMPLETE: CPT | Performed by: INTERNAL MEDICINE

## 2022-02-15 PROCEDURE — 3008F BODY MASS INDEX DOCD: CPT | Performed by: INTERNAL MEDICINE

## 2022-02-15 PROCEDURE — 3078F DIAST BP <80 MM HG: CPT | Performed by: INTERNAL MEDICINE

## 2022-02-15 PROCEDURE — 3077F SYST BP >= 140 MM HG: CPT | Performed by: INTERNAL MEDICINE

## 2022-02-15 RX ORDER — CARVEDILOL 6.25 MG/1
6.25 TABLET ORAL 2 TIMES DAILY WITH MEALS
Qty: 180 TABLET | Refills: 2 | Status: SHIPPED | OUTPATIENT
Start: 2022-02-15 | End: 2022-05-04 | Stop reason: ALTCHOICE

## 2022-02-15 NOTE — PROGRESS NOTES
Progress Note - Cardiology Office  [facility]    Marietta Bynum 37 y o  male MRN: [id number]  : 1980  Encounter: [id number]      ASSESSMENT:   Perioperative cardiac risk assessment for weight loss surgery     An echocardiogram performed because of dyspnea on exertion revealed  Echo, 2021:  EF 65%, no regional wall motion abnormalities, normal diastolic function, trace MR and TR    Patient's dyspnea has improved and he has been going for long walks with his wife and the weather permits  Patient denies any chest pain, or syncope  He has no history of MI, CHF or cardiac arrhythmias  His cardiac examination is benign  His blood pressure is slightly elevated at 1 40/70 mmHg with a heart rate of 98/Min  His EKG shows no evidence of ischemia or prior infarction     Severe obesity:  BMI is 53 61     GERD     Hyperlipidemia     Hypertension  BP today is 140/70 mmHg     Family history of cardiac problems  Mother had CAD/MI atrial fibrillation, ventricular tachycardia and stroke        RECOMMENDATIONS:  Increase Coreg to 6 25 mg b i d  Continue lisinopril 20 mg daily      Patient has low to intermediate/acceptable risk of perioperative cardiac complications with bariatric surgery and may proceed without any further cardiac testing or intervention            Please call [phone] if any questions  HPI :     Marietta Bynum is a 37y o  year old male who came for follow up  Patient has severe obesity and comes to be re-evaluated for perioperative cardiac risk prior to undergoing bariatric surgery since his prior evaluation was more than 6 months ago  Patient has no cardiac symptoms and is feeling generally better and has been going for long walks with his wife without any symptoms      REVIEW OF SYSTEMS:  Denies chest pain, unusual dyspnea, palpitations or syncope    Historical Information   Past Medical History:   Diagnosis Date    Abdominal wall hernia     Bell's palsy     2 bouts - idiopathic, possible stress induced    Depression     Diabetes mellitus ([facility] ) 21    Blood work    GERD (gastroesophageal reflux disease)     Hypertension     Irregular heart beat     Morbid obesity with BMI of 60 0-69 9, adult ([facility] )     Neurocardiogenic syncope     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     pt symptomatic with extreme and sudden environmental temp  fluctuations     Past Surgical History:   Procedure Laterality Date    COLONOSCOPY N/A 2018    Procedure: COLONOSCOPY;  Surgeon: Fred Olguin MD;  Location: [facility] GI [facility]; Service: Gastroenterology    ESOPHAGOGASTRODUODENOSCOPY N/A 2018    Procedure: ESOPHAGOGASTRODUODENOSCOPY (EGD); Surgeon: Fred Olguin MD;  Location: [facility];   Service: Gastroenterology    TESTICLE SURGERY Left     infected testicle    TOENAIL EXCISION Right 2021     Social History     Substance and Sexual Activity   Alcohol Use Yes    Alcohol/week: 0 0 standard drinks    Comment: social on occasion     Social History     Substance and Sexual Activity   Drug Use No     Social History     Tobacco Use   Smoking Status Former Smoker    Packs/day: 0 25    Years: 10 00    Pack years: 2 50    Types: Cigarettes    Start date: 7/15/1999   Alexander Frank Quit date:     Years since quittin 4   Smokeless Tobacco Never Used     Family History:   Family History   Problem Relation Age of Onset    Supraventricular tachycardia Mother     Atrial fibrillation Mother     Diabetes type II Mother     Cancer Mother     Osteoporosis Mother     Ulcerative colitis Father     Liver disease Father     ADD / ADHD Son     Colon cancer Family     Diabetes type II Family     Hyperlipidemia Family     Hypertension Family     Hypothyroidism Family     Heart disease Family     COPD Paternal Grandfather     Lung cancer Neg Hx     Asthma Neg Hx        Meds/Allergies     Allergies   Allergen Reactions    Bee Venom Anaphylaxis     Annotation - [id number]: wasp, hornet also    Macrolides And Ketolides Anaphylaxis and Rash    Other Hives, Shortness Of Breath and Wheezing     Annotation - [id number]:  violet    Pertussis Vaccine Anaphylaxis    Pertussis Vaccines Anaphylaxis and Rash    Vancomycin Shortness Of Breath    Zithromax [Azithromycin] Anaphylaxis    Erythromycin Rash    Pollen Extract Sneezing       Current Outpatient Medications:     Blood Glucose Monitoring Suppl w/Device KIT, Measure blood sugar twice daily  Morning after overnight fast and 2 hours after a meal, Disp: 1 kit, Rfl: 0    carvedilol (COREG) 3 125 mg tablet, TAKE 1 TABLET BY MOUTH TWICE DAILY WITH MEALS, Disp: 60 tablet, Rfl: 0    Chromium 1000 MCG TABS, Take 1 tablet by mouth daily, Disp: , Rfl:     dicyclomine (BENTYL) 20 mg tablet, Take 1 tablet (20 mg total) by mouth 3 (three) times a day (Patient taking differently: Take 20 mg by mouth as needed  ), Disp: 90 tablet, Rfl: 1    gabapentin (NEURONTIN) 600 MG tablet, Take 1 tablet (600 mg total) by mouth 2 (two) times a day, Disp: 60 tablet, Rfl: 0    gabapentin (NEURONTIN) 800 mg tablet, Take 1 tablet (800 mg total) by mouth 2 (two) times a day, Disp: 60 tablet, Rfl: 2    glucose blood test strip, Use as instructed, Disp: 100 strip, Rfl: 3    insulin glargine (Basaglar KwikPen) 100 units/mL injection pen, Inject 15 Units under the skin daily at bedtime, Disp: 15 mL, Rfl: 1    Insulin Pen Needle 32G X 4 MM MISC, Use daily at bedtime, Disp: 100 each, Rfl: 3    Lancets MISC, Measure blood sugar twice daily   Morning after overnight fast and 2 hours after a meal, Disp: 100 each, Rfl: 3    lisinopril (ZESTRIL) 20 mg tablet, TAKE 1 TABLET BY MOUTH ONCE DAILY IN THE MORNING, Disp: 90 tablet, Rfl: 0    metFORMIN (GLUCOPHAGE) 500 mg tablet, Take 2 tablets (1,000 mg total) by mouth 2 (two) times a day with meals, Disp: 120 tablet, Rfl: 4    multivitamin (THERAGRAN) TABS, Take 1 tablet by mouth daily, Disp: , Rfl:     Naproxen Sodium (Aleve) 220 MG CAPS, Take by mouth as needed , Disp: , Rfl:     omeprazole (PriLOSEC) 40 MG capsule, Take 1 capsule by mouth in the morning, Disp: 30 capsule, Rfl: 0    Vitals:  /70 mmHg  HR 98/Min  BP Readings from Last 3 Encounters:   22 134/61   21 130/76   11/10/21 130/76       Physical Exam:      Neurologic:  Alert & oriented x 3, no new focal deficits, Not in any acute distress,  Constitutional:  Morbid obesity  Eyes:  Pupil equal and reacting to light, conjunctiva normal,   HENT:  Atraumatic, oropharynx moist, Neck- normal range of motion, no tenderness,  Neck supple, No JVP, No LNP   Respiratory:  Bilateral air entry, mostly clear to auscultation  Cardiovascular: S1-S2 regular with a I/VI ejection systolic murmur   GI:  Soft, nondistended, normal bowel sounds, nontender, no hepatosplenomegaly appreciated  Musculoskeletal: no tenderness, no deformities  Skin:  Well hydrated, no rash   Lymphatic:  No lymphadenopathy noted   Extremities:  No significant edema and distal pulses are present        Diagnostic Studies Review Cardio:      EKG:  Normal sinus rhythm, heart rate 98/Min, low-voltage QRS, borderline EKG    Cardiac testing:   Results for orders placed during the hospital encounter of 21    Echo complete with contrast if indicated    Ann [address]ou [address]  [address] [facility] [place] [address]  [phone]    Transthoracic Echocardiogram  2D, M-mode, Doppler, and Color Doppler    Study date:  2021    Patient: Nai Barbour  MR number: [id number]  Account number: [de-identified]  : 1980  Age: 45 years  Gender: Male  Status: Outpatient  Location: Echo lab  Height: 69 in  Weight: 368 3 lb  BP: 134/ 82 mmHg    Indications: Hypertension    Diagnoses: 401 9 - HYPERTENSION NOS    Sonographer:  CHRIS Hermosillo  Primary Physician:  Corby Norwood MD  Referring Physician:  Urban James MD  Group:  [facility]  Interpreting Physician:  Stan Garcia MD    SUMMARY    LEFT VENTRICLE:  Normal LV chamber size and wall thickness  Preserved LV systolic function with ejection fraction of about 65%  No definite regional wall motion abnormality seen  Normal diastolic filling pattern    RIGHT VENTRICLE:  Normal right ventricular size and systolic function  TAPSE is 2 2 cm    MITRAL VALVE:  There was trace regurgitation  TRICUSPID VALVE:  There is trace tricuspid regurgitation  PA pressure is normal    HISTORY: PRIOR HISTORY: HTN,  OObesity, SA, GERD, IBS, DM, Migraine    PROCEDURE: The procedure was performed in the echo lab  This was a routine study  The transthoracic approach was used  The study included complete 2D imaging, M-mode, complete spectral Doppler, and color Doppler  The heart rate was 89 bpm,  at the start of the study  Echocardiographic views were limited due to restricted patient mobility, poor acoustic window availability, and decreased penetration  This was a technically difficult study  LEFT VENTRICLE: Normal LV chamber size and wall thickness  Preserved LV systolic function with ejection fraction of about 65%  No definite regional wall motion abnormality seen  Normal diastolic filling pattern    RIGHT VENTRICLE: Normal right ventricular size and systolic function  TAPSE is 2 2 cm    LEFT ATRIUM: Size was normal     RIGHT ATRIUM: Size was normal     MITRAL VALVE: Valve structure was normal  There was normal leaflet separation  DOPPLER: The transmitral velocity was within the normal range  There was no evidence for stenosis  There was trace regurgitation  AORTIC VALVE: The valve was trileaflet  Leaflets exhibited normal thickness and normal cuspal separation  DOPPLER: Transaortic velocity was within the normal range  There was no evidence for stenosis  There was no regurgitation      TRICUSPID VALVE: There is trace tricuspid regurgitation  PA pressure is normal    PULMONIC VALVE: No significant pulmonary regurgitation seen    PERICARDIUM: No pericardial effusion seen    AORTA: The root exhibited normal size  SYSTEM MEASUREMENT TABLES    2D  EF (Teich): 68 16 %  %FS: 38 42 %  Ao Diam: 3 79 cm  EDV(Teich): 143 84 ml  ESV(Teich): 45 8 ml  IVSd: 1 cm  LA Area: 16 2 cm2  LA Diam: 4 25 cm  LVEDV MOD A4C: 81 8 ml  LVEF MOD A4C: 46 38 %  LVESV MOD A4C: 43 86 ml  LVIDd: 5 44 cm  LVIDs: 3 35 cm  LVLd A4C: 7 65 cm  LVLs A4C: 6 86 cm  LVPWd: 1 13 cm  RA Area: 15 21 cm2  RVIDd: 3 53 cm  SV (Teich): 98 04 ml  SV MOD A4C: 37 94 ml    CW  TR Vmax: 2 12 m/s  TR maxP 99 mmHg    MM  TAPSE: 2 18 cm    PW  MV E/A Ratio: 2 01  E' Sept: 0 16 m/s  E/E' Sept: 6 95  MV A Colin: 0 54 m/s  MV Dec Lenoir: 5 21 m/s2  MV DecT: 213 14 ms  MV E Colin: 1 09 m/s    [employer] Accredited Echocardiography Laboratory    Prepared and electronically signed by    Maria Victoria Jennings MD  Signed 2021 15:27:18        Imaging:  Chest X-Ray:   No Chest XR results available for this patient  CT-scan of the chest:     No CTA results available for this patient    Lab Review   Lab Results   Component Value Date    WBC 6 0 10/15/2021    HGB 16 1 10/15/2021    HCT 48 3 10/15/2021    MCV 89 10/15/2021    RDW 12 8 10/15/2021     10/15/2021     BMP:  Lab Results   Component Value Date    SODIUM 141 10/15/2021    K 4 3 10/15/2021     10/15/2021    CO2 24 10/15/2021    BUN 9 10/15/2021    CREATININE 0 73 (L) 10/15/2021    GLUC 112 (H) 10/15/2021    CALCIUM 9 4 2021    CORRECTEDCA 10 4 (H) 2021    EGFR 104 2021    MG 1 6 2021     LFT:  Lab Results   Component Value Date    AST 53 (H) 10/15/2021    ALT 69 (H) 10/15/2021    ALKPHOS 112 2021    TP 7 8 10/15/2021    ALB 4 3 10/15/2021      No components found for: [employer] [facility]  Lab Results   Component Value Date    AAG7BSKGJUTP 1 800 2017     Lab Results   Component Value Date    HGBA1C 6 4 (H) 10/15/2021     Lipid Profile:   Lab Results   Component Value Date    CHOLESTEROL 182 10/15/2021    HDL 35 (L) 10/15/2021    LDLCALC 120 (H) 10/15/2021    TRIG 151 (H) 10/15/2021     Lab Results   Component Value Date    CHOLESTEROL 182 10/15/2021    CHOLESTEROL 173 03/25/2021     No results found for: CKTOTAL, CKMB, CKMBINDEX, TROPONINI  No results found for: NTBNP   Recent Results (from the past 672 hour(s))   Fingerstick Glucose (POCT)    Collection Time: 02/04/22  8:47 AM   Result Value Ref Range    POC Glucose 125 65 - 140 mg/dl   Tissue Exam    Collection Time: 02/04/22  9:15 AM   Result Value Ref Range    Case Report       Surgical Pathology Report                         Case: [id number]                                   Authorizing Provider:  Angy Unger MD      Collected:           02/04/2022 0915              Ordering Location:     [address] [place] [address] Received:            02/04/2022 [address]                                     Operating Room                                                               Pathologist:           Inder Wakefield MD                                                          Specimen:    Stomach, R/O H  Pylori                                                                     Final Diagnosis       A  Stomach, biopsy:  -  Chronic inactive antral gastritis  -  Negative for Helicobacter pylori, by H&E stain   -  Negative for atrophy, intestinal metaplasia, dysplasia or carcinoma  Additional Information       All reported additional testing was performed with appropriately reactive controls  These tests were developed and their performance characteristics determined by [facility] or appropriate performing facility, though some tests may be performed on tissues which have not been validated for performance characteristics (such as staining performed on alcohol exposed cell blocks and decalcified tissues)  Results should be interpreted with caution and in the context of the patients clinical condition  These tests may not be cleared or approved by the [place]  Food and Drug Administration, though the FDA has determined that such clearance or approval is not necessary  These tests are used for clinical purposes and they should not be regarded as investigational or for research  This laboratory has been approved by [place] 88, designated as a high-complexity laboratory and is qualified to perform these tests  Anrel Da Silva Description          A  The specimen is received in formalin, labeled with the patient's name and hospital number, and is designated "stomach rule out H pylori"  The specimen consists of 2 tan-red soft tissue fragments measuring 0 2 and 0 3 cm in greatest dimension  Entirely submitted  Screened cassette  Note: The estimated total formalin fixation time based upon information provided by the submitting clinician and the standard processing schedule is under 72 hours  Yajaira Marcos MD, [facility]      "This note has been constructed using a voice recognition system  Therefore there may be syntax, spelling, and/or grammatical errors   Please call if you have any questions  "

## 2022-02-22 DIAGNOSIS — E66.01 MORBID OBESITY (HCC): ICD-10-CM

## 2022-02-22 DIAGNOSIS — Z71.3 DIABETIC NUTRITIONAL COUNSELING COMPLETED: ICD-10-CM

## 2022-02-22 DIAGNOSIS — K43.9 VENTRAL HERNIA WITHOUT OBSTRUCTION OR GANGRENE: ICD-10-CM

## 2022-02-22 DIAGNOSIS — Z53.20 NUTRITION COUNSELING DECLINED: ICD-10-CM

## 2022-02-22 DIAGNOSIS — E66.01 MORBID OBESITY WITH BODY MASS INDEX (BMI) OF 50.0 TO 59.9 IN ADULT (HCC): ICD-10-CM

## 2022-02-22 DIAGNOSIS — E11.9 DM2 (DIABETES MELLITUS, TYPE 2) (HCC): ICD-10-CM

## 2022-02-23 DIAGNOSIS — Z79.4 TYPE 2 DIABETES MELLITUS WITHOUT COMPLICATION, WITH LONG-TERM CURRENT USE OF INSULIN (HCC): Primary | ICD-10-CM

## 2022-02-23 DIAGNOSIS — E11.9 TYPE 2 DIABETES MELLITUS WITHOUT COMPLICATION, WITH LONG-TERM CURRENT USE OF INSULIN (HCC): Primary | ICD-10-CM

## 2022-02-23 DIAGNOSIS — E11.9 DM2 (DIABETES MELLITUS, TYPE 2) (HCC): ICD-10-CM

## 2022-02-23 RX ORDER — INSULIN GLARGINE 100 [IU]/ML
15 INJECTION, SOLUTION SUBCUTANEOUS
Qty: 15 ML | Refills: 1 | Status: SHIPPED | OUTPATIENT
Start: 2022-02-23 | End: 2022-02-23 | Stop reason: CLARIF

## 2022-02-23 RX ORDER — LANCETS 33 GAUGE
EACH MISCELLANEOUS
Qty: 100 EACH | Refills: 0 | Status: SHIPPED | OUTPATIENT
Start: 2022-02-23

## 2022-02-23 RX ORDER — INSULIN GLARGINE-YFGN 100 [IU]/ML
15 INJECTION, SOLUTION SUBCUTANEOUS
Qty: 15 ML | Refills: 1 | Status: SHIPPED | OUTPATIENT
Start: 2022-02-23 | End: 2022-04-05 | Stop reason: HOSPADM

## 2022-02-23 RX ORDER — BLOOD SUGAR DIAGNOSTIC
STRIP MISCELLANEOUS
Qty: 100 EACH | Refills: 0 | Status: SHIPPED | OUTPATIENT
Start: 2022-02-23

## 2022-02-23 NOTE — TELEPHONE ENCOUNTER
Gem Bunch is not covered under patient's insurance  Insulin Glargine must be send over, insurance will cover this

## 2022-03-01 ENCOUNTER — PREP FOR PROCEDURE (OUTPATIENT)
Dept: BARIATRICS | Facility: CLINIC | Age: 42
End: 2022-03-01

## 2022-03-01 DIAGNOSIS — G47.33 OBSTRUCTIVE SLEEP APNEA (ADULT) (PEDIATRIC): ICD-10-CM

## 2022-03-01 DIAGNOSIS — E66.01 MORBID OBESITY (HCC): Primary | ICD-10-CM

## 2022-03-01 DIAGNOSIS — E11.9 DIABETES MELLITUS (HCC): ICD-10-CM

## 2022-03-01 DIAGNOSIS — Z98.84 BARIATRIC SURGERY STATUS: Primary | ICD-10-CM

## 2022-03-01 DIAGNOSIS — K21.9 ESOPHAGEAL REFLUX: ICD-10-CM

## 2022-03-03 DIAGNOSIS — K21.9 GASTROESOPHAGEAL REFLUX DISEASE: ICD-10-CM

## 2022-03-03 RX ORDER — OMEPRAZOLE 40 MG/1
CAPSULE, DELAYED RELEASE ORAL
Qty: 30 CAPSULE | Refills: 0 | Status: SHIPPED | OUTPATIENT
Start: 2022-03-03 | End: 2022-04-05 | Stop reason: HOSPADM

## 2022-03-15 NOTE — PROGRESS NOTES
H&P Exam - Bariatric Surgery   Richy De Guzman 37 y o  male MRN: [id number]  Unit/Bed#:  Encounter: [id number]      HPI:    37 y o  yo morbidly obese male  found to be a good candidate to undergo a weight loss operation upon being enrolled here at the [facility]   He has been pre certified to undergo a Laparoscopic Selin-en-Y Gastric Bypass  Here today to review his pre op test results  Has been medically cleared for the procedure  Suffers from HTN, GERD, anxiety, depression, HLD, IBS, migraines, tachycardia, MANUELA on CPAP, DM2 (on insulin 15 units HS x 1 year and metformin), umbilical hernia (referred by Dr Cassandra Gentile), S/p debridement of Salvador's gangrene (about 12 years ago)     Symptoms: excess weight, weight increase, inability to loss weight and fatigue     Associated Symptoms: depressed mood and anxiety     Associated Conditions: glucose intolerance, hyperlipidemia, sleep apnea, abdominal obesity and DM  Disease Complications: hypertension and sleep apnea    I have discussed with him at length the risks and benefits of the operation and reiterated the components of our multidisciplinary program and the importance of compliance and follow up in the post operative period  Although there is a great statistical chance of improvement or even resolution of most of his associated comorbidities, the results vary from patient to patient and they largely depend on his commitment  The patient was also instructed with regards to the importance of behavior modification, nutritional counseling, support meeting attendance and lifestyle changes that are important to ensure success  He was given the opportunity to ask questions and I have answered all of them  I have addressed with the patient the level of CODE STATUS for this hospital stay and after explaining the different options currently he wishes to be a Level I  He understands and wishes to proceed      He has lost all the weight required prior to surgery  Review of Systems   Constitutional: Negative for chills and fever  Unexpected weight change: planned weight loss  HENT: Negative for trouble swallowing  Respiratory: Positive for shortness of breath (with exertion)  Negative for cough  Cardiovascular: Negative for chest pain and palpitations  Gastrointestinal: Positive for diarrhea  Negative for abdominal pain, constipation, nausea and vomiting  Musculoskeletal: Positive for arthralgias and back pain  Neurological: Positive for headaches  Negative for dizziness  Psychiatric/Behavioral: The patient is nervous/anxious  Historical Information   Past Medical History:   Diagnosis Date    Abdominal wall hernia     Bell's palsy     2 bouts - idiopathic, possible stress induced    Depression     Diabetes mellitus ([facility] ) 03/25/21    Blood work    GERD (gastroesophageal reflux disease)     Hypertension     Irregular heart beat     Morbid obesity with BMI of 60 0-69 9, adult ([facility] )     Neurocardiogenic syncope     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     pt symptomatic with extreme and sudden environmental temp  fluctuations     Past Surgical History:   Procedure Laterality Date    COLONOSCOPY N/A 2/2/2018    Procedure: COLONOSCOPY;  Surgeon: Pj Ac MD;  Location: [facility] GI LAB; Service: Gastroenterology    ESOPHAGOGASTRODUODENOSCOPY N/A 2/2/2018    Procedure: ESOPHAGOGASTRODUODENOSCOPY (EGD); Surgeon: Pj Ac MD;  Location: [facility] GI LAB;   Service: Gastroenterology    TESTICLE SURGERY Left     infected testicle    TOENAIL EXCISION Right 09/07/2021     Social History   Social History     Substance and Sexual Activity   Alcohol Use Yes    Alcohol/week: 0 0 standard drinks    Comment: social on occasion     Social History     Substance and Sexual Activity   Drug Use No     Social History     Tobacco Use   Smoking Status Former Smoker    Packs/day: 0 25    Years: 10 00    Pack years: 2 50    Types: Cigarettes    Start date: 7/15/1999   Claudia Pall Quit date: 2010    Years since quittin 5   Smokeless Tobacco Never Used     Family History: non-contributory    Meds/Allergies   all medications and allergies reviewed  Allergies   Allergen Reactions    Bee Venom Anaphylaxis     Annotation - [id number]: wasp, hornet also    Macrolides And Ketolides Anaphylaxis and Rash    Other Hives, Shortness Of Breath and Wheezing     Annotation - [id number]:  violet    Pertussis Vaccine Anaphylaxis    Pertussis Vaccines Anaphylaxis and Rash    Vancomycin Shortness Of Breath    Zithromax [Azithromycin] Anaphylaxis    Erythromycin Rash    Pollen Extract Sneezing       Objective     Current Vitals:   Blood Pressure: 134/68 (22)  Pulse: 96 (22)  Height: 5' 9" (175 3 cm) (22)  Weight - Scale: (!) 158 kg (347 lb 9 6 oz) (22)      Invasive Devices  Report    None                 Physical Exam  Vitals reviewed  Constitutional:       General: He is not in acute distress  Appearance: He is well-developed  He is obese  HENT:      Head: Normocephalic and atraumatic  Eyes:      General: No scleral icterus  Cardiovascular:      Rate and Rhythm: Normal rate and regular rhythm  Heart sounds: Normal heart sounds  Pulmonary:      Effort: Pulmonary effort is normal  No respiratory distress  Breath sounds: Normal breath sounds  Abdominal:      General: There is no distension  Palpations: Abdomen is soft  Tenderness: There is no abdominal tenderness  Hernia: A hernia (large umbilical hernia, easily reducible) is present  Skin:     General: Skin is warm and dry  Neurological:      Mental Status: He is alert and oriented to person, place, and time  Psychiatric:         Mood and Affect: Mood normal          Behavior: Behavior normal          Lab Results: I have personally reviewed pertinent lab results      Imaging: I have personally reviewed pertinent reports  EKG, Pathology, and Other Studies: I have personally reviewed pertinent reports  Code Status: Level 1    Assessment:  37 y o  yo morbidly obese male found to be a good candidate to undergo a weight loss operation upon being enrolled here at the [facility]  He has completed all of the preoperative process for bariatric surgery      Plan:  - Plan for laparoscopic possible open Selin-en-Y Gastric Bypass/possible sleeve gastrectomy with intraoperative EGD with Dr Allie Miles on 04/04/22  - consent signed  - preop orders placed

## 2022-03-16 ENCOUNTER — OFFICE VISIT (OUTPATIENT)
Dept: BARIATRICS | Facility: CLINIC | Age: 42
End: 2022-03-16
Payer: COMMERCIAL

## 2022-03-16 VITALS
WEIGHT: 315 LBS | SYSTOLIC BLOOD PRESSURE: 134 MMHG | HEIGHT: 69 IN | DIASTOLIC BLOOD PRESSURE: 68 MMHG | BODY MASS INDEX: 46.65 KG/M2 | HEART RATE: 96 BPM

## 2022-03-16 DIAGNOSIS — E78.2 MIXED HYPERLIPIDEMIA: ICD-10-CM

## 2022-03-16 DIAGNOSIS — G47.33 OSA (OBSTRUCTIVE SLEEP APNEA): ICD-10-CM

## 2022-03-16 DIAGNOSIS — K43.9 VENTRAL HERNIA WITHOUT OBSTRUCTION OR GANGRENE: ICD-10-CM

## 2022-03-16 DIAGNOSIS — Z98.84 BARIATRIC SURGERY STATUS: ICD-10-CM

## 2022-03-16 DIAGNOSIS — K58.0 IRRITABLE BOWEL SYNDROME WITH DIARRHEA: ICD-10-CM

## 2022-03-16 DIAGNOSIS — E66.01 MORBID OBESITY WITH BODY MASS INDEX (BMI) OF 50.0 TO 59.9 IN ADULT (HCC): Primary | ICD-10-CM

## 2022-03-16 DIAGNOSIS — E11.9 TYPE 2 DIABETES MELLITUS WITHOUT COMPLICATION, WITH LONG-TERM CURRENT USE OF INSULIN (HCC): ICD-10-CM

## 2022-03-16 DIAGNOSIS — Z79.4 TYPE 2 DIABETES MELLITUS WITHOUT COMPLICATION, WITH LONG-TERM CURRENT USE OF INSULIN (HCC): ICD-10-CM

## 2022-03-16 PROCEDURE — 3078F DIAST BP <80 MM HG: CPT | Performed by: PHYSICIAN ASSISTANT

## 2022-03-16 PROCEDURE — 99214 OFFICE O/P EST MOD 30 MIN: CPT | Performed by: PHYSICIAN ASSISTANT

## 2022-03-16 PROCEDURE — 3075F SYST BP GE 130 - 139MM HG: CPT | Performed by: PHYSICIAN ASSISTANT

## 2022-03-16 PROCEDURE — 3008F BODY MASS INDEX DOCD: CPT | Performed by: PHYSICIAN ASSISTANT

## 2022-03-16 PROCEDURE — 1036F TOBACCO NON-USER: CPT | Performed by: PHYSICIAN ASSISTANT

## 2022-03-16 RX ORDER — GABAPENTIN 100 MG/1
300 CAPSULE ORAL ONCE
Status: CANCELLED | OUTPATIENT
Start: 2022-04-04 | End: 2022-03-16

## 2022-03-16 RX ORDER — PANTOPRAZOLE SODIUM 40 MG/1
40 TABLET, DELAYED RELEASE ORAL DAILY
Qty: 90 TABLET | Refills: 1 | Status: SHIPPED | OUTPATIENT
Start: 2022-03-16

## 2022-03-16 RX ORDER — HEPARIN SODIUM 5000 [USP'U]/ML
5000 INJECTION, SOLUTION INTRAVENOUS; SUBCUTANEOUS
Status: CANCELLED | OUTPATIENT
Start: 2022-04-05 | End: 2022-04-06

## 2022-03-16 RX ORDER — CELECOXIB 100 MG/1
200 CAPSULE ORAL ONCE
Status: CANCELLED | OUTPATIENT
Start: 2022-04-04 | End: 2022-03-16

## 2022-03-16 RX ORDER — ACETAMINOPHEN 325 MG/1
975 TABLET ORAL ONCE
Status: CANCELLED | OUTPATIENT
Start: 2022-04-04 | End: 2022-03-16

## 2022-03-16 RX ORDER — OXYCODONE HYDROCHLORIDE 5 MG/1
5 TABLET ORAL EVERY 4 HOURS PRN
Qty: 10 TABLET | Refills: 0 | Status: SHIPPED | OUTPATIENT
Start: 2022-03-16

## 2022-03-16 RX ORDER — SCOLOPAMINE TRANSDERMAL SYSTEM 1 MG/1
1 PATCH, EXTENDED RELEASE TRANSDERMAL ONCE
Status: CANCELLED | OUTPATIENT
Start: 2022-04-04 | End: 2022-03-16

## 2022-03-18 ENCOUNTER — OFFICE VISIT (OUTPATIENT)
Dept: BARIATRICS | Facility: CLINIC | Age: 42
End: 2022-03-18

## 2022-03-18 DIAGNOSIS — E66.01 MORBID (SEVERE) OBESITY DUE TO EXCESS CALORIES (HCC): Primary | ICD-10-CM

## 2022-03-18 PROCEDURE — RECHECK

## 2022-03-18 NOTE — PROGRESS NOTES
Weight Management Nutrition Class     Diagnosis: Obesity    Bariatric Surgeon: Dr Marzena Siddiqui    Surgery: Gastric Bypass Laparoscopic    Class: Pt attended VIRTUAL pre-op education session  Standardized packet of information for bariatric surgery was sent via email and was reviewed with pt  Importance of lifestyle change and development of regular exercise routine stressed  Pt given the opportunity to ask questions  Ensure pre-surgery drink instructions were given  Questions were answered  Pt verbalized understanding of all information provided  Pt appeared prepared for upcoming surgery  Pt  educated on two-week pre operative liver shrinking diet  Pt understands that the diet needs to be followed for 2 weeks prior to surgery  Handout reviewed  Emphasized the need to drink 80 ounces of fluid per day while on the diet  Reviewed pre-op ERAS drink, post-operative clear liquid, full liquid, and pureed diet, post-operative nutrition rules and facts, and post-operative bariatric multivitamin/mineral recommendations and brand comparison  Contact information provided for any questions/concerns

## 2022-03-22 ENCOUNTER — TELEPHONE (OUTPATIENT)
Dept: FAMILY MEDICINE CLINIC | Facility: CLINIC | Age: 42
End: 2022-03-22

## 2022-03-22 NOTE — TELEPHONE ENCOUNTER
Patient would like a call back to discuss his diet  He is on a High Protien low carb limited liver shrinking diet to prepare for upcoming Gastric Bypass on 4/4/2022  He would like to know if medications (Metformin/Insulin)  needs to be adjusted he does not want this to effect blood sugar levels   Patient can be reached at the number listed below;       [phone]

## 2022-03-25 NOTE — TELEPHONE ENCOUNTER
Called and spoke with patient yesterday evening  Advised that while basal insulin technically does not have to be decreased as it is for his basic metabolic needs, maybe he can try lowering to 12units Lantus QHS from 15 units and continue to monitor his morning fasting blood sugar  Currently about 100-120  Advised to follow up if his blood sugar is great than 150 or less than 80 in the morning fasting  He will also start to steadily wean as he loses weight s/p gastric bypass and will require close follow up after surgery

## 2022-03-29 RX ORDER — SACCHAROMYCES BOULARDII 250 MG
250 CAPSULE ORAL EVERY MORNING
COMMUNITY

## 2022-03-29 NOTE — PRE-PROCEDURE INSTRUCTIONS
My Surgical Experience    The following information was developed to assist you to prepare for your operation  What do I need to do before coming to the hospital?   Arrange for a responsible person to drive you to and from the hospital    Arrange care for your children at home  Children are not allowed in the recovery areas of the hospital   Plan to wear clothing that is easy to put on and take off  If you are having shoulder surgery, wear a shirt that buttons or zippers in the front  Bathing  o Shower the evening before and the morning of your surgery with an antibacterial soap  Please refer to the Pre Op Showering Instructions for Surgery Patients Sheet   o Remove nail polish and all body piercing jewelry  o Do not shave any body part for at least 24 hours before surgery-this includes face, arms, legs and upper body  Food  o Nothing to eat or drink after midnight the night before your surgery  This includes candy and chewing gum  o Exception: If your surgery is after 12:00pm (noon), you may have clear liquids such as 7-Up®, ginger ale, apple or cranberry juice, Jell-O®, water, or clear broth until 8:00 am  o Do not drink milk or juice with pulp on the morning before surgery  o Do not drink alcohol 24 hours before surgery  Medicine  o Follow instructions you received from your surgeon about which medicines you may take on the day of surgery  o If instructed to take medicine on the morning of surgery, take pills with just a small sip of water  Call your prescribing doctor for specific infroamtion on what to do if you take insulin    What should I bring to the hospital?    Bring:  Gabby Ness or a walker, if you have them, for foot or knee surgery   A list of the daily medicines, vitamins, minerals, herbals and nutritional supplements you take   Include the dosages of medicines and the time you take them each day   Glasses, dentures or hearing aids   Minimal clothing; you will be wearing hospital sleepwear   Photo ID; required to verify your identity   If you have a Living Will or Power of , bring a copy of the documents   If you have an ostomy, bring an extra pouch and any supplies you use    Do not bring   Medicines or inhalers   Money, valuables or jewelry    What other information should I know about the day of surgery?  Notify your surgeons if you develop a cold, sore throat, cough, fever, rash or any other illness   Report to the Ambulatory Surgical/Same Day Surgery Unit   You will be instructed to stop at Registration only if you have not been pre-registered   Inform your  fi they do not stay that they will be asked by the staff to leave a phone number where they can be reached   Be available to be reached before surgery  In the event the operating room schedule changes, you may be asked to come in earlier or later than expected    *It is important to tell your doctor and others involved in your health care if you are taking or have been taking any non-prescription drugs, vitamins, minerals, herbals or other nutritional supplements  Any of these may interact with some food or medicines and cause a reaction      Pre-Surgery Instructions:   Medication Instructions    Blood Glucose Monitoring Suppl w/Device KIT Instructed patient per Anesthesia Guidelines   carvedilol (COREG) 6 25 mg tablet Instructed patient per Anesthesia Guidelines   Chromium 1000 MCG TABS Instructed patient per Anesthesia Guidelines   dicyclomine (BENTYL) 20 mg tablet Instructed patient per Anesthesia Guidelines   gabapentin (NEURONTIN) 800 mg tablet Instructed patient per Anesthesia Guidelines   Insulin Glargine-yfgn 100 UNIT/ML SOPN Instructed patient per Anesthesia Guidelines   Insulin Pen Needle 32G X 4 MM MISC Instructed patient per Anesthesia Guidelines   Lancets (OneTouch Delica Plus [id number]) MISC Instructed patient per Anesthesia Guidelines      lisinopril (ZESTRIL) 20 mg tablet Instructed patient per Anesthesia Guidelines   metFORMIN (GLUCOPHAGE) 500 mg tablet Instructed patient per Anesthesia Guidelines   multivitamin (THERAGRAN) TABS Instructed patient per Anesthesia Guidelines   Naproxen Sodium (Aleve) 220 MG CAPS Instructed patient per Anesthesia Guidelines   omeprazole (PriLOSEC) 40 MG capsule Instructed patient per Anesthesia Guidelines   OneTouch Ultra test strip Instructed patient per Anesthesia Guidelines   oxyCODONE (Roxicodone) 5 immediate release tablet Instructed patient per Anesthesia Guidelines   pantoprazole (PROTONIX) 40 mg tablet Instructed patient per Anesthesia Guidelines   saccharomyces boulardii (FLORASTOR) 250 mg capsule Instructed patient per Anesthesia Guidelines  To take coreg, gabapentin, and omeprazole a m  of surgery; to bring C Pap machine and follow surgeon's fasting protocol

## 2022-03-30 ENCOUNTER — CLINICAL SUPPORT (OUTPATIENT)
Dept: FAMILY MEDICINE CLINIC | Facility: CLINIC | Age: 42
End: 2022-03-30

## 2022-03-30 ENCOUNTER — TELEPHONE (OUTPATIENT)
Dept: FAMILY MEDICINE CLINIC | Facility: CLINIC | Age: 42
End: 2022-03-30

## 2022-03-30 DIAGNOSIS — Z98.84 BARIATRIC SURGERY STATUS: ICD-10-CM

## 2022-03-30 DIAGNOSIS — Z11.52 ENCOUNTER FOR SCREENING FOR COVID-19: Primary | ICD-10-CM

## 2022-03-30 DIAGNOSIS — Z01.818 PREOP TESTING: ICD-10-CM

## 2022-03-30 PROCEDURE — U0005 INFEC AGEN DETEC AMPLI PROBE: HCPCS | Performed by: SURGERY

## 2022-03-30 PROCEDURE — U0003 INFECTIOUS AGENT DETECTION BY NUCLEIC ACID (DNA OR RNA); SEVERE ACUTE RESPIRATORY SYNDROME CORONAVIRUS 2 (SARS-COV-2) (CORONAVIRUS DISEASE [COVID-19]), AMPLIFIED PROBE TECHNIQUE, MAKING USE OF HIGH THROUGHPUT TECHNOLOGIES AS DESCRIBED BY CMS-2020-01-R: HCPCS | Performed by: SURGERY

## 2022-03-31 LAB — SARS-COV-2 RNA RESP QL NAA+PROBE: NEGATIVE

## 2022-04-03 ENCOUNTER — ANESTHESIA EVENT (OUTPATIENT)
Dept: PERIOP | Facility: HOSPITAL | Age: 42
DRG: 288 | End: 2022-04-03
Payer: COMMERCIAL

## 2022-04-04 ENCOUNTER — HOSPITAL ENCOUNTER (INPATIENT)
Facility: HOSPITAL | Age: 42
LOS: 1 days | Discharge: HOME | DRG: 288 | End: 2022-04-05
Attending: SURGERY | Admitting: SURGERY
Payer: COMMERCIAL

## 2022-04-04 ENCOUNTER — ANESTHESIA (OUTPATIENT)
Dept: PERIOP | Facility: HOSPITAL | Age: 42
DRG: 288 | End: 2022-04-04
Payer: COMMERCIAL

## 2022-04-04 DIAGNOSIS — E11.9 DM2 (DIABETES MELLITUS, TYPE 2) (HCC): ICD-10-CM

## 2022-04-04 DIAGNOSIS — E66.01 MORBID OBESITY (HCC): ICD-10-CM

## 2022-04-04 DIAGNOSIS — Z71.3 DIABETIC NUTRITIONAL COUNSELING COMPLETED: ICD-10-CM

## 2022-04-04 DIAGNOSIS — I10 BENIGN ESSENTIAL HYPERTENSION: ICD-10-CM

## 2022-04-04 DIAGNOSIS — Z98.84 HISTORY OF ROUX-EN-Y GASTRIC BYPASS: ICD-10-CM

## 2022-04-04 DIAGNOSIS — E11.9 TYPE 2 DIABETES MELLITUS WITHOUT COMPLICATION, WITH LONG-TERM CURRENT USE OF INSULIN (HCC): ICD-10-CM

## 2022-04-04 DIAGNOSIS — R00.0 TACHYCARDIA: Primary | ICD-10-CM

## 2022-04-04 DIAGNOSIS — G47.33 OSA (OBSTRUCTIVE SLEEP APNEA): ICD-10-CM

## 2022-04-04 DIAGNOSIS — Z79.4 TYPE 2 DIABETES MELLITUS WITHOUT COMPLICATION, WITH LONG-TERM CURRENT USE OF INSULIN (HCC): ICD-10-CM

## 2022-04-04 DIAGNOSIS — Z53.20 NUTRITION COUNSELING DECLINED: ICD-10-CM

## 2022-04-04 LAB
GLUCOSE SERPL-MCNC: 142 MG/DL (ref 65–140)
GLUCOSE SERPL-MCNC: 143 MG/DL (ref 65–140)
GLUCOSE SERPL-MCNC: 175 MG/DL (ref 65–140)

## 2022-04-04 PROCEDURE — 94760 N-INVAS EAR/PLS OXIMETRY 1: CPT

## 2022-04-04 PROCEDURE — 94660 CPAP INITIATION&MGMT: CPT

## 2022-04-04 PROCEDURE — C9113 INJ PANTOPRAZOLE SODIUM, VIA: HCPCS | Performed by: PHYSICIAN ASSISTANT

## 2022-04-04 PROCEDURE — C9290 INJ, BUPIVACAINE LIPOSOME: HCPCS | Performed by: PHYSICIAN ASSISTANT

## 2022-04-04 PROCEDURE — 43644 LAP GASTRIC BYPASS/ROUX-EN-Y: CPT | Performed by: SURGERY

## 2022-04-04 PROCEDURE — 82948 REAGENT STRIP/BLOOD GLUCOSE: CPT

## 2022-04-04 PROCEDURE — NC001 PR NO CHARGE: Performed by: SURGERY

## 2022-04-04 PROCEDURE — 0D164ZA BYPASS STOMACH TO JEJUNUM, PERCUTANEOUS ENDOSCOPIC APPROACH: ICD-10-PCS | Performed by: SURGERY

## 2022-04-04 PROCEDURE — 0DJ08ZZ INSPECTION OF UPPER INTESTINAL TRACT, VIA NATURAL OR ARTIFICIAL OPENING ENDOSCOPIC: ICD-10-PCS | Performed by: SURGERY

## 2022-04-04 RX ORDER — ESMOLOL HYDROCHLORIDE 10 MG/ML
INJECTION INTRAVENOUS AS NEEDED
Status: DISCONTINUED | OUTPATIENT
Start: 2022-04-04 | End: 2022-04-04

## 2022-04-04 RX ORDER — SCOLOPAMINE TRANSDERMAL SYSTEM 1 MG/1
1 PATCH, EXTENDED RELEASE TRANSDERMAL ONCE
Status: DISCONTINUED | OUTPATIENT
Start: 2022-04-04 | End: 2022-04-05 | Stop reason: HOSPADM

## 2022-04-04 RX ORDER — HEPARIN SODIUM 5000 [USP'U]/ML
5000 INJECTION, SOLUTION INTRAVENOUS; SUBCUTANEOUS
Status: COMPLETED | OUTPATIENT
Start: 2022-04-04 | End: 2022-04-04

## 2022-04-04 RX ORDER — PROMETHAZINE HYDROCHLORIDE 25 MG/ML
25 INJECTION, SOLUTION INTRAMUSCULAR; INTRAVENOUS EVERY 6 HOURS PRN
Status: DISCONTINUED | OUTPATIENT
Start: 2022-04-04 | End: 2022-04-05 | Stop reason: HOSPADM

## 2022-04-04 RX ORDER — CELECOXIB 100 MG/1
200 CAPSULE ORAL ONCE
Status: COMPLETED | OUTPATIENT
Start: 2022-04-04 | End: 2022-04-04

## 2022-04-04 RX ORDER — MIDAZOLAM HYDROCHLORIDE 2 MG/2ML
INJECTION, SOLUTION INTRAMUSCULAR; INTRAVENOUS AS NEEDED
Status: DISCONTINUED | OUTPATIENT
Start: 2022-04-04 | End: 2022-04-04

## 2022-04-04 RX ORDER — HYDROMORPHONE HCL/PF 1 MG/ML
1 SYRINGE (ML) INJECTION EVERY 4 HOURS PRN
Status: DISCONTINUED | OUTPATIENT
Start: 2022-04-04 | End: 2022-04-05 | Stop reason: HOSPADM

## 2022-04-04 RX ORDER — GABAPENTIN 100 MG/1
300 CAPSULE ORAL ONCE
Status: DISCONTINUED | OUTPATIENT
Start: 2022-04-04 | End: 2022-04-04 | Stop reason: HOSPADM

## 2022-04-04 RX ORDER — CARVEDILOL 6.25 MG/1
6.25 TABLET ORAL 2 TIMES DAILY WITH MEALS
Status: DISCONTINUED | OUTPATIENT
Start: 2022-04-05 | End: 2022-04-05 | Stop reason: HOSPADM

## 2022-04-04 RX ORDER — ONDANSETRON 2 MG/ML
4 INJECTION INTRAMUSCULAR; INTRAVENOUS EVERY 6 HOURS PRN
Status: DISCONTINUED | OUTPATIENT
Start: 2022-04-04 | End: 2022-04-05 | Stop reason: HOSPADM

## 2022-04-04 RX ORDER — OXYCODONE HCL 5 MG/5 ML
5 SOLUTION, ORAL ORAL EVERY 4 HOURS PRN
Status: DISCONTINUED | OUTPATIENT
Start: 2022-04-04 | End: 2022-04-05 | Stop reason: HOSPADM

## 2022-04-04 RX ORDER — ONDANSETRON 2 MG/ML
INJECTION INTRAMUSCULAR; INTRAVENOUS AS NEEDED
Status: DISCONTINUED | OUTPATIENT
Start: 2022-04-04 | End: 2022-04-04

## 2022-04-04 RX ORDER — PROPOFOL 10 MG/ML
INJECTION, EMULSION INTRAVENOUS AS NEEDED
Status: DISCONTINUED | OUTPATIENT
Start: 2022-04-04 | End: 2022-04-04

## 2022-04-04 RX ORDER — METOCLOPRAMIDE HYDROCHLORIDE 5 MG/ML
10 INJECTION INTRAMUSCULAR; INTRAVENOUS EVERY 6 HOURS PRN
Status: DISCONTINUED | OUTPATIENT
Start: 2022-04-04 | End: 2022-04-05 | Stop reason: HOSPADM

## 2022-04-04 RX ORDER — LIDOCAINE HYDROCHLORIDE 10 MG/ML
INJECTION, SOLUTION EPIDURAL; INFILTRATION; INTRACAUDAL; PERINEURAL AS NEEDED
Status: DISCONTINUED | OUTPATIENT
Start: 2022-04-04 | End: 2022-04-04

## 2022-04-04 RX ORDER — SODIUM CHLORIDE, SODIUM LACTATE, POTASSIUM CHLORIDE, CALCIUM CHLORIDE 600; 310; 30; 20 MG/100ML; MG/100ML; MG/100ML; MG/100ML
125 INJECTION, SOLUTION INTRAVENOUS CONTINUOUS
Status: DISCONTINUED | OUTPATIENT
Start: 2022-04-04 | End: 2022-04-05 | Stop reason: HOSPADM

## 2022-04-04 RX ORDER — ACETAMINOPHEN 325 MG/1
975 TABLET ORAL EVERY 6 HOURS SCHEDULED
Status: DISCONTINUED | OUTPATIENT
Start: 2022-04-04 | End: 2022-04-05 | Stop reason: HOSPADM

## 2022-04-04 RX ORDER — LISINOPRIL 20 MG/1
20 TABLET ORAL EVERY MORNING
Status: DISCONTINUED | OUTPATIENT
Start: 2022-04-05 | End: 2022-04-05 | Stop reason: HOSPADM

## 2022-04-04 RX ORDER — FENTANYL CITRATE 50 UG/ML
INJECTION, SOLUTION INTRAMUSCULAR; INTRAVENOUS AS NEEDED
Status: DISCONTINUED | OUTPATIENT
Start: 2022-04-04 | End: 2022-04-04

## 2022-04-04 RX ORDER — PROPOFOL 10 MG/ML
INJECTION, EMULSION INTRAVENOUS CONTINUOUS PRN
Status: DISCONTINUED | OUTPATIENT
Start: 2022-04-04 | End: 2022-04-04

## 2022-04-04 RX ORDER — LORAZEPAM 2 MG/ML
0.5 INJECTION INTRAMUSCULAR EVERY 6 HOURS PRN
Status: DISCONTINUED | OUTPATIENT
Start: 2022-04-04 | End: 2022-04-05 | Stop reason: HOSPADM

## 2022-04-04 RX ORDER — ACETAMINOPHEN 325 MG/1
975 TABLET ORAL ONCE
Status: COMPLETED | OUTPATIENT
Start: 2022-04-04 | End: 2022-04-04

## 2022-04-04 RX ORDER — CEFAZOLIN SODIUM 2 G/50ML
SOLUTION INTRAVENOUS AS NEEDED
Status: DISCONTINUED | OUTPATIENT
Start: 2022-04-04 | End: 2022-04-04

## 2022-04-04 RX ORDER — KETOROLAC TROMETHAMINE 30 MG/ML
15 INJECTION, SOLUTION INTRAMUSCULAR; INTRAVENOUS EVERY 6 HOURS SCHEDULED
Status: DISCONTINUED | OUTPATIENT
Start: 2022-04-04 | End: 2022-04-05 | Stop reason: HOSPADM

## 2022-04-04 RX ORDER — CEFAZOLIN SODIUM 2 G/50ML
2000 SOLUTION INTRAVENOUS EVERY 8 HOURS
Status: COMPLETED | OUTPATIENT
Start: 2022-04-04 | End: 2022-04-05

## 2022-04-04 RX ORDER — BUPIVACAINE HYDROCHLORIDE 5 MG/ML
INJECTION, SOLUTION PERINEURAL AS NEEDED
Status: DISCONTINUED | OUTPATIENT
Start: 2022-04-04 | End: 2022-04-04 | Stop reason: HOSPADM

## 2022-04-04 RX ORDER — SODIUM CHLORIDE 9 MG/ML
INJECTION, SOLUTION INTRAVENOUS CONTINUOUS PRN
Status: DISCONTINUED | OUTPATIENT
Start: 2022-04-04 | End: 2022-04-04

## 2022-04-04 RX ORDER — CEFAZOLIN SODIUM 1 G/50ML
SOLUTION INTRAVENOUS AS NEEDED
Status: DISCONTINUED | OUTPATIENT
Start: 2022-04-04 | End: 2022-04-04

## 2022-04-04 RX ORDER — DIPHENHYDRAMINE HCL 25 MG
25 TABLET ORAL
Status: DISCONTINUED | OUTPATIENT
Start: 2022-04-04 | End: 2022-04-05 | Stop reason: HOSPADM

## 2022-04-04 RX ORDER — MAGNESIUM HYDROXIDE 1200 MG/15ML
LIQUID ORAL AS NEEDED
Status: DISCONTINUED | OUTPATIENT
Start: 2022-04-04 | End: 2022-04-04 | Stop reason: HOSPADM

## 2022-04-04 RX ORDER — ROCURONIUM BROMIDE 10 MG/ML
INJECTION, SOLUTION INTRAVENOUS AS NEEDED
Status: DISCONTINUED | OUTPATIENT
Start: 2022-04-04 | End: 2022-04-04

## 2022-04-04 RX ORDER — OXYCODONE HCL 5 MG/5 ML
10 SOLUTION, ORAL ORAL EVERY 4 HOURS PRN
Status: DISCONTINUED | OUTPATIENT
Start: 2022-04-04 | End: 2022-04-05 | Stop reason: HOSPADM

## 2022-04-04 RX ORDER — SIMETHICONE 80 MG
80 TABLET,CHEWABLE ORAL EVERY 12 HOURS SCHEDULED
Status: DISCONTINUED | OUTPATIENT
Start: 2022-04-04 | End: 2022-04-05 | Stop reason: HOSPADM

## 2022-04-04 RX ORDER — FENTANYL CITRATE/PF 50 MCG/ML
25 SYRINGE (ML) INJECTION
Status: DISCONTINUED | OUTPATIENT
Start: 2022-04-04 | End: 2022-04-04 | Stop reason: HOSPADM

## 2022-04-04 RX ORDER — PROMETHAZINE HYDROCHLORIDE 25 MG/ML
12.5 INJECTION, SOLUTION INTRAMUSCULAR; INTRAVENOUS ONCE
Status: DISCONTINUED | OUTPATIENT
Start: 2022-04-04 | End: 2022-04-04 | Stop reason: HOSPADM

## 2022-04-04 RX ORDER — ONDANSETRON 2 MG/ML
4 INJECTION INTRAMUSCULAR; INTRAVENOUS ONCE AS NEEDED
Status: COMPLETED | OUTPATIENT
Start: 2022-04-04 | End: 2022-04-04

## 2022-04-04 RX ORDER — SODIUM CHLORIDE, SODIUM LACTATE, POTASSIUM CHLORIDE, CALCIUM CHLORIDE 600; 310; 30; 20 MG/100ML; MG/100ML; MG/100ML; MG/100ML
100 INJECTION, SOLUTION INTRAVENOUS CONTINUOUS
Status: DISCONTINUED | OUTPATIENT
Start: 2022-04-04 | End: 2022-04-05 | Stop reason: HOSPADM

## 2022-04-04 RX ORDER — PANTOPRAZOLE SODIUM 40 MG/1
40 INJECTION, POWDER, FOR SOLUTION INTRAVENOUS ONCE
Status: COMPLETED | OUTPATIENT
Start: 2022-04-04 | End: 2022-04-04

## 2022-04-04 RX ADMIN — METRONIDAZOLE 500 MG: 500 INJECTION, SOLUTION INTRAVENOUS at 20:54

## 2022-04-04 RX ADMIN — HEPARIN SODIUM 5000 UNITS: 5000 INJECTION INTRAVENOUS; SUBCUTANEOUS at 12:22

## 2022-04-04 RX ADMIN — CEFAZOLIN SODIUM 2000 MG: 2 SOLUTION INTRAVENOUS at 12:49

## 2022-04-04 RX ADMIN — PROPOFOL 100 MCG/KG/MIN: 10 INJECTION, EMULSION INTRAVENOUS at 12:57

## 2022-04-04 RX ADMIN — SIMETHICONE 80 MG: 80 TABLET, CHEWABLE ORAL at 20:54

## 2022-04-04 RX ADMIN — ACETAMINOPHEN 975 MG: 325 TABLET, FILM COATED ORAL at 12:22

## 2022-04-04 RX ADMIN — PANTOPRAZOLE SODIUM 40 MG: 40 INJECTION, POWDER, FOR SOLUTION INTRAVENOUS at 15:41

## 2022-04-04 RX ADMIN — ESMOLOL HYDROCHLORIDE 20 MG: 10 INJECTION, SOLUTION INTRAVENOUS at 14:38

## 2022-04-04 RX ADMIN — SUGAMMADEX 400 MG: 100 INJECTION, SOLUTION INTRAVENOUS at 15:00

## 2022-04-04 RX ADMIN — ROCURONIUM BROMIDE 50 MG: 10 INJECTION, SOLUTION INTRAVENOUS at 12:55

## 2022-04-04 RX ADMIN — ROCURONIUM BROMIDE 20 MG: 10 INJECTION, SOLUTION INTRAVENOUS at 13:47

## 2022-04-04 RX ADMIN — ROCURONIUM BROMIDE 10 MG: 10 INJECTION, SOLUTION INTRAVENOUS at 14:35

## 2022-04-04 RX ADMIN — FENTANYL CITRATE 100 MCG: 50 INJECTION, SOLUTION INTRAMUSCULAR; INTRAVENOUS at 12:53

## 2022-04-04 RX ADMIN — CELECOXIB 200 MG: 100 CAPSULE ORAL at 12:22

## 2022-04-04 RX ADMIN — ROCURONIUM BROMIDE 10 MG: 10 INJECTION, SOLUTION INTRAVENOUS at 13:22

## 2022-04-04 RX ADMIN — FENTANYL CITRATE 25 MCG: 50 INJECTION, SOLUTION INTRAMUSCULAR; INTRAVENOUS at 13:25

## 2022-04-04 RX ADMIN — DEXMEDETOMIDINE 0.2 MCG/KG/HR: 100 INJECTION, SOLUTION, CONCENTRATE INTRAVENOUS at 12:57

## 2022-04-04 RX ADMIN — SODIUM CHLORIDE, SODIUM LACTATE, POTASSIUM CHLORIDE, AND CALCIUM CHLORIDE 100 ML/HR: .6; .31; .03; .02 INJECTION, SOLUTION INTRAVENOUS at 21:03

## 2022-04-04 RX ADMIN — ONDANSETRON 4 MG: 2 INJECTION INTRAMUSCULAR; INTRAVENOUS at 15:40

## 2022-04-04 RX ADMIN — FENTANYL CITRATE 25 MCG: 50 INJECTION, SOLUTION INTRAMUSCULAR; INTRAVENOUS at 15:57

## 2022-04-04 RX ADMIN — ROCURONIUM BROMIDE 10 MG: 10 INJECTION, SOLUTION INTRAVENOUS at 14:01

## 2022-04-04 RX ADMIN — PROPOFOL 20 MG: 10 INJECTION, EMULSION INTRAVENOUS at 13:20

## 2022-04-04 RX ADMIN — SODIUM CHLORIDE, SODIUM LACTATE, POTASSIUM CHLORIDE, AND CALCIUM CHLORIDE 125 ML/HR: .6; .31; .03; .02 INJECTION, SOLUTION INTRAVENOUS at 12:16

## 2022-04-04 RX ADMIN — SODIUM CHLORIDE: 0.9 INJECTION, SOLUTION INTRAVENOUS at 12:49

## 2022-04-04 RX ADMIN — LIDOCAINE HYDROCHLORIDE 50 MG: 10 INJECTION, SOLUTION EPIDURAL; INFILTRATION; INTRACAUDAL; PERINEURAL at 12:53

## 2022-04-04 RX ADMIN — ESMOLOL HYDROCHLORIDE 20 MG: 10 INJECTION, SOLUTION INTRAVENOUS at 14:21

## 2022-04-04 RX ADMIN — FENTANYL CITRATE 50 MCG: 50 INJECTION, SOLUTION INTRAMUSCULAR; INTRAVENOUS at 15:21

## 2022-04-04 RX ADMIN — ONDANSETRON 4 MG: 2 INJECTION INTRAMUSCULAR; INTRAVENOUS at 14:51

## 2022-04-04 RX ADMIN — FAMOTIDINE 20 MG: 10 INJECTION INTRAVENOUS at 20:51

## 2022-04-04 RX ADMIN — ROCURONIUM BROMIDE 20 MG: 10 INJECTION, SOLUTION INTRAVENOUS at 14:33

## 2022-04-04 RX ADMIN — FENTANYL CITRATE 25 MCG: 50 INJECTION, SOLUTION INTRAMUSCULAR; INTRAVENOUS at 14:10

## 2022-04-04 RX ADMIN — PROPOFOL 200 MG: 10 INJECTION, EMULSION INTRAVENOUS at 12:54

## 2022-04-04 RX ADMIN — METRONIDAZOLE 500 MG: 500 INJECTION, SOLUTION INTRAVENOUS at 13:00

## 2022-04-04 RX ADMIN — FENTANYL CITRATE 25 MCG: 50 INJECTION, SOLUTION INTRAMUSCULAR; INTRAVENOUS at 13:28

## 2022-04-04 RX ADMIN — MIDAZOLAM 2 MG: 1 INJECTION INTRAMUSCULAR; INTRAVENOUS at 12:49

## 2022-04-04 RX ADMIN — ACETAMINOPHEN 975 MG: 325 TABLET, FILM COATED ORAL at 20:57

## 2022-04-04 RX ADMIN — SCOPALAMINE 1 PATCH: 1 PATCH, EXTENDED RELEASE TRANSDERMAL at 12:22

## 2022-04-04 RX ADMIN — FENTANYL CITRATE 50 MCG: 50 INJECTION, SOLUTION INTRAMUSCULAR; INTRAVENOUS at 14:06

## 2022-04-04 RX ADMIN — KETOROLAC TROMETHAMINE 15 MG: 30 INJECTION, SOLUTION INTRAMUSCULAR at 20:52

## 2022-04-04 RX ADMIN — FENTANYL CITRATE 25 MCG: 50 INJECTION, SOLUTION INTRAMUSCULAR; INTRAVENOUS at 14:11

## 2022-04-04 RX ADMIN — CEFAZOLIN SODIUM 1000 MG: 1 SOLUTION INTRAVENOUS at 13:07

## 2022-04-04 RX ADMIN — SODIUM CHLORIDE: 0.9 INJECTION, SOLUTION INTRAVENOUS at 14:31

## 2022-04-04 RX ADMIN — CEFAZOLIN SODIUM 2000 MG: 2 SOLUTION INTRAVENOUS at 20:51

## 2022-04-04 NOTE — ANESTHESIA PREPROCEDURE EVALUATION
Procedure:  LAPAROSCOPIC SARI-EN-Y GASTRIC BYPASS AND INTRAOPERATIVE EGD (N/A Abdomen)    Relevant Problems   CARDIO   (+) Benign essential hypertension   (+) Migraine headache   (+) Mixed hyperlipidemia      ENDO   (+) Type 2 diabetes mellitus without complication, with long-term current use of insulin (HCC)      GI/HEPATIC   (+) GERD (gastroesophageal reflux disease)      MUSCULOSKELETAL   (+) Low back pain      NEURO/PSYCH   (+) Anxiety   (+) Migraine headache      PULMONARY   (+) MANUELA (obstructive sleep apnea)        Physical Exam    Airway    Mallampati score: II  TM Distance: >3 FB  Neck ROM: full     Dental   No notable dental hx     Cardiovascular  Cardiovascular exam normal    Pulmonary  Pulmonary exam normal     Other Findings        Anesthesia Plan  ASA Score- 3     Anesthesia Type- general with ASA Monitors  Additional Monitors:   Airway Plan: ETT  Plan Factors-Exercise tolerance (METS): >4 METS  Chart reviewed  EKG reviewed  Imaging results reviewed  Existing labs reviewed  Patient summary reviewed  Patient is not a current smoker  Obstructive sleep apnea risk education given perioperatively  Induction- intravenous  Postoperative Plan- Plan for postoperative opioid use  Informed Consent- Anesthetic plan and risks discussed with patient  I personally reviewed this patient with the CRNA  Discussed and agreed on the Anesthesia Plan with the CRNA  Monique Singh

## 2022-04-04 NOTE — ASSESSMENT & PLAN NOTE
Lab Results   Component Value Date    HGBA1C 6 4 (H) 10/15/2021       Recent Labs     04/04/22  1529 04/04/22  2102 04/05/22  0147 04/05/22  0539   POCGLU 142* 143* 118 109       Blood Sugar Average: Last 72 hrs:  (P) 137 4     Patient on home regimen of glargine 15 units at bedtime and metformin 1000 mg b i d     - patient is status post Selin-en-Y on liquid diet  - Pt can continue metformin at discharge and monitor Glucose    - Given that patient will remain on clear liquid diet, he should log Glucose and follow up with PCP after discharge

## 2022-04-04 NOTE — PERIOPERATIVE NURSING NOTE
Patient received into PACU via stretcher from OR  Patient reactive, drowsy, denies pain or discomfort at this time  6 abdominal trocar sites clean dry and intact with hystocril on incision sites

## 2022-04-04 NOTE — H&P
Patient seen and examined by me  H&P updated  Original H&P on paper in chart      /74   Pulse 84   Temp (!) 97 2 °F (36 2 °C) (Temporal)   Resp 16   Ht 5' 9" (1 753 m)   Wt (!) 150 kg (330 lb 3 2 oz)   SpO2 96%   BMI 48 76 kg/m²     Hoda Duval MD  4/4/2022  12:24 PM

## 2022-04-04 NOTE — ANESTHESIA POSTPROCEDURE EVALUATION
Post-Op Assessment Note    CV Status:  Stable  Pain Score: 5    Pain management: adequate     Mental Status:  Alert and awake   Hydration Status:  Euvolemic   PONV Controlled:  Controlled   Airway Patency:  Patent      Post Op Vitals Reviewed: Yes      Staff: Anesthesiologist, CRNA         No complications documented      BP (P) 129/74 (04/04/22 1522)    Temp (P) 97 9 °F (36 6 °C) (04/04/22 1522)    Pulse     Resp (!) (P) 25 (04/04/22 1522)    SpO2

## 2022-04-04 NOTE — ASSESSMENT & PLAN NOTE
- Continue home lisinopril 20 mg daily  - Continue home carvedilol 6 25 mg tablet b i d   - monitor BP

## 2022-04-04 NOTE — OP NOTE
OPERATIVE REPORT  PATIENT NAME: Tony Harrington    :  1980  MRN: [id number]  Pt Location: [facility] OR ROOM 02    SURGERY DATE: 2022    Surgeon(s) and Role:     * Marie Anderson MD - Primary     * Susan Canseco MD - Assisting     * Nadja Courtney PA-C - Assisting    Preop Diagnosis:  Morbid obesity ([address] ) [E66 01]  Diabetes mellitus ([address] ) [E11 9]  Esophageal reflux [K21 9]  Obstructive sleep apnea (adult) (pediatric) [G47 33]    Post-Op Diagnosis Codes:     * Morbid obesity ([facility] ) [E66 01]     * Diabetes mellitus ([facility] ) [E11 9]     * Esophageal reflux [K21 9]     * Obstructive sleep apnea (adult) (pediatric) [G47 33]    Procedure(s) (LRB):  LAPAROSCOPIC SARI-EN-Y GASTRIC BYPASS AND INTRAOPERATIVE EGD (N/A)  LYSIS ADHESIONS LAPAROSCOPIC (N/A)    Specimen(s):  * No specimens in log *    Estimated Blood Loss:   20 ml    Drains:  * No LDAs found *    Anesthesia Type:   General    Operative Indications:   Morbid obesity ([facility] ) [E66 01]  Diabetes mellitus ([facility] ) [E11 9]  Esophageal reflux [K21 9]  Obstructive sleep apnea (adult) (pediatric) [G47 33]  BMI 48 76 kg/m2    Patient Active Problem List   Diagnosis    Morbid obesity with body mass index (BMI) of 50 0 to 59 9 in adult [facility])    Benign essential hypertension    Tachycardia    Anxiety    Diarrhea    GERD (gastroesophageal reflux disease)    Irritable bowel syndrome with diarrhea    Low back pain    Migraine headache    Mixed hyperlipidemia    Ventral hernia without obstruction or gangrene    Type 2 diabetes mellitus without complication, with long-term current use of insulin (HCC)    Abnormal flow volume loop concerning for fixed airway obstruction    MANUELA (obstructive sleep apnea)    Morbid obesity due to excess calories (HCC)    Morbid obesity with body mass index (BMI) of 40 0 to 49 9 ([facility])         Operative Findings:  Umbilical hernia (completely reducible)  Omental adhesions to anterior abdominal wall  Massive hepatomegaly    Complications:   None    Procedure and Technique:  The patient was identified by name, armband and conversation  The patient was then brought to the operative theatre  After successful induction of general anesthesia, the patient was prepped and draped in the usual sterile fashion  A timeout was performed and all were in agreement  Optiview technique was used to gain entrance into the abdomen with a 12 mm 0 degree laparoscope in the left upper quadrant  The abdomen was then insufflated to 15 mmHg  Three 12 mm ports were then placed in the right upper quadrant, left lower quadrant, and umbilicus  A 5 mm right lower quadrant port was placed  A 5 mm epigastric liver retractor was placed  Four quadrant Exparel/Marcaine transversus abdominus plane block was performed  The omentum was split using ultrasonic franchesca  Starting at the ligament of treitz 50 cm of small bowel was counted and divided with Medtronic stapler, tan load  Another 150 cm of small bowel was counted from here and a stapled jejunojejunostomy was created with the Medtronic stapler, tan load  The enterotomy was stapled closed with a tan load  The distal end of the biliopancreatic limb was ischemic (not involving the anastomosis)  Mesentery of this was ligated with the harmonic and this portion was resected with the tan load  The mesenteric defect was then closed with a running 2-0 ethibond  The gastroesophageal fat pad was dissected  Perigastric dissection was used to enter the lesser sac 5 cm distal to the gastroesophageal junction  Gastric pouch was then created with one horizontal firing and two vertical firings with the Medtronic stapler, purple load  The mariya limb was then brought antecolic/antegastric and a handsewn end to side gastrojejunostomy was then created with 3-0 PDS in two layers over a 34 [employer] El[employer] levacuator tube  The endoscope was then advanced past the posterior pharynx into the gastric pouch   Air leak test was negative and the anastomosis was hemostatic  Jenkins's space was then closed with a running 2-0 ethibond suture  The umbilical port was then closed with a transfascial 0 vicryl suture after the segment was removed from the abdomen with an endocatch bag  All port sites were examined for bleeding as we exited the abdomen to ensure hemostasis  Port sites were then closed with monocryl and dermabond  All instrument, sponge and needle counts were correct  I was present for the entire procedure, A qualified resident physician was not available and an assistant was required during the procedure for retraction tissue handling,dissection and suturing, traction/countertraction, stapling, and performance of the intraoperative EGD       Patient Disposition:  PACU       SIGNATURE: Rosalba Kitchen MD  DATE: April 4, 2022  TIME: 3:11 PM

## 2022-04-05 ENCOUNTER — TELEPHONE (OUTPATIENT)
Dept: OTHER | Facility: HOSPITAL | Age: 42
End: 2022-04-05

## 2022-04-05 VITALS
HEART RATE: 88 BPM | TEMPERATURE: 98.4 F | DIASTOLIC BLOOD PRESSURE: 71 MMHG | WEIGHT: 315 LBS | BODY MASS INDEX: 46.65 KG/M2 | OXYGEN SATURATION: 93 % | SYSTOLIC BLOOD PRESSURE: 120 MMHG | RESPIRATION RATE: 20 BRPM | HEIGHT: 69 IN

## 2022-04-05 DIAGNOSIS — R11.0 NAUSEA: Primary | ICD-10-CM

## 2022-04-05 PROBLEM — Z98.84 HISTORY OF ROUX-EN-Y GASTRIC BYPASS: Status: ACTIVE | Noted: 2022-04-05

## 2022-04-05 LAB
ANION GAP SERPL CALCULATED.3IONS-SCNC: 10 MMOL/L (ref 4–13)
BUN SERPL-MCNC: 11 MG/DL (ref 5–25)
CALCIUM SERPL-MCNC: 8.6 MG/DL (ref 8.3–10.1)
CHLORIDE SERPL-SCNC: 104 MMOL/L (ref 100–108)
CO2 SERPL-SCNC: 25 MMOL/L (ref 21–32)
CREAT SERPL-MCNC: 0.71 MG/DL (ref 0.6–1.3)
ERYTHROCYTE [DISTWIDTH] IN BLOOD BY AUTOMATED COUNT: 13.6 % (ref 11.6–15.1)
EST. AVERAGE GLUCOSE BLD GHB EST-MCNC: 111 MG/DL
GFR SERPL CREATININE-BSD FRML MDRD: 117 ML/MIN/1.73SQ M
GLUCOSE SERPL-MCNC: 103 MG/DL (ref 65–140)
GLUCOSE SERPL-MCNC: 104 MG/DL (ref 65–140)
GLUCOSE SERPL-MCNC: 109 MG/DL (ref 65–140)
GLUCOSE SERPL-MCNC: 118 MG/DL (ref 65–140)
GLUCOSE SERPL-MCNC: 96 MG/DL (ref 65–140)
HBA1C MFR BLD: 5.5 %
HCT VFR BLD AUTO: 47.6 % (ref 36.5–49.3)
HGB BLD-MCNC: 15.5 G/DL (ref 12–17)
MCH RBC QN AUTO: 29.9 PG (ref 26.8–34.3)
MCHC RBC AUTO-ENTMCNC: 32.6 G/DL (ref 31.4–37.4)
MCV RBC AUTO: 92 FL (ref 82–98)
PLATELET # BLD AUTO: 227 THOUSANDS/UL (ref 149–390)
PMV BLD AUTO: 9.5 FL (ref 8.9–12.7)
POTASSIUM SERPL-SCNC: 3.8 MMOL/L (ref 3.5–5.3)
RBC # BLD AUTO: 5.18 MILLION/UL (ref 3.88–5.62)
SODIUM SERPL-SCNC: 139 MMOL/L (ref 136–145)
WBC # BLD AUTO: 9.18 THOUSAND/UL (ref 4.31–10.16)

## 2022-04-05 PROCEDURE — 82948 REAGENT STRIP/BLOOD GLUCOSE: CPT

## 2022-04-05 PROCEDURE — 83036 HEMOGLOBIN GLYCOSYLATED A1C: CPT | Performed by: STUDENT IN AN ORGANIZED HEALTH CARE EDUCATION/TRAINING PROGRAM

## 2022-04-05 PROCEDURE — 99232 SBSQ HOSP IP/OBS MODERATE 35: CPT | Performed by: STUDENT IN AN ORGANIZED HEALTH CARE EDUCATION/TRAINING PROGRAM

## 2022-04-05 PROCEDURE — 99024 POSTOP FOLLOW-UP VISIT: CPT | Performed by: SURGERY

## 2022-04-05 PROCEDURE — 3044F HG A1C LEVEL LT 7.0%: CPT | Performed by: SURGERY

## 2022-04-05 PROCEDURE — 80048 BASIC METABOLIC PNL TOTAL CA: CPT | Performed by: PHYSICIAN ASSISTANT

## 2022-04-05 PROCEDURE — 85027 COMPLETE CBC AUTOMATED: CPT | Performed by: PHYSICIAN ASSISTANT

## 2022-04-05 RX ORDER — ONDANSETRON 4 MG/1
4 TABLET, ORALLY DISINTEGRATING ORAL EVERY 6 HOURS PRN
Qty: 20 TABLET | Refills: 0 | Status: SHIPPED | OUTPATIENT
Start: 2022-04-05

## 2022-04-05 RX ORDER — ACETAMINOPHEN 325 MG/1
975 TABLET ORAL EVERY 8 HOURS SCHEDULED
Qty: 63 TABLET | Refills: 0
Start: 2022-04-05 | End: 2022-04-12

## 2022-04-05 RX ADMIN — KETOROLAC TROMETHAMINE 15 MG: 30 INJECTION, SOLUTION INTRAMUSCULAR at 11:35

## 2022-04-05 RX ADMIN — ACETAMINOPHEN 975 MG: 325 TABLET, FILM COATED ORAL at 12:54

## 2022-04-05 RX ADMIN — ACETAMINOPHEN 975 MG: 325 TABLET, FILM COATED ORAL at 01:35

## 2022-04-05 RX ADMIN — ONDANSETRON 4 MG: 2 INJECTION INTRAMUSCULAR; INTRAVENOUS at 14:20

## 2022-04-05 RX ADMIN — ONDANSETRON 4 MG: 2 INJECTION INTRAMUSCULAR; INTRAVENOUS at 07:51

## 2022-04-05 RX ADMIN — HYDROMORPHONE HYDROCHLORIDE 1 MG: 1 INJECTION, SOLUTION INTRAMUSCULAR; INTRAVENOUS; SUBCUTANEOUS at 05:20

## 2022-04-05 RX ADMIN — CARVEDILOL 6.25 MG: 6.25 TABLET, FILM COATED ORAL at 07:51

## 2022-04-05 RX ADMIN — CEFAZOLIN SODIUM 2000 MG: 2 SOLUTION INTRAVENOUS at 04:01

## 2022-04-05 RX ADMIN — LISINOPRIL 20 MG: 20 TABLET ORAL at 09:31

## 2022-04-05 RX ADMIN — SIMETHICONE 80 MG: 80 TABLET, CHEWABLE ORAL at 09:31

## 2022-04-05 RX ADMIN — KETOROLAC TROMETHAMINE 15 MG: 30 INJECTION, SOLUTION INTRAMUSCULAR at 06:00

## 2022-04-05 RX ADMIN — METRONIDAZOLE 500 MG: 500 INJECTION, SOLUTION INTRAVENOUS at 06:00

## 2022-04-05 RX ADMIN — ACETAMINOPHEN 975 MG: 325 TABLET, FILM COATED ORAL at 06:00

## 2022-04-05 RX ADMIN — FAMOTIDINE 20 MG: 10 INJECTION INTRAVENOUS at 09:31

## 2022-04-05 RX ADMIN — KETOROLAC TROMETHAMINE 15 MG: 30 INJECTION, SOLUTION INTRAMUSCULAR at 01:36

## 2022-04-05 NOTE — CONSULTS
[address] [facility] [address] [facility] Medicine Consult Note    PCP: Kimberly Renee MD  Date of Admission:  4/4/2022  Date of Consultation: 04/05/22  Requesting Physician: Susannah Aparicio MD    Reason For Consultation:     Chronic Medical Problems    Jesus Pacheco is a 37 y o  male with a PMH of DM2, HTN , MANUELA  who is originally admitted to the [facility] service on 4/4/2022 for Selin en Y  Pt is S/p Selin en Y  We are consulted for chronic management of DM2, HTN, MANUELA  Pt is currently doing well  He is on clear diet and is tolerating well  Glucose has been ranging  From 104-175  Pt has not had any bowel movements  He is tolerating diet  Review of Systems  SEE HPI   Past Medical and Surgical History:     Past Medical History:   Diagnosis Date    Abdominal wall hernia     Bell's palsy     2 bouts - idiopathic, possible stress induced    CPAP (continuous positive airway pressure) dependence     Depression     Diabetes mellitus ([facility] ) 03/25/21    Blood work    GERD (gastroesophageal reflux disease)     Hypertension     Irregular heart beat     Morbid obesity with BMI of 60 0-69 9, adult (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 GI LAB; Service: Gastroenterology    ESOPHAGOGASTRODUODENOSCOPY N/A 2/2/2018    Procedure: ESOPHAGOGASTRODUODENOSCOPY (EGD); Surgeon: Dimitri Cardona MD;  Location: [facility] GI LAB; Service: Gastroenterology    TESTICLE SURGERY Left     infected testicle    TOENAIL EXCISION Right 09/07/2021       Meds/Allergies:    all medications and allergies reviewed    Allergies:    Allergies   Allergen Reactions    Bee Venom Anaphylaxis     Annotation - [id number]: wasp, hornet also    Macrolides And Ketolides Anaphylaxis and Rash    Other Hives, Shortness Of Breath and Wheezing     [place] [id number]:  violet    Pertussis Vaccine Anaphylaxis    Pertussis Vaccines Anaphylaxis and Rash    Vancomycin Shortness Of Breath    Zithromax [Azithromycin] Anaphylaxis    Erythromycin Rash    Pollen Extract Sneezing       History:     Marital Status: /Civil Union    Substance Use History:   Social History     Substance and Sexual Activity   Alcohol Use Yes    Alcohol/week: 0 0 standard drinks    Comment: social on occasion     Social History     Tobacco Use   Smoking Status Former Smoker    Packs/day: 0 25    Years: 10 00    Pack years: 2 50    Types: Cigarettes    Start date: 7/15/1999   Hortencia Bang Quit date:     Years since quittin 2   Smokeless Tobacco Never Used     Social History     Substance and Sexual Activity   Drug Use No       Family History:    Family History   Problem Relation Age of Onset    Supraventricular tachycardia Mother     Atrial fibrillation Mother     Diabetes type II Mother     Cancer Mother     Osteoporosis Mother     Ulcerative colitis Father     Liver disease Father     ADD / ADHD Son     Colon cancer Family     Diabetes type II Family     Hyperlipidemia Family     Hypertension Family     Hypothyroidism Family     Heart disease Family     COPD Paternal Grandfather     Lung cancer Neg Hx     Asthma Neg Hx        Physical Exam:     Vitals:   Blood Pressure: 120/71 (22 07)  Pulse: 88 (22)  Temperature: 98 4 °F (36 9 °C) (22)  Temp Source: Oral (22 2318)  Respirations: 20 (22)  Height: 5' 9" (175 3 cm) (22 1149)  Weight - Scale: (!) 150 kg (330 lb 3 2 oz) (22 1149)  SpO2: 93 % (22 07)    Physical Exam  Vitals reviewed  HENT:      Head: Normocephalic and atraumatic  Cardiovascular:      Rate and Rhythm: Normal rate and regular rhythm  Pulses: Normal pulses  Heart sounds: Normal heart sounds  Pulmonary:      Effort: Pulmonary effort is normal       Breath sounds: Normal breath sounds  Abdominal:      General: Bowel sounds are normal       Palpations: Abdomen is soft  Comments: Sutures clean without drainage  No tenderness to palpation    Skin:     General: Skin is warm and dry  Neurological:      General: No focal deficit present  Mental Status: He is alert and oriented to person, place, and time  Psychiatric:         Mood and Affect: Mood normal          Behavior: Behavior normal            Lab Results: I Have Reviewed All Lab Data Below:    Results from last 7 days   Lab Units 04/05/22  0551   WBC Thousand/uL 9 18   HEMOGLOBIN g/dL 15 5   PLATELETS Thousands/uL 227     Results from last 7 days   Lab Units 04/05/22  0551   SODIUM mmol/L 139   POTASSIUM mmol/L 3 8   CHLORIDE mmol/L 104   CO2 mmol/L 25   BUN mg/dL 11   CREATININE mg/dL 0 71   CALCIUM mg/dL 8 6             Lab Results   Component Value Date/Time    HGBA1C 6 4 (H) 10/15/2021 11:03 AM     Results from last 7 days   Lab Units 04/05/22  0730 04/05/22  0539 04/05/22  0147 04/04/22  2102 04/04/22  1529 04/04/22  1215   POC GLUCOSE mg/dl 103 109 118 143* 142* 175*           Blood Culture: No results found for: BLOODCX  Urine Culture: No results found for: URINECX  Sputum Culture: No components found for: SPUTUMCX  Wound Culture: No results found for: [facility]        * Additional Pertinent Lab Tests Reviewed: [address] Admission Reviewed    Imaging: I have personally reviewed pertinent reports  No orders to display     History of Selin-en-Y gastric bypass  Assessment & Plan  Pt is S/POD1  Selin en Y     Currently following with bariatrics for weight loss management   Continue Clear liquid diet   Continue pain medication per surgical team      Benign essential hypertension  Assessment & Plan  - Continue home lisinopril 20 mg daily  - Continue home carvedilol 6 25 mg tablet b i d   - monitor BP     Morbid obesity with body mass index (BMI) of 40 0 to 49 9 [facility])  Assessment & Plan  Pt is S/POD1  Selin en Y  Currently following with bariatrics for weight loss management   Continue Clear liquid diet   Continue pain medication per surgical team      MANUELA (obstructive sleep apnea)  Assessment & Plan  Stable     Pt has moderate sleep apnea from sleep study in 10/2021  Continue CPAP at night with autotitration of 5-20cc of H2O pressure  Pt should follow up with Pulmonology when discharged    Type 2 diabetes mellitus without complication, with long-term current use of insulin [facility])  Assessment & Plan  Lab Results   Component Value Date    HGBA1C 6 4 (H) 10/15/2021       Recent Labs     04/04/22  1529 04/04/22  2102 04/05/22  0147 04/05/22  0539   POCGLU 142* 143* 118 109       Blood Sugar Average: Last 72 hrs:  (P) 137 4     Patient on home regimen of glargine 15 units at bedtime and metformin 1000 mg b i d     - patient is status post Selin-en-Y on liquid diet  - Pt can continue metformin at discharge and monitor Glucose  - Given that patient will remain on clear liquid diet, he should log Glucose and follow up with PCP after discharge     GERD (gastroesophageal reflux disease)  Assessment & Plan  Continue home Prilosec 40 mg daily    Anxiety  Assessment & Plan  Stable    Pt will follow up outpt     Tachycardia  Assessment & Plan  Stable     Most likely secondary to pain       VTE Prophylaxis: Heparin  / sequential compression device       Counseling / Coordination of Care Time: 20 minutes  Greater than 50% of total time spent on patient counseling and coordination of care  Collaboration of Care:  Were Recommendations Directly Discussed with Primary Treatment Team? - Yes     ** Please Note: Dragon 360 Dictation speech to text software may have been used in the creation of this document **

## 2022-04-05 NOTE — NURSING NOTE
Patient's IV removed  Patient left with all belongings  AVS reviewed with patient and spouse  Patient verbalized understanding

## 2022-04-05 NOTE — ASSESSMENT & PLAN NOTE
Pt is S/POD1  Selin en Y     Currently following with bariatrics for weight loss management   Continue Clear liquid diet   Continue pain medication per surgical team

## 2022-04-05 NOTE — ASSESSMENT & PLAN NOTE
Stable     Pt has moderate sleep apnea from sleep study in 10/2021  Continue CPAP at night with autotitration of 5-20cc of H2O pressure       Pt should follow up with Pulmonology when discharged

## 2022-04-05 NOTE — UTILIZATION REVIEW
Initial Clinical Review    Elective inpatient surgical procedure  Age/Sex: 37 y o  male  Surgery Date: 4/4/22  Procedure:   LAPAROSCOPIC SARI-EN-Y GASTRIC BYPASS AND INTRAOPERATIVE EGD   LYSIS ADHESIONS LAPAROSCOPIC  Anesthesia: general  Operative Findings:   Umbilical hernia (completely reducible)  Omental adhesions to anterior abdominal wall  Massive hepatomegaly     POD#1 Progress Note:   D/c home today, 4/5    Admission Orders: Date/Time/Statement:   Admission Orders (From admission, onward)     Ordered        04/04/22 1530  Inpatient Admission  Once                      Orders Placed This Encounter   Procedures    Inpatient Admission     Standing Status:   Standing     Number of Occurrences:   1     Order Specific Question:   Level of Care     Answer:   Med Surg [16]     Order Specific Question:   Bed Type     Answer:   Bariatric [1]     Order Specific Question:   Estimated length of stay     Answer:   Inpatient Only Surgery     Vital Signs: /71   Pulse 88   Temp 98 4 °F (36 9 °C)   Resp 20   Ht 5' 9" (1 753 m)   Wt (!) 150 kg (330 lb 3 2 oz)   SpO2 93%   BMI 48 76 kg/m²     Pertinent Labs/Diagnostic Test Results:   Results from last 7 days   Lab Units 03/30/22  1310   SARS-COV-2  Negative     Results from last 7 days   Lab Units 04/05/22  0551   WBC Thousand/uL 9 18   HEMOGLOBIN g/dL 15 5   HEMATOCRIT % 47 6   PLATELETS Thousands/uL 227     Results from last 7 days   Lab Units 04/05/22  0551   SODIUM mmol/L 139   POTASSIUM mmol/L 3 8   CHLORIDE mmol/L 104   CO2 mmol/L 25   ANION GAP mmol/L 10   BUN mg/dL 11   CREATININE mg/dL 0 71   EGFR ml/min/1 73sq m 117   CALCIUM mg/dL 8 6     Results from last 7 days   Lab Units 04/05/22  0730 04/05/22  0539 04/05/22  0147 04/04/22  2102 04/04/22  1529 04/04/22  1215   POC GLUCOSE mg/dl 103 109 118 143* 142* 175*     Results from last 7 days   Lab Units 04/05/22  0551   GLUCOSE RANDOM mg/dL 104     Diet: bariatric clear liquids   Mobility: ambulate  DVT Prophylaxis: scd    Medications/Pain Control:   Scheduled Medications:  acetaminophen, 975 mg, Oral, Q6H [address]  carvedilol, 6 25 mg, Oral, BID With Meals  famotidine, 20 mg, Intravenous, Q12H AMARJIT  insulin lispro, 2-12 Units, Subcutaneous, Q6H AMARJIT  ketorolac, 15 mg, Intravenous, Q6H [address]  lisinopril, 20 mg, Oral, QAM  scopolamine, 1 patch, Transdermal, Once  simethicone, 80 mg, Oral, Q12H [address]    Continuous IV Infusions:  lactated ringers, 125 mL/hr, Intravenous, Continuous  lactated ringers, 100 mL/hr, Intravenous, Continuous    PRN Meds:  diphenhydrAMINE, 25 mg, Oral, HS PRN  HYDROmorphone, 1 mg, Intravenous, Q4H PRN  LORazepam, 0 5 mg, Intravenous, Q6H PRN  metoclopramide, 10 mg, Intravenous, Q6H PRN  ondansetron, 4 mg, Intravenous, Q6H PRN  oxyCODONE, 10 mg, Oral, Q4H PRN  oxyCODONE, 5 mg, Oral, Q4H PRN  phenol, 1 spray, Mouth/Throat, Q2H PRN  promethazine, 25 mg, Intramuscular, Q6H PRN    Network Utilization Review Department  ATTENTION: Please call with any questions or concerns to [phone] and carefully listen to the prompts so that you are directed to the right person  All voicemails are confidential   Kenia Mckeon all requests for admission clinical reviews, approved or denied determinations and any other requests to dedicated fax number below belonging to the campus where the patient is receiving treatment   List of dedicated fax numbers for the Facilities:  [address] DENIALS (Administrative/Medical Necessity) [phone]   [address] (Maternity/NICU/Pediatrics) [phone]   [address] [facility] [address] [address] [facility] Elaine Cottrell 1481 [address] [phone]

## 2022-04-05 NOTE — PROGRESS NOTES
Progress Note - Bariatric Surgery   Skipper Severe 37 y o  male MRN: [id number]  Unit/Bed#: [address] [place] [address] Encounter: [id number]      Subjective/Objective     Subjective: Tolerating liquid diet without nausea or vomiting, pain adequately controlled on oral pain medication, limited ambulation - not in hallways yet, voiding well, using incentive spirometer  Denies fevers, chills, sweats, SOB, CP, calf pain  Objective:    /71   Pulse 88   Temp 98 4 °F (36 9 °C)   Resp 20   Ht 5' 9" (1 753 m)   Wt (!) 150 kg (330 lb 3 2 oz)   SpO2 93%   BMI 48 76 kg/m²       Intake/Output Summary (Last 24 hours) at 4/5/2022 0805  Last data filed at 4/5/2022 0630  Gross per 24 hour   Intake 3050 ml   Output 920 ml   Net 2130 ml       Invasive Devices  Report    Peripheral Intravenous Line            Peripheral IV 04/04/22 Left Forearm <1 day    Peripheral IV 04/04/22 Left Wrist <1 day                ROS: 10-point system completed  All negative except see HPI  Physical Exam    General Appearance:    Alert, cooperative, no distress, appears stated age   Head:    Normocephalic, poor dentition   Lungs:     respirations unlabored   Heart:    Regular rate and rhythm   Abdomen:     Soft, appropriate tenderness, non distended, incisions clean, dry, and intact   Extremities:   Extremities normal, atraumatic, no cyanosis or edema                   Lab, Imaging and other studies:  I have personally reviewed pertinent lab results    , CBC:   Lab Results   Component Value Date    WBC 9 18 04/05/2022    HGB 15 5 04/05/2022    HCT 47 6 04/05/2022    MCV 92 04/05/2022     04/05/2022    MCH 29 9 04/05/2022    MCHC 32 6 04/05/2022    RDW 13 6 04/05/2022    MPV 9 5 04/05/2022   , CMP:   Lab Results   Component Value Date    SODIUM 139 04/05/2022    K 3 8 04/05/2022     04/05/2022    CO2 25 04/05/2022    BUN 11 04/05/2022    CREATININE 0 71 04/05/2022    CALCIUM 8 6 04/05/2022    EGFR 117 04/05/2022        VTE Mechanical Prophylaxis: sequential compression device    Assessment/Plan  36 yo M s/p lap RYGB, JAGUAR, EGD POD1 with stable post op course  Encourage PO fluids, ambulation, and incentive spirometry  Will plan for D/C home today    -DM - blood sugars well controlled post-op  Has not needed SSI  Appreciate D/C recommendations per Jose Tilley PCP    -HTN - well controlled, home meds per Jose Tilley    -MANUELA - continue CPAP    Plan of care was discussed with patient and patient's nurse  Care plan discussed with Dr Marcie Ocasio  Dispo: Continue bariatric clear liquid diet, ambulation, incentive spirometry         Sara Olivas PA-C  4/5/2022  8:05 AM

## 2022-04-05 NOTE — PLAN OF CARE
Problem: PAIN - ADULT  Goal: Verbalizes/displays adequate comfort level or baseline comfort level  Description: Interventions:  - Encourage patient to monitor pain and request assistance  - Assess pain using appropriate pain scale  - Administer analgesics based on type and severity of pain and evaluate response  - Implement non-pharmacological measures as appropriate and evaluate response  - Consider cultural and social influences on pain and pain management  - Notify physician/advanced practitioner if interventions unsuccessful or patient reports new pain  Outcome: Progressing     Problem: GASTROINTESTINAL - ADULT  Goal: Minimal or absence of nausea and/or vomiting  Description: INTERVENTIONS:  - Administer IV fluids if ordered to ensure adequate hydration  - Administer ordered antiemetic medications as needed  - Provide nonpharmacologic comfort measures as appropriate  - Advance diet as tolerated, if ordered  - Consider nutrition services referral to assist patient with adequate nutrition and appropriate food choices  Outcome: Progressing     Problem: SKIN/TISSUE INTEGRITY - ADULT  Goal: Incision(s), wounds(s) or drain site(s) healing without S/S of infection  Description: INTERVENTIONS  - Assess and document dressing, incision, wound bed, drain sites and surrounding tissue  - Provide patient and family education  - Perform skin care  Outcome: Progressing

## 2022-04-06 ENCOUNTER — TRANSITIONAL CARE MANAGEMENT (OUTPATIENT)
Dept: FAMILY MEDICINE CLINIC | Facility: CLINIC | Age: 42
End: 2022-04-06

## 2022-04-06 ENCOUNTER — TELEPHONE (OUTPATIENT)
Dept: BARIATRICS | Facility: CLINIC | Age: 42
End: 2022-04-06

## 2022-04-06 NOTE — UTILIZATION REVIEW
Notification of Discharge   This is a Notification of Discharge from our facility [address]  Please be advised that this patient has been discharge from our facility  Below you will find the admission and discharge date and time including the patients disposition  UTILIZATION REVIEW CONTACT:  Shelley Connor  Utilization   Network Utilization Review Department  Phone: [phone] x carefully listen to the prompts  All voicemails are confidential   Email: [email or web address]     PHYSICIAN ADVISORY SERVICES:  FOR [place] REVIEW - MEDICAL NECESSITY DENIAL  Phone: [phone]  Fax: [phone]  Email: [email or web address]     PRESENTATION DATE: 4/4/2022 11:24 AM  OBERVATION ADMISSION DATE:   INPATIENT ADMISSION DATE: 4/4/22  3:30 PM   DISCHARGE DATE: 4/5/2022  4:03 PM  DISPOSITION: Home/Self Care Home/Self Care      IMPORTANT INFORMATION:  Send all requests for admission clinical reviews, approved or denied determinations and any other requests to dedicated fax number below belonging to the campus where the patient is receiving treatment   List of dedicated fax numbers:  [address] DENIALS (Administrative/Medical Necessity) [phone]   [address] (Maternity/NICU/Pediatrics) [phone]   [facility] [phone]   [facility] [phone]   [facility] [phone]   [address] [phone]   [facility]  [phone]   [address] [place] [address], [facility]  [address] [phone]

## 2022-04-07 ENCOUNTER — OFFICE VISIT (OUTPATIENT)
Dept: FAMILY MEDICINE CLINIC | Facility: CLINIC | Age: 42
End: 2022-04-07

## 2022-04-07 VITALS
HEIGHT: 69 IN | BODY MASS INDEX: 46.65 KG/M2 | DIASTOLIC BLOOD PRESSURE: 70 MMHG | HEART RATE: 72 BPM | SYSTOLIC BLOOD PRESSURE: 114 MMHG | WEIGHT: 315 LBS | TEMPERATURE: 97.7 F | RESPIRATION RATE: 16 BRPM | OXYGEN SATURATION: 94 %

## 2022-04-07 DIAGNOSIS — E11.9 TYPE 2 DIABETES MELLITUS WITHOUT COMPLICATION, WITH LONG-TERM CURRENT USE OF INSULIN (HCC): ICD-10-CM

## 2022-04-07 DIAGNOSIS — Z76.89 ENCOUNTER FOR SUPPORT AND COORDINATION OF TRANSITION OF CARE: Primary | ICD-10-CM

## 2022-04-07 DIAGNOSIS — Z79.4 TYPE 2 DIABETES MELLITUS WITHOUT COMPLICATION, WITH LONG-TERM CURRENT USE OF INSULIN (HCC): ICD-10-CM

## 2022-04-07 DIAGNOSIS — E66.01 MORBID OBESITY WITH BODY MASS INDEX (BMI) OF 40.0 TO 49.9 (HCC): ICD-10-CM

## 2022-04-07 DIAGNOSIS — I10 BENIGN ESSENTIAL HYPERTENSION: ICD-10-CM

## 2022-04-07 DIAGNOSIS — Z98.84 HISTORY OF ROUX-EN-Y GASTRIC BYPASS: ICD-10-CM

## 2022-04-07 PROBLEM — R00.0 TACHYCARDIA: Status: RESOLVED | Noted: 2018-09-18 | Resolved: 2022-04-07

## 2022-04-07 PROBLEM — R19.7 DIARRHEA: Status: RESOLVED | Noted: 2018-01-19 | Resolved: 2022-04-07

## 2022-04-07 NOTE — PROGRESS NOTES
Assessment/Plan:      Diagnoses and all orders for this visit:    Encounter for support and coordination of transition of care    History of Selin-en-Y gastric bypass    Morbid obesity with body mass index (BMI) of 40 0 to 49 9 (HCC)    Benign essential hypertension    Type 2 diabetes mellitus without complication, with long-term current use of insulin ([address] )      Follows up with scheduled appointments with weight management and our office  Monitor BSGS at home closely fasting and post prandial  Monitor amb Bps as well to possible adjust home anti-hypertensives at next visit  Subjective:     Patient ID: Marylou Taylor is a 37 y o  male who presents today for transition of care appointment after hospitalization at [facility] for [address] on 4/4/22  No problems intra-operatively       BSG  Fasting only - 80-90s  Patient not measuring post prandial BSGs      Review of Systems      Objective:     Physical Exam

## 2022-04-08 NOTE — PROGRESS NOTES
Assessment/Plan:      Diagnoses and all orders for this visit:    History of Selin-en-Y gastric bypass    Type 2 diabetes mellitus without complication, with long-term current use of insulin (HCC)    Morbid obesity with body mass index (BMI) of 40 0 to 49 9 [facility])          Patient appears to be doing well today  Glucose levels are in a fair range on metformin 500 twice a day  Incision scars appear to be healing well with minimal pain  Patient has appointment with bariatric surgery on 04/14  Advised to attend appointment and comply with recommendations  Return for annual physical in 1 month  Subjective:     Patient ID: Reagan Coley is a 37 y o  male  HPI   Patient here today for postop check following Selin-en-Y bariatric surgery  Patient was hospitalized from 04/04 to 4/5 for procedure and was seen in the office last week to monitor his blood sugars  Today, patient states he has decreased his metformin from a 1000 mg b i d  To 500 mg b i d  and is no longer taking insulin  His blood glucoses ranged from   Additionally, patient states he is doing well with no acute complaints  Review of Systems   Constitutional: Negative for chills and fever  HENT: Negative for sore throat  Respiratory: Negative for cough and shortness of breath  Cardiovascular: Negative for chest pain and palpitations  Gastrointestinal: Negative for abdominal pain, constipation, diarrhea, nausea and vomiting  Genitourinary: Negative for difficulty urinating and dysuria  Musculoskeletal: Positive for back pain  Neurological: Negative for dizziness and headaches  Objective:  Vitals:    04/11/22 1003   BP: 120/70   Pulse: 78   Resp: 18   Temp: 97 6 °F (36 4 °C)   SpO2: 97%        Physical Exam  Constitutional:       Appearance: Normal appearance  He is obese  HENT:      Head: Normocephalic and atraumatic  Cardiovascular:      Rate and Rhythm: Normal rate and regular rhythm        Heart sounds: Normal heart sounds  No murmur heard  Pulmonary:      Breath sounds: Normal breath sounds  No wheezing  Abdominal:      Palpations: Abdomen is soft  Tenderness: There is no abdominal tenderness  Hernia: A hernia is present  Musculoskeletal:      Right lower leg: No edema  Left lower leg: No edema  Neurological:      Mental Status: He is alert

## 2022-04-11 ENCOUNTER — OFFICE VISIT (OUTPATIENT)
Dept: FAMILY MEDICINE CLINIC | Facility: CLINIC | Age: 42
End: 2022-04-11
Payer: COMMERCIAL

## 2022-04-11 VITALS
RESPIRATION RATE: 18 BRPM | HEART RATE: 78 BPM | BODY MASS INDEX: 46.65 KG/M2 | OXYGEN SATURATION: 97 % | WEIGHT: 315 LBS | SYSTOLIC BLOOD PRESSURE: 120 MMHG | TEMPERATURE: 97.6 F | DIASTOLIC BLOOD PRESSURE: 70 MMHG | HEIGHT: 69 IN

## 2022-04-11 DIAGNOSIS — Z79.4 TYPE 2 DIABETES MELLITUS WITHOUT COMPLICATION, WITH LONG-TERM CURRENT USE OF INSULIN (HCC): ICD-10-CM

## 2022-04-11 DIAGNOSIS — Z98.84 HISTORY OF ROUX-EN-Y GASTRIC BYPASS: Primary | ICD-10-CM

## 2022-04-11 DIAGNOSIS — E11.9 TYPE 2 DIABETES MELLITUS WITHOUT COMPLICATION, WITH LONG-TERM CURRENT USE OF INSULIN (HCC): ICD-10-CM

## 2022-04-11 DIAGNOSIS — E66.01 MORBID OBESITY WITH BODY MASS INDEX (BMI) OF 40.0 TO 49.9 (HCC): ICD-10-CM

## 2022-04-11 PROCEDURE — 99213 OFFICE O/P EST LOW 20 MIN: CPT | Performed by: FAMILY MEDICINE

## 2022-04-14 ENCOUNTER — OFFICE VISIT (OUTPATIENT)
Dept: BARIATRICS | Facility: CLINIC | Age: 42
End: 2022-04-14

## 2022-04-14 VITALS — BODY MASS INDEX: 46.65 KG/M2 | HEIGHT: 69 IN | WEIGHT: 315 LBS

## 2022-04-14 VITALS
HEART RATE: 70 BPM | BODY MASS INDEX: 46.65 KG/M2 | SYSTOLIC BLOOD PRESSURE: 118 MMHG | DIASTOLIC BLOOD PRESSURE: 68 MMHG | HEIGHT: 69 IN | WEIGHT: 315 LBS

## 2022-04-14 DIAGNOSIS — I10 BENIGN ESSENTIAL HYPERTENSION: ICD-10-CM

## 2022-04-14 DIAGNOSIS — E78.2 MIXED HYPERLIPIDEMIA: ICD-10-CM

## 2022-04-14 DIAGNOSIS — G47.33 OSA (OBSTRUCTIVE SLEEP APNEA): ICD-10-CM

## 2022-04-14 DIAGNOSIS — Z79.4 TYPE 2 DIABETES MELLITUS WITHOUT COMPLICATION, WITH LONG-TERM CURRENT USE OF INSULIN (HCC): ICD-10-CM

## 2022-04-14 DIAGNOSIS — K91.2 POSTSURGICAL MALABSORPTION: Primary | ICD-10-CM

## 2022-04-14 DIAGNOSIS — K91.2 POSTSURGICAL MALABSORPTION: ICD-10-CM

## 2022-04-14 DIAGNOSIS — E66.01 MORBID OBESITY WITH BODY MASS INDEX (BMI) OF 40.0 TO 49.9 (HCC): ICD-10-CM

## 2022-04-14 DIAGNOSIS — E66.01 MORBID (SEVERE) OBESITY DUE TO EXCESS CALORIES (HCC): ICD-10-CM

## 2022-04-14 DIAGNOSIS — E11.9 TYPE 2 DIABETES MELLITUS WITHOUT COMPLICATION, WITH LONG-TERM CURRENT USE OF INSULIN (HCC): ICD-10-CM

## 2022-04-14 DIAGNOSIS — Z48.815 ENCOUNTER FOR SURGICAL AFTERCARE FOLLOWING SURGERY ON THE DIGESTIVE SYSTEM: Primary | ICD-10-CM

## 2022-04-14 DIAGNOSIS — K21.9 GERD (GASTROESOPHAGEAL REFLUX DISEASE): ICD-10-CM

## 2022-04-14 PROCEDURE — RECHECK

## 2022-04-14 PROCEDURE — 99024 POSTOP FOLLOW-UP VISIT: CPT | Performed by: SURGERY

## 2022-04-14 NOTE — PROGRESS NOTES
FIRST POST-OPERATIVE VISIT - BARIATRIC SURGERY  Marylou Taylor 37 y o  male MRN: [id number]  Unit/Bed#:  Encounter: [id number]      HPI:  Marylou Taylor is a 37 y o  male who presents for the 1st postoperative visit following a laparoscopic Sari-en-Y gastric bypass  He is doing well  No complaints  Tolerating diet and pain controlled  Historical Information   Past Medical History:   Diagnosis Date    Abdominal wall hernia     Bell's palsy     2 bouts - idiopathic, possible stress induced    CPAP (continuous positive airway pressure) dependence     Depression     Diabetes mellitus ([facility] ) 03/25/21    Blood work    GERD (gastroesophageal reflux disease)     Hypertension     Irregular heart beat     Morbid obesity with BMI of 60 0-69 9, adult ([facility] )     Neurocardiogenic syncope     Sleep apnea     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 COLONOSCOPY N/A 2/2/2018    Procedure: COLONOSCOPY;  Surgeon: Wilfredo Nance MD;  Location: [facility] GI LAB; Service: Gastroenterology    ESOPHAGOGASTRODUODENOSCOPY N/A 2/2/2018    Procedure: ESOPHAGOGASTRODUODENOSCOPY (EGD); Surgeon: Wilfredo Nance MD;  Location: [facility] GI LAB;   Service: Gastroenterology    NE LAP GASTRIC BYPASS/SARI-EN-Y N/A 4/4/2022    Procedure: LAPAROSCOPIC SARI-EN-Y GASTRIC BYPASS AND INTRAOPERATIVE EGD;  Surgeon: Celine Lozoya MD;  Location: [address] [place] [address];  Service: Bariatrics    TESTICLE SURGERY Left     infected testicle    TOENAIL EXCISION Right 09/07/2021     Social History   Social History     Substance and Sexual Activity   Alcohol Use Yes    Alcohol/week: 0 0 standard drinks    Comment: social on occasion     Social History     Substance and Sexual Activity   Drug Use No     Social History     Tobacco Use   Smoking Status Former Smoker    Packs/day: 0 25    Years: 10 00    Pack years: 2 50    Types: Cigarettes    Start date: 7/15/1999   Ray Flower Quit date: 2010    Years since quittin 2   Smokeless Tobacco Never Used     Family History: non-contributory    Meds/Allergies   all medications and allergies reviewed  Allergies   Allergen Reactions    Bee Venom Anaphylaxis     Annotation - [id number]: wasp, hornet also    Macrolides And Ketolides Anaphylaxis and Rash    Other Hives, Shortness Of Breath and Wheezing     Annotation - [id number]:  violet    Pertussis Vaccine Anaphylaxis    Pertussis Vaccines Anaphylaxis and Rash    Vancomycin Shortness Of Breath    Zithromax [Azithromycin] Anaphylaxis    Erythromycin Rash    Pollen Extract Sneezing       Objective     Current Vitals:   Blood Pressure: 118/68 (22)  Pulse: 70 (22)  Height: 5' 9" (175 3 cm) (22)  Weight - Scale: (!) 146 kg (321 lb 9 6 oz) (22)     Invasive Devices  Report    None                 Physical Exam  Cardiovascular:      Rate and Rhythm: Normal rate  Pulmonary:      Effort: Pulmonary effort is normal  No respiratory distress  Abdominal:      General: Abdomen is flat  There is no distension  Palpations: Abdomen is soft  Tenderness: There is no abdominal tenderness  Comments: Wounds clean/dry/intact without surrounding erythema   Neurological:      Mental Status: He is alert  Assessment/Plan :    Patient is presenting for the first postoperative visit, patient hospital stay was uneventful without any complications, patient is doing well, has no complaints, is taking vitamins as instructed, currently tolerating the blenderized diet, will advance to soft diet       Patient will also be meeting with our dietician today to review vitamin and mineral supplements and also go over diet and emphasize postoperative commitment and compliance  The patient was also instructed to start exercising on a regular basis  However, I recommended no heavy lifting, or weight exercises for another 2 weeks  F/U in 4 weeks  Patient was instructed to call if develops nausea, vomiting, fever or chills

## 2022-04-14 NOTE — PROGRESS NOTES
Weight Management Nutrition Class     Diagnosis: Morbid Obesity    Bariatric Surgeon: Dr Heidy Mcguire    Surgery: Gastric Bypass Laparoscopic    Class: first post op note    Topics discussed today include:     fluid goals post op, protein goals post op, constipation, chew food well, exercise, avoidance of alcohol, PPI use, diet progression, hypoglycemia, dumping syndrome, protein supplems, vitamin/mineral supplements and calcium supplements    Patient was able to verbalize basic diet (protein, fluid, vitamin and mineral) recommendations and possible nutrition-related complications   Yes     Tried:  Infusions tuna bowl w/ann/mustard/pickle juice mashed well, deviled ham and chicken and Oikos triple zero  Eating every 4 hrs and measuring 1/4 cup, max 1/3 cup  Following 30/60 rule  Fluids:  64-80oz fluids per day (non-geoff fluids + protein shake + broth)  Protein shakes:  Equate 2 per day    protein shake  8:30am 1/4 cup oikos  12:30-tuna or chicken salad  Protein shake  Dinner:  Same as lunch    Vitamins:  Celebrate calcium chewy 1 TID  Celebrate One a day--grabbed the capsule instead of the chewable w/18mg iron--has been opening the cap for now

## 2022-04-26 ENCOUNTER — CONSULT (OUTPATIENT)
Dept: SURGERY | Facility: CLINIC | Age: 42
End: 2022-04-26
Payer: COMMERCIAL

## 2022-04-26 VITALS
TEMPERATURE: 95.3 F | HEIGHT: 69 IN | OXYGEN SATURATION: 94 % | BODY MASS INDEX: 46.65 KG/M2 | DIASTOLIC BLOOD PRESSURE: 70 MMHG | HEART RATE: 85 BPM | SYSTOLIC BLOOD PRESSURE: 124 MMHG | WEIGHT: 315 LBS

## 2022-04-26 DIAGNOSIS — K43.9 VENTRAL HERNIA WITHOUT OBSTRUCTION OR GANGRENE: Primary | ICD-10-CM

## 2022-04-26 DIAGNOSIS — I10 HYPERTENSION, UNSPECIFIED TYPE: ICD-10-CM

## 2022-04-26 PROCEDURE — 99213 OFFICE O/P EST LOW 20 MIN: CPT | Performed by: SURGERY

## 2022-04-26 PROCEDURE — 1036F TOBACCO NON-USER: CPT | Performed by: SURGERY

## 2022-04-26 PROCEDURE — 3078F DIAST BP <80 MM HG: CPT | Performed by: SURGERY

## 2022-04-26 PROCEDURE — 4010F ACE/ARB THERAPY RXD/TAKEN: CPT | Performed by: SURGERY

## 2022-04-26 PROCEDURE — 3074F SYST BP LT 130 MM HG: CPT | Performed by: SURGERY

## 2022-04-26 PROCEDURE — 3008F BODY MASS INDEX DOCD: CPT | Performed by: SURGERY

## 2022-04-26 RX ORDER — LISINOPRIL 20 MG/1
TABLET ORAL
Qty: 90 TABLET | Refills: 0 | Status: SHIPPED | OUTPATIENT
Start: 2022-04-26 | End: 2022-05-04 | Stop reason: ALTCHOICE

## 2022-04-26 NOTE — PROGRESS NOTES
[facility] History and Physical Note:    Assessment:  Umbilical hernia  Morbid obesity, BMI 48 (BMI 59 1 yr ago)    Plan:  Continue with behavior modification and proceed with further weight loss prior to planning ventral hernia repair  Chief Complaint:   "I am here to discuss when the hernia repair surgery should be done"    HPI  Patient is here to discuss timing of his ventral hernia repair  He is a 64-year-old morbidly obese male with a previously diagnosed  umbilical hernia containing omentum and small bowel  Due to his morbid obesity, weight loss was urged prior to any repair  He underwent Sari-en-Y gastric bypass earlier this month and return to the office now to discuss timing of his surgery  PMH:  Past Medical History:   Diagnosis Date    Abdominal wall hernia     Bell's palsy     2 bouts - idiopathic, possible stress induced    CPAP (continuous positive airway pressure) dependence     Depression     Diabetes mellitus ([address] ) 03/25/21    Blood work    GERD (gastroesophageal reflux disease)     Hypertension     Irregular heart beat     Morbid obesity with BMI of 60 0-69 9, adult ([address] )     Neurocardiogenic syncope     Sleep apnea     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     pt symptomatic with extreme and sudden environmental temp  fluctuations       PSH:  Past Surgical History:   Procedure Laterality Date    ABDOMINAL ADHESION SURGERY N/A 4/4/2022    Procedure: LYSIS ADHESIONS LAPAROSCOPIC;  Surgeon: Maria Isabel Alvarez MD;  Location: [address] [place] [address];  Service: Kayla Chairez COLONOSCOPY N/A 2/2/2018    Procedure: COLONOSCOPY;  Surgeon: Devin Cerda MD;  Location: [facility]; Service: Gastroenterology    ESOPHAGOGASTRODUODENOSCOPY N/A 2/2/2018    Procedure: ESOPHAGOGASTRODUODENOSCOPY (EGD); Surgeon: Devin Cerda MD;  Location: [facility];   Service: Gastroenterology    [place] LAP GASTRIC BYPASS/SARI-EN-Y N/A 4/4/2022    Procedure: LAPAROSCOPIC SARI-EN-Y GASTRIC BYPASS AND INTRAOPERATIVE EGD;  Surgeon: Obi Gorman MD;  Location: [address] [place] [address];  Service: Bariatrics    TESTICLE SURGERY Left     infected testicle    TOENAIL EXCISION Right 09/07/2021       Home Meds:  Current Outpatient Medications on File Prior to Visit   Medication Sig Dispense Refill    Blood Glucose Monitoring Suppl w/Device KIT Measure blood sugar twice daily  Morning after overnight fast and 2 hours after a meal 1 kit 0    carvedilol (COREG) 6 25 mg tablet Take 1 tablet (6 25 mg total) by mouth 2 (two) times a day with meals 180 tablet 2    Chromium 1000 MCG TABS Take 1 tablet by mouth daily      dicyclomine (BENTYL) 20 mg tablet Take 1 tablet (20 mg total) by mouth 3 (three) times a day (Patient taking differently: Take 20 mg by mouth as needed  ) 90 tablet 1    gabapentin (NEURONTIN) 800 mg tablet Take 1 tablet (800 mg total) by mouth 2 (two) times a day 60 tablet 2    Insulin Pen Needle 32G X 4 MM MISC Use daily at bedtime 100 each 3    Lancets (OneTouch Delica Plus [id number]) MISC USE 1 LANCET TO CHECK GLUCOSE TWICE DAILY   MORNING  AFTER  OVERNIGHT  FAST  AND  2  HOURS  AFTER  A  MEAL 100 each 0    lisinopril (ZESTRIL) 20 mg tablet TAKE 1 TABLET BY MOUTH ONCE DAILY IN THE MORNING 90 tablet 0    metFORMIN (GLUCOPHAGE) 500 mg tablet Take 1 tablet (500 mg total) by mouth 2 (two) times a day with meals 120 tablet 0    multivitamin (THERAGRAN) TABS Take 1 tablet by mouth daily      ondansetron (Zofran ODT) 4 mg disintegrating tablet Take 1 tablet (4 mg total) by mouth every 6 (six) hours as needed for nausea or vomiting 20 tablet 0    OneTouch Ultra test strip USE 1 STRIP TO CHECK GLUCOSE TWICE DAILY 100 each 0    oxyCODONE (Roxicodone) 5 immediate release tablet Take 1 tablet (5 mg total) by mouth every 4 (four) hours as needed for moderate pain Max Daily Amount: 30 mg 10 tablet 0    pantoprazole (PROTONIX) 40 mg tablet Take 1 tablet (40 mg total) by mouth daily 90 tablet 1    saccharomyces boulardii (FLORASTOR) 250 mg capsule Take 250 mg by mouth every morning       No current facility-administered medications on file prior to visit  Allergies: Allergies   Allergen Reactions    Bee Venom Anaphylaxis     Annotation - [id number]: wasp, hornet also    Macrolides And Ketolides Anaphylaxis and Rash    Other Hives, Shortness Of Breath and Wheezing     Annotation - [id number]:  violet    Pertussis Vaccine Anaphylaxis    Pertussis Vaccines Anaphylaxis and Rash    Vancomycin Shortness Of Breath    Zithromax [Azithromycin] Anaphylaxis    Erythromycin Rash    Pollen Extract Sneezing       Social Hx:  Social History     Socioeconomic History    Marital status: /Civil Union     Spouse name: Not on file    Number of children: 2    Years of education: Not on file    Highest education level: 12th grade   Occupational History    Not on file   Tobacco Use    Smoking status: Former Smoker     Packs/day: 0 25     Years: 10 00     Pack years: 2 50     Types: Cigarettes     Start date: 7/15/1999     Quit date:      Years since quittin 3    Smokeless tobacco: Never Used   Vaping Use    Vaping Use: Never used   Substance and Sexual Activity    Alcohol use: Yes     Alcohol/week: 0 0 standard drinks     Comment: social on occasion    Drug use: No    Sexual activity: Not Currently     Partners: Female     Birth control/protection: Abstinence, I U D  Other Topics Concern    Not on file   Social History Narrative    Denied: History of alcohol use (history) - as per [employer]    Always uses seat belt    Former smoker - as per [employer]    Never a smoker - as per [employer]     Social Determinants of Health     Financial Resource Strain: Not on file   Food Insecurity: Not on file   Transportation Needs: No Transportation Needs    Lack of Transportation (Medical): No    Lack of Transportation (Non-Medical):  No   Physical Activity: Not on file   Stress: Not on file Social Connections: Not on file   Intimate Partner Violence: Not on file   Housing Stability: Not on file        Family Hx:    Family History   Problem Relation Age of Onset    Supraventricular tachycardia Mother     Atrial fibrillation Mother     Diabetes type II Mother     Cancer Mother     Osteoporosis Mother     Ulcerative colitis Father     Liver disease Father     ADD / ADHD Son     Colon cancer Family     Diabetes type II Family     Hyperlipidemia Family     Hypertension Family     Hypothyroidism Family     Heart disease Family     COPD Paternal Grandfather     Lung cancer Neg Hx     Asthma Neg Hx          Review of Systems   Constitutional: Negative for chills and fever  HENT: Negative  Respiratory: Negative  Cardiovascular: Negative  Gastrointestinal: Negative  Genitourinary: Negative  Neurological: Negative  Hematological: Negative  All other systems reviewed and are negative  There were no vitals taken for this visit  Physical Exam  Vitals reviewed  Constitutional:       General: He is not in acute distress  Appearance: He is obese  Eyes:      Pupils: Pupils are equal, round, and reactive to light  Cardiovascular:      Rate and Rhythm: Normal rate and regular rhythm  Pulmonary:      Effort: Pulmonary effort is normal  No respiratory distress  Breath sounds: Normal breath sounds  Abdominal:      Comments:   Obese, soft, nondistended, nontender  Partially reducible bulge superior to umbilicus  Musculoskeletal:         General: Normal range of motion  Cervical back: Normal range of motion and neck supple  Skin:     General: Skin is warm and dry  Neurological:      General: No focal deficit present  Mental Status: He is alert and oriented to person, place, and time           Pertinent labs reviewed    Pertinent images and available reads personally reviewed    Pertinent notes reviewed   I reviewed the initial surgery consult note dated 5 February 2021     Ashish Enciso MD [address] [facility]  [phone]

## 2022-05-04 ENCOUNTER — OFFICE VISIT (OUTPATIENT)
Dept: FAMILY MEDICINE CLINIC | Facility: CLINIC | Age: 42
End: 2022-05-04
Payer: COMMERCIAL

## 2022-05-04 VITALS
RESPIRATION RATE: 20 BRPM | SYSTOLIC BLOOD PRESSURE: 100 MMHG | WEIGHT: 309.2 LBS | HEART RATE: 92 BPM | DIASTOLIC BLOOD PRESSURE: 76 MMHG | HEIGHT: 69 IN | BODY MASS INDEX: 45.8 KG/M2 | TEMPERATURE: 97.6 F | OXYGEN SATURATION: 96 %

## 2022-05-04 DIAGNOSIS — I10 BENIGN ESSENTIAL HYPERTENSION: ICD-10-CM

## 2022-05-04 DIAGNOSIS — I95.1 ORTHOSTATIC HYPOTENSION: ICD-10-CM

## 2022-05-04 DIAGNOSIS — Z11.4 SCREENING FOR HIV (HUMAN IMMUNODEFICIENCY VIRUS): Primary | ICD-10-CM

## 2022-05-04 PROCEDURE — 99213 OFFICE O/P EST LOW 20 MIN: CPT | Performed by: FAMILY MEDICINE

## 2022-05-04 RX ORDER — CARVEDILOL 3.12 MG/1
3.12 TABLET ORAL 2 TIMES DAILY WITH MEALS
Qty: 180 TABLET | Refills: 0 | Status: SHIPPED | OUTPATIENT
Start: 2022-05-04 | End: 2022-08-02

## 2022-05-04 RX ORDER — BLOOD PRESSURE TEST KIT
KIT MISCELLANEOUS DAILY
Qty: 1 KIT | Refills: 0 | Status: SHIPPED | OUTPATIENT
Start: 2022-05-04

## 2022-05-04 NOTE — PROGRESS NOTES
Assessment/Plan:      Diagnoses and all orders for this visit:    Screening for HIV (human immunodeficiency virus)  -     HIV 1/2 Antigen/Antibody (4th Generation) w Reflex [facility]; Future    Orthostatic hypotension  -     Blood Pressure Monitor KIT; Use in the morning    Benign essential hypertension  -     carvedilol (Coreg) 3 125 mg tablet; Take 1 tablet (3 125 mg total) by mouth 2 (two) times a day with meals      Patient advised that he currently has orthostatic hypostension  First line intervention is to reduce unnecessary medications that can exacerbate this  I also encourage adequate hydration  Patient's BP have been low since his surgery, with positive orthostatics today  I will stop his lisinopril today and cut coreg to 3 125 BID  I advised him to speak further to his cardiologist about workup for abnormal hearth rhythm  Follow up in our office in one week to check BP  Patient will also bring BGS logs  May need to titrate DM medication at that time  Subjective:     Patient ID: Emili Zhang is a 37 y o  male who presents today with dizziness while standing that has worsened over the past several weeks  He notes dizziness only when getting up from sitting  No otherwise associated with meals, exertion, head movement  He did have bariatric surgery on 4/4 and has noted weight loss since then  Patient is currently on lisinopril 20 mg qd and coreg 6 25 mg BID for htn  Coreg was also started for abnormal heart rhythm per patient, however there are not notes or halter monitoring correlating for this  Zesuzy Ellison states that he spoke to his cardiologist about this with plan for follow up to discuss this further after his surgery  Most recent echo in 6/21 grossly normal        Review of Systems   All other systems reviewed and are negative          Objective:  /76 (BP Location: Right arm, Patient Position: Standing, Cuff Size: Adult)   Pulse 92   Temp 97 6 °F (36 4 °C) (Tympanic)   Resp 20   Ht 5' 9" (1 753 m)   Wt (!) 140 kg (309 lb 3 2 oz)   SpO2 96%   BMI 45 66 kg/m²      Physical Exam  Vitals reviewed  Constitutional:       Appearance: He is obese  HENT:      Head: Normocephalic and atraumatic  Mouth/Throat:      Mouth: Mucous membranes are moist    Eyes:      Extraocular Movements: Extraocular movements intact  Cardiovascular:      Rate and Rhythm: Normal rate and regular rhythm  Pulses: Normal pulses  Heart sounds: Normal heart sounds  Pulmonary:      Breath sounds: Normal breath sounds  Abdominal:      Palpations: Abdomen is soft  Skin:     General: Skin is warm  Neurological:      Mental Status: He is alert     Psychiatric:         Mood and Affect: Mood normal          Behavior: Behavior normal

## 2022-05-04 NOTE — PATIENT INSTRUCTIONS
Please stop taking Lisinopril  Please change your carvedilol to 3 125 twice per day  We will see you back in one week

## 2022-05-09 ENCOUNTER — CLINICAL SUPPORT (OUTPATIENT)
Dept: BARIATRICS | Facility: CLINIC | Age: 42
End: 2022-05-09

## 2022-05-09 VITALS — BODY MASS INDEX: 44.58 KG/M2 | WEIGHT: 301 LBS | HEIGHT: 69 IN

## 2022-05-09 DIAGNOSIS — K91.2 POSTSURGICAL MALABSORPTION: Primary | ICD-10-CM

## 2022-05-09 PROCEDURE — 3008F BODY MASS INDEX DOCD: CPT | Performed by: FAMILY MEDICINE

## 2022-05-09 PROCEDURE — RECHECK

## 2022-05-10 NOTE — PROGRESS NOTES
Assessment/Plan:     Diagnoses and all orders for this visit:      Diabetic polyneuropathy associated with type 2 diabetes mellitus ([facility] )  -     Discontinue: gabapentin (NEURONTIN) 800 mg tablet; Take 1 tablet (800 mg total) by mouth in the morning and 1 tablet (800 mg total) in the evening   -     gabapentin (NEURONTIN) 400 mg capsule; Take 1 capsule (400 mg total) by mouth in the morning and 1 capsule (400 mg total) in the evening and 1 capsule (400 mg total) before bedtime  Radiculopathy of lumbar region  -     Discontinue: gabapentin (NEURONTIN) 800 mg tablet; Take 1 tablet (800 mg total) by mouth in the morning and 1 tablet (800 mg total) in the evening   -     gabapentin (NEURONTIN) 400 mg capsule; Take 1 capsule (400 mg total) by mouth in the morning and 1 capsule (400 mg total) in the evening and 1 capsule (400 mg total) before bedtime  Palpitations  Recommended f/u with Cardiology for possible Holter monitor given episodes of palpitations with family hx of Afib  He states he is consistently uses his CPAP to myself but to the Student examining him earlier stated he uses for about 40 mins per night to maximum 4 hours and at risk of Afib  -Advised to check his smartwacth ECG feature when experienceing an episode of palpitations and call PCP office and Cardiology office if Afib reported on watch  Diabetes Type 2  -Advised if morning fasting blood sugar drops below 80 , will taper his medications since his Bypass  He is on Metformin 500mg BID and no longer on insulin  Blood sugars morning fasting currently 105-110  Orthostatic Hypotension  - Advised to use compression stockings  Will hold off on in midodrine at this time  Will decrease total daily dose of Gabapentin from 800BID to 400 TID  Continued off of Lisinopril and Carvedilol 3 215mg BID and advised to follow up with cardiology prior to potential discontinuation   No hx of arrhythmia documented however reports episodes of occasional palpitations  Subjective:      Patient ID: Feliz Hart is a 37 y o  male  HPI    Patient is a 36 yo male who presents for f/u Orthostatic Hypotension  He is s/p Laparoscopic Selin-en-Y gastric bypass and has reported BP low since surgery on 4/4/2022  Lisinopril was stopped on last visit on 5/4/2022 and his Coreg was cut to 3 125mg BID and advised to f/u with cardiologist regarding dosing  Unable to find any hx of arrhythmia per chart review  Symptoms of light headedness, spots in eyes, and feeling weak in upper extremities when he stands up quickly  No hx of arrhythmias however patient reports episodes of palpitations several times per week with no exacerbating factors  Echo on 6/3/2021 shoed EF 65%     He states that at age 14 he had tilt table testing and was diagnosed with Neurocardiogenic syncope  Consumes >64oz of water per day  Has lost 60lbs since February and 30 lbs since Gastric bypass  Blood sugar logs show: 105-110 fasting  The following portions of the patient's history were reviewed and updated as appropriate: allergies, current medications, past family history, past medical history, past social history and problem list     Review of Systems   Constitutional: Negative for fever  Eyes: Negative for pain and visual disturbance  Respiratory: Negative for shortness of breath  Cardiovascular: Negative for chest pain and palpitations  Gastrointestinal: Negative for abdominal pain  Objective:      /70   Pulse 77   Temp 98 °F (36 7 °C)   Resp 18   Wt (!) 137 kg (302 lb)   SpO2 95%   BMI 44 60 kg/m²          Physical Exam  Constitutional:       Appearance: He is obese  Cardiovascular:      Rate and Rhythm: Normal rate and regular rhythm  Pulses: Normal pulses  Heart sounds: Normal heart sounds  Comments: RRR, S1 + S2  Pulmonary:      Effort: Pulmonary effort is normal    Skin:     General: Skin is warm        Capillary Refill: Capillary refill takes less than 2 seconds  Neurological:      General: No focal deficit present  Mental Status: He is alert     Psychiatric:         Mood and Affect: Mood normal          Behavior: Behavior normal

## 2022-05-10 NOTE — PROGRESS NOTES
Weight Management Nutrition Class     Diagnosis: Morbid Obesity    Bariatric Surgeon: Dr Samia Freitas    Surgery: Gastric Bypass Laparoscopic    Class: 5 week post op     Topics discussed today include:     fluid goals post op, protein goals post op, constipation, chew food well, exercise, avoidance of alcohol, PPI use, diet progression, hypoglycemia, dumping syndrome, protein supplems, vitamin/mineral supplements and calcium supplements    Patient was able to verbalize basic diet (protein, fluid, vitamin and mineral) recommendations and possible nutrition-related complications   Yes

## 2022-05-11 ENCOUNTER — OFFICE VISIT (OUTPATIENT)
Dept: FAMILY MEDICINE CLINIC | Facility: CLINIC | Age: 42
End: 2022-05-11
Payer: COMMERCIAL

## 2022-05-11 VITALS
OXYGEN SATURATION: 95 % | SYSTOLIC BLOOD PRESSURE: 138 MMHG | RESPIRATION RATE: 18 BRPM | BODY MASS INDEX: 44.6 KG/M2 | DIASTOLIC BLOOD PRESSURE: 70 MMHG | HEART RATE: 77 BPM | WEIGHT: 302 LBS | TEMPERATURE: 98 F

## 2022-05-11 DIAGNOSIS — E11.42 DIABETIC POLYNEUROPATHY ASSOCIATED WITH TYPE 2 DIABETES MELLITUS (HCC): ICD-10-CM

## 2022-05-11 DIAGNOSIS — M77.42 METATARSALGIA OF BOTH FEET: ICD-10-CM

## 2022-05-11 DIAGNOSIS — M77.41 METATARSALGIA OF BOTH FEET: ICD-10-CM

## 2022-05-11 DIAGNOSIS — M54.16 RADICULOPATHY OF LUMBAR REGION: ICD-10-CM

## 2022-05-11 PROCEDURE — 1036F TOBACCO NON-USER: CPT | Performed by: FAMILY MEDICINE

## 2022-05-11 PROCEDURE — 99213 OFFICE O/P EST LOW 20 MIN: CPT | Performed by: FAMILY MEDICINE

## 2022-05-11 PROCEDURE — 3075F SYST BP GE 130 - 139MM HG: CPT | Performed by: FAMILY MEDICINE

## 2022-05-11 PROCEDURE — 3078F DIAST BP <80 MM HG: CPT | Performed by: FAMILY MEDICINE

## 2022-05-11 RX ORDER — GABAPENTIN 400 MG/1
400 CAPSULE ORAL 3 TIMES DAILY
Qty: 120 CAPSULE | Refills: 2 | Status: SHIPPED | OUTPATIENT
Start: 2022-05-11

## 2022-05-11 RX ORDER — GABAPENTIN 800 MG/1
800 TABLET ORAL 2 TIMES DAILY
Qty: 60 TABLET | Refills: 2 | Status: SHIPPED | OUTPATIENT
Start: 2022-05-11 | End: 2022-05-11

## 2022-06-03 DIAGNOSIS — Z53.20 NUTRITION COUNSELING DECLINED: ICD-10-CM

## 2022-06-03 DIAGNOSIS — E66.01 MORBID OBESITY (HCC): ICD-10-CM

## 2022-06-03 DIAGNOSIS — Z71.3 DIABETIC NUTRITIONAL COUNSELING COMPLETED: ICD-10-CM

## 2022-06-03 DIAGNOSIS — E11.9 DM2 (DIABETES MELLITUS, TYPE 2) (HCC): ICD-10-CM

## 2022-06-03 NOTE — TELEPHONE ENCOUNTER
Patient called needing refill of metFORMIN (GLUCOPHAGE) 500 mg tablet  Confirmed to send to [facility]  Advised of 48-72 hr turn around

## 2022-07-05 PROBLEM — K91.2 POSTSURGICAL MALABSORPTION: Status: ACTIVE | Noted: 2022-07-05

## 2022-07-05 PROBLEM — Z48.815 ENCOUNTER FOR SURGICAL AFTERCARE FOLLOWING SURGERY OF DIGESTIVE SYSTEM: Status: ACTIVE | Noted: 2022-04-05

## 2022-07-05 NOTE — ASSESSMENT & PLAN NOTE
-Avoid fried foods and trans fat, limit saturated fats and refined carbohydrates  -Increase fish/omega 3 FA consumption  -Increase physical activity  -Repeat lipid panel

## 2022-07-05 NOTE — ASSESSMENT & PLAN NOTE
-At risk for malabsorption of vitamins/minerals secondary to malabsorption and restriction of intake from bariatric surgery  -NOT Currently taking adequate postop bariatric surgery vitamin supplementation: not taking vitamins d/t cost - reassures me he is now able to buy his Jennifer Rico MVI and calcium; doesn't want samples    -Obtain CBC/Metabolic panel   -Patient received education about the importance of adhering to a lifelong supplementation regimen to avoid vitamin/mineral deficiencies

## 2022-07-05 NOTE — ASSESSMENT & PLAN NOTE
Lab Results   Component Value Date    HGBA1C 5 5 04/05/2022     -no longer on DM meds, sugars well controlled per his report  -Repeat A1C  -Continue healthy diet, exercise, and weight loss progression

## 2022-07-05 NOTE — ASSESSMENT & PLAN NOTE
-s/p Selin-En-Y Gastric Bypass with Dr Ryan Marin on 04/04/22  EWL on schedule for expected post surgical weight loss at this time  Lengthy dietary/lifestyle education/counseling about getting back on track with healthy eating and exercise  Recommend f/u with RD and AMBERW  Initial: 388lbs  Current: 282 4lbs  EWL: 48%  [place]: current  Current BMI is Body mass index is 41 7 kg/m²  · Tolerating a regular diet-yes  · Eating at least 60 grams of protein per day-yes  · Following 30/60 minute rule with liquids-no  · Drinking at least 64 ounces of fluid per day-no  · Drinking carbonated beverages-yes, advised he eliminate soda  · Sufficient exercise-no; encouraged slowly starting walking program  · Using NSAIDs regularly-no  · Using nicotine-no  · Using alcohol-no   Advised about the risks of alcohol s/p bariatric surgery and recommend avoiding all alcohol

## 2022-07-06 ENCOUNTER — OFFICE VISIT (OUTPATIENT)
Dept: BARIATRICS | Facility: CLINIC | Age: 42
End: 2022-07-06
Payer: COMMERCIAL

## 2022-07-06 VITALS
SYSTOLIC BLOOD PRESSURE: 128 MMHG | WEIGHT: 282.4 LBS | BODY MASS INDEX: 41.83 KG/M2 | HEART RATE: 80 BPM | HEIGHT: 69 IN | DIASTOLIC BLOOD PRESSURE: 80 MMHG

## 2022-07-06 DIAGNOSIS — E78.2 MIXED HYPERLIPIDEMIA: ICD-10-CM

## 2022-07-06 DIAGNOSIS — E11.9 TYPE 2 DIABETES MELLITUS WITHOUT COMPLICATION, WITH LONG-TERM CURRENT USE OF INSULIN (HCC): ICD-10-CM

## 2022-07-06 DIAGNOSIS — Z48.815 ENCOUNTER FOR SURGICAL AFTERCARE FOLLOWING SURGERY OF DIGESTIVE SYSTEM: Primary | ICD-10-CM

## 2022-07-06 DIAGNOSIS — Z79.4 TYPE 2 DIABETES MELLITUS WITHOUT COMPLICATION, WITH LONG-TERM CURRENT USE OF INSULIN (HCC): ICD-10-CM

## 2022-07-06 DIAGNOSIS — K91.2 POSTSURGICAL MALABSORPTION: ICD-10-CM

## 2022-07-06 DIAGNOSIS — I10 BENIGN ESSENTIAL HYPERTENSION: ICD-10-CM

## 2022-07-06 DIAGNOSIS — G47.33 OSA (OBSTRUCTIVE SLEEP APNEA): ICD-10-CM

## 2022-07-06 PROCEDURE — 3079F DIAST BP 80-89 MM HG: CPT | Performed by: PHYSICIAN ASSISTANT

## 2022-07-06 PROCEDURE — 99214 OFFICE O/P EST MOD 30 MIN: CPT | Performed by: PHYSICIAN ASSISTANT

## 2022-07-06 PROCEDURE — 3074F SYST BP LT 130 MM HG: CPT | Performed by: PHYSICIAN ASSISTANT

## 2022-07-06 NOTE — PROGRESS NOTES
Assessment/Plan:    Encounter for surgical aftercare following surgery of digestive system  -s/p Selin-En-Y Gastric Bypass with Dr Federico Camilo on 04/04/22  EWL on schedule for expected post surgical weight loss at this time  Lengthy dietary/lifestyle education/counseling about getting back on track with healthy eating and exercise  Recommend f/u with RD and AMBERW  Initial: 388lbs  Current: 282 4lbs  EWL: 48%  Clinton: current  Current BMI is Body mass index is 41 7 kg/m²  · Tolerating a regular diet-yes  · Eating at least 60 grams of protein per day-yes  · Following 30/60 minute rule with liquids-no  · Drinking at least 64 ounces of fluid per day-no  · Drinking carbonated beverages-yes, advised he eliminate soda  · Sufficient exercise-no; encouraged slowly starting walking program  · Using NSAIDs regularly-no  · Using nicotine-no  · Using alcohol-no   Advised about the risks of alcohol s/p bariatric surgery and recommend avoiding all alcohol      Postsurgical malabsorption  -At risk for malabsorption of vitamins/minerals secondary to malabsorption and restriction of intake from bariatric surgery  -NOT Currently taking adequate postop bariatric surgery vitamin supplementation: not taking vitamins d/t cost - reassures me he is now able to buy his Jennifer Rico MVI and calcium; doesn't want samples    -Obtain CBC/Metabolic panel   -Patient received education about the importance of adhering to a lifelong supplementation regimen to avoid vitamin/mineral deficiencies       Type 2 diabetes mellitus without complication, with long-term current use of insulin ([facility] [address] )    Lab Results   Component Value Date    HGBA1C 5 5 04/05/2022     -no longer on DM meds, sugars well controlled per his report  -Repeat A1C  -Continue healthy diet, exercise, and weight loss progression    MANUELA (obstructive sleep apnea)  -Encouraged consistent use of CPAP and follow up with sleep medicine for mask refitting/adjustments       Benign essential hypertension  -On carvedilol  -Continue monitoring and management with prescribing provider    Mixed hyperlipidemia  -Avoid fried foods and trans fat, limit saturated fats and refined carbohydrates  -Increase fish/omega 3 FA consumption  -Increase physical activity  -Repeat lipid panel       Diagnoses and all orders for this visit:    Encounter for surgical aftercare following surgery of digestive system    Postsurgical malabsorption    Type 2 diabetes mellitus without complication, with long-term current use of insulin (HCC)    MANUELA (obstructive sleep apnea)    Benign essential hypertension    Mixed hyperlipidemia          Subjective:      Patient ID: Vesta Zuñiga is a 37 y o  male  -s/p Selin-En-Y Gastric Bypass with Dr Pierre Gibson on 04/04/22  Presents to the office today for routine follow up  Tolerating diet without issues; denies N/V, dysphagia, reflux  Overall doing very well with weight loss  Admits to frequent dumping episodes when eats sweets/ice cream/pasta/candy  He is drinking soda and sugar in coffee  Has not been taking his vitamins d/t cost   He has frequent rashes under abdominal skin folds  Diet Recall:   Upon waking - 20oz coffee with sugar and 2% milk  Late morning - Gingrs [place] yogurt  L - chicken breaded strips or tuna and bread/pasta - causes him nausea or salad with croutons  Snacks - ice pops SF  D - same as lunch - sometimes burger yazmin    Fluids - 50oz water, 20-40oz coffee, sometimes 20oz Dr Glorine Gaucher    The following portions of the patient's history were reviewed and updated as appropriate: allergies, current medications, past family history, past medical history, past social history, past surgical history and problem list     Review of Systems   Constitutional: Negative for chills and fever  Unexpected weight change: planned weight loss  HENT: Negative for trouble swallowing  Respiratory: Negative for cough and shortness of breath  Cardiovascular: Negative for chest pain and palpitations  Gastrointestinal: Positive for diarrhea  Negative for abdominal pain, constipation, nausea and vomiting  Skin: Positive for rash  Neurological: Negative for dizziness  Psychiatric/Behavioral:        Denies anxiety and depression         Objective:      /80 (BP Location: Right arm, Patient Position: Sitting, Cuff Size: Large)   Pulse 80   Ht 5' 9" (1 753 m)   Wt 128 kg (282 lb 6 4 oz)   BMI 41 70 kg/m²     Colonoscopy-Not applicable       Physical Exam  Vitals reviewed  Constitutional:       General: He is not in acute distress  Appearance: He is well-developed  He is obese  HENT:      Head: Normocephalic and atraumatic  Eyes:      General: No scleral icterus  Cardiovascular:      Rate and Rhythm: Normal rate and regular rhythm  Pulmonary:      Effort: Pulmonary effort is normal  No respiratory distress  Abdominal:      General: There is no distension  Palpations: Abdomen is soft  Tenderness: There is no abdominal tenderness  Hernia: A hernia (large reducible ventral wall hernia) is present  Comments: No incisional hernias appreciated   Skin:     General: Skin is warm and dry  Neurological:      Mental Status: He is alert and oriented to person, place, and time  Psychiatric:         Mood and Affect: Mood normal          Behavior: Behavior normal            BARRIERS: none identified    GOALS:   · Continued/Maintain healthy weight loss with good nutrition intakes  · Adequate hydration with at least 64oz  fluid intake  · Normal vitamin and mineral levels  · Exercise as tolerated  · No sugar in coffee, avoid bread/pasta/cookies/soda    · Follow-up in 3 months  We kindly ask that your arrive 15 minutes before your scheduled appointment time with your provider to allow our staff to room you, get your vital signs and update your chart  · Follow diet as discussed  · Get lab work done in the next 2 weeks  You have been given a lab slip today    Please call the office if you need a replacement  It is recommended to check with your insurance BEFORE getting labs done to make sure they are covered by your policy  Also, please check with your PCP and other providers before getting labs to avoid duplicate labs  Make sure to HOLD any multivitamins that may contain biotin and any biotin supplements FOR 5 DAYS before any labs since it can affect the results  · Follow vitamin and mineral recommendations as reviewed with you  · Call our office if you have any problems with abdominal pain especially associated with fever, chills, nausea, vomiting or any other concerns  · All  Post-bariatric surgery patients should be aware that very small quantities of any alcohol can cause impairment and it is very possible not to feel the effect  The effect can be in the system for several hours  It is also a stomach irritant  · It is advised to AVOID alcohol, Nonsteroidal antiinflammatory drugs (NSAIDS) and nicotine of all forms   Any of these can cause stomach irritation/pain

## 2022-07-06 NOTE — PATIENT INSTRUCTIONS
GOALS:   Continued/Maintain healthy weight loss with good nutrition intakes  Adequate hydration with at least 64oz  fluid intake  Normal vitamin and mineral levels  Exercise as tolerated  No sugar in coffee, avoid bread/pasta/cookies/soda    Follow-up in 3 months  We kindly ask that your arrive 15 minutes before your scheduled appointment time with your provider to allow our staff to room you, get your vital signs and update your chart  Follow diet as discussed  Get lab work done in the next 2 weeks  You have been given a lab slip today  Please call the office if you need a replacement  It is recommended to check with your insurance BEFORE getting labs done to make sure they are covered by your policy  Also, please check with your PCP and other providers before getting labs to avoid duplicate labs  Make sure to HOLD any multivitamins that may contain biotin and any biotin supplements FOR 5 DAYS before any labs since it can affect the results  Follow vitamin and mineral recommendations as reviewed with you  Call our office if you have any problems with abdominal pain especially associated with fever, chills, nausea, vomiting or any other concerns  All  Post-bariatric surgery patients should be aware that very small quantities of any alcohol can cause impairment and it is very possible not to feel the effect  The effect can be in the system for several hours  It is also a stomach irritant  It is advised to AVOID alcohol, Nonsteroidal antiinflammatory drugs (NSAIDS) and nicotine of all forms   Any of these can cause stomach irritation/pain

## 2022-08-17 ENCOUNTER — OFFICE VISIT (OUTPATIENT)
Dept: CARDIOLOGY CLINIC | Facility: CLINIC | Age: 42
End: 2022-08-17
Payer: COMMERCIAL

## 2022-08-17 VITALS
SYSTOLIC BLOOD PRESSURE: 110 MMHG | DIASTOLIC BLOOD PRESSURE: 80 MMHG | WEIGHT: 278 LBS | HEIGHT: 69 IN | OXYGEN SATURATION: 96 % | TEMPERATURE: 97.8 F | HEART RATE: 90 BPM | BODY MASS INDEX: 41.18 KG/M2

## 2022-08-17 DIAGNOSIS — R42 DIZZINESS: ICD-10-CM

## 2022-08-17 DIAGNOSIS — I10 BENIGN ESSENTIAL HYPERTENSION: Primary | ICD-10-CM

## 2022-08-17 DIAGNOSIS — I95.1 ORTHOSTATIC HYPOTENSION: ICD-10-CM

## 2022-08-17 PROCEDURE — 93000 ELECTROCARDIOGRAM COMPLETE: CPT | Performed by: INTERNAL MEDICINE

## 2022-08-17 PROCEDURE — 3074F SYST BP LT 130 MM HG: CPT | Performed by: INTERNAL MEDICINE

## 2022-08-17 PROCEDURE — 99214 OFFICE O/P EST MOD 30 MIN: CPT | Performed by: INTERNAL MEDICINE

## 2022-08-17 PROCEDURE — 3079F DIAST BP 80-89 MM HG: CPT | Performed by: INTERNAL MEDICINE

## 2022-08-17 RX ORDER — METOPROLOL SUCCINATE 25 MG/1
12.5 TABLET, EXTENDED RELEASE ORAL DAILY
Qty: 90 TABLET | Refills: 1 | Status: SHIPPED | OUTPATIENT
Start: 2022-08-17

## 2022-08-17 NOTE — PROGRESS NOTES
Progress Note - Cardiology Office  [facility]    Shiv Gauthier 40 y o  male MRN: [id number]  : 1980  Encounter: [id number]      ASSESSMENT:   Dizziness and palpitations  Negative orthostasis in our office today     Severe obesity:    S/P Bariatric surgery with about 100 lb weight loss  BMI is 41 05     GERD    Diabetes mellitus with polyneuropathy  Hyperlipidemia     Hypertension  BP today is 112/70 with heart rate of 78 and negative for orthostasis      Family history of cardiac problems  Mother had CAD/MI atrial fibrillation, ventricular tachycardia and stroke        RECOMMENDATIONS:  Ambulatory cardiac monitor  Compression stockings  Discontinue Coreg  Toprol 25 mg at night  Avoid dehydration      Please call [phone] if any questions  HPI :     Shiv Gauthier is a 40y o  year old male who came for follow up  Had bariatric surgery and has lost about 100 lb weight  Recently he has been feeling dizzy specially which change in posterior and at evaluation at PCPs office, he was told that he has orthostasis  REVIEW OF SYSTEMS:  Review of Systems   Neurological: Positive for dizziness  All other systems reviewed and are negative  Historical Information   Past Medical History:   Diagnosis Date    Abdominal wall hernia     Bell's palsy     2 bouts - idiopathic, possible stress induced    CPAP (continuous positive airway pressure) dependence     Depression     Diabetes mellitus ([address] ) 21    Blood work    GERD (gastroesophageal reflux disease)     Hypertension     Irregular heart beat     Morbid obesity with BMI of 60 0-69 9, adult ([address] )     Neurocardiogenic syncope     Sleep apnea     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     pt symptomatic with extreme and sudden environmental temp   fluctuations     Past Surgical History:   Procedure Laterality Date    ABDOMINAL ADHESION SURGERY N/A 2022    Procedure: LYSIS ADHESIONS LAPAROSCOPIC;  Surgeon: Mary Monroe MD;  Location: [address] [place] [address];  Service: Bariatrics    COLONOSCOPY N/A 2018    Procedure: COLONOSCOPY;  Surgeon: Daphney Hill MD;  Location: [place] [address] GI LAB; Service: Gastroenterology    ESOPHAGOGASTRODUODENOSCOPY N/A 2018    Procedure: ESOPHAGOGASTRODUODENOSCOPY (EGD); Surgeon: Daphney Hill MD;  Location: [facility];   Service: Gastroenterology    MN LAP GASTRIC BYPASS/SARI-EN-Y N/A 2022    Procedure: LAPAROSCOPIC SARI-EN-Y GASTRIC BYPASS AND INTRAOPERATIVE EGD;  Surgeon: Mary Monroe MD;  Location: [address] [place] [address];  Service: Bariatrics    TESTICLE SURGERY Left     infected testicle    TOENAIL EXCISION Right 2021     Social History     Substance and Sexual Activity   Alcohol Use Yes    Alcohol/week: 0 0 standard drinks    Comment: social on occasion     Social History     Substance and Sexual Activity   Drug Use No     Social History     Tobacco Use   Smoking Status Former Smoker    Packs/day: 0 25    Years: 10 00    Pack years: 2 50    Types: Cigarettes    Start date: 7/15/1999   Lidia Edwards Quit date:     Years since quittin 6   Smokeless Tobacco Never Used     Family History:   Family History   Problem Relation Age of Onset    Supraventricular tachycardia Mother     Atrial fibrillation Mother     Diabetes type II Mother     Cancer Mother     Osteoporosis Mother     Ulcerative colitis Father     Liver disease Father     ADD / ADHD Son     Colon cancer Family     Diabetes type II Family     Hyperlipidemia Family     Hypertension Family     Hypothyroidism Family     Heart disease Family     COPD Paternal Grandfather     Lung cancer Neg Hx     Asthma Neg Hx        Meds/Allergies     Allergies   Allergen Reactions    Bee Venom Anaphylaxis     Annotation - [id number]: wasp, hornet also    Macrolides And Ketolides Anaphylaxis and Rash    Other Hives, Shortness Of Breath and Wheezing     Annotation - [id number]:  violet    Pertussis Vaccine Anaphylaxis    Pertussis Vaccines Anaphylaxis and Rash    Vancomycin Shortness Of Breath    Zithromax [Azithromycin] Anaphylaxis    Erythromycin Rash    Pollen Extract Sneezing       Current Outpatient Medications:     Blood Glucose Monitoring Suppl w/Device KIT, Measure blood sugar twice daily  Morning after overnight fast and 2 hours after a meal, Disp: 1 kit, Rfl: 0    Chromium 1000 MCG TABS, Take 1 tablet by mouth daily, Disp: , Rfl:     dicyclomine (BENTYL) 20 mg tablet, Take 1 tablet (20 mg total) by mouth 3 (three) times a day (Patient taking differently: Take 20 mg by mouth as needed), Disp: 90 tablet, Rfl: 1    gabapentin (NEURONTIN) 400 mg capsule, Take 1 capsule (400 mg total) by mouth in the morning and 1 capsule (400 mg total) in the evening and 1 capsule (400 mg total) before bedtime  , Disp: 120 capsule, Rfl: 2    Lancets (OneTouch Delica Plus [id number]) MISC, USE 1 LANCET TO CHECK GLUCOSE TWICE DAILY   MORNING  AFTER  OVERNIGHT  FAST  AND  2  HOURS  AFTER  A  MEAL, Disp: 100 each, Rfl: 0    metoprolol succinate (TOPROL-XL) 25 mg 24 hr tablet, Take 0 5 tablets (12 5 mg total) by mouth daily, Disp: 90 tablet, Rfl: 1    multivitamin (THERAGRAN) TABS, Take 1 tablet by mouth daily, Disp: , Rfl:     OneTouch Ultra test strip, USE 1 STRIP TO CHECK GLUCOSE TWICE DAILY, Disp: 100 each, Rfl: 0    pantoprazole (PROTONIX) 40 mg tablet, Take 1 tablet (40 mg total) by mouth daily, Disp: 90 tablet, Rfl: 1    saccharomyces boulardii (FLORASTOR) 250 mg capsule, Take 250 mg by mouth every morning, Disp: , Rfl:     Blood Pressure Monitor KIT, Use in the morning (Patient not taking: Reported on 7/6/2022), Disp: 1 kit, Rfl: 0    Insulin Pen Needle 32G X 4 MM MISC, Use daily at bedtime, Disp: 100 each, Rfl: 3    metFORMIN (GLUCOPHAGE) 500 mg tablet, Take 1 tablet (500 mg total) by mouth 2 (two) times a day with meals (Patient not taking: Reported on 7/6/2022), Disp: 120 tablet, Rfl: 0    ondansetron (Zofran ODT) 4 mg disintegrating tablet, Take 1 tablet (4 mg total) by mouth every 6 (six) hours as needed for nausea or vomiting (Patient not taking: Reported on 7/6/2022), Disp: 20 tablet, Rfl: 0    oxyCODONE (Roxicodone) 5 immediate release tablet, Take 1 tablet (5 mg total) by mouth every 4 (four) hours as needed for moderate pain Max Daily Amount: 30 mg (Patient not taking: No sig reported), Disp: 10 tablet, Rfl: 0    Vitals: Blood pressure 110/80, pulse 90, temperature 97 8 °F (36 6 °C), height 5' 9" (1 753 m), weight 126 kg (278 lb), SpO2 96 %  ?  Body mass index is 41 05 kg/m²  Vitals:    08/17/22 1006   Weight: 126 kg (278 lb)     BP Readings from Last 3 Encounters:   08/17/22 110/80   07/06/22 128/80   05/11/22 138/70       Physical Exam:    Neurologic:  Alert & oriented x 3, no new focal deficits, Not in any acute distress,  Constitutional:  Obese  Eyes:  Pupil equal and reacting to light, conjunctiva normal,   HENT:  Atraumatic, oropharynx moist, Neck- normal range of motion, no tenderness,  Neck supple, No JVP, No LNP   Respiratory:  Bilateral air entry, mostly clear to auscultation  Cardiovascular: S1-S2 regular with a I/VI ejection systolic murmur   GI:  Soft, nondistended, normal bowel sounds, nontender, no hepatosplenomegaly appreciated  Musculoskeletal: no tenderness, no deformities     Skin:  Well hydrated, no rash   Lymphatic:  No lymphadenopathy noted   Extremities:  No edema and distal pulses are present        Diagnostic Studies Review Cardio:      EKG:  Normal sinus rhythm, heart rate 75/Min    Cardiac testing:   Results for orders placed during the hospital encounter of 06/02/21    Echo complete with contrast if indicated    Narrative  [address]  [address] [facility] [phone]    Transthoracic Echocardiogram  2D, M-mode, Doppler, and Color Doppler    Study date:  02-Jun-2021    Patient: Luis F Cr  MR number: [id number]  Account number: [id number]  : 1980  Age: 45 years  Gender: Male  Status: Outpatient  Location: Echo lab  Height: 69 in  Weight: 368 3 lb  BP: 134/ 82 mmHg    Indications: Hypertension    Diagnoses: 401 9 - HYPERTENSION NOS    Sonographer:  CHRIS Hermosillo  Primary Physician:  Corby Norwood MD  Referring Physician:  Urban James MD  Group:  [facility]  Interpreting Physician:  Urban James MD    SUMMARY    LEFT VENTRICLE:  Normal LV chamber size and wall thickness  Preserved LV systolic function with ejection fraction of about 65%  No definite regional wall motion abnormality seen  Normal diastolic filling pattern    RIGHT VENTRICLE:  Normal right ventricular size and systolic function  TAPSE is 2 2 cm    MITRAL VALVE:  There was trace regurgitation  TRICUSPID VALVE:  There is trace tricuspid regurgitation  PA pressure is normal    HISTORY: PRIOR HISTORY: HTN,  OObesity, SA, GERD, IBS, DM, Migraine    PROCEDURE: The procedure was performed in the echo lab  This was a routine study  The transthoracic approach was used  The study included complete 2D imaging, M-mode, complete spectral Doppler, and color Doppler  The heart rate was 89 bpm,  at the start of the study  Echocardiographic views were limited due to restricted patient mobility, poor acoustic window availability, and decreased penetration  This was a technically difficult study  LEFT VENTRICLE: Normal LV chamber size and wall thickness  Preserved LV systolic function with ejection fraction of about 65%  No definite regional wall motion abnormality seen  Normal diastolic filling pattern    RIGHT VENTRICLE: Normal right ventricular size and systolic function  TAPSE is 2 2 cm    LEFT ATRIUM: Size was normal     RIGHT ATRIUM: Size was normal     MITRAL VALVE: Valve structure was normal  There was normal leaflet separation  DOPPLER: The transmitral velocity was within the normal range  There was no evidence for stenosis   There was trace regurgitation  AORTIC VALVE: The valve was trileaflet  Leaflets exhibited normal thickness and normal cuspal separation  DOPPLER: Transaortic velocity was within the normal range  There was no evidence for stenosis  There was no regurgitation  TRICUSPID VALVE: There is trace tricuspid regurgitation  PA pressure is normal    PULMONIC VALVE: No significant pulmonary regurgitation seen    PERICARDIUM: No pericardial effusion seen    AORTA: The root exhibited normal size  SYSTEM MEASUREMENT TABLES    2D  EF (Teich): 68 16 %  %FS: 38 42 %  Ao Diam: 3 79 cm  EDV(Teich): 143 84 ml  ESV(Teich): 45 8 ml  IVSd: 1 cm  LA Area: 16 2 cm2  LA Diam: 4 25 cm  LVEDV MOD A4C: 81 8 ml  LVEF MOD A4C: 46 38 %  LVESV MOD A4C: 43 86 ml  LVIDd: 5 44 cm  LVIDs: 3 35 cm  LVLd A4C: 7 65 cm  LVLs A4C: 6 86 cm  LVPWd: 1 13 cm  RA Area: 15 21 cm2  RVIDd: 3 53 cm  SV (Teich): 98 04 ml  SV MOD A4C: 37 94 ml    CW  TR Vmax: 2 12 m/s  TR maxP 99 mmHg    MM  TAPSE: 2 18 cm    PW  MV E/A Ratio: 2 01  E' Sept: 0 16 m/s  E/E' Sept: 6 95  MV A Colin: 0 54 m/s  MV Dec Porter: 5 21 m/s2  MV DecT: 213 14 ms  MV E Colin: 1 09 m/s    [employer] Accredited Echocardiography Laboratory    Prepared and electronically signed by    Juvenal Caldwell MD  Signed 2021 15:27:18      Imaging:  Chest X-Ray:   No Chest XR results available for this patient  CT-scan of the chest:     No CTA results available for this patient    Lab Review   Lab Results   Component Value Date    WBC 9 18 2022    HGB 15 5 2022    HCT 47 6 2022    MCV 92 2022    RDW 13 6 2022     2022     BMP:  Lab Results   Component Value Date    SODIUM 139 2022    K 3 8 2022     2022    CO2 25 2022    BUN 11 2022    CREATININE 0 71 2022    GLUC 104 2022    CALCIUM 8 6 2022    CORRECTEDCA 10 4 (H) 2021    EGFR 117 2022    MG 1 6 2021     LFT:  Lab Results Component Value Date    AST 53 (H) 10/15/2021    ALT 69 (H) 10/15/2021    ALKPHOS 112 01/28/2021    TP 7 8 10/15/2021    ALB 4 3 10/15/2021      No components found for: [employer] [facility]  Lab Results   Component Value Date    [id number] 1 800 07/25/2017     Lab Results   Component Value Date    HGBA1C 5 5 04/05/2022     Lipid Profile:   Lab Results   Component Value Date    CHOLESTEROL 182 10/15/2021    HDL 35 (L) 10/15/2021    LDLCALC 120 (H) 10/15/2021    TRIG 151 (H) 10/15/2021     Lab Results   Component Value Date    CHOLESTEROL 182 10/15/2021    CHOLESTEROL 173 03/25/2021     No results found for: CKTOTAL, CKMB, CKMBINDEX, TROPONINI  No results found for: NTBNP   No results found for this or any previous visit (from the past 672 hour(s))  Dr Delmi Palacios MD, [facility]      "This note has been constructed using a voice recognition system  Therefore there may be syntax, spelling, and/or grammatical errors   Please call if you have any questions  "

## 2022-08-19 ENCOUNTER — TELEPHONE (OUTPATIENT)
Dept: CARDIOLOGY CLINIC | Facility: CLINIC | Age: 42
End: 2022-08-19

## 2022-08-23 ENCOUNTER — TELEPHONE (OUTPATIENT)
Dept: OTHER | Facility: OTHER | Age: 42
End: 2022-08-23

## 2022-08-24 NOTE — TELEPHONE ENCOUNTER
[employer] call  Urgent symptomatic severe tachy  SOB, lightheaded, dizziness, heart racing  Patient doing "light activity"  "Sinus tach" at 175 bpm for 30 seconds  Spoke with patient  He states he was in the shower and had the tachycardia coming out the shower; he states he was having some pain due to MSK injury one week ago  Occurred a couple hours before I was notified  I do not have access to [employer] portal and cannot review the strip personally

## 2022-09-09 DIAGNOSIS — Z71.3 DIABETIC NUTRITIONAL COUNSELING COMPLETED: ICD-10-CM

## 2022-09-09 DIAGNOSIS — K43.9 VENTRAL HERNIA WITHOUT OBSTRUCTION OR GANGRENE: ICD-10-CM

## 2022-09-09 DIAGNOSIS — E66.01 MORBID OBESITY (HCC): ICD-10-CM

## 2022-09-09 DIAGNOSIS — E66.01 MORBID OBESITY WITH BODY MASS INDEX (BMI) OF 50.0 TO 59.9 IN ADULT (HCC): ICD-10-CM

## 2022-09-09 DIAGNOSIS — Z53.20 NUTRITION COUNSELING DECLINED: ICD-10-CM

## 2022-09-09 DIAGNOSIS — E11.9 DM2 (DIABETES MELLITUS, TYPE 2) (HCC): ICD-10-CM

## 2022-09-09 RX ORDER — BLOOD SUGAR DIAGNOSTIC
1 STRIP MISCELLANEOUS 2 TIMES DAILY
Qty: 100 EACH | Refills: 0 | Status: SHIPPED | OUTPATIENT
Start: 2022-09-09

## 2022-09-09 RX ORDER — LANCETS 33 GAUGE
EACH MISCELLANEOUS
Qty: 100 EACH | Refills: 0 | Status: SHIPPED | OUTPATIENT
Start: 2022-09-09

## 2022-09-23 ENCOUNTER — CLINICAL SUPPORT (OUTPATIENT)
Dept: CARDIOLOGY CLINIC | Facility: CLINIC | Age: 42
End: 2022-09-23
Payer: COMMERCIAL

## 2022-09-23 DIAGNOSIS — R42 DIZZINESS: ICD-10-CM

## 2022-09-23 PROCEDURE — 93228 REMOTE 30 DAY ECG REV/REPORT: CPT

## 2022-10-04 DIAGNOSIS — E11.42 DIABETIC POLYNEUROPATHY ASSOCIATED WITH TYPE 2 DIABETES MELLITUS (HCC): ICD-10-CM

## 2022-10-04 DIAGNOSIS — M54.16 RADICULOPATHY OF LUMBAR REGION: ICD-10-CM

## 2022-10-04 DIAGNOSIS — M77.41 METATARSALGIA OF BOTH FEET: ICD-10-CM

## 2022-10-04 DIAGNOSIS — M77.42 METATARSALGIA OF BOTH FEET: ICD-10-CM

## 2022-10-04 RX ORDER — GABAPENTIN 400 MG/1
400 CAPSULE ORAL 3 TIMES DAILY
Qty: 120 CAPSULE | Refills: 2 | Status: SHIPPED | OUTPATIENT
Start: 2022-10-04

## 2022-10-04 NOTE — ASSESSMENT & PLAN NOTE
Lab Results   Component Value Date    HGBA1C 5 5 04/05/2022     -no longer on DM meds, has not been testing regularly   -Repeat A1C ordered  -Needs to make diet and exercise changes; f/u with RD

## 2022-10-04 NOTE — ASSESSMENT & PLAN NOTE
-s/p Selin-En-Y Gastric Bypass with Dr Samaria Wisdom on 04/04/22  Not following dietary or lifestyle recommendations  Lengthy dietary/lifestyle education/counseling about healthy dietary changes, taking vitamins/minerals, obtain lab work, follow 30/60, avoid high sugar foods/drinks, start walking program   Essential that he must f/u with SOPHIE and RAMILA - discussed case with SOPHIE and RAMILA and he will see RD today  Initial: 388lbs  Current: 264 4lbs  EWL: 56%  Clinton: current  Current BMI is Body mass index is 39 05 kg/m²  · Tolerating a regular diet-yes  · Eating at least 60 grams of protein per day-yes  · Following 30/60 minute rule with liquids-no  · Drinking at least 64 ounces of fluid per day-yes  · Drinking carbonated beverages-no  · Sufficient exercise-no; encouraged slowly starting walking program  · Using NSAIDs regularly-no  · Using nicotine-no  · Using alcohol-no   Advised about the risks of alcohol s/p bariatric surgery and recommend avoiding all alcohol

## 2022-10-04 NOTE — ASSESSMENT & PLAN NOTE
-At risk for malabsorption of vitamins/minerals secondary to malabsorption and restriction of intake from bariatric surgery  -NOT Currently taking adequate postop bariatric surgery vitamin supplementation: not taking recommended vitamins/calcium d/t cost - recently started [employer] w/out iron x1 - gave bariatric Pal MVI sample and advised him that he must start calcium as well asap and reinforced possibly life threatening risks of not taking adequate vitamins/minerals s/p surgery    -Obtain CBC/Metabolic panel ASAP - he agrees to go next week  -Patient received education about the importance of adhering to a lifelong supplementation regimen to avoid vitamin/mineral deficiencies

## 2022-10-04 NOTE — ASSESSMENT & PLAN NOTE
-Advised avoidance of food triggers and gastric irritants, follow anti-reflux diet and lifestyle measures  -Attempt to slowly wean/taper off PPI and bridge to pepcid, gaviscon prn  -Notify me if issues

## 2022-10-06 ENCOUNTER — OFFICE VISIT (OUTPATIENT)
Dept: BARIATRICS | Facility: CLINIC | Age: 42
End: 2022-10-06
Payer: COMMERCIAL

## 2022-10-06 ENCOUNTER — OFFICE VISIT (OUTPATIENT)
Dept: BARIATRICS | Facility: CLINIC | Age: 42
End: 2022-10-06

## 2022-10-06 VITALS
HEIGHT: 69 IN | BODY MASS INDEX: 39.16 KG/M2 | DIASTOLIC BLOOD PRESSURE: 78 MMHG | WEIGHT: 264.4 LBS | SYSTOLIC BLOOD PRESSURE: 128 MMHG

## 2022-10-06 VITALS — HEIGHT: 69 IN | BODY MASS INDEX: 39.16 KG/M2 | WEIGHT: 264.4 LBS

## 2022-10-06 DIAGNOSIS — K91.2 POSTSURGICAL MALABSORPTION: ICD-10-CM

## 2022-10-06 DIAGNOSIS — Z48.815 ENCOUNTER FOR SURGICAL AFTERCARE FOLLOWING SURGERY OF DIGESTIVE SYSTEM: Primary | ICD-10-CM

## 2022-10-06 DIAGNOSIS — E78.2 MIXED HYPERLIPIDEMIA: ICD-10-CM

## 2022-10-06 DIAGNOSIS — G47.33 OSA (OBSTRUCTIVE SLEEP APNEA): ICD-10-CM

## 2022-10-06 DIAGNOSIS — R42 DIZZINESS: ICD-10-CM

## 2022-10-06 DIAGNOSIS — I10 BENIGN ESSENTIAL HYPERTENSION: ICD-10-CM

## 2022-10-06 DIAGNOSIS — K91.2 POSTSURGICAL MALABSORPTION: Primary | ICD-10-CM

## 2022-10-06 DIAGNOSIS — B37.2 INTERTRIGINOUS CANDIDIASIS: ICD-10-CM

## 2022-10-06 DIAGNOSIS — K21.9 GERD (GASTROESOPHAGEAL REFLUX DISEASE): ICD-10-CM

## 2022-10-06 DIAGNOSIS — Z79.4 TYPE 2 DIABETES MELLITUS WITHOUT COMPLICATION, WITH LONG-TERM CURRENT USE OF INSULIN (HCC): ICD-10-CM

## 2022-10-06 DIAGNOSIS — E11.9 TYPE 2 DIABETES MELLITUS WITHOUT COMPLICATION, WITH LONG-TERM CURRENT USE OF INSULIN (HCC): ICD-10-CM

## 2022-10-06 PROCEDURE — RECHECK

## 2022-10-06 PROCEDURE — 99214 OFFICE O/P EST MOD 30 MIN: CPT | Performed by: PHYSICIAN ASSISTANT

## 2022-10-06 RX ORDER — NYSTATIN 100000 U/G
CREAM TOPICAL 2 TIMES DAILY
Qty: 30 G | Refills: 1 | Status: SHIPPED | OUTPATIENT
Start: 2022-10-06

## 2022-10-06 NOTE — PROGRESS NOTES
Bariatric Follow Up Nutrition Note      Type of surgery  Gastric bypass: laparoscopic  Surgery Date: 4/4/2022  6 months  post-op  Surgeon: Dr Andrew Lal  40 y o   male  Height 5' 9" (1 753 m), weight 120 kg (264 lb 6 4 oz)  Body mass index is 39 05 kg/m²  Initial:  388#  Weight on Day of Weight Loss Surgery: 330#  Weight in (lb) to have BMI = 25: 168 8#  Pre-Op Excess Wt: 220#  Post-Op Wt Loss: 123 7#/ 56% EBWL in 6 month(s)    Review of History and Medications   Past Medical History:   Diagnosis Date    Abdominal wall hernia     Bell's palsy     2 bouts - idiopathic, possible stress induced    CPAP (continuous positive airway pressure) dependence     Depression     Diabetes mellitus ([facility] ) 03/25/21    Blood work    GERD (gastroesophageal reflux disease)     Hypertension     Irregular heart beat     Morbid obesity with BMI of 60 0-69 9, adult ([facility] )     Neurocardiogenic syncope     Sleep apnea     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     pt symptomatic with extreme and sudden environmental temp  fluctuations     Past Surgical History:   Procedure Laterality Date    ABDOMINAL ADHESION SURGERY N/A 4/4/2022    Procedure: LYSIS ADHESIONS LAPAROSCOPIC;  Surgeon: Yony Dyson MD;  Location: [address] [place] [address];  Service: Rosa Alves COLONOSCOPY N/A 2/2/2018    Procedure: COLONOSCOPY;  Surgeon: Choco Silva MD;  Location: [place] [address] GI LAB; Service: Gastroenterology    ESOPHAGOGASTRODUODENOSCOPY N/A 2/2/2018    Procedure: ESOPHAGOGASTRODUODENOSCOPY (EGD); Surgeon: Choco Silva MD;  Location: [facility];   Service: Gastroenterology    NH LAP GASTRIC BYPASS/SARI-EN-Y N/A 4/4/2022    Procedure: LAPAROSCOPIC SARI-EN-Y GASTRIC BYPASS AND INTRAOPERATIVE EGD;  Surgeon: Yony Dyson MD;  Location: [address] [place] [address];  Service: Bariatrics    TESTICLE SURGERY Left     infected testicle    TOENAIL EXCISION Right 09/07/2021     Social History     Socioeconomic History    Marital status: /Civil Union     Spouse name: Not on file    Number of children: 2    Years of education: Not on file    Highest education level: 12th grade   Occupational History    Not on file   Tobacco Use    Smoking status: Former Smoker     Packs/day: 0 25     Years: 10 00     Pack years: 2 50     Types: Cigarettes     Start date: 7/15/1999     Quit date:      Years since quittin 7    Smokeless tobacco: Never Used   Vaping Use    Vaping Use: Never used   Substance and Sexual Activity    Alcohol use: Not Currently     Comment: social on occasion    Drug use: No    Sexual activity: Not Currently     Partners: Female     Birth control/protection: Abstinence, I U D  Other Topics Concern    Not on file   Social History Narrative    Denied: History of alcohol use (history) - as per [employer]    Always uses seat belt    Former smoker - as per [employer]    Never a smoker - as per [employer]     Social Determinants of Health     Financial Resource Strain: Not on file   Food Insecurity: Not on file   Transportation Needs: No Transportation Needs    Lack of Transportation (Medical): No    Lack of Transportation (Non-Medical): No   Physical Activity: Not on file   Stress: Not on file   Social Connections: Not on file   Intimate Partner Violence: Not on file   Housing Stability: Not on file       Current Outpatient Medications:     glucose blood (OneTouch Ultra) test strip, Use 1 each 2 (two) times a day to check glucose (Patient not taking: Reported on 10/6/2022), Disp: 100 each, Rfl: 0    Lancets (OneTouch Delica Plus [id number]) MISC, USE 1 LANCET TO CHECK GLUCOSE TWICE DAILY  MORNING  AFTER  OVERNIGHT  FAST  AND  2  HOURS  AFTER  A  MEAL (Patient not taking: Reported on 10/6/2022), Disp: 100 each, Rfl: 0    Blood Glucose Monitoring Suppl w/Device KIT, Measure blood sugar twice daily   Morning after overnight fast and 2 hours after a meal (Patient not taking: Reported on 10/6/2022), Disp: 1 kit, Rfl: 0    Blood Pressure Monitor KIT, Use in the morning (Patient not taking: No sig reported), Disp: 1 kit, Rfl: 0    Chromium 1000 MCG TABS, Take 1 tablet by mouth daily (Patient not taking: Reported on 10/6/2022), Disp: , Rfl:     dicyclomine (BENTYL) 20 mg tablet, Take 1 tablet (20 mg total) by mouth 3 (three) times a day (Patient not taking: Reported on 10/6/2022), Disp: 90 tablet, Rfl: 1    gabapentin (NEURONTIN) 400 mg capsule, Take 1 capsule (400 mg total) by mouth 3 (three) times a day, Disp: 120 capsule, Rfl: 2    Insulin Pen Needle 32G X 4 MM MISC, Use daily at bedtime (Patient not taking: Reported on 10/6/2022), Disp: 100 each, Rfl: 3    metFORMIN (GLUCOPHAGE) 500 mg tablet, Take 1 tablet (500 mg total) by mouth 2 (two) times a day with meals (Patient not taking: No sig reported), Disp: 120 tablet, Rfl: 0    metoprolol succinate (TOPROL-XL) 25 mg 24 hr tablet, Take 0 5 tablets (12 5 mg total) by mouth daily, Disp: 90 tablet, Rfl: 1    multivitamin (THERAGRAN) TABS, Take 1 tablet by mouth daily, Disp: , Rfl:     nystatin (MYCOSTATIN) cream, Apply topically 2 (two) times a day, Disp: 30 g, Rfl: 1    ondansetron (Zofran ODT) 4 mg disintegrating tablet, Take 1 tablet (4 mg total) by mouth every 6 (six) hours as needed for nausea or vomiting (Patient not taking: No sig reported), Disp: 20 tablet, Rfl: 0    oxyCODONE (Roxicodone) 5 immediate release tablet, Take 1 tablet (5 mg total) by mouth every 4 (four) hours as needed for moderate pain Max Daily Amount: 30 mg (Patient not taking: No sig reported), Disp: 10 tablet, Rfl: 0    pantoprazole (PROTONIX) 40 mg tablet, Take 1 tablet (40 mg total) by mouth daily, Disp: 90 tablet, Rfl: 1    saccharomyces boulardii (FLORASTOR) 250 mg capsule, Take 250 mg by mouth every morning (Patient not taking: Reported on 10/6/2022), Disp: , Rfl:     Food Intake and Lifestyle Assessment   Food Intake Assessment completed via usual diet recall  Taking 1 centrum men multi (? If has iron)  Wake 6am during week, 8-9am on sat and sunday   Not working right now  Wife works from [employer] to [employer] and he doesn't have a license to drive  Breakfast: 6am-coffee  28oz shaker cup which he puts in 1/2 cup of sugar + 2% 1/2 cup + [place] coffee (highly encouraged non-calorie sweetener)  8:30am-10am, but times may not be until lunch time (advsied to set alarm to remind when to eat):  frozen meal of beef chirozio Life Choice meal from [employer] (about 340cals and 22gm protein) OR [employer] greek yogurt  Snack: -   Lunch: 12-1pm:  Another Life Choice meal   Snack: -  Dinner: 3-4pm-Life Choice meal or 4-5oz burger yazmin 85/15% fat/chicken/shrimp/ground beef--pork chop and steak too dry and dense  Will most often have a potato (about 1/2 cup) + corn most often (does like the non-starchy veggies--encoruaged) OR salad from [employer] (russell) OR wrap from Ritani style chicken salad from [employer]: 9-10pm:    Beverage intake: water, sugar free beverages and coffee/tea  Diet texture/stage: regular  Protein supplement: still has the protein powder, but doesn't make them  Estimated protein intake per day: 60-80gm  Estimated fluid intake per day: 50oz water, 32oz Gatroade zero, Coffee w/ a lot of sugar!   Meals eaten away from home: varies  Typical meal pattern: 3-4 meals per day and 1-2 snacks per day  Eating Behaviors: Does not drink with meals and waits 45-minutes after meal before resuming drinking (for the most part), Mindless eating and poor meal schedule    Food allergies or intolerances: pork and steak are too dense and dry, too much pasta is too filling, sugar causes dumping  Cultural or Protestant considerations: -    Physical Assessment  Nutrition Related Findings  Dumping--too much sugar  Gassy    Physical Activity  Types of exercise: None--just walks in [employer] once a week on wife's day off  Current physical limitations: Large hernia    Psychosocial Assessment   Support systems: spouse and children  Socioeconomic factors: yes, does not work  Wife is the only one that works  Money is very tight  Nutrition Diagnosis  Diagnosis: Inappropriate intake of carbohydrates (NI-5 8  3)  Related to: Inability or lack of desire to manage self-care  As Evidenced by: Reports of disorded eating patterns     Interventions and Teaching   Patient educated on post-op nutrition guidelines  Patient educated and handouts provided  Surgical changes to stomach / GI  Capacity of post-surgery stomach  Diet progression  Adequate hydration  Sugar and fat restriction to decrease "dumping syndrome"  Expected weight loss  Weight loss plateaus/ possibility of weight regain  Exercise  Nutrition considerations after surgery  Protein supplements  Meal planning and preparation  Appropriate carbohydrate, protein, and fat intake, and food/fluid choices to maximize safe weight loss, nutrient intake, and tolerance   Dietary and lifestyle changes  Possible problems with poor eating habits  Intuitive eating  Techniques for self monitoring and keeping daily food journal  Potential for food intolerance after surgery, and ways to deal with them including: lactose intolerance, nausea, reflux, vomiting, diarrhea, food intolerance, appetite changes, gas  Vitamin / Mineral supplementation of Multivitamin with minerals and Calcium    Education provided to: patient    Barriers to learning: No barriers identified    Readiness to change: action    Comprehension: verbalizes understanding     Expected Compliance: good    Pt presents today after 6 month f/u with Broderick  Pt was noted to have a high BP and sweating when roomed by MA  After discussion with Broderick pt reports that he drinks coffee with 1/2 cup of sugar in it each morning  Pt was most likely actively dumping at the time of the visit  During visit with SOPHIE pt admits that he had 2 cups of coffee this morning before appt   Educated pt on what was happening when he had that much sugar and talked about ways to decrease  Strongly recommended to try a non-calorie sweetener, but to still limit the volume he adds to his coffee  Reviewed diet recall and pt appears to be eating 4 meals per day and some snacks  Recommended to pt to limit to 3 meals + 1 snack per day (either a lean protein or a protein shake)  He also reports, when he eats more often then he should he sometimes eats with the meal to "push it out quickly"  Advised pt against and recommended to adhere to the 30/60 rule  Pt will F/U next week with SW  Also reminded pt to start the bariatric multi that Broderick provided him a free month sample  RD provided with a coupon for $99 for a one year supply  Goals  Eliminate sugar sweetened beverages--avoid sugar in coffee, try non-geoff sweetener  Food journal via baritastic  Exercise 30 minutes 5 times per week--start walking   Can split into 15mins 2x/day  Eat 3 meals per day + 1 protein planned snack  Eliminate mindless snacking  F/U with SW in one week  Start baripal multi one per day, at next visit will talk about adding calcium     Time Spent:   30 mins

## 2022-10-06 NOTE — PATIENT INSTRUCTIONS
GOALS:   Continued/Maintain healthy weight loss with good nutrition intakes  Adequate hydration with at least 64oz  fluid intake  Normal vitamin and mineral levels  Exercise as tolerated  Stop sugar in coffee, no juice  Stop eating sweets  Follow 30/60 minute rule    Follow-up in 6 months  We kindly ask that your arrive 15 minutes before your scheduled appointment time with your provider to allow our staff to room you, get your vital signs and update your chart  Follow diet as discussed  Get lab work done in the next 2 weeks  You have been given a lab slip today  Please call the office if you need a replacement  It is recommended to check with your insurance BEFORE getting labs done to make sure they are covered by your policy  Also, please check with your PCP and other providers before getting labs to avoid duplicate labs  Make sure to HOLD any multivitamins that may contain biotin and any biotin supplements FOR 5 DAYS before any labs since it can affect the results  Follow vitamin and mineral recommendations as reviewed with you  Call our office if you have any problems with abdominal pain especially associated with fever, chills, nausea, vomiting or any other concerns  All  Post-bariatric surgery patients should be aware that very small quantities of any alcohol can cause impairment and it is very possible not to feel the effect  The effect can be in the system for several hours  It is also a stomach irritant  It is advised to AVOID alcohol, Nonsteroidal antiinflammatory drugs (NSAIDS) and nicotine of all forms   Any of these can cause stomach irritation/pain

## 2022-10-06 NOTE — ASSESSMENT & PLAN NOTE
-Nystatin to affected area  -Keep clean and dry  -If no resolution will need dermatology referral   -Will refer to plastic surgery at annual visit to discuss skin removal

## 2022-10-06 NOTE — PROGRESS NOTES
Assessment/Plan:    Encounter for surgical aftercare following surgery of digestive system  -s/p Selin-En-Y Gastric Bypass with Dr Jeramie Paredes on 04/04/22  Not following dietary or lifestyle recommendations  Lengthy dietary/lifestyle education/counseling about healthy dietary changes, taking vitamins/minerals, obtain lab work, follow 30/60, avoid high sugar foods/drinks, start walking program   Essential that he must f/u with RD and RAMILA - discussed case with RD and RAMILA and he will see RD today  Initial: 388lbs  Current: 264 4lbs  EWL: 56%  [place]: current  Current BMI is Body mass index is 39 05 kg/m²  · Tolerating a regular diet-yes  · Eating at least 60 grams of protein per day-yes  · Following 30/60 minute rule with liquids-no  · Drinking at least 64 ounces of fluid per day-yes  · Drinking carbonated beverages-no  · Sufficient exercise-no; encouraged slowly starting walking program  · Using NSAIDs regularly-no  · Using nicotine-no  · Using alcohol-no   Advised about the risks of alcohol s/p bariatric surgery and recommend avoiding all alcohol      Postsurgical malabsorption  -At risk for malabsorption of vitamins/minerals secondary to malabsorption and restriction of intake from bariatric surgery  -NOT Currently taking adequate postop bariatric surgery vitamin supplementation: not taking recommended vitamins/calcium d/t cost - recently started [employer] w/out iron x1 - gave bariatric Pal MVI sample and advised him that he must start calcium as well asap and reinforced possibly life threatening risks of not taking adequate vitamins/minerals s/p surgery    -Obtain CBC/Metabolic panel ASAP - he agrees to go next week  -Patient received education about the importance of adhering to a lifelong supplementation regimen to avoid vitamin/mineral deficiencies       GERD (gastroesophageal reflux disease)  -Advised avoidance of food triggers and gastric irritants, follow anti-reflux diet and lifestyle measures  -Attempt to slowly wean/taper off PPI and bridge to pepcid, gaviscon prn  -Notify me if issues      Type 2 diabetes mellitus without complication, with long-term current use of insulin (HCC)      Lab Results   Component Value Date    HGBA1C 5 5 04/05/2022     -no longer on DM meds, has not been testing regularly   -Repeat A1C ordered  -Needs to make diet and exercise changes; f/u with RD    MANUELA (obstructive sleep apnea)  -Encouraged consistent use of CPAP and follow up with sleep medicine for mask refitting/adjustments       Benign essential hypertension  -On carvedilol   -Continue monitoring and management with prescribing provider    Mixed hyperlipidemia  -Avoid fried foods and trans fat, limit saturated fats and refined carbohydrates  -Increase fish/omega 3 FA consumption  -Increase physical activity  -Repeat lipid panel    Dizziness  -Continue f/u with cards regarding heart monitor  -Likely r/t dumping; eating excessive sugar and drinking with meals and possible low iron  -He agrees to get lab work and start bariatric MVI with iron - gave sample today   -Regular RD follow up  -Avoid refined CHO, practice 30/60, increase protein and fiber  -Monitor blood sugars     Intertriginous candidiasis  -Nystatin to affected area  -Keep clean and dry  -If no resolution will need dermatology referral   -Will refer to plastic surgery at annual visit to discuss skin removal       Diagnoses and all orders for this visit:    Encounter for surgical aftercare following surgery of digestive system    Postsurgical malabsorption    GERD (gastroesophageal reflux disease)    Type 2 diabetes mellitus without complication, with long-term current use of insulin (HCC)    MANUELA (obstructive sleep apnea)    Benign essential hypertension    Mixed hyperlipidemia    Intertriginous candidiasis  -     nystatin (MYCOSTATIN) cream; Apply topically 2 (two) times a day    Dizziness          Subjective:      Patient ID: Fran Rosenberg is a 40 y o  male     -s/p Selin-En-Y Gastric Bypass with Dr Alfredo Martinez on 04/04/22  Presents to the office today for routine follow up  Tolerating regular diet; denies N/V, dysphagia, reflux  He admits to no license so he has not gotten blood work yet  He saw cardiology in August for frequent dizziness and intermittent heart palpitations  Awaiting 30 day heart monitor results  He admits to drinking with meals and often wishes he is "pushing the food through" to get rid of it so the unhealthy food doesn't stay with him  He saw Dr Allie Charlton in April to discuss his umbilical hernia and was advised to continue weight loss  He is getting chronic rashes under abdominal pannus despite trying to keep the area clean and dry  Diet Recall:   Upon waking 6am- 24oz coffee w/ 1/3 -1/2 CUP sugar and milk (he is using this actual amount of sugar)  10-11am - healthy choice meal with black beans (about 10g protein)  12-1pm - healthy choice meal  Snacks - tries not to but if he does has some fruit  3-4pm - hamburger yazmin or southern kitchen salad from [employer] on tortilla/wrap  9pm - chicken or ground beef or shrimp on sandwich bread or plain or veggie yazmin  HS - sugar cookies or candy but tries to avoid    Fluids - 24oz coffee as listed above, 2-3 28oz GZero, 50oz water, no more soda, some apple juice or apple cider    The following portions of the patient's history were reviewed and updated as appropriate: allergies, current medications, past family history, past medical history, past social history, past surgical history and problem list     Review of Systems   Constitutional: Negative for chills and fever  Unexpected weight change: planned weight loss  HENT: Positive for dental problem  Negative for trouble swallowing  Respiratory: Negative for cough and shortness of breath  Cardiovascular: Positive for palpitations  Negative for chest pain  Gastrointestinal: Negative for abdominal pain, constipation, diarrhea, nausea and vomiting  Skin: Positive for rash  Neurological: Positive for dizziness  Psychiatric/Behavioral:        Seasonal blues         Objective:      /78 (BP Location: Right arm, Patient Position: Sitting, Cuff Size: Large)   Ht 5' 9" (1 753 m)   Wt 120 kg (264 lb 6 4 oz)   BMI 39 05 kg/m²     Colonoscopy-Not applicable       Physical Exam  Vitals reviewed  Constitutional:       General: He is not in acute distress  Appearance: He is well-developed  HENT:      Head: Normocephalic and atraumatic  Eyes:      General: No scleral icterus  Cardiovascular:      Rate and Rhythm: Normal rate and regular rhythm  Pulmonary:      Effort: Pulmonary effort is normal  No respiratory distress  Abdominal:      General: There is no distension  Palpations: Abdomen is soft  Tenderness: There is no abdominal tenderness  Hernia: A hernia (large supraumbilical ventral hernia, partially reducible) is present  Comments: No incisional hernias appreciated   Skin:     General: Skin is warm and dry  Findings: Rash (erythematous raw skin rash under abdominal pannus) present  Neurological:      Mental Status: He is alert and oriented to person, place, and time  Psychiatric:         Mood and Affect: Mood normal          Behavior: Behavior normal            BARRIERS: financial, no license to drive    GOALS:   · Continued/Maintain healthy weight loss with good nutrition intakes  · Adequate hydration with at least 64oz  fluid intake  · Normal vitamin and mineral levels  · Exercise as tolerated  · Stop sugar in coffee, no juice  · Stop eating sweets  · Follow 30/60 minute rule    · Follow-up in 6 months  We kindly ask that your arrive 15 minutes before your scheduled appointment time with your provider to allow our staff to room you, get your vital signs and update your chart  · Follow diet as discussed  · Get lab work done in the next 2 weeks  You have been given a lab slip today    Please call the office if you need a replacement  It is recommended to check with your insurance BEFORE getting labs done to make sure they are covered by your policy  Also, please check with your PCP and other providers before getting labs to avoid duplicate labs  Make sure to HOLD any multivitamins that may contain biotin and any biotin supplements FOR 5 DAYS before any labs since it can affect the results  · Follow vitamin and mineral recommendations as reviewed with you  · Call our office if you have any problems with abdominal pain especially associated with fever, chills, nausea, vomiting or any other concerns  · All  Post-bariatric surgery patients should be aware that very small quantities of any alcohol can cause impairment and it is very possible not to feel the effect  The effect can be in the system for several hours  It is also a stomach irritant  · It is advised to AVOID alcohol, Nonsteroidal antiinflammatory drugs (NSAIDS) and nicotine of all forms   Any of these can cause stomach irritation/pain

## 2022-10-06 NOTE — ASSESSMENT & PLAN NOTE
-Continue f/u with cards regarding heart monitor  -Likely r/t dumping; eating excessive sugar and drinking with meals and possible low iron  -He agrees to get lab work and start bariatric MVI with iron - gave sample today   -Regular RD follow up  -Avoid refined CHO, practice 30/60, increase protein and fiber  -Monitor blood sugars

## 2022-10-12 LAB
EST. AVERAGE GLUCOSE BLD GHB EST-MCNC: 105 MG/DL
HBA1C MFR BLD: 5.3 % (ref 4.8–5.6)

## 2022-10-13 ENCOUNTER — OFFICE VISIT (OUTPATIENT)
Dept: BARIATRICS | Facility: CLINIC | Age: 42
End: 2022-10-13

## 2022-10-13 VITALS — WEIGHT: 261.8 LBS | BODY MASS INDEX: 38.66 KG/M2

## 2022-10-13 DIAGNOSIS — Z48.815 ENCOUNTER FOR SURGICAL AFTERCARE FOLLOWING SURGERY OF DIGESTIVE SYSTEM: Primary | ICD-10-CM

## 2022-10-13 PROCEDURE — RECHECK

## 2022-10-13 NOTE — PATIENT INSTRUCTIONS
- keep eating plan simple, reference proteins in manual and shop from there  - reduce portions to save on unnecessary calories as well as saving money on food items  - replace sugar in coffee with protein powder/shake, drink slowly and mindfully  - log foods to get a better understanding of caloric intake as well how it makes the body feel  - take blood sugars throughout the day to get accurate reading rather than only depending on how the body feels

## 2022-10-23 LAB
25(OH)D3+25(OH)D2 SERPL-MCNC: 32.8 NG/ML (ref 30–100)
ALBUMIN SERPL-MCNC: 4.4 G/DL (ref 4–5)
ALBUMIN/GLOB SERPL: 1.4 {RATIO} (ref 1.2–2.2)
ALP SERPL-CCNC: 99 IU/L (ref 44–121)
ALT SERPL-CCNC: 26 IU/L (ref 0–44)
AST SERPL-CCNC: 27 IU/L (ref 0–40)
BILIRUB SERPL-MCNC: 0.7 MG/DL (ref 0–1.2)
BUN SERPL-MCNC: 8 MG/DL (ref 6–24)
BUN/CREAT SERPL: 12 (ref 9–20)
CALCIUM SERPL-MCNC: 10 MG/DL (ref 8.7–10.2)
CHLORIDE SERPL-SCNC: 104 MMOL/L (ref 96–106)
CHOLEST SERPL-MCNC: 172 MG/DL (ref 100–199)
CHOLEST/HDLC SERPL: 5.1 RATIO (ref 0–5)
CO2 SERPL-SCNC: 21 MMOL/L (ref 20–29)
CREAT SERPL-MCNC: 0.67 MG/DL (ref 0.76–1.27)
EGFR: 120 ML/MIN/1.73
ERYTHROCYTE [DISTWIDTH] IN BLOOD BY AUTOMATED COUNT: 12.8 % (ref 11.6–15.4)
EST. AVERAGE GLUCOSE BLD GHB EST-MCNC: 103 MG/DL
FOLATE SERPL-MCNC: >20 NG/ML
GLOBULIN SER-MCNC: 3.1 G/DL (ref 1.5–4.5)
GLUCOSE SERPL-MCNC: 86 MG/DL (ref 70–99)
HBA1C MFR BLD: 5.2 % (ref 4.8–5.6)
HCT VFR BLD AUTO: 54.5 % (ref 37.5–51)
HDLC SERPL-MCNC: 34 MG/DL
HGB BLD-MCNC: 18.4 G/DL (ref 13–17.7)
LDLC SERPL CALC-MCNC: 113 MG/DL (ref 0–99)
MCH RBC QN AUTO: 29.7 PG (ref 26.6–33)
MCHC RBC AUTO-ENTMCNC: 33.8 G/DL (ref 31.5–35.7)
MCV RBC AUTO: 88 FL (ref 79–97)
PLATELET # BLD AUTO: 246 X10E3/UL (ref 150–450)
POTASSIUM SERPL-SCNC: 4.6 MMOL/L (ref 3.5–5.2)
PROT SERPL-MCNC: 7.5 G/DL (ref 6–8.5)
PTH-INTACT SERPL-MCNC: 21 PG/ML (ref 15–65)
RBC # BLD AUTO: 6.2 X10E6/UL (ref 4.14–5.8)
SL AMB VLDL CHOLESTEROL CALC: 25 MG/DL (ref 5–40)
SODIUM SERPL-SCNC: 143 MMOL/L (ref 134–144)
TRIGL SERPL-MCNC: 141 MG/DL (ref 0–149)
VIT A SERPL-MCNC: 42.5 UG/DL (ref 20.1–62)
VIT B1 BLD-SCNC: 118 NMOL/L (ref 66.5–200)
VIT B12 SERPL-MCNC: 457 PG/ML (ref 232–1245)
WBC # BLD AUTO: 6.8 X10E3/UL (ref 3.4–10.8)
ZINC SERPL-MCNC: 97 UG/DL (ref 44–115)

## 2022-10-24 ENCOUNTER — VBI (OUTPATIENT)
Dept: ADMINISTRATIVE | Facility: OTHER | Age: 42
End: 2022-10-24

## 2022-11-03 ENCOUNTER — OFFICE VISIT (OUTPATIENT)
Dept: BARIATRICS | Facility: CLINIC | Age: 42
End: 2022-11-03

## 2022-11-03 VITALS — BODY MASS INDEX: 38.09 KG/M2 | WEIGHT: 257.2 LBS | HEIGHT: 69 IN

## 2022-11-03 VITALS — BODY MASS INDEX: 37.98 KG/M2 | WEIGHT: 257.2 LBS

## 2022-11-03 DIAGNOSIS — E66.01 MORBID (SEVERE) OBESITY DUE TO EXCESS CALORIES (HCC): Primary | ICD-10-CM

## 2022-11-03 DIAGNOSIS — K91.2 POSTSURGICAL MALABSORPTION: Primary | ICD-10-CM

## 2022-11-03 NOTE — PROGRESS NOTES
Patient presents for post-op follow up, current weight 257 2lbs  Eating behaviors/food choices: Reports efforts to make healthier food choices, reduce sugar that's going in his coffee, use sugar substitutes instead  Having [employer] for meals although portions may be too large since reports dumping  May also be eating too quickly, encouraged to slow down and tune into body cues for satiation  Getting in his hydration, trying to follow 30/60, not washing out his pouch intentionally, may still forget not to sip after mindlessly grabbing a snack  Mental Health/Wellness:  Patient reports some emotional eating, oversimplifies that it's "just a habit" or something he does without thinking about it  Went over sequence that allows for mindless snacking, such as having hard candy by his chair to grab if he feels he needs something for an "oral fixation" or going into the kitchen and him grabbing something mindlessly that is out on the counter  Discussed setting his environment up for success, making it more inconvenient to get to the higher calorie snack foods  Patient feels it's unreasonable to get rid of everything because it would inconvenience his family, suggested not to get rid of it all rather to make it inconvenient for him to go to  Even if unhealthy choices is made, reflect on what he was thinking about, feeling or what was going on that led to grabbing things  Patient doesn't believe he's thinking about anything when he does these things but if he finds it to be such a habit to grab these foods, reflecting on the reason why is important, whether it be past trauma, current stress, not having enough nutrition or boredom  Goals:    - practice stop, think, act, make accessing higher calorie snack foods more inconvenient  - reflect on feelings or environment that promoted mindless or emotional eating, set up plans to navigate next time        Next Appointment:  With ANNE and SOPHIE on 1/3

## 2022-11-03 NOTE — PROGRESS NOTES
Bariatric Follow Up Nutrition Note    Type of surgery  Gastric bypass: laparoscopic  Surgery Date: 4/4/2022  6 months  post-op  Surgeon: Dr Jose Armanod Parkinson  40 y o   male  Height 5' 9" (1 753 m), weight 117 kg (257 lb 3 2 oz)  Body mass index is 37 98 kg/m²  Initial:  388#  Weight on Day of Weight Loss Surgery: 330#  Weight in (lb) to have BMI = 25: 168 8#  Pre-Op Excess Wt: 220#  Post-Op Wt Loss: 130 7#/ 60% EBWL in 7 month(s)    Review of History and Medications   Past Medical History:   Diagnosis Date   • Abdominal wall hernia    • Bell's palsy     2 bouts - idiopathic, possible stress induced   • CPAP (continuous positive airway pressure) dependence    • Depression    • Diabetes mellitus ([facility] ) 03/25/21    Blood work   • GERD (gastroesophageal reflux disease)    • Hypertension    • Irregular heart beat    • Morbid obesity with BMI of 60 0-69 9, adult ([facility] )    • Neurocardiogenic syncope    • Sleep apnea    • 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     pt symptomatic with extreme and sudden environmental temp  fluctuations     Past Surgical History:   Procedure Laterality Date   • ABDOMINAL ADHESION SURGERY N/A 4/4/2022    Procedure: LYSIS ADHESIONS LAPAROSCOPIC;  Surgeon: Basil Cobb MD;  Location: [address] [place] [address];  Service: Bariatrics   • COLONOSCOPY N/A 2/2/2018    Procedure: COLONOSCOPY;  Surgeon: Radha Camara MD;  Location: [place] [address] GI LAB; Service: Gastroenterology   • ESOPHAGOGASTRODUODENOSCOPY N/A 2/2/2018    Procedure: ESOPHAGOGASTRODUODENOSCOPY (EGD); Surgeon: Radha Camara MD;  Location: [facility];   Service: Gastroenterology   • [place] LAP GASTRIC BYPASS/SARI-EN-Y N/A 4/4/2022    Procedure: LAPAROSCOPIC SARI-EN-Y GASTRIC BYPASS AND INTRAOPERATIVE EGD;  Surgeon: Basil Cobb MD;  Location: [address] [place] [address];  Service: Bariatrics   • TESTICLE SURGERY Left     infected testicle   • TOENAIL EXCISION Right 09/07/2021     Social History     Socioeconomic History   • Marital status: /Civil Union     Spouse name: Not on file   • Number of children: 2   • Years of education: Not on file   • Highest education level: 12th grade   Occupational History   • Not on file   Tobacco Use   • Smoking status: Former Smoker     Packs/day: 0 25     Years: 10 00     Pack years: 2 50     Types: Cigarettes     Start date: 7/15/1999     Quit date:      Years since quittin 8   • Smokeless tobacco: Never Used   Vaping Use   • Vaping Use: Never used   Substance and Sexual Activity   • Alcohol use: Not Currently     Comment: social on occasion   • Drug use: No   • Sexual activity: Not Currently     Partners: Female     Birth control/protection: Abstinence, I U D  Other Topics Concern   • Not on file   Social History Narrative    Denied: History of alcohol use (history) - as per [employer]    Always uses seat belt    Former smoker - as per [employer]    Never a smoker - as per [employer]     Social Determinants of Health     Financial Resource Strain: Not on file   Food Insecurity: Not on file   Transportation Needs: No Transportation Needs   • Lack of Transportation (Medical): No   • Lack of Transportation (Non-Medical): No   Physical Activity: Not on file   Stress: Not on file   Social Connections: Not on file   Intimate Partner Violence: Not on file   Housing Stability: Not on file       Current Outpatient Medications:   •  glucose blood (OneTouch Ultra) test strip, Use 1 each 2 (two) times a day to check glucose (Patient not taking: Reported on 10/6/2022), Disp: 100 each, Rfl: 0  •  Lancets (OneTouch Delica Plus [id number]) MISC, USE 1 LANCET TO CHECK GLUCOSE TWICE DAILY  MORNING  AFTER  OVERNIGHT  FAST  AND  2  HOURS  AFTER  A  MEAL (Patient not taking: Reported on 10/6/2022), Disp: 100 each, Rfl: 0  •  Blood Glucose Monitoring Suppl w/Device KIT, Measure blood sugar twice daily   Morning after overnight fast and 2 hours after a meal (Patient not taking: Reported on 10/6/2022), Disp: 1 kit, Rfl: 0  •  Blood Pressure Monitor KIT, Use in the morning (Patient not taking: No sig reported), Disp: 1 kit, Rfl: 0  •  Chromium 1000 MCG TABS, Take 1 tablet by mouth daily (Patient not taking: Reported on 10/6/2022), Disp: , Rfl:   •  dicyclomine (BENTYL) 20 mg tablet, Take 1 tablet (20 mg total) by mouth 3 (three) times a day (Patient not taking: Reported on 10/6/2022), Disp: 90 tablet, Rfl: 1  •  gabapentin (NEURONTIN) 400 mg capsule, Take 1 capsule (400 mg total) by mouth 3 (three) times a day, Disp: 120 capsule, Rfl: 2  •  Insulin Pen Needle 32G X 4 MM MISC, Use daily at bedtime (Patient not taking: Reported on 10/6/2022), Disp: 100 each, Rfl: 3  •  metFORMIN (GLUCOPHAGE) 500 mg tablet, Take 1 tablet (500 mg total) by mouth 2 (two) times a day with meals (Patient not taking: No sig reported), Disp: 120 tablet, Rfl: 0  •  metoprolol succinate (TOPROL-XL) 25 mg 24 hr tablet, Take 0 5 tablets (12 5 mg total) by mouth daily, Disp: 90 tablet, Rfl: 1  •  multivitamin (THERAGRAN) TABS, Take 1 tablet by mouth daily, Disp: , Rfl:   •  nystatin (MYCOSTATIN) cream, Apply topically 2 (two) times a day, Disp: 30 g, Rfl: 1  •  ondansetron (Zofran ODT) 4 mg disintegrating tablet, Take 1 tablet (4 mg total) by mouth every 6 (six) hours as needed for nausea or vomiting (Patient not taking: No sig reported), Disp: 20 tablet, Rfl: 0  •  oxyCODONE (Roxicodone) 5 immediate release tablet, Take 1 tablet (5 mg total) by mouth every 4 (four) hours as needed for moderate pain Max Daily Amount: 30 mg (Patient not taking: No sig reported), Disp: 10 tablet, Rfl: 0  •  pantoprazole (PROTONIX) 40 mg tablet, Take 1 tablet (40 mg total) by mouth daily, Disp: 90 tablet, Rfl: 1  •  saccharomyces boulardii (FLORASTOR) 250 mg capsule, Take 250 mg by mouth every morning (Patient not taking: Reported on 10/6/2022), Disp: , Rfl:     Food Intake and Lifestyle Assessment   Food Intake Assessment completed via usual diet recall  Taking 1 centrum men multi (? If has iron)  Wake 6am during week, 8-9am on sat and sunday   Not working right now  Wife works from [employer] to [employer] and he doesn't have a license to drive  Breakfast: 6am-coffee  28oz shaker cup which he puts in 1/2 cup of sugar + 2% 1/2 cup + [place] coffee (highly encouraged non-calorie sweetener)  If has breakfast:  [employer]ik[employer] greek yogurt when has them in the house OR omelet    8:30am-10am, but times may not be until lunch time (advsied to set alarm to remind when to eat):  frozen meal of beef chirozio Life Choice meal (10oz meal) which takes 30mins to eat from [facility] (about 30  mins later has the "ate too much feeling") (about 340cals and 22gm protein)  Snack: -   Lunch: 12-1pm:  Another Life Choice meal   Snack: -if has anything will be cookies or candy because in the house  Dinner: 3-4pm-Life Choice meal or 4-5oz burger yazmin 85/15% fat/chicken/shrimp/ground beef--pork chop and steak too dry and dense  Will most often have a potato (about 1/2 cup) + corn most often (does like the non-starchy veggies--encoruaged) OR salad from [employer] (Clear[employer]) OR wrap from [employer] style chicken salad from [employer]: 9-10pm:-  *160 count bag of Halls lasted 1 5 weeks--looked it up and 1 drop = 10 cals! Beverage intake: water, sugar free beverages and coffee/tea  Diet texture/stage: regular  Protein supplement: still has the protein powder, but doesn't make them  Estimated protein intake per day: 60-80gm  Estimated fluid intake per day: 50oz water, 32oz Gatroade zero, Coffee w/ a lot of sugar (in a 28oz coffee is down to 3 tbsp sugar instead of 1/2 cup or will try stevia) and not dumping as often     Meals eaten away from home: varies  Typical meal pattern: 3-4 meals per day and 1-2 snacks per day  Eating Behaviors: Does not drink with meals and waits 45-minutes after meal before resuming drinking (for the most part), Mindless eating and poor meal schedule    Food allergies or intolerances: pork and steak are too dense and dry, too much pasta is too filling, sugar causes dumping  Cultural or Evangelical considerations: -    Vitamins:  · Did start the BariatricPal multi since last visit, needs to order for when he runs out of the one month sample  · Hasn't purchased calcium citrate chews, but does have equate type tums (2 BID)  Pt states the BA chewy citrate are costly  Suggested at least Viactiv w/meals 1 TID  Physical Assessment  Nutrition Related Findings  Dumping--too much sugar  Gassy    Physical Activity  Types of exercise: None--just walks in [employer] once a week on wife's day off  Suggested to start some resistance training--can use resistance bands  Current physical limitations: Large hernia    Psychosocial Assessment   Support systems: spouse and children  Socioeconomic factors: yes, does not work  Wife is the only one that works  Money is very tight  Nutrition Diagnosis  Diagnosis: Inappropriate intake of carbohydrates (NI-5 8  3)  Related to: Inability or lack of desire to manage self-care  As Evidenced by: Reports of disorded eating patterns     Interventions and Teaching   Patient educated on post-op nutrition guidelines  Patient educated and handouts provided    Surgical changes to stomach / GI  Capacity of post-surgery stomach  Diet progression  Adequate hydration  Sugar and fat restriction to decrease "dumping syndrome"  Expected weight loss  Weight loss plateaus/ possibility of weight regain  Exercise  Nutrition considerations after surgery  Protein supplements  Meal planning and preparation  Appropriate carbohydrate, protein, and fat intake, and food/fluid choices to maximize safe weight loss, nutrient intake, and tolerance   Dietary and lifestyle changes  Possible problems with poor eating habits  Intuitive eating  Techniques for self monitoring and keeping daily food journal  Potential for food intolerance after surgery, and ways to deal with them including: lactose intolerance, nausea, reflux, vomiting, diarrhea, food intolerance, appetite changes, gas  Vitamin / Mineral supplementation of Multivitamin with minerals and Calcium    Education provided to: patient    Barriers to learning: No barriers identified    Readiness to change: action    Comprehension: verbalizes understanding     Expected Compliance: good    Pt presents today with continued weight loss and reports to have started to make changes that we discussed last visit  He has decreased the sugar in his coffee from the reported 1/2 cup to about 3tbsp  He tried the non-calorie sweeteners, but dislikes them  Pt is reporting that he consumes a whole frozen meal that is 10oz (over 1 cup volume) but then about 1 hr later doesn't feel well  Suggested to limit to 1/2 to 3/4 of the meal to avoid over eating since he really should only be consuming 1/2 to 3/4 cup total of food per meal   Pt will try to eat less of the meal and save the rest       Goals  · Continue to decrease the sugar in coffee  · Food journal via baritastic  · Exercise 30 minutes 5 times per week--start walking   Can split into 15mins 2x/day and add strength training  · Eat 3 meals per day + 1 protein planned snack--set alarm to remind to eat  · Eliminate mindless snacking  · Eat 1/2 to 3/4 of the frozen meal instead of the whole meal  · Re-order baripal multi one per day, at next visit will talk about adding calcium  · F/u with RD and SW at 9 months post-op    Time Spent:   30 mins

## 2022-11-09 ENCOUNTER — TELEPHONE (OUTPATIENT)
Dept: BARIATRICS | Facility: CLINIC | Age: 42
End: 2022-11-09

## 2022-11-28 ENCOUNTER — OFFICE VISIT (OUTPATIENT)
Dept: FAMILY MEDICINE CLINIC | Facility: CLINIC | Age: 42
End: 2022-11-28

## 2022-11-28 VITALS
HEART RATE: 92 BPM | OXYGEN SATURATION: 98 % | HEIGHT: 69 IN | TEMPERATURE: 96.9 F | SYSTOLIC BLOOD PRESSURE: 142 MMHG | WEIGHT: 254.7 LBS | DIASTOLIC BLOOD PRESSURE: 76 MMHG | RESPIRATION RATE: 18 BRPM | BODY MASS INDEX: 37.72 KG/M2

## 2022-11-28 DIAGNOSIS — Z71.3 DIETARY COUNSELING: ICD-10-CM

## 2022-11-28 DIAGNOSIS — E66.09 CLASS 2 OBESITY DUE TO EXCESS CALORIES WITH BODY MASS INDEX (BMI) OF 37.0 TO 37.9 IN ADULT, UNSPECIFIED WHETHER SERIOUS COMORBIDITY PRESENT: ICD-10-CM

## 2022-11-28 DIAGNOSIS — Z00.00 ANNUAL PHYSICAL EXAM: Primary | ICD-10-CM

## 2022-11-28 DIAGNOSIS — Z71.82 EXERCISE COUNSELING: ICD-10-CM

## 2022-11-28 DIAGNOSIS — Z23 ENCOUNTER FOR IMMUNIZATION: ICD-10-CM

## 2022-11-28 PROBLEM — E66.01 MORBID OBESITY WITH BODY MASS INDEX (BMI) OF 40.0 TO 49.9 (HCC): Status: RESOLVED | Noted: 2022-04-04 | Resolved: 2022-11-28

## 2022-11-28 PROBLEM — Z98.84 S/P BARIATRIC SURGERY: Status: ACTIVE | Noted: 2022-11-28

## 2022-11-28 PROBLEM — R61 HYPERHIDROSIS: Status: ACTIVE | Noted: 2022-11-28

## 2022-11-28 NOTE — PROGRESS NOTES
[address]    NAME: Jesus Pacheco  AGE: 40 y o  SEX: male  : 1980     DATE: 2022     Assessment and Plan:     Problem List Items Addressed This Visit    None  Visit Diagnoses     Annual physical exam    -  Primary    Class 2 obesity due to excess calories with body mass index (BMI) of 37 0 to 37 9 in adult, unspecified whether serious comorbidity present        Dietary counseling        Exercise counseling        Encounter for immunization        Relevant Orders    influenza vaccine, quadrivalent, 0 5 mL, preservative-free, for adult and pediatric patients 6 mos+ (AFLURIA, FLUARIX, FLULAVAL, FLUZONE)    Pneumococcal Conjugate Vaccine 20-valent (PCV20)    TD VACCINE GREATER THAN OR EQUAL TO 8YO PRESERVATIVE FREE IM        Status post Selin-en-Y gastric bypass surgery in April  Patient reports doing well following procedure and has been losing weight  Patient has no acute concerns today  He did have questions regarding his Holter monitor which I advised him to follow-up with his cardiologist to address  Otherwise, will follow-up in 6 months to monitor progress and review chronic conditions  Patient has periumbilical hernia that is being monitored  Surgeon advised for patient to continue to lose weight with no surgical intervention planned at this time  Immunizations and preventive care screenings were discussed with patient today  Appropriate education was printed on patient's after visit summary  Counseling:  Dental Health: discussed importance of regular tooth brushing, flossing, and dental visits  · Exercise: the importance of regular exercise/physical activity was discussed  Recommend exercise 3-5 times per week for at least 30 minutes  Return in about 6 months (around 2023) for f/u chronic conditions (long)       Chief Complaint:     Chief Complaint   Patient presents with   • Physical Exam      History of Present Illness:     Adult Annual Physical   Patient here for a comprehensive physical exam  The patient reports no problems  Diet and Physical Activity  · Diet/Nutrition: bariatric diet, follows it well  · Exercise: walking, 1-2 times a week on average and 30-60 minutes on average  Depression Screening  PHQ-2/9 Depression Screening    Little interest or pleasure in doing things: 0 - not at all  Feeling down, depressed, or hopeless: 0 - not at all  PHQ-2 Score: 0  PHQ-2 Interpretation: Negative depression screen       General Health  · Sleep: sleeps well and 6 hours, on CPAP  · Hearing: normal - bilateral   · Vision: no vision problems and most recent eye exam >1 year ago  · Dental: no dental visits for >1 year and does not brush teeth regularly   Health  · Symptoms include: none     Review of Systems:     Review of Systems   Constitutional: Negative for chills and fever  HENT: Negative for sore throat  Respiratory: Negative for cough and shortness of breath  Cardiovascular: Negative for chest pain and palpitations  Gastrointestinal: Negative for abdominal pain, constipation, diarrhea, nausea and vomiting  Genitourinary: Negative for difficulty urinating and dysuria  Neurological: Negative for dizziness and headaches  Past Medical History:     Past Medical History:   Diagnosis Date   • Abdominal wall hernia    • Bell's palsy     2 bouts - idiopathic, possible stress induced   • CPAP (continuous positive airway pressure) dependence    • Depression    • Diabetes mellitus (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     pt symptomatic with extreme and sudden environmental temp   fluctuations      Past Surgical History:     Past Surgical History: Procedure Laterality Date   • ABDOMINAL ADHESION SURGERY N/A 2022    Procedure: LYSIS ADHESIONS LAPAROSCOPIC;  Surgeon: Benito Trejo MD;  Location: [address] [place] [address];  Service: Bariatrics   • COLONOSCOPY N/A 2018    Procedure: COLONOSCOPY;  Surgeon: Celina Cueto MD;  Location: [place] [address] GI LAB; Service: Gastroenterology   • ESOPHAGOGASTRODUODENOSCOPY N/A 2018    Procedure: ESOPHAGOGASTRODUODENOSCOPY (EGD); Surgeon: Celina Cueto MD;  Location: [facility];   Service: Gastroenterology   • [place] LAP GASTRIC BYPASS/SARI-EN-Y N/A 2022    Procedure: LAPAROSCOPIC SARI-EN-Y GASTRIC BYPASS AND INTRAOPERATIVE EGD;  Surgeon: Benito Trejo MD;  Location: [address] [place] [address];  Service: Bariatrics   • TESTICLE SURGERY Left     infected testicle   • TOENAIL EXCISION Right 2021      Family History:     Family History   Problem Relation Age of Onset   • Supraventricular tachycardia Mother    • Atrial fibrillation Mother    • Diabetes type II Mother    • Cancer Mother    • Osteoporosis Mother    • Ulcerative colitis Father    • Liver disease Father    • ADD / ADHD Son    • Colon cancer Family    • Diabetes type II Family    • Hyperlipidemia Family    • Hypertension Family    • Hypothyroidism Family    • Heart disease Family    • COPD Paternal Grandfather    • Lung cancer Neg Hx    • Asthma Neg Hx       Social History:     Social History     Socioeconomic History   • Marital status: /Civil Union     Spouse name: None   • Number of children: 2   • Years of education: None   • Highest education level: 12th grade   Occupational History   • None   Tobacco Use   • Smoking status: Former     Packs/day: 0 25     Years: 10 00     Pack years: 2 50     Types: Cigarettes     Start date: 7/15/1999     Quit date:      Years since quittin 9   • Smokeless tobacco: Never   Vaping Use   • Vaping Use: Never used   Substance and Sexual Activity   • Alcohol use: Not Currently     Comment: social on occasion   • Drug use: No   • Sexual activity: Not Currently     Partners: Female     Birth control/protection: Abstinence, I U D  Other Topics Concern   • None   Social History Narrative    Denied: History of alcohol use (history) - as per [employer]    Always uses seat belt    Former smoker - as per [employer]    Never a smoker - as per [facility]     Social Determinants of Health     Financial Resource Strain: Not on file   Food Insecurity: Not on file   Transportation Needs: No Transportation Needs   • Lack of Transportation (Medical): No   • Lack of Transportation (Non-Medical): No   Physical Activity: Not on file   Stress: Not on file   Social Connections: Not on file   Intimate Partner Violence: Not on file   Housing Stability: Not on file      Current Medications:     Current Outpatient Medications   Medication Sig Dispense Refill   • Chromium 1000 MCG TABS Take 1 tablet by mouth daily     • dicyclomine (BENTYL) 20 mg tablet Take 1 tablet (20 mg total) by mouth 3 (three) times a day 90 tablet 1   • gabapentin (NEURONTIN) 400 mg capsule Take 1 capsule (400 mg total) by mouth 3 (three) times a day 120 capsule 2   • glucose blood (OneTouch Ultra) test strip Use 1 each 2 (two) times a day to check glucose 100 each 0   • Lancets (OneTouch Delica Plus [id number]) MISC USE 1 LANCET TO CHECK GLUCOSE TWICE DAILY  MORNING  AFTER  OVERNIGHT  FAST  AND  2  HOURS  AFTER  A  MEAL 100 each 0   • metoprolol succinate (TOPROL-XL) 25 mg 24 hr tablet Take 0 5 tablets (12 5 mg total) by mouth daily 90 tablet 1   • multivitamin (THERAGRAN) TABS Take 1 tablet by mouth daily     • nystatin (MYCOSTATIN) cream Apply topically 2 (two) times a day 30 g 1   • saccharomyces boulardii (FLORASTOR) 250 mg capsule Take 250 mg by mouth every morning       No current facility-administered medications for this visit  Allergies:      Allergies   Allergen Reactions   • Bee Venom Anaphylaxis     Annotation - [id number]: wasp, hornet also   • Macrolides And Ketolides Anaphylaxis and Rash   • Other Hives, Shortness Of Breath and Wheezing     [facility] - [id number]:  violet   • Pertussis Vaccine Anaphylaxis   • Pertussis Vaccines Anaphylaxis and Rash   • Vancomycin Shortness Of Breath   • Zithromax [Azithromycin] Anaphylaxis   • Erythromycin Rash   • Pollen Extract Sneezing      Physical Exam:     /76 (BP Location: Left arm, Patient Position: Sitting, Cuff Size: Adult)   Pulse 92   Temp (!) 96 9 °F (36 1 °C) (Tympanic)   Resp 18   Ht 5' 9" (1 753 m)   Wt 116 kg (254 lb 11 2 oz)   SpO2 98%   BMI 37 61 kg/m²     Physical Exam  Constitutional:       Appearance: Normal appearance  He is obese  HENT:      Head: Normocephalic and atraumatic  Right Ear: Tympanic membrane and ear canal normal       Left Ear: Tympanic membrane and ear canal normal       Nose: Nose normal  No congestion  Mouth/Throat:      Mouth: Mucous membranes are moist       Pharynx: Oropharynx is clear  Eyes:      Extraocular Movements: Extraocular movements intact  Conjunctiva/sclera: Conjunctivae normal       Pupils: Pupils are equal, round, and reactive to light  Cardiovascular:      Rate and Rhythm: Normal rate and regular rhythm  Heart sounds: Normal heart sounds  No murmur heard  Pulmonary:      Effort: Pulmonary effort is normal  No respiratory distress  Breath sounds: Normal breath sounds  No stridor  No wheezing  Abdominal:      General: Abdomen is flat  Bowel sounds are normal  There is no distension  Palpations: Abdomen is soft  There is no mass  Tenderness: There is no abdominal tenderness  Hernia: A hernia (periumbilical) is present  Musculoskeletal:      Cervical back: Neck supple  No tenderness  Right lower leg: No edema  Left lower leg: No edema  Neurological:      Mental Status: He is alert            Marilyn Ramirez MD  [address] [place] [address]

## 2022-11-28 NOTE — PATIENT INSTRUCTIONS

## 2022-12-16 NOTE — PROGRESS NOTES
Patient presents for post-op follow up, current weight 261 8lbs  Eating behaviors/food choices: Patient reports trying to put into place some of the recommendations given by RD, he reduced sugar from half a cup to 9tsp and is trying to find healthy proteins  Was considering going vegan but when discussing the limitations this would further put on his protein options he amended his statement stating he wants to add in vegan options such as tofu to his regiment  He wants to find other proteins that he can tolerate since pork and beef are hard to digest   Reminded patient that those are denser meats, often higher in calories, if he can tolerate poultry, seafood and dairy, he would be getting good proteins in  He does have the Equate protein powder, says this is what he can afford but it doesn't last long  Looked at nutrition and serving sizes, patient is using double the serving of protein shake than necessary because he feels that he needs a 24oz serving to keep him satisfied  Encouraged patient to cut back portion of protein shake to one scoop as directed to reduce calories and save money  Patient is only taking blood sugars in the morning and says he feels 80 is too low  Not taking blood sugars throughout the day, reminded he had metabolic surgery which will change his blood sugars but also he's missing information about what his blood sugars are doing through the day which would provide a clearer picture  Patient is making food decisions based on how he feels rather than facts of blood sugars, encouraged to get that information so he can make well informed food decisions  Recommended that he track foods for calories and how food makes his body feel (gasy, bloated, satisfied, etc)  Activity/Exercise:  Patient reports wanting to be more active but didn't specify what keeps him from doing so    He wants to go back to work but is worried about all of the upcoming procedures to fix his hernia and possible skin removal, the time he's going to need to take off and how that would coordinate with a job  SW highlighted that there is a significant amount of time before he hits his goal weight and may be ready to do these procedures, consider looking into a job that works with his schedule now and plan for the procedures when the time comes  Mental Health/Wellness:  Patient presents with increased racing thoughts and anxiety about how to navigate the use of his bariatric tool, mindless/emotional snacking and taking care of his family  Patient does acknowledge that the practice of drinking with food to "wash out his pouch" is similar to bulimia, SW highlighted that as an eating disorder that can cause real health issues which is why it is such a concern  Tried to review what foods he feels he needs to "wash out" and why he eats them to begin with, patient seems to have anticipatory discomfort with getting too hungry  He's trying to determine what foods he can tolerate and seems to be trying to understand triggers of emotional eating but doesn't seem ready to move away from consuming higher calorie, higher volumes of food  He states he has some foods to help with low blood sugars, he will have a snack if he feels there's been enough time since one meal and before the next but not really because of biological hunger  Patient also admits that he's  Not actually sure what hungry is for him any longer  SW pointed out patient's racing, spiraling thoughts and urged him to keep things simple, that his body cues and "feelings" about this seem to be often and may not be reliable indicators of hunger and satiation  Recommended patient use more concrete methods of scheduling, planning and consuming his meals and snacks, starting with getting protein at breakfast rather than depending on just coffee    Patient says he needs the caffeine to function, encouraged him to incorporate protein through a shake or paring it with some protein food  Suggested setting alarms to time meals, eating slowly and mindfully, tuning into cues of satiation and finding alterative activities for head hunger  Asked patient as to whether he would consider reconnecting to therapy, patient admits he has considered it but doesn't feel he's to the point that he needs it  He feels he knows what his struggles are and can pull himself back from it  Validated patient's insight about his struggles but also pointed out that reported behaviors he's exhibiting in response to adjusting to his bariatric lifestyle indicate there may be some areas that need further exploration and support  Patient struggles with allowing others to help him, possible element of shame in exposing his past trauma, wants to convey being the expert on his struggles but missing important components of trauma that he can't navigate alone with out objective guidance      Goals:    - keep eating plan simple, reference proteins in manual and shop from there  - reduce portions to save on unnecessary calories as well as saving money on food items  - replace sugar in coffee with protein powder/shake, drink slowly and mindfully  - log foods to get a better understanding of caloric intake as well how it makes the body feel  - take blood sugars throughout the day to get accurate reading rather than only depending on how the body feels    Next Appointment:  With ANNE and RD on 11/3 [Joint Pain] : joint pain [Fever] : no fever [Vision Problems] : no vision problems [Nasal Discharge] : no nasal discharge [Chest Pain] : no chest pain [Palpitations] : no palpitations [Lower Ext Edema] : no lower extremity edema [Shortness Of Breath] : no shortness of breath [Wheezing] : no wheezing [Cough] : no cough [Abdominal Pain] : no abdominal pain [Nausea] : no nausea [Constipation] : no constipation [Diarrhea] : diarrhea [Vomiting] : no vomiting [Dysuria] : no dysuria [Joint Swelling] : no joint swelling [Muscle Weakness] : no muscle weakness [Muscle Pain] : no muscle pain [Back Pain] : no back pain [Dizziness] : no dizziness [Fainting] : no fainting [Unsteady Walking] : no ataxia [FreeTextEntry7] : No blood in stool

## 2023-01-03 ENCOUNTER — OFFICE VISIT (OUTPATIENT)
Dept: BARIATRICS | Facility: CLINIC | Age: 43
End: 2023-01-03

## 2023-01-03 VITALS — HEIGHT: 69 IN | WEIGHT: 253.6 LBS | BODY MASS INDEX: 37.56 KG/M2

## 2023-01-03 VITALS — BODY MASS INDEX: 37.45 KG/M2 | WEIGHT: 253.6 LBS

## 2023-01-03 DIAGNOSIS — K91.2 POSTSURGICAL MALABSORPTION: Primary | ICD-10-CM

## 2023-01-03 DIAGNOSIS — Z48.815 ENCOUNTER FOR SURGICAL AFTERCARE FOLLOWING SURGERY OF DIGESTIVE SYSTEM: Primary | ICD-10-CM

## 2023-01-03 NOTE — PROGRESS NOTES
Patient presents for post-op check in, current weight 253 6lbs  Eating behaviors/food choices: trying to get in three meals a day but still forgetting one, often breakfast   With kids being home for the holidays his usual routine was off, going to work on getting back to three meals  Suggested he set reminder on phone, he has a goal setting jessica called Peggy Grant that he is using to meet some of his goals  Says he's getting adequate protein, getting plenty of water  Still having hard candies while watching TV, encouraged to be mindful of this behavior so it doesn't become a consistent habit  Suggested managing environment, allowing himself a portioned amount of candies instead of keeping them in the living room to mindlessly eat  Activity/Exercise:  Patient got a new dog, trying to take him for walks, still training him  He says he and his wife try to go out for walks about three times a week along with shorter walks for the dog  He did get bands for strength training but hadn't set it up yet with the holiday, plans to do so soon  Sleep/Rest:  Patient still using CPAP machine, getting quality rest he reports  Mental Health/Wellness:  Has been dealing with some family stressors, his going through health concerns and a scare that her health may be deteriorating quicker but it was resolved successfully  Patient also trying to have more communication with his dad which can be strained  Denies this having much impact on his mood, denies any depression or anxiety  Patient is trying to also train a new dog, highlighted that these small stressors could lead up to overall stress that could impact wellness  Encouraged patient to be aware of his own self care, the focus he has on others could be contributing to him missing meals or mindlessly eating candy  Patient said he would be working on this        Goals:    - continue mindfulness of the impact stress has on self care, invest time in own wellness  - have three meals a day, practice mindful eating when reaching for higher calorie items  - clean up environment of tempting food items to decrease mindless snacking

## 2023-01-03 NOTE — PROGRESS NOTES
Bariatric Follow Up Nutrition Note    Type of surgery  Gastric bypass: laparoscopic  Surgery Date: 4/4/2022  6 months  post-op  Surgeon: Dr Lloyd Merino  40 y o   male  Height 5' 9" (1 753 m), weight 115 kg (253 lb 9 6 oz)  Body mass index is 37 45 kg/m²  Initial:  388#  Weight on Day of Weight Loss Surgery: 330#  Weight in (lb) to have BMI = 25: 168 8#  Pre-Op Excess Wt: 220#  Post-Op Wt Loss: 134 5#/ 61% EBWL in 9 month(s)    Review of History and Medications   Past Medical History:   Diagnosis Date   • Abdominal wall hernia    • Bell's palsy     2 bouts - idiopathic, possible stress induced   • CPAP (continuous positive airway pressure) dependence    • Depression    • Diabetes mellitus ([facility] ) 03/25/21    Blood work   • GERD (gastroesophageal reflux disease)    • Hypertension    • Irregular heart beat    • Morbid obesity with BMI of 60 0-69 9, adult ([facility] )    • Neurocardiogenic syncope    • Sleep apnea    • 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 GI LAB; Service: Gastroenterology   • ESOPHAGOGASTRODUODENOSCOPY N/A 2/2/2018    Procedure: ESOPHAGOGASTRODUODENOSCOPY (EGD); Surgeon: Gwendolyn Forrester MD;  Location: [facility] GI LAB;   Service: Gastroenterology   • ND LAP GASTRIC BYPASS/SARI-EN-Y N/A 4/4/2022    Procedure: LAPAROSCOPIC SARI-EN-Y GASTRIC BYPASS AND INTRAOPERATIVE EGD;  Surgeon: Jany Sawyer MD;  Location: [address] [place] [address];  Service: Bariatrics   • TESTICLE SURGERY Left     infected testicle   • TOENAIL EXCISION Right 09/07/2021     Social History     Socioeconomic History   • Marital status: /Civil Union     Spouse name: Not on file   • Number of children: 2   • Years of education: Not on file   • Highest education level: 12th grade   Occupational History   • Not on file   Tobacco Use   • Smoking status: Former     Packs/day: 0 25     Years: 10 00     Pack years: 2 50     Types: Cigarettes     Start date: 7/15/1999     Quit date:      Years since quittin 0   • Smokeless tobacco: Never   Vaping Use   • Vaping Use: Never used   Substance and Sexual Activity   • Alcohol use: Not Currently     Comment: social on occasion   • Drug use: No   • Sexual activity: Not Currently     Partners: Female     Birth control/protection: Abstinence, I U D  Other Topics Concern   • Not on file   Social History Narrative    Denied: History of alcohol use (history) - as per [employer]    Always uses seat belt    Former smoker - as per [employer]    Never a smoker - as per [employer]     Social Determinants of Health     Financial Resource Strain: Not on file   Food Insecurity: Not on file   Transportation Needs: No Transportation Needs   • Lack of Transportation (Medical): No   • Lack of Transportation (Non-Medical): No   Physical Activity: Not on file   Stress: Not on file   Social Connections: Not on file   Intimate Partner Violence: Not on file   Housing Stability: Not on file       Current Outpatient Medications:   •  Chromium 1000 MCG TABS, Take 1 tablet by mouth daily, Disp: , Rfl:   •  dicyclomine (BENTYL) 20 mg tablet, Take 1 tablet (20 mg total) by mouth 3 (three) times a day, Disp: 90 tablet, Rfl: 1  •  gabapentin (NEURONTIN) 400 mg capsule, Take 1 capsule (400 mg total) by mouth 3 (three) times a day, Disp: 120 capsule, Rfl: 2  •  glucose blood (OneTouch Ultra) test strip, Use 1 each 2 (two) times a day to check glucose, Disp: 100 each, Rfl: 0  •  Lancets (OneTouch Delica Plus [id number]) MISC, USE 1 LANCET TO CHECK GLUCOSE TWICE DAILY   MORNING  AFTER  OVERNIGHT  FAST  AND  2  HOURS  AFTER A  MEAL, Disp: 100 each, Rfl: 0  •  metoprolol succinate (TOPROL-XL) 25 mg 24 hr tablet, Take 0 5 tablets (12 5 mg total) by mouth daily, Disp: 90 tablet, Rfl: 1  •  multivitamin (THERAGRAN) TABS, Take 1 tablet by mouth daily, Disp: , Rfl:   •  nystatin (MYCOSTATIN) cream, Apply topically 2 (two) times a day, Disp: 30 g, Rfl: 1  •  saccharomyces boulardii (FLORASTOR) 250 mg capsule, Take 250 mg by mouth every morning, Disp: , Rfl:     Food Intake and Lifestyle Assessment   Food Intake Assessment completed via usual diet recall  Wake 6am during week, 8-9am on sat and sunday   Not working right now  Wife works from [employer] to [employer] and he doesn't have a license to drive  Breakfast: 6am-32oz coffee 3 tbsp (but not officially measured) of sugar for first cup of coffee of the day, the rest he uses stevia  And still not eating for breakfast often  If has breakfast:  8:30am-10am, but times may not be until lunch time (advsied to set alarm to remind when to eat):  frozen meal of beef chirozio Life Choice meal (10oz meal) (about 340cals and 22gm protein) OR will make protein waffles but doesn't do well with the syrups or SF syrup so came up with other options OR breakfast burrito OR greek yogurt  Snack: -   Lunch: 12-1pm:  Another Life Choice meal or the other day did 1/2 can of chili + 1 minute cup of rice (all of the individual cup) with cheese (a lot) and sour cream  Snack:    Dinner: 4-7pm-Life Choice meal or 4-5oz burger yazmin 85/15% fat/chicken/shrimp/ground beef--pork chop and steak too dry and dense  Will most often have a potato (about 1/2 cup) + corn most often (does like the non-starchy veggies--encoruaged) OR salad from [employer] (r[place]ell) OR wrap from [employer] style chicken salad from [address] [place] [address] the chili as stated above  Snack: 9-10pm:-  *changed from cough drops to mints, but unsure how many he has in a day--eats mindlessly       Yesterday   Woke at 10am  first meal at 4pm-1/2 can of chili + 1 minute cup of rice (all of the individual cup) with cheese (a lot) and sour cream    8pm-1/2 can of chili + 1 minute cup of rice (all of the individual cup) with cheese (a lot) and sour cream--ate this twice yesterday  10pm-2 greek yogurts    Beverage intake: water, sugar free beverages and coffee/tea  Diet texture/stage: regular  Protein supplement: still has the protein powder, but doesn't make them OR occasional [employer] with 42gm protein    Estimated protein intake per day: 60-80gm  Estimated fluid intake per day: 50oz water, 32oz Gatroade zero, Coffee w/ a lot of sugar (in a 32oz coffee is down to 3 tbsp sugar instead of 1/2 cup  If has more coffee during the day does use stevia) and not dumping as often  Meals eaten away from home: varies  Typical meal pattern: 3-4 meals per day and 1-2 snacks per day  Eating Behaviors: Does not drink with meals and waits 45-minutes after meal before resuming drinking (for the most part), Mindless eating and poor meal schedule    Food allergies or intolerances: pork and steak are too dense and dry, too much pasta is too filling, sugar causes dumping  Cultural or Baptist considerations: -    Vitamins:  · Did start the BariatricPal multi in the past, but still needs to order since he ran out  Did provide with a one month sample  His plan is when gets his income taxes back will order 1 year supply for $99   Right now taking a men's one-a-day  · Pt purchased Equate tablet calcium citrate (2 tabs = 630mg)--takes 2 BID  Physical Assessment  Nutrition Related Findings  Dumping--too much sugar  Gassy    Physical Activity  Types of exercise: None--just walks in [employer] once a week on wife's day off  Suggested to start some resistance training--can use resistance bands  Current physical limitations: Large hernia    Psychosocial Assessment   Support systems: spouse and children  Socioeconomic factors: yes, does not work  Wife is the only one that works   Money is very tight     Nutrition Diagnosis  Diagnosis: Inappropriate intake of carbohydrates (NI-5 8  3)  Related to: Inability or lack of desire to manage self-care  As Evidenced by: Reports of disorded eating patterns     Interventions and Teaching   Patient educated on post-op nutrition guidelines  Patient educated and handouts provided  Surgical changes to stomach / GI  Capacity of post-surgery stomach  Diet progression  Adequate hydration  Sugar and fat restriction to decrease "dumping syndrome"  Expected weight loss  Weight loss plateaus/ possibility of weight regain  Exercise  Nutrition considerations after surgery  Protein supplements  Meal planning and preparation  Appropriate carbohydrate, protein, and fat intake, and food/fluid choices to maximize safe weight loss, nutrient intake, and tolerance   Dietary and lifestyle changes  Possible problems with poor eating habits  Intuitive eating  Techniques for self monitoring and keeping daily food journal  Potential for food intolerance after surgery, and ways to deal with them including: lactose intolerance, nausea, reflux, vomiting, diarrhea, food intolerance, appetite changes, gas  Vitamin / Mineral supplementation of Multivitamin with minerals and Calcium    Education provided to: patient    Barriers to learning: No barriers identified    Readiness to change: action    Comprehension: verbalizes understanding     Expected Compliance: good    Pt presents today with continued weight loss  He has cut down on the sugar in his coffee, but does still use 3tbsp sugar in one 32oz cup  He did change to stevia if he has additional cups during the day  He is still struggling with eating 3 meals per day  He feels now that his kids are back in school he will be a little better with remembering to have the 3 meals but does admit that if he sets alarms as reminders he will likely still forget    He does seem to be choosing protein has his main part of his meal but will also eating a larger volume of carbs at times ie:  1 cup of rice with his chili  Suggested to cut down to 1/2 cup so he can have more of the protein  Pt is now taking a calcium supplement, but still taking a men's one a day multi instead of the bariatric multi  He does struggle financially therefore provided with a free sample and his plan is to purchase a full year of the [place] once he gets his tax refund this year  Goals  · Continue to decrease the sugar in coffee  · Food journal via [employer]  · Exercise 30 minutes 5 times per week--start walking  Can split into 15mins 2x/day and add strength training  · Eat 3 meals per day + 1 protein planned snack--set alarm to remind to eat  · Eliminate mindless snacking--omit the mints  · Be more mindful of portions--use measuring cups and spoons  · Re-order baripal multi one per day  · Continue calcium 2 tabs BID for 1300 mg  · Entered and provided with lab rx for pt to complete closer to annual appt in April      Time Spent:   30 mins

## 2023-02-15 ENCOUNTER — OFFICE VISIT (OUTPATIENT)
Dept: CARDIOLOGY CLINIC | Facility: CLINIC | Age: 43
End: 2023-02-15

## 2023-02-15 VITALS
HEART RATE: 84 BPM | WEIGHT: 254 LBS | SYSTOLIC BLOOD PRESSURE: 144 MMHG | HEIGHT: 69 IN | OXYGEN SATURATION: 98 % | BODY MASS INDEX: 37.62 KG/M2 | DIASTOLIC BLOOD PRESSURE: 88 MMHG

## 2023-02-15 DIAGNOSIS — E11.42 DIABETIC POLYNEUROPATHY ASSOCIATED WITH TYPE 2 DIABETES MELLITUS (HCC): ICD-10-CM

## 2023-02-15 DIAGNOSIS — M77.42 METATARSALGIA OF BOTH FEET: ICD-10-CM

## 2023-02-15 DIAGNOSIS — R00.0 TACHYCARDIA: ICD-10-CM

## 2023-02-15 DIAGNOSIS — M54.16 RADICULOPATHY OF LUMBAR REGION: ICD-10-CM

## 2023-02-15 DIAGNOSIS — I10 BENIGN ESSENTIAL HYPERTENSION: Primary | ICD-10-CM

## 2023-02-15 DIAGNOSIS — M77.41 METATARSALGIA OF BOTH FEET: ICD-10-CM

## 2023-02-15 RX ORDER — GABAPENTIN 400 MG/1
CAPSULE ORAL
Qty: 120 CAPSULE | Refills: 0 | Status: SHIPPED | OUTPATIENT
Start: 2023-02-15

## 2023-02-15 RX ORDER — METOPROLOL SUCCINATE 25 MG/1
25 TABLET, EXTENDED RELEASE ORAL DAILY
Qty: 90 TABLET | Refills: 1 | Status: SHIPPED | OUTPATIENT
Start: 2023-02-15

## 2023-02-15 NOTE — PROGRESS NOTES
Progress Note - Cardiology Office  [facility]    Emiliana Ocampo 40 y o  male MRN: [id number]  : 1980  Encounter: [id number]      ASSESSMENT:   Dizziness and palpitations  Symptoms have improved since last office visit    30-day ambulatory extended Holter monitor: 2022:  Underlying rhythm was sinus  Average heart rate was 80 and ranged from 5280/min  There was 1% PVCs and 4% PACs detected  No episodes of atrial fibrillation/flutter, SVT, VT, significant pauses or heart blocks detected  7 patient triggered events for symptoms like lightheadedness, shortness of breath, dizziness, heart racing correlated with sinus rhythm with PACs or mild sinus tachycardia with PACs but no sustained arrhythmias     Severe obesity:    S/P Bariatric surgery with substantial weight loss BMI is 37 51     GERD     Diabetes mellitus with polyneuropathy  Hyperlipidemia     Hypertension  BP today is 144/88 with heart rate of 84 and negative for orthostasis      Family history of cardiac problems  Mother had CAD/MI atrial fibrillation, ventricular tachycardia and stroke        RECOMMENDATIONS:  Increase Toprol to 25 mg daily  Avoid dehydration  Compression stockings during the day  Continue diet, exercise and weight loss strategies      Please call [phone] if any questions  HPI :     Emiliana Ocampo is a 40y o  year old male who came for follow up  He is generally feeling better and has had decrease incidence of palpitations  We discussed the findings of his Holter monitor and I encouraged him to increase his Toprol XL to 25 mg daily  Patient has had substantial weight loss of almost 150 pounds since his bariatric surgery  REVIEW OF SYSTEMS:  Review of Systems   Cardiovascular: Positive for palpitations  All other systems reviewed and are negative          Historical Information   Past Medical History:   Diagnosis Date   • Abdominal wall hernia    • Bell's palsy     2 bouts - idiopathic, possible stress induced   • CPAP (continuous positive airway pressure) dependence    • Depression    • Diabetes mellitus ([facility] ) 21    Blood work   • GERD (gastroesophageal reflux disease)    • Hypertension    • Irregular heart beat    • Morbid obesity with BMI of 60 0-69 9, adult ([facility] )    • Neurocardiogenic syncope    • Sleep apnea    • 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     pt symptomatic with extreme and sudden environmental temp  fluctuations     Past Surgical History:   Procedure Laterality Date   • ABDOMINAL ADHESION SURGERY N/A 2022    Procedure: LYSIS ADHESIONS LAPAROSCOPIC;  Surgeon: Rufina Avery MD;  Location: [address] [place] [address];  Service: Bariatrics   • COLONOSCOPY N/A 2018    Procedure: COLONOSCOPY;  Surgeon: Sandeep Snow MD;  Location: [place] [address] GI LAB; Service: Gastroenterology   • ESOPHAGOGASTRODUODENOSCOPY N/A 2018    Procedure: ESOPHAGOGASTRODUODENOSCOPY (EGD); Surgeon: Sandeep Snow MD;  Location: [facility] GI LAB;   Service: Gastroenterology   • IN LAPS GSTR RSTCV [address] W/BYP SARI-EN-Y LIMB <150 CM N/A 2022    Procedure: LAPAROSCOPIC SARI-EN-Y GASTRIC BYPASS AND INTRAOPERATIVE EGD;  Surgeon: Rufina Avery MD;  Location: [address] [place] [address];  Service: Bariatrics   • TESTICLE SURGERY Left     infected testicle   • TOENAIL EXCISION Right 2021     Social History     Substance and Sexual Activity   Alcohol Use Not Currently    Comment: social on occasion     Social History     Substance and Sexual Activity   Drug Use No     Social History     Tobacco Use   Smoking Status Former   • Packs/day: 0 25   • Years: 10 00   • Pack years: 2 50   • Types: Cigarettes   • Start date: 7/15/1999   • Quit date:    • Years since quittin 1   Smokeless Tobacco Never     Family History:   Family History   Problem Relation Age of Onset   • Supraventricular tachycardia Mother    • Atrial fibrillation Mother    • Diabetes type II Mother    • Cancer Mother    • Osteoporosis Mother    • Ulcerative colitis Father    • Liver disease Father    • ADD / ADHD Son    • Colon cancer Family    • Diabetes type II Family    • Hyperlipidemia Family    • Hypertension Family    • Hypothyroidism Family    • Heart disease Family    • COPD Paternal Grandfather    • Lung cancer Neg Hx    • Asthma Neg Hx        Meds/Allergies     Allergies   Allergen Reactions   • Bee Venom Anaphylaxis     Annotation - [id number]: wasp, hornet also   • Macrolides And Ketolides Anaphylaxis and Rash   • Other Hives, Shortness Of Breath and Wheezing     Annotation - [id number]:  violet   • Pertussis Vaccine Anaphylaxis   • Pertussis Vaccines Anaphylaxis and Rash   • Vancomycin Shortness Of Breath   • Zithromax [Azithromycin] Anaphylaxis   • Erythromycin Rash   • Pollen Extract Sneezing       Current Outpatient Medications:   •  Chromium 1000 MCG TABS, Take 1 tablet by mouth daily, Disp: , Rfl:   •  dicyclomine (BENTYL) 20 mg tablet, Take 1 tablet (20 mg total) by mouth 3 (three) times a day, Disp: 90 tablet, Rfl: 1  •  gabapentin (NEURONTIN) 400 mg capsule, Take 1 capsule (400 mg total) by mouth 3 (three) times a day, Disp: 120 capsule, Rfl: 2  •  glucose blood (OneTouch Ultra) test strip, Use 1 each 2 (two) times a day to check glucose, Disp: 100 each, Rfl: 0  •  Lancets (OneTouch Delica Plus [id number]) MISC, USE 1 LANCET TO CHECK GLUCOSE TWICE DAILY   MORNING  AFTER  OVERNIGHT  FAST  AND  2  HOURS  AFTER  A  MEAL, Disp: 100 each, Rfl: 0  •  metoprolol succinate (TOPROL-XL) 25 mg 24 hr tablet, Take 1 tablet (25 mg total) by mouth daily, Disp: 90 tablet, Rfl: 1  •  multivitamin (THERAGRAN) TABS, Take 1 tablet by mouth daily, Disp: , Rfl:   •  nystatin (MYCOSTATIN) cream, Apply topically 2 (two) times a day, Disp: 30 g, Rfl: 1  •  saccharomyces boulardii (FLORASTOR) 250 mg capsule, Take 250 mg by mouth every morning, Disp: , Rfl:     Vitals: Blood pressure 144/88, pulse 84, height 5' 9" (1 753 m), weight 115 kg (254 lb), SpO2 98 %   ?  Body mass index is 37 51 kg/m²  Vitals:    02/15/23 0931   Weight: 115 kg (254 lb)     BP Readings from Last 3 Encounters:   02/15/23 144/88   22 142/76   10/06/22 128/78       Physical Exam    Neurologic:  Alert & oriented x 3, no new focal deficits, Not in any acute distress,  Constitutional: Obese  Eyes:  Pupil equal and reacting to light, conjunctiva normal,   HENT:  Atraumatic, oropharynx moist, Neck- normal range of motion, no tenderness,  Neck supple, No JVP, No LNP   Respiratory:  Bilateral air entry, mostly clear to auscultation  Cardiovascular: S1-S2 regular with a I/VI ejection systolic murmur   GI:  Soft, nondistended, normal bowel sounds, nontender, no hepatosplenomegaly appreciated  Musculoskeletal:  No tenderness, no deformities  Skin:  Well hydrated, no rash   Lymphatic:  No lymphadenopathy noted   Extremities:  No significant edema        Diagnostic Studies Review Cardio:      EKG: Normal sinus rhythm with sinus arrhythmia, heart rate 84/min    Cardiac testing:   Results for orders placed during the hospital encounter of 21    Echo complete with contrast if indicated    Narrative  [address]  [address] [place] [address]  [phone]    Transthoracic Echocardiogram  2D, M-mode, Doppler, and Color Doppler    Study date:  2021    Patient: Luba Fajardo  MR number: [id number]  Account number: [de-identified]  : 1980  Age: 45 years  Gender: Male  Status: Outpatient  Location: Echo lab  Height: 69 in  Weight: 368 3 lb  BP: 134/ 82 mmHg    Indications: Hypertension    Diagnoses: 401 9 - HYPERTENSION NOS    Sonographer:  CHRIS Weiss  Primary Physician:  Corby Patricio MD  Referring Physician:  Cinthya Maxwell MD  Group:  [place] [id number] [facility]  Interpreting Physician:  Cinthya Maxwell MD    SUMMARY    LEFT VENTRICLE:  Normal LV chamber size and wall thickness  Preserved LV systolic function with ejection fraction of about 65%  No definite regional wall motion abnormality seen  Normal diastolic filling pattern    RIGHT VENTRICLE:  Normal right ventricular size and systolic function  TAPSE is 2 2 cm    MITRAL VALVE:  There was trace regurgitation  TRICUSPID VALVE:  There is trace tricuspid regurgitation  PA pressure is normal    HISTORY: PRIOR HISTORY: HTN,  OObesity, SA, GERD, IBS, DM, Migraine    PROCEDURE: The procedure was performed in the echo lab  This was a routine study  The transthoracic approach was used  The study included complete 2D imaging, M-mode, complete spectral Doppler, and color Doppler  The heart rate was 89 bpm,  at the start of the study  Echocardiographic views were limited due to restricted patient mobility, poor acoustic window availability, and decreased penetration  This was a technically difficult study  LEFT VENTRICLE: Normal LV chamber size and wall thickness  Preserved LV systolic function with ejection fraction of about 65%  No definite regional wall motion abnormality seen  Normal diastolic filling pattern    RIGHT VENTRICLE: Normal right ventricular size and systolic function  TAPSE is 2 2 cm    LEFT ATRIUM: Size was normal     RIGHT ATRIUM: Size was normal     MITRAL VALVE: Valve structure was normal  There was normal leaflet separation  DOPPLER: The transmitral velocity was within the normal range  There was no evidence for stenosis  There was trace regurgitation  AORTIC VALVE: The valve was trileaflet  Leaflets exhibited normal thickness and normal cuspal separation  DOPPLER: Transaortic velocity was within the normal range  There was no evidence for stenosis  There was no regurgitation  TRICUSPID VALVE: There is trace tricuspid regurgitation  PA pressure is normal    PULMONIC VALVE: No significant pulmonary regurgitation seen    PERICARDIUM: No pericardial effusion seen    AORTA: The root exhibited normal size      SYSTEM MEASUREMENT TABLES    2D  EF (Teich): 68 16 %  %FS: 38 42 %  Ao Diam: 3 79 cm  EDV(Teich): 143 84 ml  ESV(Teich): 45 8 ml  IVSd: 1 cm  LA Area: 16 2 cm2  LA Diam: 4 25 cm  LVEDV MOD A4C: 81 8 ml  LVEF MOD A4C: 46 38 %  LVESV MOD A4C: 43 86 ml  LVIDd: 5 44 cm  LVIDs: 3 35 cm  LVLd A4C: 7 65 cm  LVLs A4C: 6 86 cm  LVPWd: 1 13 cm  RA Area: 15 21 cm2  RVIDd: 3 53 cm  SV (Teich): 98 04 ml  SV MOD A4C: 37 94 ml    CW  TR Vmax: 2 12 m/s  TR maxP 99 mmHg    MM  TAPSE: 2 18 cm    PW  MV E/A Ratio: 2 01  E' Sept: 0 16 m/s  E/E' Sept: 6 95  MV A Colin: 0 54 m/s  MV Dec [place]: 5 21 m/s2  MV DecT: 213 14 ms  MV E Colin: 1 09 m/s    [facility] Accredited Echocardiography [facility]    Prepared and electronically signed by    Mita Rhodes MD  Signed 2021 15:27:18      Imaging:  Chest X-Ray:   No Chest XR results available for this patient  CT-scan of the chest:     No CTA results available for this patient    Lab Review   Lab Results   Component Value Date    WBC 6 8 10/12/2022    HGB 18 4 (H) 10/12/2022    HCT 54 5 (H) 10/12/2022    MCV 88 10/12/2022    RDW 12 8 10/12/2022     10/12/2022     BMP:  Lab Results   Component Value Date    SODIUM 143 10/12/2022    K 4 6 10/12/2022     10/12/2022    CO2 21 10/12/2022    BUN 8 10/12/2022    CREATININE 0 67 (L) 10/12/2022    GLUC 86 10/12/2022    CALCIUM 8 6 2022    CORRECTEDCA 10 4 (H) 2021    EGFR 120 10/12/2022    MG 1 6 2021     LFT:  Lab Results   Component Value Date    AST 27 10/12/2022    ALT 26 10/12/2022    ALKPHOS 112 2021    TP 7 5 10/12/2022    ALB 4 4 10/12/2022      No components found for: [facility]  Lab Results   Component Value Date    NFR4IIFABGHR 1 800 2017     Lab Results   Component Value Date    HGBA1C 5 3 10/12/2022     Lipid Profile:   Lab Results   Component Value Date    CHOLESTEROL 172 10/12/2022    HDL 34 (L) 10/12/2022    LDLCALC 113 (H) 10/12/2022    TRIG 141 10/12/2022     Lab Results   Component Value Date    CHOLESTEROL 172 10/12/2022    CHOLESTEROL 182 10/15/2021     No results found for: CKTOTAL, CKMB, CKMBINDEX, TROPONINI  No results found for: NTBNP   No results found for this or any previous visit (from the past 672 hour(s))  Dr Emani Soriano MD, [facility]      "This note has been constructed using a voice recognition system  Therefore there may be syntax, spelling, and/or grammatical errors   Please call if you have any questions  "

## 2023-03-28 DIAGNOSIS — M77.42 METATARSALGIA OF BOTH FEET: ICD-10-CM

## 2023-03-28 DIAGNOSIS — E11.42 DIABETIC POLYNEUROPATHY ASSOCIATED WITH TYPE 2 DIABETES MELLITUS (HCC): ICD-10-CM

## 2023-03-28 DIAGNOSIS — M77.41 METATARSALGIA OF BOTH FEET: ICD-10-CM

## 2023-03-28 DIAGNOSIS — M54.16 RADICULOPATHY OF LUMBAR REGION: ICD-10-CM

## 2023-03-28 RX ORDER — GABAPENTIN 400 MG/1
400 CAPSULE ORAL 3 TIMES DAILY
Qty: 120 CAPSULE | Refills: 0 | Status: SHIPPED | OUTPATIENT
Start: 2023-03-28

## 2023-04-21 PROBLEM — Z86.39 HISTORY OF DIABETES MELLITUS, TYPE II: Status: ACTIVE | Noted: 2023-04-21

## 2023-04-21 PROBLEM — E66.01 MORBID (SEVERE) OBESITY DUE TO EXCESS CALORIES (HCC): Status: ACTIVE | Noted: 2023-04-21

## 2023-04-21 PROBLEM — E66.812 OBESITY, CLASS II, BMI 35-39.9: Status: ACTIVE | Noted: 2023-04-21

## 2023-04-21 PROBLEM — E66.9 OBESITY, CLASS II, BMI 35-39.9: Status: ACTIVE | Noted: 2023-04-21

## 2023-05-04 ENCOUNTER — OFFICE VISIT (OUTPATIENT)
Dept: SURGERY | Facility: CLINIC | Age: 43
End: 2023-05-04

## 2023-05-04 VITALS
BODY MASS INDEX: 39.28 KG/M2 | WEIGHT: 265.2 LBS | OXYGEN SATURATION: 96 % | SYSTOLIC BLOOD PRESSURE: 157 MMHG | TEMPERATURE: 97 F | DIASTOLIC BLOOD PRESSURE: 88 MMHG | HEIGHT: 69 IN | HEART RATE: 108 BPM

## 2023-05-04 DIAGNOSIS — K43.9 VENTRAL HERNIA WITHOUT OBSTRUCTION OR GANGRENE: ICD-10-CM

## 2023-05-04 DIAGNOSIS — Z98.84 S/P BARIATRIC SURGERY: ICD-10-CM

## 2023-05-04 NOTE — PROGRESS NOTES
"[facility] History and Physical Note:    Assessment:  Epigastric hernia in setting of rectus diastases  Patient has had significant weight loss and will continue his behavior modification  Plan for CAT scan in July and return in August for ventral hernia repair including retrorectus biologic mesh and September 2023    Plan:  1   CT abdomen pelvis without contrast July  2   Follow-up in August for scheduling surgery in September    Chief Complaint:  \"I am back to discuss umbilical hernia repair\"    HPI  Patient is a 78-year-old gentleman with diabetes, hypertension, obesity now status post Selin-en-Y gastric bypass April 2022  I saw him at this time for his umbilical hernia containing omentum and urged him to proceed with weight loss prior to any further discussion regarding umbilical hernia repair  At that time his weight was 319 pounds, BMI 47  He has been successful in his weight loss efforts and now weighs 256 pounds, BMI 38  He reports to no longer require diabetic medications  His last appointment with bariatrics was 24 April 2023 who referred patient to me for consideration of his umbilical hernia repair    Patient reports intermittent incarceration and he must lay down and push on this area in order to relieve the discomfort  He desires repair after the summer  PMH:  Past Medical History:   Diagnosis Date    Abdominal wall hernia     Bell's palsy     2 bouts - idiopathic, possible stress induced    CPAP (continuous positive airway pressure) dependence     Depression     Diabetes mellitus ([address] ) 03/25/21    Blood work    GERD (gastroesophageal reflux disease)     Hypertension     Irregular heart beat     Morbid obesity with BMI of 60 0-69 9, adult ([address] )     Neurocardiogenic syncope     Sleep apnea     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     pt symptomatic with extreme and sudden environmental temp   fluctuations       PSH:  Past Surgical History:   Procedure " Laterality Date    ABDOMINAL ADHESION SURGERY N/A 4/4/2022    Procedure: LYSIS ADHESIONS LAPAROSCOPIC;  Surgeon: Monica Sidhu MD;  Location: [address] [place] [address];  Service: Thresea Bigger COLONOSCOPY N/A 2/2/2018    Procedure: COLONOSCOPY;  Surgeon: Tao Walton MD;  Location: [place] [address] GI LAB; Service: Gastroenterology    ESOPHAGOGASTRODUODENOSCOPY N/A 2/2/2018    Procedure: ESOPHAGOGASTRODUODENOSCOPY (EGD); Surgeon: Tao Walton MD;  Location: [facility] GI LAB; Service: Gastroenterology    [place] LAPS GSTR RSTCV PX W/BYP SARI-EN-Y LIMB <150 CM N/A 4/4/2022    Procedure: LAPAROSCOPIC SARI-EN-Y GASTRIC BYPASS AND INTRAOPERATIVE EGD;  Surgeon: Monica Sidhu MD;  Location: [address] [place] [address];  Service: Bariatrics    TESTICLE SURGERY Left     infected testicle    TOENAIL EXCISION Right 09/07/2021       Home Meds:  Current Outpatient Medications on File Prior to Visit   Medication Sig Dispense Refill    Calcium Carb-Cholecalciferol (Calcium 500/D) 500-10 MG-MCG CHEW Chew 3 (three) times a day      gabapentin (NEURONTIN) 400 mg capsule Take 1 capsule (400 mg total) by mouth 3 (three) times a day 120 capsule 0    metoprolol succinate (TOPROL-XL) 25 mg 24 hr tablet Take 1 tablet (25 mg total) by mouth daily 90 tablet 1    multivitamin (THERAGRAN) TABS Take 1 tablet by mouth daily       No current facility-administered medications on file prior to visit  Allergies:   Allergies   Allergen Reactions    Bee Venom Anaphylaxis     Annotation - [id number]: wasp, hornet also    Macrolides And Ketolides Anaphylaxis and Rash    Other Hives, Shortness Of Breath and Wheezing     Annotation - [id number]:  violet    Pertussis Vaccine Anaphylaxis    Pertussis Vaccines Anaphylaxis and Rash    Vancomycin Shortness Of Breath    Zithromax [Azithromycin] Anaphylaxis    Erythromycin Rash    Pollen Extract Sneezing       Social Hx:  Social History     Socioeconomic History    Marital status: /Civil Union     Spouse name: Not on file    Number of children: 2    Years of education: Not on file    Highest education level: 12th grade   Occupational History    Not on file   Tobacco Use    Smoking status: Former     Packs/day: 0 25     Years: 10 00     Pack years: 2 50     Types: Cigarettes     Start date: 7/15/1999     Quit date: 2010     Years since quittin 3    Smokeless tobacco: Never   Vaping Use    Vaping Use: Never used   Substance and Sexual Activity    Alcohol use: Not Currently     Comment: social on occasion    Drug use: No    Sexual activity: Not Currently     Partners: Female     Birth control/protection: Abstinence, I U D  Other Topics Concern    Not on file   Social History Narrative    Denied: History of alcohol use (history) - as per [employer]    Always uses seat belt    Former smoker - as per [employer]    Never a smoker - as per [employer]     Social Determinants of Health     Financial Resource Strain: Not on file   Food Insecurity: Not on file   Transportation Needs: Not on file   Physical Activity: Not on file   Stress: Not on file   Social Connections: Not on file   Intimate Partner Violence: Not on file   Housing Stability: Not on file        Family Hx:    Family History   Problem Relation Age of Onset    Supraventricular tachycardia Mother     Atrial fibrillation Mother     Diabetes type II Mother     Cancer Mother     Osteoporosis Mother     Ulcerative colitis Father     Liver disease Father     ADD / ADHD Son     COPD Paternal Grandfather     Colon cancer Family     Diabetes type II Family     Hyperlipidemia Family     Hypertension Family     Hypothyroidism Family     Heart disease Family     Lung cancer Neg Hx     Asthma Neg Hx          Review of Systems   Constitutional: Negative for chills and fever  Respiratory: Negative  Cardiovascular: Negative  Gastrointestinal: Negative  Genitourinary: Negative  Neurological: Negative      All other systems reviewed "and are negative  /88 (BP Location: Left arm, Patient Position: Sitting, Cuff Size: Large)   Pulse (!) 108   Temp (!) 97 °F (36 1 °C) (Core)   Ht 5' 9\" (1 753 m)   Wt 120 kg (265 lb 3 2 oz)   SpO2 96%   BMI 39 16 kg/m²       Physical Exam  Vitals reviewed  Constitutional:       General: He is not in acute distress  Appearance: He is obese  Eyes:      Pupils: Pupils are equal, round, and reactive to light  Cardiovascular:      Rate and Rhythm: Normal rate and regular rhythm  Pulmonary:      Effort: Pulmonary effort is normal       Breath sounds: Normal breath sounds  Abdominal:      General: There is no distension  Palpations: Abdomen is soft  Comments: Patient has epigastric hernia in the setting of rectus diastases  Difficult to reduce   Musculoskeletal:         General: Normal range of motion  Skin:     General: Skin is warm and dry           Pertinent labs reviewed    Pertinent images and available reads personally reviewed    Pertinent notes reviewed       Ling Phan MD [address] [facility]  [phone]        "

## 2023-05-08 NOTE — PROGRESS NOTES
Name: Paris Bill      : 1980      MRN: [id number]  Encounter Provider: Shweta Donahue MD  Encounter Date: 5/10/2023   Encounter department: [facility]     1  Medication refill    2  Insomnia, unspecified type  -     doxylamine (UNISOM) 25 MG tablet; Take 1 tablet (25 mg total) by mouth daily at bedtime as needed for sleep    3  Diabetic polyneuropathy associated with type 2 diabetes mellitus ([facility] )    4  Type 2 diabetes mellitus without complication, with long-term current use of insulin ([facility])  -     Albumin / creatinine urine ratio; Future  -     Albumin / creatinine urine ratio    5  Metatarsalgia of both feet    6  Radiculopathy of lumbar region    7  Screening for HIV (human immunodeficiency virus)  -     HIV 1/2 AG/AB w Reflex [facility] for 2 yr old and above; Future      Provided patient refill for gabapentin  Patient is diabetic foot exam conducted today within normal limits  Prescribed patient Unisom to take as needed for insomnia  Provided patient with handout for good sleep hygiene  Additionally, patient reported some issues with CPAP machine  Advised patient to reach out to supplier and/or pulmonologist to have issues addressed  Subjective       HPI   Patient here for medication refill  Patient is 15 feet secondary to type 2 diabetes  Patient has no issues with medication  Today, patient is reporting some issues with insomnia despite being on P about CPAP machine for MANUELA  Patient reports that he has good sleep hygiene and tries to sleep and wake up at the same time every day  On days of poor sleep, he does feel more tired  Otherwise, has no acute complaints  Review of Systems   Constitutional: Negative for chills and fever  HENT: Negative for sore throat  Respiratory: Negative for cough and shortness of breath  Cardiovascular: Negative for chest pain and palpitations     Gastrointestinal: Negative for abdominal pain, "constipation, diarrhea, nausea and vomiting  Genitourinary: Negative for difficulty urinating and dysuria  Neurological: Negative for dizziness and headaches  Current Outpatient Medications on File Prior to Visit   Medication Sig   • Calcium Carb-Cholecalciferol 500-10 MG-MCG CHEW Chew 3 (three) times a day   • gabapentin (NEURONTIN) 400 mg capsule Take 1 capsule (400 mg total) by mouth 3 (three) times a day   • metoprolol succinate (TOPROL-XL) 25 mg 24 hr tablet Take 1 tablet (25 mg total) by mouth daily   • Multiple Vitamins-Minerals (BARIATRIC MULTIVITAMINS/IRON PO) Take by mouth   • multivitamin (THERAGRAN) TABS Take 1 tablet by mouth daily       Objective     Pulse 74   Temp 98 °F (36 7 °C)   Resp 18   Ht 5' 9\" (1 753 m)   Wt 116 kg (255 lb 14 4 oz)   SpO2 97%   BMI 37 79 kg/m²     Physical Exam  Constitutional:       Appearance: Normal appearance  He is obese  HENT:      Head: Normocephalic and atraumatic  Cardiovascular:      Rate and Rhythm: Normal rate and regular rhythm  Pulses: no weak pulses          Dorsalis pedis pulses are 1+ on the right side and 1+ on the left side  Posterior tibial pulses are 1+ on the right side and 1+ on the left side  Heart sounds: Normal heart sounds  No murmur heard  Pulmonary:      Breath sounds: Normal breath sounds  No wheezing  Abdominal:      Palpations: Abdomen is soft  Tenderness: There is no abdominal tenderness  Hernia: A hernia is present  Musculoskeletal:      Right lower leg: No edema  Left lower leg: No edema  Feet:      Right foot:      Skin integrity: No ulcer, skin breakdown, erythema, warmth, callus or dry skin  Left foot:      Skin integrity: No ulcer, skin breakdown, erythema, warmth, callus or dry skin  Neurological:      Mental Status: He is alert  Patient's shoes and socks removed  Right Foot/Ankle   Right Foot Inspection  Skin Exam: skin normal and skin intact   No dry skin, no " warmth, no callus, no erythema, no maceration, no abnormal color, no pre-ulcer, no ulcer and no callus  Sensory   Monofilament testing: intact    Vascular  The right DP pulse is 1+  The right PT pulse is 1+  Left Foot/Ankle  Left Foot Inspection  Skin Exam: skin normal and skin intact  No dry skin, no warmth, no erythema, no maceration, normal color, no pre-ulcer, no ulcer and no callus  Sensory   Monofilament testing: intact    Vascular  The left DP pulse is 1+  The left PT pulse is 1+       Assign Risk Category  No deformity present  No loss of protective sensation  No weak pulses  Risk: 0      Tio Barrientos MD

## 2023-05-09 DIAGNOSIS — E11.42 DIABETIC POLYNEUROPATHY ASSOCIATED WITH TYPE 2 DIABETES MELLITUS (HCC): ICD-10-CM

## 2023-05-09 DIAGNOSIS — M77.41 METATARSALGIA OF BOTH FEET: ICD-10-CM

## 2023-05-09 DIAGNOSIS — M77.42 METATARSALGIA OF BOTH FEET: ICD-10-CM

## 2023-05-09 DIAGNOSIS — M54.16 RADICULOPATHY OF LUMBAR REGION: ICD-10-CM

## 2023-05-09 RX ORDER — GABAPENTIN 400 MG/1
400 CAPSULE ORAL 3 TIMES DAILY
Qty: 120 CAPSULE | Refills: 0 | Status: SHIPPED | OUTPATIENT
Start: 2023-05-09

## 2023-05-10 ENCOUNTER — OFFICE VISIT (OUTPATIENT)
Dept: FAMILY MEDICINE CLINIC | Facility: CLINIC | Age: 43
End: 2023-05-10

## 2023-05-10 VITALS
OXYGEN SATURATION: 97 % | RESPIRATION RATE: 18 BRPM | HEART RATE: 74 BPM | WEIGHT: 255.9 LBS | HEIGHT: 69 IN | TEMPERATURE: 98 F | BODY MASS INDEX: 37.9 KG/M2

## 2023-05-10 DIAGNOSIS — M77.42 METATARSALGIA OF BOTH FEET: ICD-10-CM

## 2023-05-10 DIAGNOSIS — E11.9 TYPE 2 DIABETES MELLITUS WITHOUT COMPLICATION, WITH LONG-TERM CURRENT USE OF INSULIN (HCC): ICD-10-CM

## 2023-05-10 DIAGNOSIS — E11.42 DIABETIC POLYNEUROPATHY ASSOCIATED WITH TYPE 2 DIABETES MELLITUS (HCC): ICD-10-CM

## 2023-05-10 DIAGNOSIS — Z11.4 SCREENING FOR HIV (HUMAN IMMUNODEFICIENCY VIRUS): ICD-10-CM

## 2023-05-10 DIAGNOSIS — Z76.0 MEDICATION REFILL: Primary | ICD-10-CM

## 2023-05-10 DIAGNOSIS — Z79.4 TYPE 2 DIABETES MELLITUS WITHOUT COMPLICATION, WITH LONG-TERM CURRENT USE OF INSULIN (HCC): ICD-10-CM

## 2023-05-10 DIAGNOSIS — G47.00 INSOMNIA, UNSPECIFIED TYPE: ICD-10-CM

## 2023-05-10 DIAGNOSIS — M54.16 RADICULOPATHY OF LUMBAR REGION: ICD-10-CM

## 2023-05-10 DIAGNOSIS — M77.41 METATARSALGIA OF BOTH FEET: ICD-10-CM

## 2023-05-17 DIAGNOSIS — K91.2 POSTSURGICAL MALABSORPTION: Primary | ICD-10-CM

## 2023-05-17 DIAGNOSIS — E83.52 HIGH CALCIUM LEVELS: ICD-10-CM

## 2023-06-05 DIAGNOSIS — M77.42 METATARSALGIA OF BOTH FEET: ICD-10-CM

## 2023-06-05 DIAGNOSIS — M54.16 RADICULOPATHY OF LUMBAR REGION: ICD-10-CM

## 2023-06-05 DIAGNOSIS — E11.42 DIABETIC POLYNEUROPATHY ASSOCIATED WITH TYPE 2 DIABETES MELLITUS (HCC): ICD-10-CM

## 2023-06-05 DIAGNOSIS — M77.41 METATARSALGIA OF BOTH FEET: ICD-10-CM

## 2023-06-05 RX ORDER — GABAPENTIN 400 MG/1
400 CAPSULE ORAL 3 TIMES DAILY
Qty: 120 CAPSULE | Refills: 0 | Status: SHIPPED | OUTPATIENT
Start: 2023-06-05

## 2023-07-12 DIAGNOSIS — E11.42 DIABETIC POLYNEUROPATHY ASSOCIATED WITH TYPE 2 DIABETES MELLITUS (HCC): ICD-10-CM

## 2023-07-12 DIAGNOSIS — M77.42 METATARSALGIA OF BOTH FEET: ICD-10-CM

## 2023-07-12 DIAGNOSIS — M77.41 METATARSALGIA OF BOTH FEET: ICD-10-CM

## 2023-07-12 DIAGNOSIS — M54.16 RADICULOPATHY OF LUMBAR REGION: ICD-10-CM

## 2023-07-12 NOTE — TELEPHONE ENCOUNTER
Dr. Elaina Bradley : 90 days supply sent to [facility] . With refill so pls close this task we are not delegate to close  for meds.  Thanks

## 2023-07-13 RX ORDER — GABAPENTIN 400 MG/1
400 CAPSULE ORAL 3 TIMES DAILY
Qty: 270 CAPSULE | Refills: 0 | OUTPATIENT
Start: 2023-07-13 | End: 2023-10-11

## 2023-07-13 NOTE — TELEPHONE ENCOUNTER
Gabapentin should be taken 3 times a day starting 7/12/23. 90 day supply was provided to last until 10/10/23.

## 2023-07-18 DIAGNOSIS — M77.42 METATARSALGIA OF BOTH FEET: ICD-10-CM

## 2023-07-18 DIAGNOSIS — M54.16 RADICULOPATHY OF LUMBAR REGION: ICD-10-CM

## 2023-07-18 DIAGNOSIS — M77.41 METATARSALGIA OF BOTH FEET: ICD-10-CM

## 2023-07-18 DIAGNOSIS — E11.42 DIABETIC POLYNEUROPATHY ASSOCIATED WITH TYPE 2 DIABETES MELLITUS (HCC): ICD-10-CM

## 2023-07-18 RX ORDER — GABAPENTIN 400 MG/1
400 CAPSULE ORAL 3 TIMES DAILY
Qty: 270 CAPSULE | Refills: 3 | Status: SHIPPED | OUTPATIENT
Start: 2023-07-18 | End: 2023-07-18 | Stop reason: SDUPTHER

## 2023-07-18 RX ORDER — GABAPENTIN 400 MG/1
400 CAPSULE ORAL 3 TIMES DAILY
Qty: 270 CAPSULE | Refills: 3 | Status: SHIPPED | OUTPATIENT
Start: 2023-07-18 | End: 2023-10-16

## 2023-07-18 NOTE — TELEPHONE ENCOUNTER
Pharmacy is requesting Rx (Gabapentin) be resent by attending due to this being a control substance.

## 2023-07-19 ENCOUNTER — HOSPITAL ENCOUNTER (OUTPATIENT)
Dept: RADIOLOGY | Facility: HOSPITAL | Age: 43
Discharge: HOME | End: 2023-07-19
Attending: SURGERY
Payer: COMMERCIAL

## 2023-07-19 DIAGNOSIS — K43.9 VENTRAL HERNIA WITHOUT OBSTRUCTION OR GANGRENE: ICD-10-CM

## 2023-07-19 PROCEDURE — G1004 CDSM NDSC: HCPCS

## 2023-07-19 PROCEDURE — 74176 CT ABD & PELVIS W/O CONTRAST: CPT

## 2023-08-10 ENCOUNTER — OFFICE VISIT (OUTPATIENT)
Dept: SURGERY | Facility: CLINIC | Age: 43
End: 2023-08-10
Payer: COMMERCIAL

## 2023-08-10 VITALS
OXYGEN SATURATION: 97 % | HEIGHT: 69 IN | BODY MASS INDEX: 37.47 KG/M2 | SYSTOLIC BLOOD PRESSURE: 130 MMHG | HEART RATE: 76 BPM | WEIGHT: 253 LBS | TEMPERATURE: 98.6 F | DIASTOLIC BLOOD PRESSURE: 80 MMHG

## 2023-08-10 DIAGNOSIS — K43.9 EPIGASTRIC HERNIA: Primary | ICD-10-CM

## 2023-08-10 DIAGNOSIS — Z01.818 PREOPERATIVE EXAMINATION: ICD-10-CM

## 2023-08-10 DIAGNOSIS — M62.08 RECTUS DIASTASIS: ICD-10-CM

## 2023-08-10 PROCEDURE — 99213 OFFICE O/P EST LOW 20 MIN: CPT | Performed by: SURGERY

## 2023-08-10 RX ORDER — CEFAZOLIN SODIUM 2 G/50ML
2000 SOLUTION INTRAVENOUS ONCE
OUTPATIENT
Start: 2023-08-10 | End: 2023-08-10

## 2023-08-10 NOTE — PROGRESS NOTES
[facility] History and Physical Note:    Assessment:  Epigastric hernia in setting of rectus diastases. Hernia is only partially reducible and contains bowel; this is causing increased discomfort and becoming more difficult to reduce. Patient has had significant weight loss and will continue his behavior modification. Pertinent images and available reads personally reviewed  Reviewed CT images as well as CT report    Plan:  1. CBC and CMP. Last hemoglobin A1c 5.1 in May 2023.  2.  Plan repair of his rectus diastases and epigastric hernia with a retrorectus biologic mesh and TAR if needed. Chief Complaint:  "I am ready to schedule surgery"     HPI  Patient is a 56-year-old gentleman with diabetes, hypertension, obesity now 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 I saw him at this time for his umbilical hernia containing omentum and urged him to proceed with weight loss prior to any further discussion regarding umbilical hernia repair. At one point, he was about 400 pounds . He has been successful in his weight loss efforts and now weighs 253 pounds, BMI 37. He reports to no longer require diabetic medications. His last appointment with bariatrics was 24 April 2023 who referred patient to me for consideration of his umbilical hernia repair. I last saw him in May 2023 and ordered a CT for surgical planning. He follows up now to schedule the surgery.     Patient reports intermittent incarceration and he must lay down and push on this area in order to relieve the discomfort.      CT abdomen pelvis 19 July 2023:  IMPRESSION:     1. Umbilical/periumbilical ventral hernia containing omentum and unobstructed loops of bowel. Increased in size when compared to the prior CT. 2. Postsurgical changes of the bowel without obstruction. 3. Diverticulosis without diverticulitis.   4. Heterogeneity and nodularity of the liver could be due to cirrhotic changes or steatosis in the appropriate clinical setting. 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 fluctuations       PSH:  Past Surgical History:   Procedure Laterality Date   • ABDOMINAL ADHESION SURGERY N/A 4/4/2022    Procedure: LYSIS ADHESIONS LAPAROSCOPIC;  Surgeon: Fabricio Glynn MD;  Location: [facility];  Service: Bariatrics   • COLONOSCOPY N/A 2/2/2018    Procedure: COLONOSCOPY;  Surgeon: Layo Osorio MD;  Location: [address] [place] [address] [facility]; Service: Gastroenterology   • ESOPHAGOGASTRODUODENOSCOPY N/A 2/2/2018    Procedure: ESOPHAGOGASTRODUODENOSCOPY (EGD); Surgeon: Layo Osorio MD;  Location: [facility];   Service: Gastroenterology   • OR LAPS GSTR RSTCV PX W/BYP SARI-EN-Y LIMB <150 CM N/A 4/4/2022    Procedure: LAPAROSCOPIC SARI-EN-Y GASTRIC BYPASS AND INTRAOPERATIVE EGD;  Surgeon: Fabricio Glynn MD;  Location: [facility];  Service: Bariatrics   • TESTICLE SURGERY Left     infected testicle   • TOENAIL EXCISION Right 09/07/2021       Home Meds:  Current Outpatient Medications on File Prior to Visit   Medication Sig Dispense Refill   • gabapentin (NEURONTIN) 400 mg capsule Take 1 capsule (400 mg total) by mouth 3 (three) times a day 270 capsule 3   • Calcium Carb-Cholecalciferol 500-10 MG-MCG CHEW Chew 3 (three) times a day     • doxylamine (UNISOM) 25 MG tablet Take 1 tablet (25 mg total) by mouth daily at bedtime as needed for sleep 30 tablet 1   • metoprolol succinate (TOPROL-XL) 25 mg 24 hr tablet Take 1 tablet (25 mg total) by mouth daily 90 tablet 1   • Multiple Vitamins-Minerals (BARIATRIC MULTIVITAMINS/IRON PO) Take by mouth     • multivitamin (THERAGRAN) TABS Take 1 tablet by mouth daily       No current facility-administered medications on file prior to visit. Allergies: Allergies   Allergen Reactions   • Bee Venom Anaphylaxis     Annotation - [id number]: wasp, hornet also   • Macrolides And Ketolides Anaphylaxis and Rash   • Other Hives, Shortness Of Breath and Wheezing     Annotation - [id number]:  violet   • Pertussis Vaccine Anaphylaxis   • Pertussis Vaccines Anaphylaxis and Rash   • Vancomycin Shortness Of Breath   • Zithromax [Azithromycin] Anaphylaxis   • Erythromycin Rash   • Pollen Extract Sneezing       Social Hx:  Social History     Socioeconomic History   • Marital status: /Civil Union     Spouse name: Not on file   • Number of children: 2   • Years of education: Not on file   • Highest education level: 12th grade   Occupational History   • Not on file   Tobacco Use   • Smoking status: Former     Packs/day: 0.25     Years: 10.00     Total pack years: 2.50     Types: Cigarettes     Start date: 7/15/1999     Quit date:      Years since quittin.6   • Smokeless tobacco: Never   Vaping Use   • Vaping Use: Never used   Substance and Sexual Activity   • Alcohol use: Not Currently     Comment: social on occasion   • Drug use: No   • Sexual activity: Not Currently     Partners: Female     Birth control/protection: Abstinence, I.U.D. Other Topics Concern   • Not on file   Social History Narrative    Denied: History of alcohol use (history) - as per [employer]    Always uses seat belt    Former smoker - as per [employer]    Never a smoker - as per [employer]     Social Determinants of Health     Financial Resource Strain: Not on file   Food Insecurity: Not on file   Transportation Needs: No Transportation Needs (2022)    PRAPARE - Transportation    • Lack of Transportation (Medical):  No • Lack of Transportation (Non-Medical): No   Physical Activity: Not on file   Stress: Not on file   Social Connections: Not on file   Intimate Partner Violence: Not on file   Housing Stability: Not on file        Family Hx:    Family History   Problem Relation Age of Onset   • Supraventricular tachycardia Mother    • Atrial fibrillation Mother    • Diabetes type II Mother    • Cancer Mother    • Osteoporosis Mother    • Ulcerative colitis Father    • Liver disease Father    • ADD / ADHD Son    • COPD Paternal Grandfather    • Colon cancer Family    • Diabetes type II Family    • Hyperlipidemia Family    • Hypertension Family    • Hypothyroidism Family    • Heart disease Family    • Lung cancer Neg Hx    • Asthma Neg Hx          Review of Systems   Constitutional: Negative for chills and fever. Respiratory: Negative. Cardiovascular: Negative. Gastrointestinal: Negative. Genitourinary: Negative. Musculoskeletal: Negative. Neurological: Negative. Hematological: Negative. All other systems reviewed and are negative. There were no vitals taken for this visit. Physical Exam  Vitals reviewed. Constitutional:       General: He is not in acute distress. HENT:      Head: Normocephalic and atraumatic. Eyes:      Pupils: Pupils are equal, round, and reactive to light. Cardiovascular:      Rate and Rhythm: Normal rate and regular rhythm. Pulmonary:      Effort: Pulmonary effort is normal.      Breath sounds: Normal breath sounds. Abdominal:      General: There is no distension. Palpations: Abdomen is soft. Comments: Partially incarcerated epigastric hernia and moderate rectus diastases   Musculoskeletal:      Cervical back: Normal range of motion and neck supple. Lymphadenopathy:      Cervical: No cervical adenopathy. Skin:     General: Skin is warm and dry. Neurological:      General: No focal deficit present. Mental Status: He is alert.    Psychiatric:         Mood and Affect: Mood normal.         Pertinent labs reviewed    Pertinent images and available reads personally reviewed  Reviewed CT images as well as CT report  Pertinent notes reviewed       Marbella Orozco MD [address] [facility]  [phone]

## 2023-08-10 NOTE — H&P
[facility] History and Physical Note:    Assessment:  Epigastric hernia in setting of rectus diastases. Hernia is only partially reducible and contains bowel; this is causing increased discomfort and becoming more difficult to reduce. Patient has had significant weight loss and will continue his behavior modification. Pertinent images and available reads personally reviewed  Reviewed CT images as well as CT report    Plan:  1. CBC and CMP. Last hemoglobin A1c 5.1 in May 2023.  2.  Plan repair of his rectus diastases and epigastric hernia with a retrorectus biologic mesh and TAR if needed. Chief Complaint:  "I am ready to schedule surgery"     HPI  Patient is a 36-year-old gentleman with diabetes, hypertension, obesity now 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 I saw him at this time for his umbilical hernia containing omentum and urged him to proceed with weight loss prior to any further discussion regarding umbilical hernia repair. At one point, he was about 400 pounds . He has been successful in his weight loss efforts and now weighs 253 pounds, BMI 37. He reports to no longer require diabetic medications. His last appointment with bariatrics was 24 April 2023 who referred patient to me for consideration of his umbilical hernia repair. I last saw him in May 2023 and ordered a CT for surgical planning. He follows up now to schedule the surgery.     Patient reports intermittent incarceration and he must lay down and push on this area in order to relieve the discomfort.      CT abdomen pelvis 19 July 2023:  IMPRESSION:     1. Umbilical/periumbilical ventral hernia containing omentum and unobstructed loops of bowel. Increased in size when compared to the prior CT. 2. Postsurgical changes of the bowel without obstruction. 3. Diverticulosis without diverticulitis.   4. Heterogeneity and nodularity of the liver could be due to cirrhotic changes or steatosis in the appropriate clinical setting. 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 fluctuations       PSH:  Past Surgical History:   Procedure Laterality Date   • ABDOMINAL ADHESION SURGERY N/A 4/4/2022    Procedure: LYSIS ADHESIONS LAPAROSCOPIC;  Surgeon: Whit Murry MD;  Location: [facility];  Service: Bariatrics   • COLONOSCOPY N/A 2/2/2018    Procedure: COLONOSCOPY;  Surgeon: Louann Levin MD;  Location: [address] [place] [address] [facility]; Service: Gastroenterology   • ESOPHAGOGASTRODUODENOSCOPY N/A 2/2/2018    Procedure: ESOPHAGOGASTRODUODENOSCOPY (EGD); Surgeon: Louann Levin MD;  Location: [facility];   Service: Gastroenterology   • WI LAPS GSTR RSTCV PX W/BYP SARI-EN-Y LIMB <150 CM N/A 4/4/2022    Procedure: LAPAROSCOPIC SARI-EN-Y GASTRIC BYPASS AND INTRAOPERATIVE EGD;  Surgeon: Whit Murry MD;  Location: [facility];  Service: Bariatrics   • TESTICLE SURGERY Left     infected testicle   • TOENAIL EXCISION Right 09/07/2021       Home Meds:  Current Outpatient Medications on File Prior to Visit   Medication Sig Dispense Refill   • gabapentin (NEURONTIN) 400 mg capsule Take 1 capsule (400 mg total) by mouth 3 (three) times a day 270 capsule 3   • Calcium Carb-Cholecalciferol 500-10 MG-MCG CHEW Chew 3 (three) times a day     • doxylamine (UNISOM) 25 MG tablet Take 1 tablet (25 mg total) by mouth daily at bedtime as needed for sleep 30 tablet 1   • metoprolol succinate (TOPROL-XL) 25 mg 24 hr tablet Take 1 tablet (25 mg total) by mouth daily 90 tablet 1   • Multiple Vitamins-Minerals (BARIATRIC MULTIVITAMINS/IRON PO) Take by mouth     • multivitamin (THERAGRAN) TABS Take 1 tablet by mouth daily       No current facility-administered medications on file prior to visit. Allergies: Allergies   Allergen Reactions   • Bee Venom Anaphylaxis     Annotation - [id number]: wasp, hornet also   • Macrolides And Ketolides Anaphylaxis and Rash   • Other Hives, Shortness Of Breath and Wheezing     Annotation - [id number]:  violet   • Pertussis Vaccine Anaphylaxis   • Pertussis Vaccines Anaphylaxis and Rash   • Vancomycin Shortness Of Breath   • Zithromax [Azithromycin] Anaphylaxis   • Erythromycin Rash   • Pollen Extract Sneezing       Social Hx:  Social History     Socioeconomic History   • Marital status: /Civil Union     Spouse name: Not on file   • Number of children: 2   • Years of education: Not on file   • Highest education level: 12th grade   Occupational History   • Not on file   Tobacco Use   • Smoking status: Former     Packs/day: 0.25     Years: 10.00     Total pack years: 2.50     Types: Cigarettes     Start date: 7/15/1999     Quit date:      Years since quittin.6   • Smokeless tobacco: Never   Vaping Use   • Vaping Use: Never used   Substance and Sexual Activity   • Alcohol use: Not Currently     Comment: social on occasion   • Drug use: No   • Sexual activity: Not Currently     Partners: Female     Birth control/protection: Abstinence, I.U.D. Other Topics Concern   • Not on file   Social History Narrative    Denied: History of alcohol use (history) - as per [employer]    Always uses seat belt    Former smoker - as per [employer]    Never a smoker - as per [employer]     Social Determinants of Health     Financial Resource Strain: Not on file   Food Insecurity: Not on file   Transportation Needs: No Transportation Needs (2022)    PRAPARE - Transportation    • Lack of Transportation (Medical):  No • Lack of Transportation (Non-Medical): No   Physical Activity: Not on file   Stress: Not on file   Social Connections: Not on file   Intimate Partner Violence: Not on file   Housing Stability: Not on file        Family Hx:    Family History   Problem Relation Age of Onset   • Supraventricular tachycardia Mother    • Atrial fibrillation Mother    • Diabetes type II Mother    • Cancer Mother    • Osteoporosis Mother    • Ulcerative colitis Father    • Liver disease Father    • ADD / ADHD Son    • COPD Paternal Grandfather    • Colon cancer Family    • Diabetes type II Family    • Hyperlipidemia Family    • Hypertension Family    • Hypothyroidism Family    • Heart disease Family    • Lung cancer Neg Hx    • Asthma Neg Hx          Review of Systems   Constitutional: Negative for chills and fever. Respiratory: Negative. Cardiovascular: Negative. Gastrointestinal: Negative. Genitourinary: Negative. Musculoskeletal: Negative. Neurological: Negative. Hematological: Negative. All other systems reviewed and are negative. There were no vitals taken for this visit. Physical Exam  Vitals reviewed. Constitutional:       General: He is not in acute distress. HENT:      Head: Normocephalic and atraumatic. Eyes:      Pupils: Pupils are equal, round, and reactive to light. Cardiovascular:      Rate and Rhythm: Normal rate and regular rhythm. Pulmonary:      Effort: Pulmonary effort is normal.      Breath sounds: Normal breath sounds. Abdominal:      General: There is no distension. Palpations: Abdomen is soft. Comments: Partially incarcerated epigastric hernia and moderate rectus diastases   Musculoskeletal:      Cervical back: Normal range of motion and neck supple. Lymphadenopathy:      Cervical: No cervical adenopathy. Skin:     General: Skin is warm and dry. Neurological:      General: No focal deficit present. Mental Status: He is alert.    Psychiatric:         Mood and Affect: Mood normal.         Pertinent labs reviewed    Pertinent images and available reads personally reviewed  Reviewed CT images as well as CT report  Pertinent notes reviewed       Lasha Marquez MD [address] [facility]  [phone]

## 2023-08-22 DIAGNOSIS — I10 BENIGN ESSENTIAL HYPERTENSION: ICD-10-CM

## 2023-08-22 RX ORDER — METOPROLOL SUCCINATE 25 MG/1
25 TABLET, EXTENDED RELEASE ORAL DAILY
Qty: 90 TABLET | Refills: 0 | Status: SHIPPED | OUTPATIENT
Start: 2023-08-22

## 2023-09-07 ENCOUNTER — ANESTHESIA EVENT (OUTPATIENT)
Dept: PERIOP | Facility: HOSPITAL | Age: 43
DRG: 227 | End: 2023-09-07
Payer: COMMERCIAL

## 2023-09-07 LAB
ALBUMIN SERPL-MCNC: 4.4 G/DL (ref 4.1–5.1)
ALBUMIN/GLOB SERPL: 1.6 {RATIO} (ref 1.2–2.2)
ALP SERPL-CCNC: 73 IU/L (ref 44–121)
ALT SERPL-CCNC: 21 IU/L (ref 0–44)
AST SERPL-CCNC: 23 IU/L (ref 0–40)
BILIRUB SERPL-MCNC: 0.8 MG/DL (ref 0–1.2)
BUN SERPL-MCNC: 8 MG/DL (ref 6–24)
BUN/CREAT SERPL: 10 (ref 9–20)
CALCIUM SERPL-MCNC: 9.9 MG/DL (ref 8.7–10.2)
CHLORIDE SERPL-SCNC: 103 MMOL/L (ref 96–106)
CO2 SERPL-SCNC: 25 MMOL/L (ref 20–29)
CREAT SERPL-MCNC: 0.83 MG/DL (ref 0.76–1.27)
EGFR: 112 ML/MIN/1.73
ERYTHROCYTE [DISTWIDTH] IN BLOOD BY AUTOMATED COUNT: 12.5 % (ref 11.6–15.4)
GLOBULIN SER-MCNC: 2.8 G/DL (ref 1.5–4.5)
GLUCOSE SERPL-MCNC: 89 MG/DL (ref 70–99)
HCT VFR BLD AUTO: 51.7 % (ref 37.5–51)
HGB BLD-MCNC: 17.7 G/DL (ref 13–17.7)
MCH RBC QN AUTO: 30.6 PG (ref 26.6–33)
MCHC RBC AUTO-ENTMCNC: 34.2 G/DL (ref 31.5–35.7)
MCV RBC AUTO: 89 FL (ref 79–97)
PLATELET # BLD AUTO: 223 X10E3/UL (ref 150–450)
POTASSIUM SERPL-SCNC: 4.3 MMOL/L (ref 3.5–5.2)
PROT SERPL-MCNC: 7.2 G/DL (ref 6–8.5)
RBC # BLD AUTO: 5.79 X10E6/UL (ref 4.14–5.8)
SODIUM SERPL-SCNC: 141 MMOL/L (ref 134–144)
WBC # BLD AUTO: 5.9 X10E3/UL (ref 3.4–10.8)

## 2023-09-13 NOTE — PRE-PROCEDURE INSTRUCTIONS
Pre-Surgery Instructions:   Medication Instructions   • Calcium Carb-Cholecalciferol 500-10 MG-MCG CHEW Stop taking 7 days prior to surgery. • doxylamine (UNISOM) 25 MG tablet Hold day of surgery. • gabapentin (NEURONTIN) 400 mg capsule Take night before surgery   • metoprolol succinate (TOPROL-XL) 25 mg 24 hr tablet Take day of surgery. • Multiple Vitamins-Minerals (BARIATRIC MULTIVITAMINS/IRON PO) Stop taking 7 days prior to surgery. Medication instructions for day surgery reviewed. Please use only a sip of water to take your instructed medications. Avoid all over the counter vitamins, supplements and NSAIDS for one week prior to surgery per anesthesia guidelines. Tylenol is ok to take as needed. You will receive a call one business day prior to surgery with an arrival time and hospital directions. If your surgery is scheduled on a Monday, the hospital will be calling you on the Friday prior to your surgery. If you have not heard from anyone by 8pm, please call the hospital supervisor through the hospital  at [phone]. Wilfredo Smith [phone]). Do not eat or drink anything after midnight the night before your surgery, including candy, mints, lifesavers, or chewing gum. Do not drink alcohol 24hrs before your surgery. Try not to smoke at least 24hrs before your surgery. Follow the pre surgery showering instructions as listed in the [facility] Experience Booklet” or otherwise provided by your surgeon's office. Do not shave the surgical area 24 hours before surgery. Do not apply any lotions, creams, including makeup, cologne, deodorant, or perfumes after showering on the day of your surgery. No contact lenses, eye make-up, or artificial eyelashes. Remove nail polish, including gel polish, and any artificial, gel, or acrylic nails if possible. Remove all jewelry including rings and body piercing jewelry. Wear causal clothing that is easy to take on and off.  Consider your type of surgery. Keep any valuables, jewelry, piercings at home. Please bring any specially ordered equipment (sling, braces) if indicated. Arrange for a responsible person to drive you to and from the hospital on the day of your surgery. Visitor Guidelines discussed. Call the surgeon's office with any new illnesses, exposures, or additional questions prior to surgery. Please reference your [facility] Surgical Experience Booklet” for additional information to prepare for your upcoming surgery.

## 2023-09-19 NOTE — ANESTHESIA PREPROCEDURE EVALUATION
Procedure:  REPAIR HERNIA EPIGASTRIC Repair rectus diastases, implantation of biologic mesh (Abdomen)    Relevant Problems   CARDIO   (+) Benign essential hypertension   (+) Migraine headache   (+) Mixed hyperlipidemia      ENDO   (+) Type 2 diabetes mellitus without complication, with long-term current use of insulin (HCC)      GI/HEPATIC   (+) GERD (gastroesophageal reflux disease)      NEURO/PSYCH   (+) Anxiety   (+) Migraine headache      PULMONARY   (+) MANUELA (obstructive sleep apnea)        Physical Exam    Airway    Mallampati score: II  TM Distance: >3 FB  Neck ROM: full     Dental   Comment: Very poor dentition,     Cardiovascular  Cardiovascular exam normal    Pulmonary  Pulmonary exam normal     Other Findings        Anesthesia Plan  ASA Score- 2     Anesthesia Type- general with ASA Monitors. Additional Monitors:   Airway Plan: ETT. Plan Factors-Exercise tolerance (METS): >4 METS. Chart reviewed. Imaging results reviewed. Existing labs reviewed. Patient summary reviewed. Patient is not a current smoker. Obstructive sleep apnea risk education given perioperatively. Induction- intravenous. Postoperative Plan- Plan for postoperative opioid use. Informed Consent- Anesthetic plan and risks discussed with patient. I personally reviewed this patient with the CRNA. Discussed and agreed on the Anesthesia Plan with the CRNA. Suzi Swift

## 2023-09-20 ENCOUNTER — HOSPITAL ENCOUNTER (INPATIENT)
Facility: HOSPITAL | Age: 43
LOS: 1 days | Discharge: HOME | DRG: 227 | End: 2023-09-22
Attending: SURGERY | Admitting: SURGERY
Payer: COMMERCIAL

## 2023-09-20 ENCOUNTER — ANESTHESIA (OUTPATIENT)
Dept: PERIOP | Facility: HOSPITAL | Age: 43
DRG: 227 | End: 2023-09-20
Payer: COMMERCIAL

## 2023-09-20 DIAGNOSIS — K43.9 EPIGASTRIC HERNIA: ICD-10-CM

## 2023-09-20 DIAGNOSIS — Z98.890 S/P HERNIA REPAIR: Primary | ICD-10-CM

## 2023-09-20 DIAGNOSIS — Z87.19 S/P HERNIA REPAIR: Primary | ICD-10-CM

## 2023-09-20 DIAGNOSIS — M62.08 RECTUS DIASTASIS: ICD-10-CM

## 2023-09-20 LAB
GLUCOSE SERPL-MCNC: 100 MG/DL (ref 65–140)
GLUCOSE SERPL-MCNC: 131 MG/DL (ref 65–140)

## 2023-09-20 PROCEDURE — 0KXK0ZZ TRANSFER RIGHT ABDOMEN MUSCLE, OPEN APPROACH: ICD-10-PCS | Performed by: SURGERY

## 2023-09-20 PROCEDURE — 49595 RPR AA HRN 1ST > 10 RDC: CPT | Performed by: SURGERY

## 2023-09-20 PROCEDURE — 0DNE0ZZ RELEASE LARGE INTESTINE, OPEN APPROACH: ICD-10-PCS | Performed by: SURGERY

## 2023-09-20 PROCEDURE — 88305 TISSUE EXAM BY PATHOLOGIST: CPT | Performed by: PATHOLOGY

## 2023-09-20 PROCEDURE — 0KXL0ZZ TRANSFER LEFT ABDOMEN MUSCLE, OPEN APPROACH: ICD-10-PCS | Performed by: SURGERY

## 2023-09-20 PROCEDURE — C1781 MESH (IMPLANTABLE): HCPCS | Performed by: SURGERY

## 2023-09-20 PROCEDURE — NC001 PR NO CHARGE: Performed by: SURGERY

## 2023-09-20 PROCEDURE — 15734 MUSCLE-SKIN GRAFT TRUNK: CPT | Performed by: SURGERY

## 2023-09-20 PROCEDURE — 0WUF0JZ SUPPLEMENT ABDOMINAL WALL WITH SYNTHETIC SUBSTITUTE, OPEN APPROACH: ICD-10-PCS | Performed by: SURGERY

## 2023-09-20 PROCEDURE — 0DBU0ZZ EXCISION OF OMENTUM, OPEN APPROACH: ICD-10-PCS | Performed by: SURGERY

## 2023-09-20 PROCEDURE — 82948 REAGENT STRIP/BLOOD GLUCOSE: CPT

## 2023-09-20 PROCEDURE — C9290 INJ, BUPIVACAINE LIPOSOME: HCPCS | Performed by: ANESTHESIOLOGY

## 2023-09-20 DEVICE — OVITEX™ 1S REINFORCED TISSUE MATRIX WITH PERMANENT POLYMER (OVITEX 1S) IS A STERILE BIOSCAFFOLD COMPOSED OF OVINE (SHEEP) DERIVED EXTRACELLULAR MATRIX (ECM) AND MONOFILAMENT POLYPROPYLENE COLORED WITH ([PHATHALOCYANINATO(2-)] COPPER).  THE DEVICE CONSISTS OF TWO SIDES:  A TEXTURED AND SMOOTH SIDE.  THE TEXTURED SIDE OF THE DEVICE, INDICATED WITH BLUE POLYPROPYLENE, PROVIDES A SURFACE CONDUCIVE FOR TISSUE INGROWTH.  THE SMOOTH SIDE OF THE DEVICE, CONTAINING CLEAR POLYPROPYLENE, PROVIDES A SURFACE DESIGNED TO MINIMIZE TISSUE ATTACHMENT.  OVITEX 1S WILL INCORPORATE INTO THE RECIPIENT TISSUE WITH ASSOCIATED CELLULAR AND MICROVASCULAR INGROWTH.OVITEX 1S IS PROVIDED IN VARIOUS SHAPES AND SIZES TO SUIT SURGEON PREFERENCE AND THE COMPLEXITY OF THE SOFT TISSUE REPAIR.  THE DEVICE MAY BE TRIMMED TO A DESIRED SHAPE TO FURTHER ACCOMMODATE AN INDIVIDUAL PATIENT’S REQUIREMENTS.
Type: IMPLANTABLE DEVICE | Site: ABDOMEN | Status: FUNCTIONAL
Brand: OVITEX REINFORCED BIOSCAFFOLD WITH PERMANENT POLYMER (OVITEX)

## 2023-09-20 RX ORDER — KETAMINE HCL IN NACL, ISO-OSM 100MG/10ML
SYRINGE (ML) INJECTION AS NEEDED
Status: DISCONTINUED | OUTPATIENT
Start: 2023-09-20 | End: 2023-09-20

## 2023-09-20 RX ORDER — PROPOFOL 10 MG/ML
INJECTION, EMULSION INTRAVENOUS AS NEEDED
Status: DISCONTINUED | OUTPATIENT
Start: 2023-09-20 | End: 2023-09-20

## 2023-09-20 RX ORDER — DEXAMETHASONE SODIUM PHOSPHATE 10 MG/ML
INJECTION, SOLUTION INTRAMUSCULAR; INTRAVENOUS AS NEEDED
Status: DISCONTINUED | OUTPATIENT
Start: 2023-09-20 | End: 2023-09-20

## 2023-09-20 RX ORDER — ROCURONIUM BROMIDE 10 MG/ML
INJECTION, SOLUTION INTRAVENOUS AS NEEDED
Status: DISCONTINUED | OUTPATIENT
Start: 2023-09-20 | End: 2023-09-20

## 2023-09-20 RX ORDER — BUPIVACAINE HYDROCHLORIDE 2.5 MG/ML
INJECTION, SOLUTION EPIDURAL; INFILTRATION; INTRACAUDAL
Status: DISCONTINUED | OUTPATIENT
Start: 2023-09-20 | End: 2023-09-20

## 2023-09-20 RX ORDER — HYDROMORPHONE HCL/PF 1 MG/ML
SYRINGE (ML) INJECTION AS NEEDED
Status: DISCONTINUED | OUTPATIENT
Start: 2023-09-20 | End: 2023-09-20

## 2023-09-20 RX ORDER — ONDANSETRON 2 MG/ML
4 INJECTION INTRAMUSCULAR; INTRAVENOUS EVERY 6 HOURS PRN
Status: DISCONTINUED | OUTPATIENT
Start: 2023-09-20 | End: 2023-09-22 | Stop reason: HOSPADM

## 2023-09-20 RX ORDER — OXYCODONE HYDROCHLORIDE 5 MG/1
5 TABLET ORAL EVERY 4 HOURS PRN
Status: DISCONTINUED | OUTPATIENT
Start: 2023-09-20 | End: 2023-09-22 | Stop reason: HOSPADM

## 2023-09-20 RX ORDER — ACETAMINOPHEN 325 MG/1
975 TABLET ORAL EVERY 8 HOURS SCHEDULED
Status: DISCONTINUED | OUTPATIENT
Start: 2023-09-20 | End: 2023-09-22 | Stop reason: HOSPADM

## 2023-09-20 RX ORDER — ONDANSETRON 2 MG/ML
INJECTION INTRAMUSCULAR; INTRAVENOUS AS NEEDED
Status: DISCONTINUED | OUTPATIENT
Start: 2023-09-20 | End: 2023-09-20

## 2023-09-20 RX ORDER — SODIUM CHLORIDE, SODIUM LACTATE, POTASSIUM CHLORIDE, CALCIUM CHLORIDE 600; 310; 30; 20 MG/100ML; MG/100ML; MG/100ML; MG/100ML
125 INJECTION, SOLUTION INTRAVENOUS CONTINUOUS
Status: DISCONTINUED | OUTPATIENT
Start: 2023-09-20 | End: 2023-09-20

## 2023-09-20 RX ORDER — MAGNESIUM HYDROXIDE 1200 MG/15ML
LIQUID ORAL AS NEEDED
Status: DISCONTINUED | OUTPATIENT
Start: 2023-09-20 | End: 2023-09-20 | Stop reason: HOSPADM

## 2023-09-20 RX ORDER — OXYCODONE HYDROCHLORIDE 10 MG/1
10 TABLET ORAL EVERY 4 HOURS PRN
Status: DISCONTINUED | OUTPATIENT
Start: 2023-09-20 | End: 2023-09-22 | Stop reason: HOSPADM

## 2023-09-20 RX ORDER — GABAPENTIN 400 MG/1
400 CAPSULE ORAL 3 TIMES DAILY
Status: DISCONTINUED | OUTPATIENT
Start: 2023-09-20 | End: 2023-09-22 | Stop reason: HOSPADM

## 2023-09-20 RX ORDER — ACETAMINOPHEN 325 MG/1
650 TABLET ORAL EVERY 6 HOURS PRN
Status: DISCONTINUED | OUTPATIENT
Start: 2023-09-20 | End: 2023-09-20

## 2023-09-20 RX ORDER — METHOCARBAMOL 500 MG/1
500 TABLET, FILM COATED ORAL EVERY 8 HOURS SCHEDULED
Status: DISCONTINUED | OUTPATIENT
Start: 2023-09-20 | End: 2023-09-22 | Stop reason: HOSPADM

## 2023-09-20 RX ORDER — FENTANYL CITRATE 50 UG/ML
INJECTION, SOLUTION INTRAMUSCULAR; INTRAVENOUS AS NEEDED
Status: DISCONTINUED | OUTPATIENT
Start: 2023-09-20 | End: 2023-09-20

## 2023-09-20 RX ORDER — HYDROMORPHONE HCL/PF 1 MG/ML
0.5 SYRINGE (ML) INJECTION EVERY 4 HOURS PRN
Status: DISCONTINUED | OUTPATIENT
Start: 2023-09-20 | End: 2023-09-22

## 2023-09-20 RX ORDER — SODIUM CHLORIDE, SODIUM GLUCONATE, SODIUM ACETATE, POTASSIUM CHLORIDE, MAGNESIUM CHLORIDE, SODIUM PHOSPHATE, DIBASIC, AND POTASSIUM PHOSPHATE .53; .5; .37; .037; .03; .012; .00082 G/100ML; G/100ML; G/100ML; G/100ML; G/100ML; G/100ML; G/100ML
75 INJECTION, SOLUTION INTRAVENOUS CONTINUOUS
Status: DISCONTINUED | OUTPATIENT
Start: 2023-09-20 | End: 2023-09-22

## 2023-09-20 RX ORDER — OXYCODONE HYDROCHLORIDE AND ACETAMINOPHEN 5; 325 MG/1; MG/1
1 TABLET ORAL ONCE AS NEEDED
Status: DISCONTINUED | OUTPATIENT
Start: 2023-09-20 | End: 2023-09-21

## 2023-09-20 RX ORDER — FENTANYL CITRATE/PF 50 MCG/ML
25 SYRINGE (ML) INJECTION
Status: DISCONTINUED | OUTPATIENT
Start: 2023-09-20 | End: 2023-09-20 | Stop reason: HOSPADM

## 2023-09-20 RX ORDER — HEPARIN SODIUM 5000 [USP'U]/ML
5000 INJECTION, SOLUTION INTRAVENOUS; SUBCUTANEOUS EVERY 8 HOURS SCHEDULED
Status: DISCONTINUED | OUTPATIENT
Start: 2023-09-20 | End: 2023-09-22 | Stop reason: HOSPADM

## 2023-09-20 RX ORDER — ONDANSETRON 2 MG/ML
4 INJECTION INTRAMUSCULAR; INTRAVENOUS ONCE AS NEEDED
Status: DISCONTINUED | OUTPATIENT
Start: 2023-09-20 | End: 2023-09-20 | Stop reason: HOSPADM

## 2023-09-20 RX ORDER — LIDOCAINE HYDROCHLORIDE 10 MG/ML
INJECTION, SOLUTION EPIDURAL; INFILTRATION; INTRACAUDAL; PERINEURAL AS NEEDED
Status: DISCONTINUED | OUTPATIENT
Start: 2023-09-20 | End: 2023-09-20

## 2023-09-20 RX ORDER — CEFAZOLIN SODIUM 2 G/50ML
2000 SOLUTION INTRAVENOUS ONCE
Status: COMPLETED | OUTPATIENT
Start: 2023-09-20 | End: 2023-09-20

## 2023-09-20 RX ORDER — SODIUM CHLORIDE, SODIUM LACTATE, POTASSIUM CHLORIDE, CALCIUM CHLORIDE 600; 310; 30; 20 MG/100ML; MG/100ML; MG/100ML; MG/100ML
INJECTION, SOLUTION INTRAVENOUS CONTINUOUS PRN
Status: DISCONTINUED | OUTPATIENT
Start: 2023-09-20 | End: 2023-09-20

## 2023-09-20 RX ORDER — KETOROLAC TROMETHAMINE 30 MG/ML
INJECTION, SOLUTION INTRAMUSCULAR; INTRAVENOUS AS NEEDED
Status: DISCONTINUED | OUTPATIENT
Start: 2023-09-20 | End: 2023-09-20

## 2023-09-20 RX ORDER — METOPROLOL SUCCINATE 25 MG/1
25 TABLET, EXTENDED RELEASE ORAL DAILY
Status: DISCONTINUED | OUTPATIENT
Start: 2023-09-21 | End: 2023-09-22 | Stop reason: HOSPADM

## 2023-09-20 RX ORDER — MIDAZOLAM HYDROCHLORIDE 2 MG/2ML
INJECTION, SOLUTION INTRAMUSCULAR; INTRAVENOUS AS NEEDED
Status: DISCONTINUED | OUTPATIENT
Start: 2023-09-20 | End: 2023-09-20

## 2023-09-20 RX ADMIN — LIDOCAINE HYDROCHLORIDE 50 MG: 10 INJECTION, SOLUTION EPIDURAL; INFILTRATION; INTRACAUDAL; PERINEURAL at 07:26

## 2023-09-20 RX ADMIN — Medication 50 MG: at 08:34

## 2023-09-20 RX ADMIN — HYDROMORPHONE HYDROCHLORIDE 0.5 MG: 1 INJECTION, SOLUTION INTRAMUSCULAR; INTRAVENOUS; SUBCUTANEOUS at 09:03

## 2023-09-20 RX ADMIN — METHOCARBAMOL 500 MG: 500 TABLET ORAL at 16:58

## 2023-09-20 RX ADMIN — HYDROMORPHONE HYDROCHLORIDE 0.5 MG: 1 INJECTION, SOLUTION INTRAMUSCULAR; INTRAVENOUS; SUBCUTANEOUS at 08:04

## 2023-09-20 RX ADMIN — HYDROMORPHONE HYDROCHLORIDE 0.5 MG: 1 INJECTION, SOLUTION INTRAMUSCULAR; INTRAVENOUS; SUBCUTANEOUS at 08:19

## 2023-09-20 RX ADMIN — OXYCODONE HYDROCHLORIDE 10 MG: 10 TABLET ORAL at 15:05

## 2023-09-20 RX ADMIN — ROCURONIUM BROMIDE 20 MG: 10 INJECTION, SOLUTION INTRAVENOUS at 08:01

## 2023-09-20 RX ADMIN — KETOROLAC TROMETHAMINE 30 MG: 30 INJECTION, SOLUTION INTRAMUSCULAR; INTRAVENOUS at 09:46

## 2023-09-20 RX ADMIN — MIDAZOLAM HYDROCHLORIDE 2 MG: 1 INJECTION, SOLUTION INTRAMUSCULAR; INTRAVENOUS at 07:22

## 2023-09-20 RX ADMIN — SUGAMMADEX 200 MG: 100 INJECTION, SOLUTION INTRAVENOUS at 10:04

## 2023-09-20 RX ADMIN — HEPARIN SODIUM 5000 UNITS: 5000 INJECTION INTRAVENOUS; SUBCUTANEOUS at 21:25

## 2023-09-20 RX ADMIN — GABAPENTIN 400 MG: 400 CAPSULE ORAL at 16:54

## 2023-09-20 RX ADMIN — ACETAMINOPHEN 975 MG: 325 TABLET ORAL at 21:25

## 2023-09-20 RX ADMIN — FENTANYL CITRATE 50 MCG: 50 INJECTION INTRAMUSCULAR; INTRAVENOUS at 07:26

## 2023-09-20 RX ADMIN — HYDROMORPHONE HYDROCHLORIDE 0.5 MG: 1 INJECTION, SOLUTION INTRAMUSCULAR; INTRAVENOUS; SUBCUTANEOUS at 08:34

## 2023-09-20 RX ADMIN — BUPIVACAINE HYDROCHLORIDE 20 ML: 2.5 INJECTION, SOLUTION EPIDURAL; INFILTRATION; INTRACAUDAL at 10:00

## 2023-09-20 RX ADMIN — METHOCARBAMOL 500 MG: 500 TABLET ORAL at 21:25

## 2023-09-20 RX ADMIN — ROCURONIUM BROMIDE 30 MG: 10 INJECTION, SOLUTION INTRAVENOUS at 08:23

## 2023-09-20 RX ADMIN — DEXAMETHASONE SODIUM PHOSPHATE 10 MG: 10 INJECTION, SOLUTION INTRAMUSCULAR; INTRAVENOUS at 07:26

## 2023-09-20 RX ADMIN — BUPIVACAINE 10 ML: 13.3 INJECTION, SUSPENSION, LIPOSOMAL INFILTRATION at 10:00

## 2023-09-20 RX ADMIN — PROPOFOL 200 MG: 10 INJECTION, EMULSION INTRAVENOUS at 07:26

## 2023-09-20 RX ADMIN — CEFAZOLIN SODIUM 2000 MG: 2 SOLUTION INTRAVENOUS at 07:21

## 2023-09-20 RX ADMIN — SODIUM CHLORIDE, SODIUM GLUCONATE, SODIUM ACETATE, POTASSIUM CHLORIDE, MAGNESIUM CHLORIDE, SODIUM PHOSPHATE, DIBASIC, AND POTASSIUM PHOSPHATE 75 ML/HR: .53; .5; .37; .037; .03; .012; .00082 INJECTION, SOLUTION INTRAVENOUS at 17:54

## 2023-09-20 RX ADMIN — ACETAMINOPHEN 975 MG: 325 TABLET ORAL at 16:54

## 2023-09-20 RX ADMIN — GABAPENTIN 400 MG: 400 CAPSULE ORAL at 21:25

## 2023-09-20 RX ADMIN — SODIUM CHLORIDE, SODIUM LACTATE, POTASSIUM CHLORIDE, AND CALCIUM CHLORIDE: .6; .31; .03; .02 INJECTION, SOLUTION INTRAVENOUS at 07:18

## 2023-09-20 RX ADMIN — HEPARIN SODIUM 5000 UNITS: 5000 INJECTION INTRAVENOUS; SUBCUTANEOUS at 16:56

## 2023-09-20 RX ADMIN — ONDANSETRON 4 MG: 2 INJECTION INTRAMUSCULAR; INTRAVENOUS at 07:26

## 2023-09-20 RX ADMIN — ROCURONIUM BROMIDE 50 MG: 10 INJECTION, SOLUTION INTRAVENOUS at 07:26

## 2023-09-20 NOTE — OP NOTE
OPERATIVE REPORT  PATIENT NAME: Gordon Mg    :  1980  MRN: [id number]  Pt Location: [facility] ROOM 02    SURGERY DATE: 2023    Surgeon(s) and Role:     * Inderjit Rodrigues MD - Primary    Preop Diagnosis:  Epigastric hernia [K43.9]  Rectus diastasis [M62.08]    Post-Op Diagnosis Codes:     * Epigastric hernia [K43.9]     * Rectus diastasis [M62.08]    Procedure(s):  Exploratory laparotomy, enterolysis  Right to left myofascial advancement flap  Left to right myofascial advancement flap   Implantation of mesh  Closure of abdominal wall in multiple layers  Partial omentectomy       Specimen(s):  ID Type Source Tests Collected by Time Destination   1 : OMENTUM AND EPIGASTRIC HERNIA [facility]  Tissue Omentum TISSUE EXAM Inderjit Rodrigues MD 2023 0805        Estimated Blood Loss:   Minimal    Drains:  * No LDAs found *    Anesthesia Type:   General    Operative Indications:  Epigastric hernia [K43.9]  Rectus diastasis [M62.08]      Operative Findings:  20 cm x 5 cm defect   Rectus release performed via right to left myofascial advancement flap and right to left myofascial advancement flap  Closure of posterior sheath with running 2-0 prolene. 20 cm x 10 cm Ovitex mesh placed, secured with 2-0 prolene suture. Closure of anterior sheath with interrupted figure of 8 0 prolene suture. Subcutaneous tissue closed in layers  10 Fr flap MARCELLO drain placed above mesh. Complications:   None    Procedure and Technique:  The patient was brought to the operating room and identified verbally and via wristband. He was transferred to the operating room table and positioned supine. General endotracheal anesthesia was induced successfully. The patient's abdomen was prepped and draped in the usual sterile fashion. Pre-operative antibiotics were administered. A time out was performed confirming the patient, procedure, and site. All parties were in agreement.     Attention was turned to the abdomen where a 10 cm midline epigastric incision was made with a 10 blade scalpel. Dissection was deepened through the subcutaneous tissue with Bovie electrocautery down to the hernia sac. The hernia sac was circumferentially dissected free using a combination of blunt dissection with finger fracturing and bovie electrocautery, exposing the fascial defect. The hernia sac was entered with Bovie electrocautery, taking care to not injure structures within the sac. The hernia sac contents were explored, revealing viable small bowel and omentum. Adhesiolysis was performed with Bovie electrocautery close to the level of the fascia, taking care not to injure surrounding structures. An omentectomy was performed throughout this process using Bovie electrocautery. There was a Swiss cheese type defect superiorly, including 2 additional ventral hernias. The hernias were joined with Bovie electrocautery to make 1 large fascial defect. The hernia sac was excised with Bovie electrocautery and removed from the abdomen. Hemostasis was achieved with Bovie electrocautery. Attention was then turned to creating each myofascial flaps. An incision was made on the R anterior rectus sheath with Bovie electrocautery, extending the length of the incision and creating a right to left myofascial advancement flap, taking care to preserve perforators as able. In similar fashion, an incision was made on the left anterior rectus sheath with Bovie electrocautery and extended the length of the incision, creating a left to right myofascial advancement flap. Total fascial defect at end of release measured 20 cm x 5 cm. The posterior fascia was then closed with 2-0 Prolene suture in a running fashion. A 20 cm x 10 cm Ovitex mesh was trimmed to size and secured to the posterior fascia using 2-0 Prolene suture. A 10 Guinean flat MARCELLO drain was placed overlying the mesh and secured with 3-0 nylon suture at the skin.   The anterior fascia was then closed with 0 Prolene suture in a figure-of-eight fashion. The wound was irrigated copiously with normal saline. The subcutaneous tissue was closed in layers including 2 layers with 3-0 Vicryl suture in a running fashion. The skin was closed with 3-0 Monocryl STRATAFIX suture in a running fashion and covered with exofin skin glue. The patient was then allowed to awaken, extubated, and transferred to the PACU having tolerated the procedure well. All instrument, needle, and sponge counts were correct at the end of the case. Radiofrequency detection was negative.     Dr. Merry Cordova was present for the entire procedure        Patient Disposition:  PACU         SIGNATURE: Felicia Mcneil DO  DATE: September 20, 2023  TIME: 9:57 AM

## 2023-09-20 NOTE — ANESTHESIA POSTPROCEDURE EVALUATION
Post-Op Assessment Note    CV Status:  Stable  Pain Score: 0    Pain management: adequate  Multimodal analgesia used between 6 hours prior to anesthesia start to PACU discharge    Mental Status:  Arousable   Hydration Status:  Euvolemic   PONV Controlled:  Controlled   Airway Patency:  Patent   Two or more mitigation strategies used for obstructive sleep apnea   Post Op Vitals Reviewed: Yes      Staff: CRNA         No notable events documented.     /86 (09/20/23 1015)    Temp 98.4 °F (36.9 °C) (09/20/23 1015)    Pulse 101 (09/20/23 1015)   Resp 16 (09/20/23 1015)    SpO2 94 % (09/20/23 1015)

## 2023-09-20 NOTE — H&P
[facility] History and Physical Note:    Assessment:  Epigastric hernia in setting of rectus diastases. Hernia is only partially reducible and contains bowel; this is causing increased discomfort and becoming more difficult to reduce. Patient has had significant weight loss and will continue his behavior modification. Pertinent images and available reads personally reviewed  Reviewed CT images as well as CT report    Plan:  1. CBC and CMP. Last hemoglobin A1c 5.1 in May 2023.  2.  Plan repair of his rectus diastases and epigastric hernia with a retrorectus biologic mesh and TAR if needed. Chief Complaint:  "I am ready to schedule surgery"     HPI  Patient is a 45-year-old gentleman with diabetes, hypertension, obesity now 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 I saw him at this time for his umbilical hernia containing omentum and urged him to proceed with weight loss prior to any further discussion regarding umbilical hernia repair. At one point, he was about 400 pounds . He has been successful in his weight loss efforts and now weighs 253 pounds, BMI 37. He reports to no longer require diabetic medications. His last appointment with bariatrics was 24 April 2023 who referred patient to me for consideration of his umbilical hernia repair. I last saw him in May 2023 and ordered a CT for surgical planning. He follows up now to schedule the surgery.     Patient reports intermittent incarceration and he must lay down and push on this area in order to relieve the discomfort.      CT abdomen pelvis 19 July 2023:  IMPRESSION:     1. Umbilical/periumbilical ventral hernia containing omentum and unobstructed loops of bowel. Increased in size when compared to the prior CT. 2. Postsurgical changes of the bowel without obstruction. 3. Diverticulosis without diverticulitis.   4. Heterogeneity and nodularity of the liver could be due to cirrhotic changes or steatosis in the appropriate clinical setting. 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 fluctuations       PSH:  Past Surgical History:   Procedure Laterality Date   • ABDOMINAL ADHESION SURGERY N/A 4/4/2022    Procedure: LYSIS ADHESIONS LAPAROSCOPIC;  Surgeon: Jennifer Glover MD;  Location: [facility];  Service: Bariatrics   • COLONOSCOPY N/A 2/2/2018    Procedure: COLONOSCOPY;  Surgeon: Tracy Simons MD;  Location: [address] [place] [address] [facility]; Service: Gastroenterology   • ESOPHAGOGASTRODUODENOSCOPY N/A 2/2/2018    Procedure: ESOPHAGOGASTRODUODENOSCOPY (EGD); Surgeon: Tracy Simons MD;  Location: [facility];   Service: Gastroenterology   • PA LAPS GSTR RSTCV PX W/BYP SARI-EN-Y LIMB <150 CM N/A 4/4/2022    Procedure: LAPAROSCOPIC SARI-EN-Y GASTRIC BYPASS AND INTRAOPERATIVE EGD;  Surgeon: Jennifer Glover MD;  Location: [facility];  Service: Bariatrics   • TESTICLE SURGERY Left     infected testicle   • TOENAIL EXCISION Right 09/07/2021       Home Meds:  Current Outpatient Medications on File Prior to Visit   Medication Sig Dispense Refill   • gabapentin (NEURONTIN) 400 mg capsule Take 1 capsule (400 mg total) by mouth 3 (three) times a day 270 capsule 3   • Calcium Carb-Cholecalciferol 500-10 MG-MCG CHEW Chew 3 (three) times a day     • doxylamine (UNISOM) 25 MG tablet Take 1 tablet (25 mg total) by mouth daily at bedtime as needed for sleep 30 tablet 1   • metoprolol succinate (TOPROL-XL) 25 mg 24 hr tablet Take 1 tablet (25 mg total) by mouth daily 90 tablet 1   • Multiple Vitamins-Minerals (BARIATRIC MULTIVITAMINS/IRON PO) Take by mouth     • multivitamin (THERAGRAN) TABS Take 1 tablet by mouth daily       No current facility-administered medications on file prior to visit. Allergies: Allergies   Allergen Reactions   • Bee Venom Anaphylaxis     Annotation - [id number]: wasp, hornet also   • Macrolides And Ketolides Anaphylaxis and Rash   • Other Hives, Shortness Of Breath and Wheezing     Annotation - [id number]:  violet   • Pertussis Vaccine Anaphylaxis   • Pertussis Vaccines Anaphylaxis and Rash   • Vancomycin Shortness Of Breath   • Zithromax [Azithromycin] Anaphylaxis   • Erythromycin Rash   • Pollen Extract Sneezing       Social Hx:  Social History     Socioeconomic History   • Marital status: /Civil Union     Spouse name: Not on file   • Number of children: 2   • Years of education: Not on file   • Highest education level: 12th grade   Occupational History   • Not on file   Tobacco Use   • Smoking status: Former     Packs/day: 0.25     Years: 10.00     Total pack years: 2.50     Types: Cigarettes     Start date: 7/15/1999     Quit date:      Years since quittin.6   • Smokeless tobacco: Never   Vaping Use   • Vaping Use: Never used   Substance and Sexual Activity   • Alcohol use: Not Currently     Comment: social on occasion   • Drug use: No   • Sexual activity: Not Currently     Partners: Female     Birth control/protection: Abstinence, I.U.D. Other Topics Concern   • Not on file   Social History Narrative    Denied: History of alcohol use (history) - as per [employer]    Always uses seat belt    Former smoker - as per [employer]    Never a smoker - as per [employer]     Social Determinants of Health     Financial Resource Strain: Not on file   Food Insecurity: Not on file   Transportation Needs: No Transportation Needs (2022)    PRAPARE - Transportation    • Lack of Transportation (Medical):  No • Lack of Transportation (Non-Medical): No   Physical Activity: Not on file   Stress: Not on file   Social Connections: Not on file   Intimate Partner Violence: Not on file   Housing Stability: Not on file        Family Hx:    Family History   Problem Relation Age of Onset   • Supraventricular tachycardia Mother    • Atrial fibrillation Mother    • Diabetes type II Mother    • Cancer Mother    • Osteoporosis Mother    • Ulcerative colitis Father    • Liver disease Father    • ADD / ADHD Son    • COPD Paternal Grandfather    • Colon cancer Family    • Diabetes type II Family    • Hyperlipidemia Family    • Hypertension Family    • Hypothyroidism Family    • Heart disease Family    • Lung cancer Neg Hx    • Asthma Neg Hx          Review of Systems   Constitutional: Negative for chills and fever. Respiratory: Negative. Cardiovascular: Negative. Gastrointestinal: Negative. Genitourinary: Negative. Musculoskeletal: Negative. Neurological: Negative. Hematological: Negative. All other systems reviewed and are negative. There were no vitals taken for this visit. Physical Exam  Vitals reviewed. Constitutional:       General: He is not in acute distress. HENT:      Head: Normocephalic and atraumatic. Eyes:      Pupils: Pupils are equal, round, and reactive to light. Cardiovascular:      Rate and Rhythm: Normal rate and regular rhythm. Pulmonary:      Effort: Pulmonary effort is normal.      Breath sounds: Normal breath sounds. Abdominal:      General: There is no distension. Palpations: Abdomen is soft. Comments: Partially incarcerated epigastric hernia and moderate rectus diastases   Musculoskeletal:      Cervical back: Normal range of motion and neck supple. Lymphadenopathy:      Cervical: No cervical adenopathy. Skin:     General: Skin is warm and dry. Neurological:      General: No focal deficit present. Mental Status: He is alert.    Psychiatric:         Mood and Affect: Mood normal.         Pertinent labs reviewed    Pertinent images and available reads personally reviewed  Reviewed CT images as well as CT report  Pertinent notes reviewed       Manny Campoverde MD [id number] [facility]  [phone]

## 2023-09-20 NOTE — INTERVAL H&P NOTE
H&P reviewed. After examining the patient I find no changes in the patients condition since the H&P had been written.     Vitals:    09/20/23 0629   BP: 139/84   Pulse: 82   Resp: 18   Temp: 97.6 °F (36.4 °C)   SpO2: 95%

## 2023-09-21 LAB
ANION GAP SERPL CALCULATED.3IONS-SCNC: 7 MMOL/L
ATRIAL RATE: 112 BPM
BASOPHILS # BLD AUTO: 0.03 THOUSANDS/ÂΜL (ref 0–0.1)
BASOPHILS NFR BLD AUTO: 0 % (ref 0–1)
BUN SERPL-MCNC: 13 MG/DL (ref 5–25)
CALCIUM SERPL-MCNC: 8.7 MG/DL (ref 8.4–10.2)
CHLORIDE SERPL-SCNC: 105 MMOL/L (ref 96–108)
CO2 SERPL-SCNC: 25 MMOL/L (ref 21–32)
CREAT SERPL-MCNC: 0.65 MG/DL (ref 0.6–1.3)
EOSINOPHIL # BLD AUTO: 0.04 THOUSAND/ÂΜL (ref 0–0.61)
EOSINOPHIL NFR BLD AUTO: 0 % (ref 0–6)
ERYTHROCYTE [DISTWIDTH] IN BLOOD BY AUTOMATED COUNT: 13.2 % (ref 11.6–15.1)
GFR SERPL CREATININE-BSD FRML MDRD: 119 ML/MIN/1.73SQ M
GLUCOSE SERPL-MCNC: 138 MG/DL (ref 65–140)
HCT VFR BLD AUTO: 43 % (ref 36.5–49.3)
HGB BLD-MCNC: 14.6 G/DL (ref 12–17)
IMM GRANULOCYTES # BLD AUTO: 0.02 THOUSAND/UL (ref 0–0.2)
IMM GRANULOCYTES NFR BLD AUTO: 0 % (ref 0–2)
LYMPHOCYTES # BLD AUTO: 2.73 THOUSANDS/ÂΜL (ref 0.6–4.47)
LYMPHOCYTES NFR BLD AUTO: 26 % (ref 14–44)
MAGNESIUM SERPL-MCNC: 1.7 MG/DL (ref 1.9–2.7)
MCH RBC QN AUTO: 31.2 PG (ref 26.8–34.3)
MCHC RBC AUTO-ENTMCNC: 34 G/DL (ref 31.4–37.4)
MCV RBC AUTO: 92 FL (ref 82–98)
MONOCYTES # BLD AUTO: 0.77 THOUSAND/ÂΜL (ref 0.17–1.22)
MONOCYTES NFR BLD AUTO: 7 % (ref 4–12)
NEUTROPHILS # BLD AUTO: 7.02 THOUSANDS/ÂΜL (ref 1.85–7.62)
NEUTS SEG NFR BLD AUTO: 67 % (ref 43–75)
NRBC BLD AUTO-RTO: 0 /100 WBCS
P AXIS: 60 DEGREES
PHOSPHATE SERPL-MCNC: 3.8 MG/DL (ref 2.7–4.5)
PLATELET # BLD AUTO: 244 THOUSANDS/UL (ref 149–390)
PMV BLD AUTO: 9.2 FL (ref 8.9–12.7)
POTASSIUM SERPL-SCNC: 3.7 MMOL/L (ref 3.5–5.3)
PR INTERVAL: 146 MS
QRS AXIS: 50 DEGREES
QRSD INTERVAL: 76 MS
QT INTERVAL: 334 MS
QTC INTERVAL: 455 MS
RBC # BLD AUTO: 4.68 MILLION/UL (ref 3.88–5.62)
SODIUM SERPL-SCNC: 137 MMOL/L (ref 135–147)
T WAVE AXIS: 11 DEGREES
VENTRICULAR RATE: 112 BPM
WBC # BLD AUTO: 10.61 THOUSAND/UL (ref 4.31–10.16)

## 2023-09-21 PROCEDURE — 80048 BASIC METABOLIC PNL TOTAL CA: CPT | Performed by: STUDENT IN AN ORGANIZED HEALTH CARE EDUCATION/TRAINING PROGRAM

## 2023-09-21 PROCEDURE — 85025 COMPLETE CBC W/AUTO DIFF WBC: CPT | Performed by: STUDENT IN AN ORGANIZED HEALTH CARE EDUCATION/TRAINING PROGRAM

## 2023-09-21 PROCEDURE — NC001 PR NO CHARGE: Performed by: SURGERY

## 2023-09-21 PROCEDURE — 83735 ASSAY OF MAGNESIUM: CPT | Performed by: STUDENT IN AN ORGANIZED HEALTH CARE EDUCATION/TRAINING PROGRAM

## 2023-09-21 PROCEDURE — 93005 ELECTROCARDIOGRAM TRACING: CPT

## 2023-09-21 PROCEDURE — 93010 ELECTROCARDIOGRAM REPORT: CPT | Performed by: INTERNAL MEDICINE

## 2023-09-21 PROCEDURE — 84100 ASSAY OF PHOSPHORUS: CPT | Performed by: STUDENT IN AN ORGANIZED HEALTH CARE EDUCATION/TRAINING PROGRAM

## 2023-09-21 RX ORDER — SODIUM CHLORIDE, SODIUM GLUCONATE, SODIUM ACETATE, POTASSIUM CHLORIDE, MAGNESIUM CHLORIDE, SODIUM PHOSPHATE, DIBASIC, AND POTASSIUM PHOSPHATE .53; .5; .37; .037; .03; .012; .00082 G/100ML; G/100ML; G/100ML; G/100ML; G/100ML; G/100ML; G/100ML
1000 INJECTION, SOLUTION INTRAVENOUS ONCE
Status: COMPLETED | OUTPATIENT
Start: 2023-09-21 | End: 2023-09-21

## 2023-09-21 RX ADMIN — HEPARIN SODIUM 5000 UNITS: 5000 INJECTION INTRAVENOUS; SUBCUTANEOUS at 06:25

## 2023-09-21 RX ADMIN — ACETAMINOPHEN 975 MG: 325 TABLET ORAL at 13:31

## 2023-09-21 RX ADMIN — HEPARIN SODIUM 5000 UNITS: 5000 INJECTION INTRAVENOUS; SUBCUTANEOUS at 13:31

## 2023-09-21 RX ADMIN — METHOCARBAMOL 500 MG: 500 TABLET ORAL at 21:41

## 2023-09-21 RX ADMIN — HYDROMORPHONE HYDROCHLORIDE 0.5 MG: 1 INJECTION, SOLUTION INTRAMUSCULAR; INTRAVENOUS; SUBCUTANEOUS at 05:40

## 2023-09-21 RX ADMIN — OXYCODONE HYDROCHLORIDE 10 MG: 10 TABLET ORAL at 13:36

## 2023-09-21 RX ADMIN — ACETAMINOPHEN 975 MG: 325 TABLET ORAL at 21:41

## 2023-09-21 RX ADMIN — METOPROLOL SUCCINATE 25 MG: 25 TABLET, FILM COATED, EXTENDED RELEASE ORAL at 08:12

## 2023-09-21 RX ADMIN — GABAPENTIN 400 MG: 400 CAPSULE ORAL at 21:41

## 2023-09-21 RX ADMIN — GABAPENTIN 400 MG: 400 CAPSULE ORAL at 08:12

## 2023-09-21 RX ADMIN — OXYCODONE HYDROCHLORIDE 10 MG: 10 TABLET ORAL at 18:02

## 2023-09-21 RX ADMIN — HEPARIN SODIUM 5000 UNITS: 5000 INJECTION INTRAVENOUS; SUBCUTANEOUS at 21:41

## 2023-09-21 RX ADMIN — METHOCARBAMOL 500 MG: 500 TABLET ORAL at 06:25

## 2023-09-21 RX ADMIN — SODIUM CHLORIDE, SODIUM GLUCONATE, SODIUM ACETATE, POTASSIUM CHLORIDE, MAGNESIUM CHLORIDE, SODIUM PHOSPHATE, DIBASIC, AND POTASSIUM PHOSPHATE 1000 ML: .53; .5; .37; .037; .03; .012; .00082 INJECTION, SOLUTION INTRAVENOUS at 09:07

## 2023-09-21 RX ADMIN — ACETAMINOPHEN 975 MG: 325 TABLET ORAL at 06:25

## 2023-09-21 RX ADMIN — SODIUM CHLORIDE, SODIUM GLUCONATE, SODIUM ACETATE, POTASSIUM CHLORIDE, MAGNESIUM CHLORIDE, SODIUM PHOSPHATE, DIBASIC, AND POTASSIUM PHOSPHATE 75 ML/HR: .53; .5; .37; .037; .03; .012; .00082 INJECTION, SOLUTION INTRAVENOUS at 09:07

## 2023-09-21 RX ADMIN — METHOCARBAMOL 500 MG: 500 TABLET ORAL at 13:31

## 2023-09-21 RX ADMIN — GABAPENTIN 400 MG: 400 CAPSULE ORAL at 16:01

## 2023-09-21 NOTE — PLAN OF CARE
Problem: PAIN - ADULT  Goal: Verbalizes/displays adequate comfort level or baseline comfort level  Description: Interventions:  - Encourage patient to monitor pain and request assistance  - Assess pain using appropriate pain scale  - Administer analgesics based on type and severity of pain and evaluate response  - Implement non-pharmacological measures as appropriate and evaluate response  - Consider cultural and social influences on pain and pain management  - Notify physician/advanced practitioner if interventions unsuccessful or patient reports new pain  Outcome: Progressing     Problem: INFECTION - ADULT  Goal: Absence or prevention of progression during hospitalization  Description: INTERVENTIONS:  - Assess and monitor for signs and symptoms of infection  - Monitor lab/diagnostic results  - Monitor all insertion sites, i.e. indwelling lines, tubes, and drains  - Monitor endotracheal if appropriate and nasal secretions for changes in amount and color  - [place] appropriate cooling/warming therapies per order  - Administer medications as ordered  - Instruct and encourage patient and family to use good hand hygiene technique  - Identify and instruct in appropriate isolation precautions for identified infection/condition  Outcome: Progressing  Goal: Absence of fever/infection during neutropenic period  Description: INTERVENTIONS:  - Monitor WBC    Outcome: Progressing     Problem: SAFETY ADULT  Goal: Patient will remain free of falls  Description: INTERVENTIONS:  - Educate patient/family on patient safety including physical limitations  - Instruct patient to call for assistance with activity   - Consult OT/PT to assist with strengthening/mobility   - Keep Call bell within reach  - Keep bed low and locked with side rails adjusted as appropriate  - Keep care items and personal belongings within reach  - Initiate and maintain comfort rounds  - Make Fall Risk Sign visible to staff  - Offer Toileting every 2 Hours, in advance of need  - Initiate/Maintain bed alarm  - Obtain necessary fall risk management equipment: call bell  - Apply yellow socks and bracelet for high fall risk patients  - Consider moving patient to room near nurses station  Outcome: Progressing  Goal: Maintain or return to baseline ADL function  Description: INTERVENTIONS:  -  Assess patient's ability to carry out ADLs; assess patient's baseline for ADL function and identify physical deficits which impact ability to perform ADLs (bathing, care of mouth/teeth, toileting, grooming, dressing, etc.)  - Assess/evaluate cause of self-care deficits   - Assess range of motion  - Assess patient's mobility; develop plan if impaired  - Assess patient's need for assistive devices and provide as appropriate  - Encourage maximum independence but intervene and supervise when necessary  - Involve family in performance of ADLs  - Assess for home care needs following discharge   - Consider OT consult to assist with ADL evaluation and planning for discharge  - Provide patient education as appropriate  Outcome: Progressing  Goal: Maintains/Returns to pre admission functional level  Description: INTERVENTIONS:  - Perform BMAT or MOVE assessment daily.   - Set and communicate daily mobility goal to care team and patient/family/caregiver. - Collaborate with rehabilitation services on mobility goals if consulted  - Perform Range of Motion 2 times a day. - Reposition patient every 2 hours.   - Dangle patient 2 times a day  - Stand patient 2 times a day  - Ambulate patient 2 times a day  - Out of bed to chair 2 times a day   - Out of bed for meals 2 times a day  - Out of bed for toileting  - Record patient progress and toleration of activity level   Outcome: Progressing     Problem: DISCHARGE PLANNING  Goal: Discharge to home or other facility with appropriate resources  Description: INTERVENTIONS:  - Identify barriers to discharge w/patient and caregiver  - Arrange for needed discharge resources and transportation as appropriate  - Identify discharge learning needs (meds, wound care, etc.)  - Arrange for interpretive services to assist at discharge as needed  - Refer to Case Management Department for coordinating discharge planning if the patient needs post-hospital services based on physician/advanced practitioner order or complex needs related to functional status, cognitive ability, or social support system  Outcome: Progressing     Problem: Knowledge Deficit  Goal: Patient/family/caregiver demonstrates understanding of disease process, treatment plan, medications, and discharge instructions  Description: Complete learning assessment and assess knowledge base.   Interventions:  - Provide teaching at level of understanding  - Provide teaching via preferred learning methods  Outcome: Progressing

## 2023-09-21 NOTE — PROGRESS NOTES
Progress Note - General Surgery   Jerrell Palomo 44 y.o. male MRN: [id number]  Unit/Bed#: [address] [place] [address] Encounter: [id number]    Assessment:  44 y.o. M with history of morbid obesity s/p laparoscopic mariya en y gastric bypass in April of 2022 and significant weightloss since, as well as a large ventral hernia, now s/p exploratory laparotomy with rectus release and mesh implantation for closure of abdominal wall defect on 9/20    Afebrile. Tachycardia following ambulation. Vitals otherwise stable on room air    Presyncopal event this morning    Cr 0.65  Hgb 14.6  WBC 10.6    Plan:  Continue diabetic diet  Will administer 1 L bolus of isolyte. Suspect presyncopal events is 2/2 hypovolemia. Maintain MARCELLO drain, monitor output   Prn analgesia/scheduled analgesia  Maintain abdominal binder  OOB/Ambulate  DVT ppx     Subjective/Objective     Subjective: Events as stated above. Patient states he got up to use the restroom using the walker at approximately 430 this morning. As he was turning to sit back in bed, he felt room spinning dizziness and lightheadedness. He did fall and hit his bottom. No definite head strike. His nurse was with him and after helping him get back into bed, patient feels better. He no longer has the sensation. States his pain is controlled. He has only used 1 dose of IV Dilaudid. He is hungry, wants to ambulate, is voiding, is having bowel function. Objective:     Blood pressure 118/74, pulse (!) 128, temperature 98.2 °F (36.8 °C), temperature source Oral, resp. rate 18, height 5' 9" (1.753 m), weight 113 kg (250 lb), SpO2 96 %. ,Body mass index is 36.92 kg/m². Intake/Output Summary (Last 24 hours) at 9/21/2023 0842  Last data filed at 9/20/2023 0945  Gross per 24 hour   Intake 900 ml   Output --   Net 900 ml       Invasive Devices     Peripheral Intravenous Line  Duration           Peripheral IV 09/20/23 Dorsal (posterior); Left Hand 1 day          Drain  Duration Closed/Suction Drain Right Abdomen Bulb 10 Fr. <1 day                Physical Exam:   NAD, alert and oriented x3  Normocephalic, atraumatic  Moist mucous membranes   Norm resp effort on room air   Regular rate  Abd soft, nondistended, appropriately tender, incision is clean/dry/intact.   Abdominal MARCELLO drain with light sanguinous drainage  No calf tenderness or peripheral edema  CN grossly intact   Skin is warm and dry      Lab, Imaging and other studies:  CBC:   Lab Results   Component Value Date    WBC 10.61 (H) 09/21/2023    HGB 14.6 09/21/2023    HCT 43.0 09/21/2023    MCV 92 09/21/2023     09/21/2023    RBC 4.68 09/21/2023    MCH 31.2 09/21/2023    MCHC 34.0 09/21/2023    RDW 13.2 09/21/2023    MPV 9.2 09/21/2023    NRBC 0 09/21/2023   , CMP:   Lab Results   Component Value Date    SODIUM 137 09/21/2023    K 3.7 09/21/2023     09/21/2023    CO2 25 09/21/2023    BUN 13 09/21/2023    CREATININE 0.65 09/21/2023    CALCIUM 8.7 09/21/2023    EGFR 119 09/21/2023     VTE Pharmacologic Prophylaxis: Heparin  VTE Mechanical Prophylaxis: sequential compression device

## 2023-09-21 NOTE — UTILIZATION REVIEW
Initial Clinical Review    Elective outpatient procedure, converted to inpatient admission due to post op day 1 tachycardia     Elective  surgical procedure  Age/Sex: 44 y.o. male  Surgery Date: 9/20  Procedure: Exploratory laparotomy, enterolysis  Right to left myofascial advancement flap  Left to right myofascial advancement flap   Implantation of mesh  Closure of abdominal wall in multiple layers  Partial omentectomy   Anesthesia: general  Operative Findings: 20 cm x 5 cm defect   Rectus release performed via right to left myofascial advancement flap and right to left myofascial advancement flap  Closure of posterior sheath with running 2-0 prolene. 20 cm x 10 cm Ovitex mesh placed, secured with 2-0 prolene suture. Closure of anterior sheath with interrupted figure of 8 0 prolene suture. Subcutaneous tissue closed in layers  10 Fr flap MARCELLO drain placed above mesh.        POD#1 Progress Note: s/p exploratory laparotomy with rectus release and mesh implantation for closure of abdominal wall defect on 9/20. Pt afebrile , tachycardia following ambulation . Cont diabetic diet , give 1l IVF , maintain MARCELLO drain , monitor output , pain control , maintain abd binder , OOB , DVT ppx . Admission Orders:    Date/Time/Statement:   09/22/23 0840  Inpatient Admission  Once        Transfer Service: Surgery-General    Question Answer Comment   Level of Care Med Surg    Estimated length of stay More than 2 Midnights    Certification I certify that inpatient services are medically necessary for this patient for a duration of greater than two midnights. See H&P and MD Progress Notes for additional information about the patient's course of treatment. 09/22/23 0841   09/20/23 2255  Outpatient No Charge Bed  Once        Comments: Inpatient order entered by error.    Transfer Service: Surgery-General     09/20/23 2255   09/20/23 1028  Inpatient Admission  (Inpatient Admission)  Once        Transfer Service: Surgery-General    Question Answer Comment   Level of Care Med Surg    Estimated length of stay More than 2 Midnights    Certification I certify that inpatient services are medically necessary for this patient for a duration of greater than two midnights. See H&P and MD Progress Notes for additional information about the patient's course of treatment.         09/20/23 1031         Vital Signs: /74 (BP Location: Right arm)   Pulse (!) 128   Temp 98.2 °F (36.8 °C) (Oral)   Resp 18   Ht 5' 9" (1.753 m)   Wt 113 kg (250 lb)   SpO2 96%   BMI 36.92 kg/m²    09/22/23 07:14:52 98.2 °F (36.8 °C) 96 -- 109/74 86 94 % -- -- -- -- -- --   09/22/23 02:59:40 98.7 °F (37.1 °C) 107 Abnormal  18 138/77 97 93 % -- -- -- -- -- --   09/22/23 00:06:49 98.7 °F (37.1 °C) 103 18 141/77 98 94 % -- -- -- -- -- --   09/21/23 19:50:51 98.8 °F (37.1 °C) 105 -- 111/73 86 94 % --            09/21/23 08:09:56 98.2 °F (36.8 °C) 128 Abnormal  18 118/74 89 96 % -- -- -- None (Room air) -- Lying   09/21/23 02:35:50 -- 109 Abnormal  18 124/83 97 97 % -- -- -- -- -- --   09/20/23 23:12:20 98.4 °F (36.9 °C) 124 Abnormal  18 104/72 83 95 % -- -- -- -- -- --   09/20/23 20:01:55 98.6 °F (37 °C) 106 Abnormal  18 131/68 89 94 % -- -- -- -- -- Lying   09/20/23 14:53:21 98.7 °F (37.1 °C) 91 20 129/81 97 94 % 28 -- 2 L/min Nasal cannula -- --   09/20/23 1351 -- 92 18 142/79 -- 97 % 28 -- 2 L/min Nasal cannula -- --   09/20/23 1115 -- 95 16 146/94 -- 95 % 36 -- 4 L/min Nasal cannula -- --   09/20/23 1100 -- 98 16 162/98 -- 95 % 36 -- 4 L/min Nasal cannula -- --   09/20/23 1045 -- 94 16 155/97 -- 96 % -- 6 L/min -- Simple mask -- --   09/20/23 1030 -- 97 16 145/91 -- 94 % -- 6 L/min -- Simple mask -- --   09/20/23 1015 98.4 °F (36.9 °C) 101 16 146/86 -- 94 % -- 6 L/min           Pertinent Labs/Diagnostic Test Results:   No orders to display         Results from last 7 days   Lab Units 09/21/23  0551   WBC Thousand/uL 10.61*   HEMOGLOBIN g/dL 14.6 HEMATOCRIT % 43.0   PLATELETS Thousands/uL 244   NEUTROS ABS Thousands/µL 7.02     Results from last 7 days   Lab Units 09/21/23  0551   SODIUM mmol/L 137   POTASSIUM mmol/L 3.7   CHLORIDE mmol/L 105   CO2 mmol/L 25   ANION GAP mmol/L 7   BUN mg/dL 13   CREATININE mg/dL 0.65   EGFR ml/min/1.73sq m 119   CALCIUM mg/dL 8.7   MAGNESIUM mg/dL 1.7*   PHOSPHORUS mg/dL 3.8     Results from last 7 days   Lab Units 09/20/23  1040 09/20/23  0638   POC GLUCOSE mg/dl 131 100     Results from last 7 days   Lab Units 09/21/23  0551   GLUCOSE RANDOM mg/dL 138     Diet: cons carb diet   Mobility: up as parisa   DVT Prophylaxis:     Medications/Pain Control:   Scheduled Medications:  acetaminophen, 975 mg, Oral, Q8H AMARJIT  gabapentin, 400 mg, Oral, TID  heparin (porcine), 5,000 Units, Subcutaneous, Q8H AMARJIT  methocarbamol, 500 mg, Oral, Q8H AMARJIT  metoprolol succinate, 25 mg, Oral, Daily      Continuous IV Infusions:  multi-electrolyte, 75 mL/hr, Intravenous, Continuous      PRN Meds:  HYDROmorphone, 0.5 mg, Intravenous, Q4H PRN  lactated ringers, 1,000 mL, Intravenous, Once PRN   And  lactated ringers, 1,000 mL, Intravenous, Once PRN  ondansetron, 4 mg, Intravenous, Q6H PRN  oxyCODONE, 10 mg, Oral, Q4H PRN  oxyCODONE, 5 mg, Oral, Q4H PRN  oxyCODONE-acetaminophen, 1 tablet, Oral, Once PRN  sodium chloride, 1,000 mL, Intravenous, Once PRN   And  sodium chloride, 1,000 mL, Intravenous, Once PRN        Network Utilization Review Department  ATTENTION: Please call with any questions or concerns to [phone] and carefully listen to the prompts so that you are directed to the right person. All voicemails are confidential.  Elodia Sales all requests for admission clinical reviews, approved or denied determinations and any other requests to dedicated fax number below belonging to the campus where the patient is receiving treatment.  List of dedicated fax numbers for the Facilities:  FACILITY NAME UR FAX NUMBER   ADMISSION DENIALS (Administrative/Medical Necessity) [phone] 2303 [facility] (Maternity/NICU/Pediatrics) [address] 1000 [facility] [phone] [address] [address] [facility] [address] [phone] Do not drive or operate heavy tools/machinery while you are on chlordiazepoxide

## 2023-09-21 NOTE — PLAN OF CARE
Problem: PAIN - ADULT  Goal: Verbalizes/displays adequate comfort level or baseline comfort level  Description: Interventions:  - Encourage patient to monitor pain and request assistance  - Assess pain using appropriate pain scale  - Administer analgesics based on type and severity of pain and evaluate response  - Implement non-pharmacological measures as appropriate and evaluate response  - Consider cultural and social influences on pain and pain management  - Notify physician/advanced practitioner if interventions unsuccessful or patient reports new pain  Outcome: Progressing     Problem: INFECTION - ADULT  Goal: Absence or prevention of progression during hospitalization  Description: INTERVENTIONS:  - Assess and monitor for signs and symptoms of infection  - Monitor lab/diagnostic results  - Monitor all insertion sites, i.e. indwelling lines, tubes, and drains  - Monitor endotracheal if appropriate and nasal secretions for changes in amount and color  - [place] appropriate cooling/warming therapies per order  - Administer medications as ordered  - Instruct and encourage patient and family to use good hand hygiene technique  - Identify and instruct in appropriate isolation precautions for identified infection/condition  Outcome: Progressing  Goal: Absence of fever/infection during neutropenic period  Description: INTERVENTIONS:  - Monitor WBC    Outcome: Progressing     Problem: SAFETY ADULT  Goal: Patient will remain free of falls  Description: INTERVENTIONS:  - Educate patient/family on patient safety including physical limitations  - Instruct patient to call for assistance with activity   - Consult OT/PT to assist with strengthening/mobility   - Keep Call bell within reach  - Keep bed low and locked with side rails adjusted as appropriate  - Keep care items and personal belongings within reach  - Initiate and maintain comfort rounds  - Make Fall Risk Sign visible to staff  - Offer Toileting every  Hours, in advance of need  - Initiate/Maintain alarm  - Obtain necessary fall risk management equipment:   - Apply yellow socks and bracelet for high fall risk patients  - Consider moving patient to room near nurses station  Outcome: Progressing  Goal: Maintain or return to baseline ADL function  Description: INTERVENTIONS:  -  Assess patient's ability to carry out ADLs; assess patient's baseline for ADL function and identify physical deficits which impact ability to perform ADLs (bathing, care of mouth/teeth, toileting, grooming, dressing, etc.)  - Assess/evaluate cause of self-care deficits   - Assess range of motion  - Assess patient's mobility; develop plan if impaired  - Assess patient's need for assistive devices and provide as appropriate  - Encourage maximum independence but intervene and supervise when necessary  - Involve family in performance of ADLs  - Assess for home care needs following discharge   - Consider OT consult to assist with ADL evaluation and planning for discharge  - Provide patient education as appropriate  Outcome: Progressing  Goal: Maintains/Returns to pre admission functional level  Description: INTERVENTIONS:  - Perform BMAT or MOVE assessment daily.   - Set and communicate daily mobility goal to care team and patient/family/caregiver. - Collaborate with rehabilitation services on mobility goals if consulted  - Perform Range of Motion  times a day. - Reposition patient every  hours.   - Dangle patient  times a day  - Stand patient  times a day  - Ambulate patient  times a day  - Out of bed to chair  times a day   - Out of bed for meals  times a day  - Out of bed for toileting  - Record patient progress and toleration of activity level   Outcome: Progressing     Problem: DISCHARGE PLANNING  Goal: Discharge to home or other facility with appropriate resources  Description: INTERVENTIONS:  - Identify barriers to discharge w/patient and caregiver  - Arrange for needed discharge resources and transportation as appropriate  - Identify discharge learning needs (meds, wound care, etc.)  - Arrange for interpretive services to assist at discharge as needed  - Refer to Case Management Department for coordinating discharge planning if the patient needs post-hospital services based on physician/advanced practitioner order or complex needs related to functional status, cognitive ability, or social support system  Outcome: Progressing     Problem: Knowledge Deficit  Goal: Patient/family/caregiver demonstrates understanding of disease process, treatment plan, medications, and discharge instructions  Description: Complete learning assessment and assess knowledge base.   Interventions:  - Provide teaching at level of understanding  - Provide teaching via preferred learning methods  Outcome: Progressing

## 2023-09-22 VITALS
RESPIRATION RATE: 18 BRPM | DIASTOLIC BLOOD PRESSURE: 86 MMHG | HEART RATE: 117 BPM | SYSTOLIC BLOOD PRESSURE: 135 MMHG | BODY MASS INDEX: 37.03 KG/M2 | WEIGHT: 250 LBS | HEIGHT: 69 IN | OXYGEN SATURATION: 93 % | TEMPERATURE: 98.2 F

## 2023-09-22 PROCEDURE — 99024 POSTOP FOLLOW-UP VISIT: CPT | Performed by: PHYSICIAN ASSISTANT

## 2023-09-22 RX ORDER — OXYCODONE HYDROCHLORIDE 5 MG/1
5 TABLET ORAL EVERY 6 HOURS PRN
Qty: 8 TABLET | Refills: 0 | Status: SHIPPED | OUTPATIENT
Start: 2023-09-22 | End: 2023-09-28

## 2023-09-22 RX ORDER — HYDROMORPHONE HCL IN WATER/PF 6 MG/30 ML
0.2 PATIENT CONTROLLED ANALGESIA SYRINGE INTRAVENOUS EVERY 4 HOURS PRN
Status: DISCONTINUED | OUTPATIENT
Start: 2023-09-22 | End: 2023-09-22 | Stop reason: HOSPADM

## 2023-09-22 RX ADMIN — OXYCODONE HYDROCHLORIDE 10 MG: 10 TABLET ORAL at 03:04

## 2023-09-22 RX ADMIN — GABAPENTIN 400 MG: 400 CAPSULE ORAL at 09:23

## 2023-09-22 RX ADMIN — METHOCARBAMOL 500 MG: 500 TABLET ORAL at 05:07

## 2023-09-22 RX ADMIN — ACETAMINOPHEN 975 MG: 325 TABLET ORAL at 05:07

## 2023-09-22 RX ADMIN — HEPARIN SODIUM 5000 UNITS: 5000 INJECTION INTRAVENOUS; SUBCUTANEOUS at 05:07

## 2023-09-22 RX ADMIN — OXYCODONE HYDROCHLORIDE 10 MG: 10 TABLET ORAL at 09:22

## 2023-09-22 RX ADMIN — METOPROLOL SUCCINATE 25 MG: 25 TABLET, FILM COATED, EXTENDED RELEASE ORAL at 09:23

## 2023-09-22 NOTE — PLAN OF CARE
Problem: PAIN - ADULT  Goal: Verbalizes/displays adequate comfort level or baseline comfort level  Description: Interventions:  - Encourage patient to monitor pain and request assistance  - Assess pain using appropriate pain scale  - Administer analgesics based on type and severity of pain and evaluate response  - Implement non-pharmacological measures as appropriate and evaluate response  - Consider cultural and social influences on pain and pain management  - Notify physician/advanced practitioner if interventions unsuccessful or patient reports new pain  Outcome: Progressing     Problem: INFECTION - ADULT  Goal: Absence or prevention of progression during hospitalization  Description: INTERVENTIONS:  - Assess and monitor for signs and symptoms of infection  - Monitor lab/diagnostic results  - Monitor all insertion sites, i.e. indwelling lines, tubes, and drains  - Monitor endotracheal if appropriate and nasal secretions for changes in amount and color  - [place] appropriate cooling/warming therapies per order  - Administer medications as ordered  - Instruct and encourage patient and family to use good hand hygiene technique  - Identify and instruct in appropriate isolation precautions for identified infection/condition  Outcome: Progressing  Goal: Absence of fever/infection during neutropenic period  Description: INTERVENTIONS:  - Monitor WBC    Outcome: Progressing     Problem: SAFETY ADULT  Goal: Patient will remain free of falls  Description: INTERVENTIONS:  - Educate patient/family on patient safety including physical limitations  - Instruct patient to call for assistance with activity   - Consult OT/PT to assist with strengthening/mobility   - Keep Call bell within reach  - Keep bed low and locked with side rails adjusted as appropriate  - Keep care items and personal belongings within reach  - Initiate and maintain comfort rounds  - Make Fall Risk Sign visible to staff  - Offer Toileting every 2 Hours, in advance of need  - Initiate/Maintain bed alarm  - Obtain necessary fall risk management equipment: call bell  - Apply yellow socks and bracelet for high fall risk patients  - Consider moving patient to room near nurses station  Outcome: Progressing  Goal: Maintain or return to baseline ADL function  Description: INTERVENTIONS:  -  Assess patient's ability to carry out ADLs; assess patient's baseline for ADL function and identify physical deficits which impact ability to perform ADLs (bathing, care of mouth/teeth, toileting, grooming, dressing, etc.)  - Assess/evaluate cause of self-care deficits   - Assess range of motion  - Assess patient's mobility; develop plan if impaired  - Assess patient's need for assistive devices and provide as appropriate  - Encourage maximum independence but intervene and supervise when necessary  - Involve family in performance of ADLs  - Assess for home care needs following discharge   - Consider OT consult to assist with ADL evaluation and planning for discharge  - Provide patient education as appropriate  Outcome: Progressing  Goal: Maintains/Returns to pre admission functional level  Description: INTERVENTIONS:  - Perform BMAT or MOVE assessment daily.   - Set and communicate daily mobility goal to care team and patient/family/caregiver. - Collaborate with rehabilitation services on mobility goals if consulted  - Perform Range of Motion 2 times a day. - Reposition patient every 2 hours.   - Dangle patient 2 times a day  - Stand patient 2 times a day  - Ambulate patient 2 times a day  - Out of bed to chair 2 times a day   - Out of bed for meals 2 times a day  - Out of bed for toileting  - Record patient progress and toleration of activity level   Outcome: Progressing     Problem: DISCHARGE PLANNING  Goal: Discharge to home or other facility with appropriate resources  Description: INTERVENTIONS:  - Identify barriers to discharge w/patient and caregiver  - Arrange for needed discharge resources and transportation as appropriate  - Identify discharge learning needs (meds, wound care, etc.)  - Arrange for interpretive services to assist at discharge as needed  - Refer to Case Management Department for coordinating discharge planning if the patient needs post-hospital services based on physician/advanced practitioner order or complex needs related to functional status, cognitive ability, or social support system  Outcome: Progressing     Problem: Knowledge Deficit  Goal: Patient/family/caregiver demonstrates understanding of disease process, treatment plan, medications, and discharge instructions  Description: Complete learning assessment and assess knowledge base.   Interventions:  - Provide teaching at level of understanding  - Provide teaching via preferred learning methods  Outcome: Progressing

## 2023-09-22 NOTE — DISCHARGE SUMMARY
Discharge Summary - Erica Carrillo 44 y.o. male MRN: [id number]    Unit/Bed#: 2 [address] [facility] Encounter: [id number]    Admission Date: 9/20/2023   Discharge Date: 9/22/2023    Admitting Diagnosis:   Epigastric hernia [K43.9]  Rectus diastasis [M62.08]    Discharge Diagnoses: Principal Problem:    Ventral hernia without obstruction or gangrene      Consultations:  n/a    Procedures Performed:    Exploratory laparotomy  Enterolysis  Right to left myofascial advancement flap  Left to right myofascial advancement flap   Implantation of mesh  Closure of abdominal wall in multiple layers  Partial omentectomy       HPI per admission H&P:  Patient is a 59-year-old gentleman with diabetes, hypertension, obesity now 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  I saw him at this time for his umbilical hernia containing omentum and urged him to proceed with weight loss prior to any further discussion regarding umbilical hernia repair. At one point, he was about 400 pounds .  He has been successful in his weight loss efforts and now weighs 253 pounds, BMI 37.  He reports to no longer require diabetic medications.  His last appointment with bariatrics was 21 April 2023   Patient reports intermittent incarceration and he must lay down and push on this area in order to relieve the discomfort. Hospital Course: Erica Carrillo is a 44 y.o. male admitted postoperatively after the aforementioned surgical procedures described above. Postoperatively patient had a syncopal episode after attempting a bowel movement. He fell into bed after feeling dizzy and lightheaded. Patient was not injured. Patient was given 1 L bolus of IV fluids and did not have any recurring symptoms after that. Patient was also noted to be tachycardic without any hypotension or symptoms of palpitations, chest pain, shortness of breath, pleuritic chest pain, headache.   EKG revealed sinus tachycardia and patient admitted to higher heart rate at baseline in the past.  Postoperative day 2 patient was tolerating diet and having bowel movements. Discharged home with abdominal binder in place        Condition at Discharge: good     Discharge instructions/Information to patient and family:   See after visit summary for information provided to patient and family. Provisions for Follow-Up Care:  See after visit summary for information related to follow-up care and any pertinent home health orders. Disposition: Home    Planned Readmission: No    Discharge Statement   I spent 20 minutes discharging the patient. This time was spent on the day of discharge. I had direct contact with the patient on the day of discharge. Additional documentation is required if more than 30 minutes were spent on discharge. Discharge Medications:  See after visit summary for reconciled discharge medications provided to patient and family. unsure

## 2023-09-22 NOTE — DISCHARGE INSTR - AVS FIRST PAGE
Postoperative Care Instructions      1. General: You may feel pulling sensations around the wound or funny aches and pains around the incisions. This is normal. Even minor surgery is a change in your body and this is your body's reaction to it. If you have had abdominal surgery, it may help to support the incision with a small pillow or blanket for comfort when moving or coughing. 2. Wound care: The glue over the incisions will fall off over the next week or two. If you have staples or stitches, they will be removed by the physician at your follow up appointment. Keep binder in place during the day, can be removed at night to sleep. 3. Showering: You may shower again 48 hours after surgery. Please do not soak wound in standing water such as a bath, hot tub, pool, lake, etc. Do not scrub or use exfoliants on the surgical wounds. 4. Activity: You may go up and down stairs, walk as much as you are comfortable, but walk at least 3 times each day. If you have had abdominal surgery, do not perform any strenuous exercise or lift anything heavier than 15-20 pounds for at least 4 weeks, unless cleared by your physician. 5. Diet: You may resume your regular diet. Please drink lots of water. 6. Medications: Resume all of your previous medications, unless told otherwise by the doctor. A good option for pain control is to start with acetaminophen(Tylenol) 500mg every 6 to 8 hours. If this is not sufficient then you make take the narcotic pain medicine as prescribed. You do not need to take the narcotic pain medication unless you are having significant pain and discomfort. Please take the narcotic medication with food. Insure that you do not take more than 4000 mg of Tylenol per day. 7. Driving: You will need someone to drive you home on the day of surgery. Do not drive or make any important decisions while on narcotic pain medication.  Generally, you may drive 48 hours after you've stopped taking all narcotic pain medications. 8. Upset Stomach: You may take Maalox, Tums, or similar items for an upset stomach. If your narcotic pain medication causes an upset stomach, do not take it on an empty stomach. Try taking it with at least some crackers or toast.     9. Constipation: Patients often experience constipation after surgery, especially if taking narcotic pain medications. We recommend starting an over-the-counter medication for this, such as Metamucil, Senokot, Colace, milk of magnesia, etc. You may stop taking these medications a couple days after your last dose of narcotic medication. If you experience significant nausea or vomiting after abdominal surgery, call the office before trying any of these medications. 10. Call the office: If you are experiencing any of the following: fevers above 101.5°, significant nausea or vomiting, if the wound develops drainage and/or excessive redness around the wound, or if you have significant diarrhea or other worsening symptoms. 11. Pain: A prescription for narcotic pain medication will be sent to your pharmacy upon discharge from the hospital. Please only take this medication if absolutely necessary. Please return extra narcotics to your local pharmacy.

## 2023-09-25 PROCEDURE — 88305 TISSUE EXAM BY PATHOLOGIST: CPT | Performed by: PATHOLOGY

## 2023-09-26 NOTE — UTILIZATION REVIEW
NOTIFICATION OF ADMISSION DISCHARGE   This is a Notification of Discharge from [address] [place] [address]. Please be advised that this patient has been discharge from our facility. Below you will find the admission and discharge date and time including the patient’s disposition. UTILIZATION REVIEW CONTACT:  Asha Gann  Utilization   Network Utilization Review Department  Phone: [phone] x carefully listen to the prompts. All voicemails are confidential.  Email: [email or web address]     ADMISSION INFORMATION  PRESENTATION DATE: 9/20/2023  6:03 AM  OBERVATION ADMISSION DATE:   INPATIENT ADMISSION DATE: 9/22/23  8:41 AM   DISCHARGE DATE: 9/22/2023  1:52 PM   DISPOSITION:Home/Self Care    IMPORTANT INFORMATION:  Send all requests for admission clinical reviews, approved or denied determinations and any other requests to dedicated fax number below belonging to the campus where the patient is receiving treatment.  List of dedicated fax numbers:  [place] DENIALS (Administrative/Medical Necessity) [phone] 2303 [facility] (Maternity/NICU/Pediatrics) [phone]   [facility] [phone]   [facility] [phone] [address] [facility] [address] [phone] 401 [facility] [phone]   [facility] [phone]   [address] [place] [address] 608 [facility] [phone]   [address] [facility] [phone] 3441 [facility] [phone] 2720 [facility] [address] [facility] [phone]

## 2023-09-28 ENCOUNTER — HOSPITAL ENCOUNTER (EMERGENCY)
Facility: HOSPITAL | Age: 43
Discharge: HOME | End: 2023-09-28
Attending: EMERGENCY MEDICINE
Payer: COMMERCIAL

## 2023-09-28 VITALS
WEIGHT: 243.8 LBS | HEART RATE: 106 BPM | SYSTOLIC BLOOD PRESSURE: 141 MMHG | RESPIRATION RATE: 18 BRPM | DIASTOLIC BLOOD PRESSURE: 84 MMHG | BODY MASS INDEX: 36 KG/M2 | TEMPERATURE: 98.6 F | OXYGEN SATURATION: 99 %

## 2023-09-28 DIAGNOSIS — Z48.89 ENCOUNTER FOR POST SURGICAL WOUND CHECK: ICD-10-CM

## 2023-09-28 DIAGNOSIS — Z51.89 VISIT FOR WOUND CHECK: Primary | ICD-10-CM

## 2023-09-28 PROCEDURE — 99284 EMERGENCY DEPT VISIT MOD MDM: CPT | Performed by: EMERGENCY MEDICINE

## 2023-09-28 PROCEDURE — 99283 EMERGENCY DEPT VISIT LOW MDM: CPT

## 2023-09-28 NOTE — ED PROVIDER NOTES
History  Chief Complaint   Patient presents with   • Wound Drain Evaluation     Had hernia repair last Wed. Questioning if his drainage is enough and states his bruising has increased. Has not contacted surgeons office. Patient is postop day 8 status post ventral hernia repair with mesh. He has a MARCELLO drain which has been draining between 35 and 40 cc daily, and the patient has been monitoring and recording the drainage. Patient's wife was concerned of increasing bruising below the surgical wound on the lower abdominal wall. Patient has been able to eat and drink well. Has no nausea or vomiting, bowel function has returned. He has an upcoming appointment with his surgeon next week          Prior to Admission Medications   Prescriptions Last Dose Informant Patient Reported? Taking?    Calcium Carb-Cholecalciferol 500-10 MG-MCG CHEW   Yes No   Sig: Chew 3 (three) times a day   Multiple Vitamins-Minerals (BARIATRIC MULTIVITAMINS/IRON PO)  Self Yes No   Sig: Take by mouth   doxylamine (UNISOM) 25 MG tablet   No No   Sig: Take 1 tablet (25 mg total) by mouth daily at bedtime as needed for sleep   gabapentin (NEURONTIN) 400 mg capsule   No No   Sig: Take 1 capsule (400 mg total) by mouth 3 (three) times a day   metoprolol succinate (TOPROL-XL) 25 mg 24 hr tablet   No No   Sig: Take 1 tablet by mouth once daily   oxyCODONE (Roxicodone) 5 immediate release tablet   No No   Sig: Take 1 tablet (5 mg total) by mouth every 6 (six) hours as needed for severe pain for up to 6 days Max Daily Amount: 20 mg      Facility-Administered Medications: None       Past Medical History:   Diagnosis Date   • Abdominal wall hernia    • Bell's palsy     2 bouts - idiopathic, possible stress induced   • CPAP (continuous positive airway pressure) dependence    • Depression    • Diabetes mellitus ([address]) 03/25/21    Blood work   • GERD (gastroesophageal reflux disease)    • Hypertension    • Irregular heart beat    • Morbid obesity with BMI of 60.0-69.9, adult [facility])    • Neurocardiogenic syncope    • Sleep apnea    • 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 fluctuations       Past Surgical History:   Procedure Laterality Date   • ABDOMINAL ADHESION SURGERY N/A 4/4/2022    Procedure: LYSIS ADHESIONS LAPAROSCOPIC;  Surgeon: Ivelisse Gabriel MD;  Location: [facility];  Service: Bariatrics   • COLONOSCOPY N/A 2/2/2018    Procedure: COLONOSCOPY;  Surgeon: Batsheva Lozoya MD;  Location: [address] [place] [address] GI LAB; Service: Gastroenterology   • ESOPHAGOGASTRODUODENOSCOPY N/A 2/2/2018    Procedure: ESOPHAGOGASTRODUODENOSCOPY (EGD); Surgeon: Batsheva Lozoya MD;  Location: [facility] GI LAB;   Service: Gastroenterology   • OMENTECTOMY N/A 9/20/2023    Procedure: PARTIAL OMENTECTOMY;  Surgeon: Filipe Wilson MD;  Location: [facility];  Service: General   • FL LAPS GSTR TCV [address] [place] [address] W/BYP SARI-EN-Y LIMB <150 CM N/A 4/4/2022    Procedure: LAPAROSCOPIC SARI-EN-Y GASTRIC BYPASS AND INTRAOPERATIVE EGD;  Surgeon: Ivelisse Gabriel MD;  Location: [facility];  Service: Bariatrics   • FL RPR AA HERNIA 1ST < 3 CM REDUCIBLE N/A 9/20/2023    Procedure: REPAIR HERNIA EPIGASTRIC Repair rectus diastases, implantation of biologic mesh;  Surgeon: Filipe Wilson MD;  Location: [facility];  Service: General   • TESTICLE SURGERY Left     infected testicle   • TOENAIL EXCISION Right 09/07/2021       Family History   Problem Relation Age of Onset   • Supraventricular tachycardia Mother    • Atrial fibrillation Mother    • Diabetes type II Mother    • Cancer Mother    • Osteoporosis Mother    • Ulcerative colitis Father    • Liver disease Father    • ADD / ADHD Son    • COPD Paternal Grandfather    • Colon cancer Family    • Diabetes type II Family    • Hyperlipidemia Family    • Hypertension Family    • Hypothyroidism Family    • Heart disease Family    • Lung cancer Neg Hx    • Asthma Neg Hx      I have reviewed and agree with the history as documented. E-Cigarette/Vaping   • E-Cigarette Use Never User      E-Cigarette/Vaping Substances   • Nicotine No    • THC No    • CBD No    • Flavoring No    • Other No    • Unknown No      Social History     Tobacco Use   • Smoking status: Former     Packs/day: 0.25     Years: 10.00     Total pack years: 2.50     Types: Cigarettes     Start date: 7/15/1999     Quit date: 2010     Years since quittin.7     Passive exposure: Never   • Smokeless tobacco: Never   Vaping Use   • Vaping Use: Never used   Substance Use Topics   • Alcohol use: Yes     Comment: social on occasion   • Drug use: No       Review of Systems   Constitutional: Negative for chills and fever. HENT: Negative for congestion and sore throat. Eyes: Negative for visual disturbance. Respiratory: Negative for cough and shortness of breath. Cardiovascular: Negative for chest pain. Gastrointestinal: Negative for abdominal pain, blood in stool, constipation, diarrhea, nausea and vomiting. Genitourinary: Negative for dysuria and hematuria. Musculoskeletal: Negative for back pain. Skin: Positive for color change and wound. Neurological: Negative for weakness and headaches. Hematological: Does not bruise/bleed easily. Psychiatric/Behavioral: Negative for confusion. All other systems reviewed and are negative. Physical Exam  Physical Exam  Vitals and nursing note reviewed. Constitutional:       Appearance: Normal appearance. HENT:      Head: Normocephalic. Right Ear: External ear normal.      Left Ear: External ear normal.      Nose: Nose normal.      Mouth/Throat:      Mouth: Mucous membranes are moist.   Eyes:      Conjunctiva/sclera: Conjunctivae normal.   Cardiovascular:      Rate and Rhythm: Normal rate. Pulses: Normal pulses. Pulmonary:      Effort: Pulmonary effort is normal.   Abdominal:      General: Bowel sounds are normal.   Musculoskeletal:         General: Normal range of motion.       Cervical back: Normal range of motion. Skin:     General: Skin is warm and dry. Capillary Refill: Capillary refill takes less than 2 seconds. Findings: Bruising present. Comments: The inferior part of the wound has a 2 mm area of superficial separation with no wound infection. There is an area of dependent ecchymosis on the lower without fluctuance. Neurological:      General: No focal deficit present. Mental Status: He is alert. Psychiatric:         Mood and Affect: Mood normal.         Vital Signs  ED Triage Vitals [09/28/23 1017]   Temperature Pulse Respirations Blood Pressure SpO2   98.6 °F (37 °C) (!) 106 18 141/84 99 %      Temp Source Heart Rate Source Patient Position - Orthostatic VS BP Location FiO2 (%)   Oral Monitor Sitting Right arm --      Pain Score       --           Vitals:    09/28/23 1017   BP: 141/84   Pulse: (!) 106   Patient Position - Orthostatic VS: Sitting         Visual Acuity      ED Medications  Medications - No data to display    Diagnostic Studies  Results Reviewed     None                 No orders to display              Procedures  Procedures         ED Course                                             Medical Decision Making  Wound was photographed and shared with the surgical team who came to evaluate the patient. Disposition  Final diagnoses:   Visit for wound check   Encounter for post surgical wound check     Time reflects when diagnosis was documented in both MDM as applicable and the Disposition within this note     Time User Action Codes Description Comment    9/28/2023 11:41 AM Teresa Villanueva Add [Z51.89] Visit for wound check     9/28/2023 11:41 AM Teresa Mins Add [A25.68] Encounter for post surgical wound check       ED Disposition     ED Disposition   Discharge    Condition   Stable    Date/Time   Thu Sep 28, 2023 11:41 AM    Comment   Neelima Erickson discharge to home/self care.                Follow-up Information     Follow up With Specialties Details Why [address], MD General Surgery Schedule an appointment as soon as possible for a visit   [address] [facility], [address] [facility]  [phone]            Discharge Medication List as of 9/28/2023 11:42 AM      CONTINUE these medications which have NOT CHANGED    Details   Calcium Carb-Cholecalciferol 500-10 MG-MCG CHEW Chew 3 (three) times a day, Historical Med      doxylamine (UNISOM) 25 MG tablet Take 1 tablet (25 mg total) by mouth daily at bedtime as needed for sleep, Starting Wed 5/10/2023, Normal      gabapentin (NEURONTIN) 400 mg capsule Take 1 capsule (400 mg total) by mouth 3 (three) times a day, Starting Tue 7/18/2023, Until Mon 10/16/2023, Normal      metoprolol succinate (TOPROL-XL) 25 mg 24 hr tablet Take 1 tablet by mouth once daily, Starting Tue 8/22/2023, Normal      Multiple Vitamins-Minerals (BARIATRIC MULTIVITAMINS/IRON PO) Take by mouth, Historical Med      oxyCODONE (Roxicodone) 5 immediate release tablet Take 1 tablet (5 mg total) by mouth every 6 (six) hours as needed for severe pain for up to 6 days Max Daily Amount: 20 mg, Starting Fri 9/22/2023, Until Thu 9/28/2023 at 2359, Normal             No discharge procedures on file.     PDMP Review       Value Time User    PDMP Reviewed  Yes 4/5/2022  8:08 AM Kiel Doran PA-C          ED Provider  Electronically Signed by           Sarika Gill MD  09/28/23 9111

## 2023-09-28 NOTE — QUICK NOTE
Patient was concerned about ecchymosis surrounding incision, changes in lower portion of midline incision, leaking around MARCELLO incision site, and the dark color nature of the MARCELLO drain output. The most inferior portion of the wound had some dermis exposed after the skin glue fell off. I placed steri-strips here to approximate the skin. I redressed the drain site and gave the patient a new, clean abdominal binder and educated patient on expected findings with MARCELLO drain output over the next several days.

## 2023-10-04 ENCOUNTER — TELEPHONE (OUTPATIENT)
Dept: SURGERY | Facility: CLINIC | Age: 43
End: 2023-10-04

## 2023-10-04 NOTE — TELEPHONE ENCOUNTER
Please call patient. Between 9 AM and 3 PM today has had 110 ml output from MARCELLO drain. At 1 PM had 40 ML, 45 minutes after another 35 ML from drain. Has changed dressings several times. Has been more active walking 2-3 miles per day also. Please call [phone]. Advised if needed go to ED.   Patient has PO appointment 10/5 @ 10 AM.

## 2023-10-05 ENCOUNTER — OFFICE VISIT (OUTPATIENT)
Dept: SURGERY | Facility: CLINIC | Age: 43
End: 2023-10-05
Payer: COMMERCIAL

## 2023-10-05 ENCOUNTER — HOSPITAL ENCOUNTER (INPATIENT)
Facility: HOSPITAL | Age: 43
LOS: 8 days | Discharge: HOME HEALTH | End: 2023-10-13
Attending: SURGERY | Admitting: SURGERY
Payer: COMMERCIAL

## 2023-10-05 ENCOUNTER — TELEPHONE (OUTPATIENT)
Dept: OTHER | Facility: OTHER | Age: 43
End: 2023-10-05

## 2023-10-05 VITALS — WEIGHT: 240.2 LBS | TEMPERATURE: 96.6 F | HEIGHT: 69 IN | BODY MASS INDEX: 35.58 KG/M2

## 2023-10-05 DIAGNOSIS — T14.8XXA HEMATOMA: Primary | ICD-10-CM

## 2023-10-05 DIAGNOSIS — S30.1XXA HEMATOMA OF ABDOMINAL WALL, INITIAL ENCOUNTER: Primary | ICD-10-CM

## 2023-10-05 LAB
ANION GAP SERPL CALCULATED.3IONS-SCNC: 10 MMOL/L
BASOPHILS # BLD AUTO: 0.03 THOUSANDS/ÂΜL (ref 0–0.1)
BASOPHILS NFR BLD AUTO: 0 % (ref 0–1)
BUN SERPL-MCNC: 9 MG/DL (ref 5–25)
CALCIUM SERPL-MCNC: 8.8 MG/DL (ref 8.4–10.2)
CHLORIDE SERPL-SCNC: 106 MMOL/L (ref 96–108)
CO2 SERPL-SCNC: 22 MMOL/L (ref 21–32)
CREAT SERPL-MCNC: 0.59 MG/DL (ref 0.6–1.3)
EOSINOPHIL # BLD AUTO: 0.07 THOUSAND/ÂΜL (ref 0–0.61)
EOSINOPHIL NFR BLD AUTO: 1 % (ref 0–6)
ERYTHROCYTE [DISTWIDTH] IN BLOOD BY AUTOMATED COUNT: 13.6 % (ref 11.6–15.1)
GFR SERPL CREATININE-BSD FRML MDRD: 124 ML/MIN/1.73SQ M
GLUCOSE SERPL-MCNC: 89 MG/DL (ref 65–140)
HCT VFR BLD AUTO: 44.8 % (ref 36.5–49.3)
HGB BLD-MCNC: 14.4 G/DL (ref 12–17)
IMM GRANULOCYTES # BLD AUTO: 0.04 THOUSAND/UL (ref 0–0.2)
IMM GRANULOCYTES NFR BLD AUTO: 1 % (ref 0–2)
LYMPHOCYTES # BLD AUTO: 1.72 THOUSANDS/ÂΜL (ref 0.6–4.47)
LYMPHOCYTES NFR BLD AUTO: 20 % (ref 14–44)
MCH RBC QN AUTO: 30.4 PG (ref 26.8–34.3)
MCHC RBC AUTO-ENTMCNC: 32.1 G/DL (ref 31.4–37.4)
MCV RBC AUTO: 95 FL (ref 82–98)
MONOCYTES # BLD AUTO: 0.57 THOUSAND/ÂΜL (ref 0.17–1.22)
MONOCYTES NFR BLD AUTO: 7 % (ref 4–12)
NEUTROPHILS # BLD AUTO: 6.3 THOUSANDS/ÂΜL (ref 1.85–7.62)
NEUTS SEG NFR BLD AUTO: 71 % (ref 43–75)
NRBC BLD AUTO-RTO: 0 /100 WBCS
PLATELET # BLD AUTO: 343 THOUSANDS/UL (ref 149–390)
PMV BLD AUTO: 9.1 FL (ref 8.9–12.7)
POTASSIUM SERPL-SCNC: 4 MMOL/L (ref 3.5–5.3)
RBC # BLD AUTO: 4.74 MILLION/UL (ref 3.88–5.62)
SODIUM SERPL-SCNC: 138 MMOL/L (ref 135–147)
WBC # BLD AUTO: 8.73 THOUSAND/UL (ref 4.31–10.16)

## 2023-10-05 PROCEDURE — 80048 BASIC METABOLIC PNL TOTAL CA: CPT | Performed by: STUDENT IN AN ORGANIZED HEALTH CARE EDUCATION/TRAINING PROGRAM

## 2023-10-05 PROCEDURE — 85025 COMPLETE CBC W/AUTO DIFF WBC: CPT | Performed by: STUDENT IN AN ORGANIZED HEALTH CARE EDUCATION/TRAINING PROGRAM

## 2023-10-05 PROCEDURE — 99212 OFFICE O/P EST SF 10 MIN: CPT | Performed by: SURGERY

## 2023-10-05 RX ORDER — HEPARIN SODIUM 5000 [USP'U]/ML
5000 INJECTION, SOLUTION INTRAVENOUS; SUBCUTANEOUS EVERY 8 HOURS SCHEDULED
Status: DISCONTINUED | OUTPATIENT
Start: 2023-10-05 | End: 2023-10-13 | Stop reason: HOSPADM

## 2023-10-05 RX ORDER — ACETAMINOPHEN 325 MG/1
650 TABLET ORAL EVERY 6 HOURS PRN
Status: DISCONTINUED | OUTPATIENT
Start: 2023-10-05 | End: 2023-10-13

## 2023-10-05 RX ORDER — SODIUM CHLORIDE, SODIUM GLUCONATE, SODIUM ACETATE, POTASSIUM CHLORIDE, MAGNESIUM CHLORIDE, SODIUM PHOSPHATE, DIBASIC, AND POTASSIUM PHOSPHATE .53; .5; .37; .037; .03; .012; .00082 G/100ML; G/100ML; G/100ML; G/100ML; G/100ML; G/100ML; G/100ML
125 INJECTION, SOLUTION INTRAVENOUS CONTINUOUS
Status: DISCONTINUED | OUTPATIENT
Start: 2023-10-05 | End: 2023-10-06

## 2023-10-05 RX ORDER — ONDANSETRON 2 MG/ML
4 INJECTION INTRAMUSCULAR; INTRAVENOUS EVERY 6 HOURS PRN
Status: DISCONTINUED | OUTPATIENT
Start: 2023-10-05 | End: 2023-10-13 | Stop reason: HOSPADM

## 2023-10-05 RX ADMIN — ACETAMINOPHEN 650 MG: 325 TABLET ORAL at 23:25

## 2023-10-05 RX ADMIN — HEPARIN SODIUM 5000 UNITS: 5000 INJECTION INTRAVENOUS; SUBCUTANEOUS at 18:41

## 2023-10-05 RX ADMIN — SODIUM CHLORIDE, SODIUM GLUCONATE, SODIUM ACETATE, POTASSIUM CHLORIDE, MAGNESIUM CHLORIDE, SODIUM PHOSPHATE, DIBASIC, AND POTASSIUM PHOSPHATE 125 ML/HR: .53; .5; .37; .037; .03; .012; .00082 INJECTION, SOLUTION INTRAVENOUS at 18:22

## 2023-10-05 NOTE — PROGRESS NOTES
Patient is status post epigastric hernia repair with retrorectus biologic mesh surgery was performed on 20 September 2023. About 1 week postoperatively for ecchymosis around the incision this is most consistent with a hematoma. Since that time, patient has had some dark blood coming from the MARCELLO drain. There was approximately 110 cc in the last 24 hours. On exam, patient has likely a large hematoma that feels subcutaneous but could be coming through the fascial repair. Drain is currently clotted. I believe the best plan would be admission later on today and preop for wound exploration tomorrow. Patient is currently being evicted and has a court date this afternoon for an extension. He would like to continue with this court date prior to being admitted.

## 2023-10-05 NOTE — LETTER
[address] [place] [id number]  Dept: [phone]    October 13, 2023     Patient: Sma Rivero   YOB: 1980   Date of Visit: 10/5/2023       To Whom it May Concern:    Delisa Gonzales is under my professional care. He was seen in the hospital from 10/5/2023 to 10/13/23. He will require weekly visits for abdominal wound from visiting nurse and weekly appointments in the wound care center for estimated length of 6 weeks. If you have any questions or concerns, please don't hesitate to call.          Sincerely,          Germain Jaquez PA-C

## 2023-10-05 NOTE — LETTER
October 5, 2023     Patient: Yady Mckeon  YOB: 1980  Date of Visit: 10/5/2023      To Whom it May Concern:    Kimberly Call is under my professional care. Sherwin King was seen in my office on 10/5/2023. Sherwin King had a surgical procedure performed on 20 September 2023. He has a complication which will require surgery again tomorrow, 6 October 2023. He is unable to lift greater than 20 pounds or perform any strenuous exercise until 6 November 2023. If you have any questions or concerns, please don't hesitate to call.          Sincerely,          Corwin Coles MD        CC: No Recipients

## 2023-10-05 NOTE — LETTER
To: PAUL  From:  [place], [place] [phone]    Signed delivery form for [place] Tong    Thank you

## 2023-10-06 ENCOUNTER — ANESTHESIA EVENT (INPATIENT)
Dept: PERIOP | Facility: HOSPITAL | Age: 43
End: 2023-10-06
Payer: COMMERCIAL

## 2023-10-06 ENCOUNTER — ANESTHESIA (INPATIENT)
Dept: PERIOP | Facility: HOSPITAL | Age: 43
End: 2023-10-06
Payer: COMMERCIAL

## 2023-10-06 PROBLEM — T14.8XXA HEMATOMA: Status: ACTIVE | Noted: 2023-10-06

## 2023-10-06 LAB
ANION GAP SERPL CALCULATED.3IONS-SCNC: 9 MMOL/L
BASOPHILS # BLD AUTO: 0.04 THOUSANDS/ÂΜL (ref 0–0.1)
BASOPHILS NFR BLD AUTO: 0 % (ref 0–1)
BUN SERPL-MCNC: 8 MG/DL (ref 5–25)
CALCIUM SERPL-MCNC: 8.5 MG/DL (ref 8.4–10.2)
CHLORIDE SERPL-SCNC: 105 MMOL/L (ref 96–108)
CO2 SERPL-SCNC: 24 MMOL/L (ref 21–32)
CREAT SERPL-MCNC: 0.54 MG/DL (ref 0.6–1.3)
EOSINOPHIL # BLD AUTO: 0.09 THOUSAND/ÂΜL (ref 0–0.61)
EOSINOPHIL NFR BLD AUTO: 1 % (ref 0–6)
ERYTHROCYTE [DISTWIDTH] IN BLOOD BY AUTOMATED COUNT: 13.5 % (ref 11.6–15.1)
GFR SERPL CREATININE-BSD FRML MDRD: 129 ML/MIN/1.73SQ M
GLUCOSE SERPL-MCNC: 101 MG/DL (ref 65–140)
HCT VFR BLD AUTO: 40.5 % (ref 36.5–49.3)
HGB BLD-MCNC: 13.1 G/DL (ref 12–17)
IMM GRANULOCYTES # BLD AUTO: 0.04 THOUSAND/UL (ref 0–0.2)
IMM GRANULOCYTES NFR BLD AUTO: 0 % (ref 0–2)
LYMPHOCYTES # BLD AUTO: 1.87 THOUSANDS/ÂΜL (ref 0.6–4.47)
LYMPHOCYTES NFR BLD AUTO: 19 % (ref 14–44)
MAGNESIUM SERPL-MCNC: 1.9 MG/DL (ref 1.9–2.7)
MCH RBC QN AUTO: 29.8 PG (ref 26.8–34.3)
MCHC RBC AUTO-ENTMCNC: 32.3 G/DL (ref 31.4–37.4)
MCV RBC AUTO: 92 FL (ref 82–98)
MONOCYTES # BLD AUTO: 0.71 THOUSAND/ÂΜL (ref 0.17–1.22)
MONOCYTES NFR BLD AUTO: 7 % (ref 4–12)
NEUTROPHILS # BLD AUTO: 7.27 THOUSANDS/ÂΜL (ref 1.85–7.62)
NEUTS SEG NFR BLD AUTO: 73 % (ref 43–75)
NRBC BLD AUTO-RTO: 0 /100 WBCS
PHOSPHATE SERPL-MCNC: 3.4 MG/DL (ref 2.7–4.5)
PLATELET # BLD AUTO: 365 THOUSANDS/UL (ref 149–390)
PMV BLD AUTO: 8.7 FL (ref 8.9–12.7)
POTASSIUM SERPL-SCNC: 3.8 MMOL/L (ref 3.5–5.3)
RBC # BLD AUTO: 4.4 MILLION/UL (ref 3.88–5.62)
SODIUM SERPL-SCNC: 138 MMOL/L (ref 135–147)
WBC # BLD AUTO: 10.02 THOUSAND/UL (ref 4.31–10.16)

## 2023-10-06 PROCEDURE — 84100 ASSAY OF PHOSPHORUS: CPT | Performed by: STUDENT IN AN ORGANIZED HEALTH CARE EDUCATION/TRAINING PROGRAM

## 2023-10-06 PROCEDURE — 83735 ASSAY OF MAGNESIUM: CPT | Performed by: STUDENT IN AN ORGANIZED HEALTH CARE EDUCATION/TRAINING PROGRAM

## 2023-10-06 PROCEDURE — 0JC80ZZ EXTIRPATION OF MATTER FROM ABDOMEN SUBCUTANEOUS TISSUE AND FASCIA, OPEN APPROACH: ICD-10-PCS | Performed by: SURGERY

## 2023-10-06 PROCEDURE — 11045 DBRDMT SUBQ TISS EACH ADDL: CPT | Performed by: SURGERY

## 2023-10-06 PROCEDURE — 80048 BASIC METABOLIC PNL TOTAL CA: CPT | Performed by: STUDENT IN AN ORGANIZED HEALTH CARE EDUCATION/TRAINING PROGRAM

## 2023-10-06 PROCEDURE — 0JB80ZZ EXCISION OF ABDOMEN SUBCUTANEOUS TISSUE AND FASCIA, OPEN APPROACH: ICD-10-PCS | Performed by: SURGERY

## 2023-10-06 PROCEDURE — 85025 COMPLETE CBC W/AUTO DIFF WBC: CPT | Performed by: STUDENT IN AN ORGANIZED HEALTH CARE EDUCATION/TRAINING PROGRAM

## 2023-10-06 PROCEDURE — 11042 DBRDMT SUBQ TIS 1ST 20SQCM/<: CPT | Performed by: SURGERY

## 2023-10-06 PROCEDURE — F08L5BZ WOUND MANAGEMENT TREATMENT OF MUSCULOSKELETAL SYSTEM - LOWER BACK / LOWER EXTREMITY USING PHYSICAL AGENTS: ICD-10-PCS | Performed by: SURGERY

## 2023-10-06 PROCEDURE — 99024 POSTOP FOLLOW-UP VISIT: CPT | Performed by: SURGERY

## 2023-10-06 PROCEDURE — 10140 I&D HMTMA SEROMA/FLUID COLLJ: CPT | Performed by: SURGERY

## 2023-10-06 PROCEDURE — 97606 NEG PRS WND THER DME>50 SQCM: CPT | Performed by: SURGERY

## 2023-10-06 RX ORDER — MIDAZOLAM HYDROCHLORIDE 2 MG/2ML
INJECTION, SOLUTION INTRAMUSCULAR; INTRAVENOUS AS NEEDED
Status: DISCONTINUED | OUTPATIENT
Start: 2023-10-06 | End: 2023-10-06

## 2023-10-06 RX ORDER — GLYCOPYRROLATE 0.2 MG/ML
INJECTION INTRAMUSCULAR; INTRAVENOUS AS NEEDED
Status: DISCONTINUED | OUTPATIENT
Start: 2023-10-06 | End: 2023-10-06

## 2023-10-06 RX ORDER — KETAMINE HCL IN NACL, ISO-OSM 100MG/10ML
SYRINGE (ML) INJECTION AS NEEDED
Status: DISCONTINUED | OUTPATIENT
Start: 2023-10-06 | End: 2023-10-06

## 2023-10-06 RX ORDER — ONDANSETRON 2 MG/ML
INJECTION INTRAMUSCULAR; INTRAVENOUS AS NEEDED
Status: DISCONTINUED | OUTPATIENT
Start: 2023-10-06 | End: 2023-10-06

## 2023-10-06 RX ORDER — SODIUM CHLORIDE, SODIUM LACTATE, POTASSIUM CHLORIDE, CALCIUM CHLORIDE 600; 310; 30; 20 MG/100ML; MG/100ML; MG/100ML; MG/100ML
INJECTION, SOLUTION INTRAVENOUS CONTINUOUS PRN
Status: DISCONTINUED | OUTPATIENT
Start: 2023-10-06 | End: 2023-10-06

## 2023-10-06 RX ORDER — HYDROMORPHONE HCL/PF 1 MG/ML
SYRINGE (ML) INJECTION AS NEEDED
Status: DISCONTINUED | OUTPATIENT
Start: 2023-10-06 | End: 2023-10-06

## 2023-10-06 RX ORDER — FENTANYL CITRATE 50 UG/ML
INJECTION, SOLUTION INTRAMUSCULAR; INTRAVENOUS AS NEEDED
Status: DISCONTINUED | OUTPATIENT
Start: 2023-10-06 | End: 2023-10-06

## 2023-10-06 RX ORDER — CEFAZOLIN SODIUM 2 G/50ML
2000 SOLUTION INTRAVENOUS ONCE
Status: COMPLETED | OUTPATIENT
Start: 2023-10-06 | End: 2023-10-06

## 2023-10-06 RX ORDER — MAGNESIUM HYDROXIDE 1200 MG/15ML
LIQUID ORAL AS NEEDED
Status: DISCONTINUED | OUTPATIENT
Start: 2023-10-06 | End: 2023-10-06 | Stop reason: HOSPADM

## 2023-10-06 RX ORDER — PROPOFOL 10 MG/ML
INJECTION, EMULSION INTRAVENOUS AS NEEDED
Status: DISCONTINUED | OUTPATIENT
Start: 2023-10-06 | End: 2023-10-06

## 2023-10-06 RX ORDER — SODIUM CHLORIDE, SODIUM LACTATE, POTASSIUM CHLORIDE, CALCIUM CHLORIDE 600; 310; 30; 20 MG/100ML; MG/100ML; MG/100ML; MG/100ML
75 INJECTION, SOLUTION INTRAVENOUS CONTINUOUS
Status: DISCONTINUED | OUTPATIENT
Start: 2023-10-06 | End: 2023-10-07

## 2023-10-06 RX ORDER — LIDOCAINE HYDROCHLORIDE 10 MG/ML
INJECTION, SOLUTION EPIDURAL; INFILTRATION; INTRACAUDAL; PERINEURAL AS NEEDED
Status: DISCONTINUED | OUTPATIENT
Start: 2023-10-06 | End: 2023-10-06

## 2023-10-06 RX ORDER — DEXAMETHASONE SODIUM PHOSPHATE 10 MG/ML
INJECTION, SOLUTION INTRAMUSCULAR; INTRAVENOUS AS NEEDED
Status: DISCONTINUED | OUTPATIENT
Start: 2023-10-06 | End: 2023-10-06

## 2023-10-06 RX ADMIN — SODIUM CHLORIDE, SODIUM LACTATE, POTASSIUM CHLORIDE, AND CALCIUM CHLORIDE: .6; .31; .03; .02 INJECTION, SOLUTION INTRAVENOUS at 12:19

## 2023-10-06 RX ADMIN — FENTANYL CITRATE 50 MCG: 50 INJECTION, SOLUTION INTRAMUSCULAR; INTRAVENOUS at 12:33

## 2023-10-06 RX ADMIN — ACETAMINOPHEN 650 MG: 325 TABLET ORAL at 18:02

## 2023-10-06 RX ADMIN — Medication 20 MG: at 12:37

## 2023-10-06 RX ADMIN — GLYCOPYRROLATE 0.1 MG: 0.2 INJECTION, SOLUTION INTRAMUSCULAR; INTRAVENOUS at 12:35

## 2023-10-06 RX ADMIN — HYDROMORPHONE HYDROCHLORIDE 1 MG: 1 INJECTION, SOLUTION INTRAMUSCULAR; INTRAVENOUS; SUBCUTANEOUS at 12:37

## 2023-10-06 RX ADMIN — MIDAZOLAM 2 MG: 1 INJECTION INTRAMUSCULAR; INTRAVENOUS at 12:22

## 2023-10-06 RX ADMIN — SODIUM CHLORIDE, SODIUM GLUCONATE, SODIUM ACETATE, POTASSIUM CHLORIDE, MAGNESIUM CHLORIDE, SODIUM PHOSPHATE, DIBASIC, AND POTASSIUM PHOSPHATE 125 ML/HR: .53; .5; .37; .037; .03; .012; .00082 INJECTION, SOLUTION INTRAVENOUS at 02:06

## 2023-10-06 RX ADMIN — SODIUM CHLORIDE, SODIUM LACTATE, POTASSIUM CHLORIDE, AND CALCIUM CHLORIDE 125 ML/HR: .6; .31; .03; .02 INJECTION, SOLUTION INTRAVENOUS at 16:55

## 2023-10-06 RX ADMIN — DEXAMETHASONE SODIUM PHOSPHATE 10 MG: 10 INJECTION, SOLUTION INTRAMUSCULAR; INTRAVENOUS at 12:44

## 2023-10-06 RX ADMIN — PROPOFOL 200 MG: 10 INJECTION, EMULSION INTRAVENOUS at 12:28

## 2023-10-06 RX ADMIN — Medication 30 MG: at 12:42

## 2023-10-06 RX ADMIN — LIDOCAINE HYDROCHLORIDE 50 MG: 10 INJECTION, SOLUTION EPIDURAL; INFILTRATION; INTRACAUDAL; PERINEURAL at 12:28

## 2023-10-06 RX ADMIN — FENTANYL CITRATE 50 MCG: 50 INJECTION, SOLUTION INTRAMUSCULAR; INTRAVENOUS at 12:28

## 2023-10-06 RX ADMIN — HEPARIN SODIUM 5000 UNITS: 5000 INJECTION INTRAVENOUS; SUBCUTANEOUS at 22:16

## 2023-10-06 RX ADMIN — CEFAZOLIN SODIUM 2000 MG: 2 SOLUTION INTRAVENOUS at 12:23

## 2023-10-06 RX ADMIN — ONDANSETRON 4 MG: 2 INJECTION INTRAMUSCULAR; INTRAVENOUS at 12:44

## 2023-10-06 NOTE — ANESTHESIA POSTPROCEDURE EVALUATION
Post-Op Assessment Note    CV Status:  Stable  Pain Score: 0    Pain management: adequate     Mental Status:  Sleepy and arousable   Hydration Status:  Stable   PONV Controlled:  Controlled   Airway Patency:  Patent and adequate      Post Op Vitals Reviewed: Yes      Staff: CRNA, Anesthesiologist         No notable events documented.     BP      Temp      Pulse    Resp      SpO2

## 2023-10-06 NOTE — TELEPHONE ENCOUNTER
Imtiaz Duffy from [address] called, requesting a call back from on call provider, regarding pt having a fever.  Provider paged via [facility]

## 2023-10-06 NOTE — ANESTHESIA PREPROCEDURE EVALUATION
Procedure:  DEBRIDEMENT WOUND ABDOMINAL ([address]) (Abdomen)    Relevant Problems   CARDIO   (+) Benign essential hypertension   (+) Migraine headache   (+) Mixed hyperlipidemia      ENDO   (+) Type 2 diabetes mellitus without complication, with long-term current use of insulin (HCC)      GI/HEPATIC   (+) GERD (gastroesophageal reflux disease)      NEURO/PSYCH   (+) Anxiety   (+) Migraine headache      PULMONARY   (+) MANUELA (obstructive sleep apnea)        Physical Exam    Airway    Mallampati score: II  TM Distance: >3 FB  Neck ROM: full     Dental   Comment: Very poor dentition,     Cardiovascular  Cardiovascular exam normal    Pulmonary  Pulmonary exam normal     Other Findings        Anesthesia Plan  ASA Score- 3     Anesthesia Type- general with ASA Monitors. Additional Monitors:   Airway Plan: ETT. Plan Factors-Exercise tolerance (METS): >4 METS. Chart reviewed. Imaging results reviewed. Existing labs reviewed. Patient summary reviewed. Patient is not a current smoker. Obstructive sleep apnea risk education given perioperatively. Induction- intravenous. Postoperative Plan- Plan for postoperative opioid use. Informed Consent- Anesthetic plan and risks discussed with patient. I personally reviewed this patient with the CRNA. Discussed and agreed on the Anesthesia Plan with the CRNA. Brett Chris

## 2023-10-06 NOTE — OP NOTE
OPERATIVE REPORT  PATIENT NAME: Boone Caicedo    :  1980  MRN: [id number]  Pt Location: [facility] OR ROOM 03    SURGERY DATE: 10/6/2023    Surgeon(s) and Role:     * Dalton Soler MD - Primary     * Luba Valiente DO - Assisting    Preop Diagnosis:  Hematoma [T14. 8XXA]    Post-Op Diagnosis Codes:     * Hematoma [T14. 8XXA]    Procedure(s):  EVACUATION OF HEMATOMA. VACUUM PLACEMENT VERAFLO WITH IRRIGATION SETTINGS. EXCISIONAL DEBRIDEMENT    Specimen(s):  * No specimens in log *    Estimated Blood Loss:   200 mL    Drains:  Closed/Suction Drain Right Abdomen Bulb 10 Fr. (Active)   Number of days: 16       Anesthesia Type:   General    Operative Indications:  Hematoma [T14. 8XXA]    Operative Findings:  14 cm x 7 cm x 8 cm cavity containing 1 L hematoma. No active bleeding identified. Hematoma evacuated, cavity washed out/debrided and wound vac placed including 3 black sponges. No signs of infection at time of surgery. Complications:   None    Procedure and Technique:  The patient was brought to the operating room and identified verbally and via wristband. He was transferred to the operating room table and positioned supine. General endotracheal anesthesia was induced successfully. The patient's abdomen was prepped and draped in the usual sterile fashion. Pre-operative antibiotics were administered. A time out was performed confirming the patient, procedure, and site. All parties were in agreement. Attention was turned to the abdomen where the prior midline incision was opened with a 15 blade scalpel. Approximately 1 L of clotted blood was encountered which was evacuated. No active bleeding was identified. The anterior fascial closure was intact. Necrotic areas of the subcutaneous tissue were sharply debrided with bovie electrocautery down to healthy bleeding tissue. Total wound measurements did not change post debridement.  Total wound measured 14 cm x 7 cm x 8 cm, with undermining inferiorly 7 cm, left laterally 5 cm and right laterally 3 cm. A wound vac was placed including 3 black sponges and set to 125 mmHg continuous. The patient was then allowed to awaken, extubated, and transferred to the PACU having tolerated the procedure well. All instrument, needle, and sponge counts were correct at the end of the case. Radiofrequency detection was negative.     Dr. Christian Cooks was present for the entire procedure      Patient Disposition:  PACU         SIGNATURE: Rigoberto Ramírez,   DATE: October 6, 2023  TIME: 1:33 PM

## 2023-10-06 NOTE — PLAN OF CARE
Problem: PAIN - ADULT  Goal: Verbalizes/displays adequate comfort level or baseline comfort level  Description: Interventions:  - Encourage patient to monitor pain and request assistance  - Assess pain using appropriate pain scale  - Administer analgesics based on type and severity of pain and evaluate response  - Implement non-pharmacological measures as appropriate and evaluate response  - Consider cultural and social influences on pain and pain management  - Notify physician/advanced practitioner if interventions unsuccessful or patient reports new pain  10/5/2023 2020 by Tony Lopez RN  Outcome: Progressing  10/5/2023 2019 by Tony Lopez RN  Outcome: Progressing     Problem: INFECTION - ADULT  Goal: Absence of fever/infection during neutropenic period  Description: INTERVENTIONS:  - Monitor WBC    10/5/2023 2020 by Tony Lopez RN  Outcome: Progressing  10/5/2023 2019 by Tony Lopez RN  Outcome: Progressing

## 2023-10-06 NOTE — H&P
H&P Exam - General Surgery   Saloni Welsh 44 y.o. male MRN: [id number]  Unit/Bed#: [address] [place] [address] Encounter: [id number]    Assessment/Plan     Assessment:  Status post epigastric hernia repair with retrorectus biologic mesh now superficial wound hematoma. No signs of infection      Plan:  Wound exploration and washout      HPI:  Saloni Welsh is a 44 y.o. male who is status post epigastric hernia repair with retrorectus biologic mesh surgery was performed on 20 September 2023. About 1 week postoperatively for ecchymosis around the incision this is most consistent with a hematoma. Since that time, patient has had some dark blood coming from the MARCELLO drain. There was approximately 110 cc in the last 24 hours. On exam, patient has likely a large hematoma that feels subcutaneous but could be coming through the fascial repair. Drain is currently clotted. Patient was admitted yesterday for planned wound exploration and washout today. Review of Systems   Constitutional: Negative for chills and fever. HENT: Negative. Respiratory: Negative. Cardiovascular: Negative. Gastrointestinal: Negative. Genitourinary: Negative. Musculoskeletal: Negative. Skin: Negative. Neurological: Negative. Hematological: Negative. All other systems reviewed and are negative. 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  fluctuations     Past Surgical History:   Procedure Laterality Date   • ABDOMINAL ADHESION SURGERY N/A 2022    Procedure: LYSIS ADHESIONS LAPAROSCOPIC;  Surgeon: Lacey Vargas MD;  Location: [facility];  Service: Bariatrics   • COLONOSCOPY N/A 2018    Procedure: COLONOSCOPY;  Surgeon: Jamaica Maxwell MD;  Location: [address] [place] [address] [facility]; Service: Gastroenterology   • ESOPHAGOGASTRODUODENOSCOPY N/A 2018    Procedure: ESOPHAGOGASTRODUODENOSCOPY (EGD); Surgeon: Jamaica Maxwell MD;  Location: [facility];   Service: Gastroenterology   • OMENTECTOMY N/A 2023    Procedure: PARTIAL OMENTECTOMY;  Surgeon: Fatuma Ellison MD;  Location: [facility];  Service: General   • CA LAPS GSTR RSTCV PX W/BYP SARI-EN-Y LIMB <150 CM N/A 2022    Procedure: LAPAROSCOPIC SARI-EN-Y GASTRIC BYPASS AND INTRAOPERATIVE EGD;  Surgeon: Lacey Vargas MD;  Location: [facility];  Service: Bariatrics   • CA RPR AA HERNIA 1ST < 3 CM REDUCIBLE N/A 2023    Procedure: REPAIR HERNIA EPIGASTRIC Repair rectus diastases, implantation of biologic mesh;  Surgeon: Fatuma Ellison MD;  Location: [facility];  Service: General   • TESTICLE SURGERY Left     infected testicle   • TOENAIL EXCISION Right 2021     Social History   Social History     Substance and Sexual Activity   Alcohol Use Yes    Comment: social on occasion     Social History     Substance and Sexual Activity   Drug Use No     Social History     Tobacco Use   Smoking Status Former   • Packs/day: 0.25   • Years: 10.00   • Total pack years: 2.50   • Types: Cigarettes   • Start date: 7/15/1999   • Quit date:    • Years since quittin.7   • Passive exposure: Never   Smokeless Tobacco Never     E-Cigarette/Vaping   • E-Cigarette Use Never User      E-Cigarette/Vaping Substances   • Nicotine No    • THC No    • CBD No    • Flavoring No    • Other No    • Unknown No      Family History: non-contributory    Meds/Allergies   PTA meds:   Prior to Admission Medications   Prescriptions Last Dose Informant Patient Reported? Taking? Calcium Carb-Cholecalciferol 500-10 MG-MCG CHEW 10/5/2023  Yes Yes   Sig: Chew 3 (three) times a day   Multiple Vitamins-Minerals (BARIATRIC MULTIVITAMINS/IRON PO) 10/5/2023 Self Yes Yes   Sig: Take by mouth   doxylamine (UNISOM) 25 MG tablet   No No   Sig: Take 1 tablet (25 mg total) by mouth daily at bedtime as needed for sleep   gabapentin (NEURONTIN) 400 mg capsule 10/5/2023  No Yes   Sig: Take 1 capsule (400 mg total) by mouth 3 (three) times a day   metoprolol succinate (TOPROL-XL) 25 mg 24 hr tablet 10/5/2023  No Yes   Sig: Take 1 tablet by mouth once daily      Facility-Administered Medications: None     Allergies   Allergen Reactions   • Bee Venom Anaphylaxis     Annotation - [id number]: wasp, hornet also   • Macrolides And Ketolides Anaphylaxis and Rash   • Other Hives, Shortness Of Breath and Wheezing     Annotation - [id number]:  violet   • Pertussis Vaccines Anaphylaxis and Rash   • Vancomycin Shortness Of Breath   • Zithromax [Azithromycin] Anaphylaxis   • Erythromycin Rash   • Pollen Extract Sneezing       Objective   First Vitals:   Blood Pressure: 131/82 (10/05/23 1830)  Pulse: (!) 117 (10/05/23 1830)  Temperature: (!) 101.2 °F (38.4 °C) (10/05/23 2225)  Respirations: 19 (10/05/23 2225)  SpO2: 96 % (10/05/23 1830)    Current Vitals:   Blood Pressure: 133/74 (10/06/23 0747)  Pulse: (!) 107 (10/06/23 0747)  Temperature: 99.4 °F (37.4 °C) (10/06/23 0747)  Respirations: 17 (10/06/23 0747)  SpO2: 94 % (10/06/23 0747)      Intake/Output Summary (Last 24 hours) at 10/6/2023 1134  Last data filed at 10/5/2023 1822  Gross per 24 hour   Intake --   Output 30 ml   Net -30 ml       Invasive Devices     Peripheral Intravenous Line  Duration           Peripheral IV 10/06/23 Distal;Dorsal (posterior); Left Forearm <1 day          Drain  Duration           Closed/Suction Drain RLQ 10 Fr. 16 days    Closed/Suction Drain Right Abdomen Bulb 10 Fr. 16 days                Physical Exam  Vitals reviewed. Constitutional:       General: He is not in acute distress. Appearance: He is obese. HENT:      Head: Normocephalic and atraumatic. Cardiovascular:      Rate and Rhythm: Normal rate and regular rhythm. Pulmonary:      Effort: Pulmonary effort is normal. No respiratory distress. Breath sounds: Normal breath sounds. Abdominal:      General: Abdomen is flat. There is no distension. Musculoskeletal:         General: Normal range of motion. Cervical back: Normal range of motion and neck supple. Lymphadenopathy:      Cervical: No cervical adenopathy. Skin:     General: Skin is warm and dry. Neurological:      General: No focal deficit present. Lab Results:   I have personally reviewed pertinent lab results. , CBC:   Lab Results   Component Value Date    WBC 10.02 10/06/2023    HGB 13.1 10/06/2023    HCT 40.5 10/06/2023    MCV 92 10/06/2023     10/06/2023    RBC 4.40 10/06/2023    MCH 29.8 10/06/2023    MCHC 32.3 10/06/2023    RDW 13.5 10/06/2023    MPV 8.7 (L) 10/06/2023    NRBC 0 10/06/2023   , CMP:   Lab Results   Component Value Date    SODIUM 138 10/06/2023    K 3.8 10/06/2023     10/06/2023    CO2 24 10/06/2023    BUN 8 10/06/2023    CREATININE 0.54 (L) 10/06/2023    CALCIUM 8.5 10/06/2023    EGFR 129 10/06/2023     Imaging: I have personally reviewed pertinent reports. and I have personally reviewed pertinent films in PACS  EKG, Pathology, and Other Studies: I have personally reviewed pertinent reports. and I have personally reviewed pertinent films in PACS    Code Status: Level 1 - Full Code    Counseling / Coordination of Care  Total floor / unit time spent today 60 minutes. Greater than 50% of total time was spent with the patient and / or family counseling and / or coordination of care.   A description of the counseling / coordination of care: I personally performed the history, 14 point review of systems, and physical exam.  I reviewed his surgical history and explained the issue at length with the patient. I facilitated admission to the surgery service and personally communicated with the anesthesiologist, Dr. Moshe Pickens, and then entire OR team to facilitate his surgery.

## 2023-10-06 NOTE — UTILIZATION REVIEW
Initial Clinical Review    Admission: Date/Time/Statement:   Admission Orders (From admission, onward)     Ordered        10/05/23 1624  Inpatient Admission  Once                      Orders Placed This Encounter   Procedures   • Inpatient Admission     Standing Status:   Standing     Number of Occurrences:   1     Order Specific Question:   Level of Care     Answer:   Med Surg [16]     Order Specific Question:   Estimated length of stay     Answer:   More than 2 Midnights     Order Specific Question:   Certification     Answer:   I certify that inpatient services are medically necessary for this patient for a duration of greater than two midnights. See H&P and MD Progress Notes for additional information about the patient's course of treatment. Initial Presentation:   44 yom admitted from surgeon's office. Pt s/p epigastric hernia repair with retrorectus biologic mesh surgery was performed on 20 September 2023. About 1 week postoperatively for ecchymosis around the incision this is most consistent with a hematoma. Since that time, patient has had some dark blood coming from the MARCELLO drain. There was approximately 110 cc in the last 24 hours. On exam, patient has likely a large hematoma that feels subcutaneous but could be coming through the fascial repair. Drain is currently clotted. Admitted to inpatient status for post-op abd hematoma    Date: 10/6/23   Day 2:   Tmax 101.2  S/p epigastric hernia repair with retrorectus biologic mesh now superficial wound hematoma. No signs of infection  Plan:  Wound exploration and washout. IVF maintained. To OR for: EVACUATION OF HEMATOMA. VACUUM PLACEMENT VERAFLO WITH IRRIGATION SETTINGS.  EXCISIONAL DEBRIDEMENT    ED Triage Vitals   Temperature Pulse Respirations Blood Pressure SpO2   10/05/23 2225 10/05/23 1830 10/05/23 2225 10/05/23 1830 10/05/23 1830   (!) 101.2 °F (38.4 °C) (!) 117 19 131/82 96 %      Temp src Heart Rate Source Patient Position - Orthostatic VS BP Location FiO2 (%)   -- -- -- -- --             Pain Score       10/05/23 1711       No Pain          Wt Readings from Last 1 Encounters:   10/05/23 109 kg (240 lb 3.2 oz)     Additional Vital Signs:   Date/Time Temp Pulse Resp BP MAP (mmHg) SpO2   10/06/23 07:47:19 99.4 °F (37.4 °C) 107 Abnormal  17 133/74 94 94 %   10/06/23 00:24:14 99.1 °F (37.3 °C) 111 Abnormal  19 -- -- 95 %   10/05/23 22:25:06 101.2 °F (38.4 °C) Abnormal  122 Abnormal  19 124/81 95 95 %   10/05/23 1830 -- 117 Abnormal  -- 131/82 98 96 %     Pertinent Labs/Diagnostic Test Results:     Results from last 7 days   Lab Units 10/06/23  0621 10/05/23  1932   WBC Thousand/uL 10.02 8.73   HEMOGLOBIN g/dL 13.1 14.4   HEMATOCRIT % 40.5 44.8   PLATELETS Thousands/uL 365 343   NEUTROS ABS Thousands/µL 7.27 6.30     Results from last 7 days   Lab Units 10/06/23  0621 10/05/23  1932   SODIUM mmol/L 138 138   POTASSIUM mmol/L 3.8 4.0   CHLORIDE mmol/L 105 106   CO2 mmol/L 24 22   ANION GAP mmol/L 9 10   BUN mg/dL 8 9   CREATININE mg/dL 0.54* 0.59*   EGFR ml/min/1.73sq m 129 124   CALCIUM mg/dL 8.5 8.8   MAGNESIUM mg/dL 1.9  --    PHOSPHORUS mg/dL 3.4  --      Results from last 7 days   Lab Units 10/06/23  0621 10/05/23  1932   GLUCOSE RANDOM mg/dL 101 89     Past Medical History:   Diagnosis Date   • Abdominal wall hernia    • Bell's palsy     2 bouts - idiopathic, possible stress induced   • CPAP (continuous positive airway pressure) dependence    • Depression    • Diabetes mellitus ([address]) 03/25/21    Blood work   • GERD (gastroesophageal reflux disease)    • Hypertension    • Irregular heart beat    • Morbid obesity with BMI of 60.0-69.9, adult ([address])    • Neurocardiogenic syncope    • Sleep apnea    • 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  fluctuations     Admitting Diagnosis: Hematoma of abdominal wall, initial encounter  Age/Sex: 44 y.o. male  Admission Orders:  scd     Scheduled Medications:  heparin (porcine), 5,000 Units, Subcutaneous, Q8H [facility]  influenza vaccine, 0.5 mL, Intramuscular, Once    Continuous IV Infusions:  multi-electrolyte, 125 mL/hr, Intravenous, Continuous    PRN Meds:  acetaminophen, 650 mg, Oral, Q6H PRN  ondansetron, 4 mg, Intravenous, Q6H PRN    Network Utilization Review Department  ATTENTION: Please call with any questions or concerns to [phone] and carefully listen to the prompts so that you are directed to the right person. All voicemails are confidential.   For Discharge needs, contact Care Management DC Support Team at [phone] opt. 2  Send all requests for admission clinical reviews, approved or denied determinations and any other requests to dedicated fax number below belonging to the campus where the patient is receiving treatment.  List of dedicated fax numbers for the Facilities:  [place] DENIALS (Administrative/Medical Necessity) [phone]   DISCHARGE SUPPORT TEAM (NETWORK) [address] [facility] [facility] (Maternity/NICU/Pediatrics) [phone]   [address] [place] [address] Drive [address] [facility] [phone]   [address] [facility] [address] [address] [facility] [address] [phone]

## 2023-10-07 LAB
GLUCOSE SERPL-MCNC: 109 MG/DL (ref 65–140)
GLUCOSE SERPL-MCNC: 142 MG/DL (ref 65–140)
GLUCOSE SERPL-MCNC: 96 MG/DL (ref 65–140)

## 2023-10-07 PROCEDURE — 99024 POSTOP FOLLOW-UP VISIT: CPT | Performed by: SURGERY

## 2023-10-07 PROCEDURE — 82948 REAGENT STRIP/BLOOD GLUCOSE: CPT

## 2023-10-07 RX ORDER — OXYCODONE HYDROCHLORIDE AND ACETAMINOPHEN 5; 325 MG/1; MG/1
1 TABLET ORAL EVERY 4 HOURS PRN
Status: DISCONTINUED | OUTPATIENT
Start: 2023-10-07 | End: 2023-10-13

## 2023-10-07 RX ORDER — HYDROMORPHONE HCL/PF 1 MG/ML
0.5 SYRINGE (ML) INJECTION
Status: DISCONTINUED | OUTPATIENT
Start: 2023-10-07 | End: 2023-10-13

## 2023-10-07 RX ADMIN — HEPARIN SODIUM 5000 UNITS: 5000 INJECTION INTRAVENOUS; SUBCUTANEOUS at 21:14

## 2023-10-07 RX ADMIN — ACETAMINOPHEN 650 MG: 325 TABLET ORAL at 06:28

## 2023-10-07 RX ADMIN — HEPARIN SODIUM 5000 UNITS: 5000 INJECTION INTRAVENOUS; SUBCUTANEOUS at 06:28

## 2023-10-07 RX ADMIN — ACETAMINOPHEN 650 MG: 325 TABLET ORAL at 00:11

## 2023-10-07 RX ADMIN — HEPARIN SODIUM 5000 UNITS: 5000 INJECTION INTRAVENOUS; SUBCUTANEOUS at 13:50

## 2023-10-07 RX ADMIN — SODIUM CHLORIDE, SODIUM LACTATE, POTASSIUM CHLORIDE, AND CALCIUM CHLORIDE 75 ML/HR: .6; .31; .03; .02 INJECTION, SOLUTION INTRAVENOUS at 04:00

## 2023-10-07 NOTE — PLAN OF CARE
Problem: PAIN - ADULT  Goal: Verbalizes/displays adequate comfort level or baseline comfort level  Description: Interventions:  - Encourage patient to monitor pain and request assistance  - Assess pain using appropriate pain scale  - Administer analgesics based on type and severity of pain and evaluate response  - Implement non-pharmacological measures as appropriate and evaluate response  - Consider cultural and social influences on pain and pain management  - Notify physician/advanced practitioner if interventions unsuccessful or patient reports new pain  Outcome: Progressing     Problem: INFECTION - ADULT  Goal: Absence or prevention of progression during hospitalization  Description: INTERVENTIONS:  - Assess and monitor for signs and symptoms of infection  - Monitor lab/diagnostic results  - Monitor all insertion sites, i.e. indwelling lines, tubes, and drains  - Monitor endotracheal if appropriate and nasal secretions for changes in amount and color  - [place] appropriate cooling/warming therapies per order  - Administer medications as ordered  - Instruct and encourage patient and family to use good hand hygiene technique  - Identify and instruct in appropriate isolation precautions for identified infection/condition  Outcome: Progressing     Problem: SAFETY ADULT  Goal: Patient will remain free of falls  Description: INTERVENTIONS:  - Educate patient/family on patient safety including physical limitations  - Instruct patient to call for assistance with activity   - Consult OT/PT to assist with strengthening/mobility   - Keep Call bell within reach  - Keep bed low and locked with side rails adjusted as appropriate  - Keep care items and personal belongings within reach  - Initiate and maintain comfort rounds  - Make Fall Risk Sign visible to staff  - Offer Toileting every 2 Hours, in advance of need  - Initiate/Maintain bed alarm  - Apply yellow socks and bracelet for high fall risk patients  - Consider moving patient to room near nurses station  Outcome: Progressing  Goal: Maintain or return to baseline ADL function  Description: INTERVENTIONS:  -  Assess patient's ability to carry out ADLs; assess patient's baseline for ADL function and identify physical deficits which impact ability to perform ADLs (bathing, care of mouth/teeth, toileting, grooming, dressing, etc.)  - Assess/evaluate cause of self-care deficits   - Assess range of motion  - Assess patient's mobility; develop plan if impaired  - Assess patient's need for assistive devices and provide as appropriate  - Encourage maximum independence but intervene and supervise when necessary  - Involve family in performance of ADLs  - Assess for home care needs following discharge   - Consider OT consult to assist with ADL evaluation and planning for discharge  - Provide patient education as appropriate  Outcome: Progressing  Goal: Maintains/Returns to pre admission functional level  Description: INTERVENTIONS:  - Perform BMAT or MOVE assessment daily.   - Set and communicate daily mobility goal to care team and patient/family/caregiver. - Collaborate with rehabilitation services on mobility goals if consulted  - Perform Range of Motion 2 times a day. - Reposition patient every 2 hours.   - Dangle patient 2 times a day  - Stand patient 2 times a day  - Ambulate patient 2 times a day  - Out of bed to chair 2 times a day   - Out of bed for meals 2 times a day  - Out of bed for toileting  - Record patient progress and toleration of activity level   Outcome: Progressing     Problem: DISCHARGE PLANNING  Goal: Discharge to home or other facility with appropriate resources  Description: INTERVENTIONS:  - Identify barriers to discharge w/patient and caregiver  - Arrange for needed discharge resources and transportation as appropriate  - Identify discharge learning needs (meds, wound care, etc.)  - Arrange for interpretive services to assist at discharge as needed  - Refer to Case Management Department for coordinating discharge planning if the patient needs post-hospital services based on physician/advanced practitioner order or complex needs related to functional status, cognitive ability, or social support system  Outcome: Progressing

## 2023-10-07 NOTE — PROGRESS NOTES
Progress Note - General Surgery   Saloni Welsh 44 y.o. male MRN: [id number]  Unit/Bed#: [address] [place] [address] [address] Encounter: [id number]    Assessment/Plan    43 y/o male s/p elective epigastric hernia repair on 9/20, now p/w abdominal wall hematoma, POD#1 s/p hematoma evacuation, washout, VAC placement  Pt doing well this morning  VAC (Veraflo) functioning with no leaks  AFVSS, mild tachycardia noted  No new labs today  UO 1.9L  VAC 450cc SS output    Diabetic diet  D/C IVF  Encourage ambulation  Anticipate return to OR on Tuesday or Wednesday of next week for washout and possible closure of abdomen - this was d/w patient  Pt seen and examined with attending      /80   Pulse (!) 118   Temp 98.1 °F (36.7 °C)   Resp 16   SpO2 96%     Labs in chart were reviewed. Results from last 7 days   Lab Units 10/06/23  0621   WBC Thousand/uL 10.02   HEMOGLOBIN g/dL 13.1   HEMATOCRIT % 40.5   PLATELETS Thousands/uL 365     Results from last 7 days   Lab Units 10/06/23  0621   POTASSIUM mmol/L 3.8   CHLORIDE mmol/L 105   CO2 mmol/L 24   BUN mg/dL 8   CREATININE mg/dL 0.54*           Intake/Output Summary (Last 24 hours) at 10/7/2023 0916  Last data filed at 10/7/2023 0171  Gross per 24 hour   Intake 3624.09 ml   Output 2645 ml   Net 979.09 ml           Subjective/Objective     Subjective: Pt seen and examined. No acute overnight events. Pt feeling well, no complaints. VAC functioning. Review of Systems   Constitutional: Negative for chills and fever. Respiratory: Negative for shortness of breath. Cardiovascular: Negative for chest pain. Gastrointestinal: Negative for abdominal pain, nausea and vomiting. Genitourinary: Negative for difficulty urinating. Skin: Positive for wound. Objective:     Physical Exam  Vitals and nursing note reviewed. Constitutional:       General: He is not in acute distress. Appearance: He is not toxic-appearing. HENT:      Head: Normocephalic and atraumatic.    Eyes: Extraocular Movements: Extraocular movements intact. Pulmonary:      Effort: Pulmonary effort is normal. No respiratory distress. Abdominal:      Comments: VAC in place midline functioning correctly with no leaks, Veraflo   Musculoskeletal:         General: Normal range of motion. Cervical back: Normal range of motion. Skin:     General: Skin is warm and dry. Neurological:      Mental Status: He is alert and oriented to person, place, and time. Psychiatric:         Mood and Affect: Mood normal.         Behavior: Behavior normal.         Thought Content:  Thought content normal.            Alize Garcia PA-C  10/7/2023

## 2023-10-08 LAB
GLUCOSE SERPL-MCNC: 83 MG/DL (ref 65–140)
GLUCOSE SERPL-MCNC: 84 MG/DL (ref 65–140)
GLUCOSE SERPL-MCNC: 84 MG/DL (ref 65–140)
GLUCOSE SERPL-MCNC: 85 MG/DL (ref 65–140)

## 2023-10-08 PROCEDURE — 82948 REAGENT STRIP/BLOOD GLUCOSE: CPT

## 2023-10-08 PROCEDURE — 99024 POSTOP FOLLOW-UP VISIT: CPT | Performed by: SURGERY

## 2023-10-08 RX ADMIN — HEPARIN SODIUM 5000 UNITS: 5000 INJECTION INTRAVENOUS; SUBCUTANEOUS at 14:11

## 2023-10-08 RX ADMIN — OXYCODONE HYDROCHLORIDE AND ACETAMINOPHEN 1 TABLET: 5; 325 TABLET ORAL at 00:20

## 2023-10-08 RX ADMIN — OXYCODONE HYDROCHLORIDE AND ACETAMINOPHEN 1 TABLET: 5; 325 TABLET ORAL at 23:50

## 2023-10-08 RX ADMIN — HEPARIN SODIUM 5000 UNITS: 5000 INJECTION INTRAVENOUS; SUBCUTANEOUS at 21:01

## 2023-10-08 RX ADMIN — ACETAMINOPHEN 650 MG: 325 TABLET ORAL at 19:48

## 2023-10-08 RX ADMIN — HEPARIN SODIUM 5000 UNITS: 5000 INJECTION INTRAVENOUS; SUBCUTANEOUS at 05:46

## 2023-10-08 NOTE — PROGRESS NOTES
Progress Note - General Surgery   Gordon Batters 44 y.o. male MRN: [id number]  Unit/Bed#: [address] [place] [address] Encounter: [id number]    Assessment/Plan    45 y/o male s/p elective epigastric hernia repair on 9/20, now p/w abdominal wall hematoma, POD#2 s/p hematoma evacuation, washout, VAC placement  Pt doing well, no complaints  VAC functioning with no leaks; canister was full (500cc SS) therefore was replaced this morning and therapy resumed  AFVSS, pt continues with mild tachycardia  No new labs  UO 900cc  VAC 950cc    Labs tomorrow  Continue VAC Veraflo therapy  Plan for OR Tues/Wed for washout/possible closure of abdomen  Ambulation encouraged      /79 (BP Location: Right arm)   Pulse 101   Temp 99 °F (37.2 °C) (Oral)   Resp 20   SpO2 99%     Labs in chart were reviewed. Intake/Output Summary (Last 24 hours) at 10/8/2023 0831  Last data filed at 10/8/2023 0615  Gross per 24 hour   Intake 225 ml   Output 1350 ml   Net -1125 ml           Subjective/Objective     Subjective: Pt seen and examined. No acute overnight events. Pt has no complaints. Has questions about paperwork for his wife as she has missed work for childcare due to his hospitalization. Review of Systems   Constitutional: Negative for chills and fever. Respiratory: Negative for shortness of breath. Cardiovascular: Negative for chest pain. Gastrointestinal: Negative for abdominal pain, nausea and vomiting. Genitourinary: Negative for difficulty urinating. Skin: Positive for wound (Surgical, VAC). Objective:     Physical Exam  Vitals and nursing note reviewed. Constitutional:       General: He is not in acute distress. Appearance: He is not toxic-appearing. HENT:      Head: Normocephalic and atraumatic. Eyes:      Extraocular Movements: Extraocular movements intact. Pulmonary:      Effort: Pulmonary effort is normal. No respiratory distress. Abdominal:      General: There is no distension. Palpations: Abdomen is soft. Tenderness: There is no abdominal tenderness. There is no guarding. Comments: VAC in place, functioning with no leaks   Musculoskeletal:         General: Normal range of motion. Cervical back: Normal range of motion. Skin:     General: Skin is warm and dry. Neurological:      Mental Status: He is alert and oriented to person, place, and time. Psychiatric:         Mood and Affect: Mood normal.         Behavior: Behavior normal.         Thought Content:  Thought content normal.            Nathen Otoole PA-C  10/8/2023

## 2023-10-08 NOTE — PLAN OF CARE
Problem: PAIN - ADULT  Goal: Verbalizes/displays adequate comfort level or baseline comfort level  Description: Interventions:  - Encourage patient to monitor pain and request assistance  - Assess pain using appropriate pain scale  - Administer analgesics based on type and severity of pain and evaluate response  - Implement non-pharmacological measures as appropriate and evaluate response  - Consider cultural and social influences on pain and pain management  - Notify physician/advanced practitioner if interventions unsuccessful or patient reports new pain  Outcome: Progressing     Problem: INFECTION - ADULT  Goal: Absence or prevention of progression during hospitalization  Description: INTERVENTIONS:  - Assess and monitor for signs and symptoms of infection  - Monitor lab/diagnostic results  - Monitor all insertion sites, i.e. indwelling lines, tubes, and drains  - Monitor endotracheal if appropriate and nasal secretions for changes in amount and color  - [place] appropriate cooling/warming therapies per order  - Administer medications as ordered  - Instruct and encourage patient and family to use good hand hygiene technique  - Identify and instruct in appropriate isolation precautions for identified infection/condition  Outcome: Progressing     Problem: DISCHARGE PLANNING  Goal: Discharge to home or other facility with appropriate resources  Description: INTERVENTIONS:  - Identify barriers to discharge w/patient and caregiver  - Arrange for needed discharge resources and transportation as appropriate  - Identify discharge learning needs (meds, wound care, etc.)  - Arrange for interpretive services to assist at discharge as needed  - Refer to Case Management Department for coordinating discharge planning if the patient needs post-hospital services based on physician/advanced practitioner order or complex needs related to functional status, cognitive ability, or social support system  Outcome: Progressing Problem: Knowledge Deficit  Goal: Patient/family/caregiver demonstrates understanding of disease process, treatment plan, medications, and discharge instructions  Description: Complete learning assessment and assess knowledge base.   Interventions:  - Provide teaching at level of understanding  - Provide teaching via preferred learning methods  Outcome: Progressing

## 2023-10-09 LAB
ANION GAP SERPL CALCULATED.3IONS-SCNC: 11 MMOL/L
BASOPHILS # BLD AUTO: 0.04 THOUSANDS/ÂΜL (ref 0–0.1)
BASOPHILS NFR BLD AUTO: 1 % (ref 0–1)
BUN SERPL-MCNC: 10 MG/DL (ref 5–25)
CALCIUM SERPL-MCNC: 9.8 MG/DL (ref 8.4–10.2)
CHLORIDE SERPL-SCNC: 102 MMOL/L (ref 96–108)
CO2 SERPL-SCNC: 24 MMOL/L (ref 21–32)
CREAT SERPL-MCNC: 0.71 MG/DL (ref 0.6–1.3)
EOSINOPHIL # BLD AUTO: 0.16 THOUSAND/ÂΜL (ref 0–0.61)
EOSINOPHIL NFR BLD AUTO: 2 % (ref 0–6)
ERYTHROCYTE [DISTWIDTH] IN BLOOD BY AUTOMATED COUNT: 13.2 % (ref 11.6–15.1)
GFR SERPL CREATININE-BSD FRML MDRD: 115 ML/MIN/1.73SQ M
GLUCOSE SERPL-MCNC: 91 MG/DL (ref 65–140)
HCT VFR BLD AUTO: 43 % (ref 36.5–49.3)
HGB BLD-MCNC: 14.1 G/DL (ref 12–17)
IMM GRANULOCYTES # BLD AUTO: 0.04 THOUSAND/UL (ref 0–0.2)
IMM GRANULOCYTES NFR BLD AUTO: 1 % (ref 0–2)
LYMPHOCYTES # BLD AUTO: 2.24 THOUSANDS/ÂΜL (ref 0.6–4.47)
LYMPHOCYTES NFR BLD AUTO: 30 % (ref 14–44)
MCH RBC QN AUTO: 29.9 PG (ref 26.8–34.3)
MCHC RBC AUTO-ENTMCNC: 32.8 G/DL (ref 31.4–37.4)
MCV RBC AUTO: 91 FL (ref 82–98)
MONOCYTES # BLD AUTO: 0.5 THOUSAND/ÂΜL (ref 0.17–1.22)
MONOCYTES NFR BLD AUTO: 7 % (ref 4–12)
NEUTROPHILS # BLD AUTO: 4.53 THOUSANDS/ÂΜL (ref 1.85–7.62)
NEUTS SEG NFR BLD AUTO: 59 % (ref 43–75)
NRBC BLD AUTO-RTO: 0 /100 WBCS
PLATELET # BLD AUTO: 440 THOUSANDS/UL (ref 149–390)
PMV BLD AUTO: 8.7 FL (ref 8.9–12.7)
POTASSIUM SERPL-SCNC: 4 MMOL/L (ref 3.5–5.3)
RBC # BLD AUTO: 4.71 MILLION/UL (ref 3.88–5.62)
SODIUM SERPL-SCNC: 137 MMOL/L (ref 135–147)
WBC # BLD AUTO: 7.51 THOUSAND/UL (ref 4.31–10.16)

## 2023-10-09 PROCEDURE — 80048 BASIC METABOLIC PNL TOTAL CA: CPT | Performed by: PHYSICIAN ASSISTANT

## 2023-10-09 PROCEDURE — 85025 COMPLETE CBC W/AUTO DIFF WBC: CPT | Performed by: PHYSICIAN ASSISTANT

## 2023-10-09 PROCEDURE — 99024 POSTOP FOLLOW-UP VISIT: CPT | Performed by: SURGERY

## 2023-10-09 RX ADMIN — HEPARIN SODIUM 5000 UNITS: 5000 INJECTION INTRAVENOUS; SUBCUTANEOUS at 21:13

## 2023-10-09 RX ADMIN — OXYCODONE HYDROCHLORIDE AND ACETAMINOPHEN 1 TABLET: 5; 325 TABLET ORAL at 23:35

## 2023-10-09 RX ADMIN — HEPARIN SODIUM 5000 UNITS: 5000 INJECTION INTRAVENOUS; SUBCUTANEOUS at 05:35

## 2023-10-09 RX ADMIN — HEPARIN SODIUM 5000 UNITS: 5000 INJECTION INTRAVENOUS; SUBCUTANEOUS at 14:01

## 2023-10-09 NOTE — PLAN OF CARE
Problem: PAIN - ADULT  Goal: Verbalizes/displays adequate comfort level or baseline comfort level  Description: Interventions:  - Encourage patient to monitor pain and request assistance  - Assess pain using appropriate pain scale  - Administer analgesics based on type and severity of pain and evaluate response  - Implement non-pharmacological measures as appropriate and evaluate response  - Consider cultural and social influences on pain and pain management  - Notify physician/advanced practitioner if interventions unsuccessful or patient reports new pain  Outcome: Progressing     Problem: INFECTION - ADULT  Goal: Absence or prevention of progression during hospitalization  Description: INTERVENTIONS:  - Assess and monitor for signs and symptoms of infection  - Monitor lab/diagnostic results  - Monitor all insertion sites, i.e. indwelling lines, tubes, and drains  - Monitor endotracheal if appropriate and nasal secretions for changes in amount and color  - [place] appropriate cooling/warming therapies per order  - Administer medications as ordered  - Instruct and encourage patient and family to use good hand hygiene technique  - Identify and instruct in appropriate isolation precautions for identified infection/condition  Outcome: Progressing

## 2023-10-09 NOTE — PLAN OF CARE
Problem: PAIN - ADULT  Goal: Verbalizes/displays adequate comfort level or baseline comfort level  Description: Interventions:  - Encourage patient to monitor pain and request assistance  - Assess pain using appropriate pain scale  - Administer analgesics based on type and severity of pain and evaluate response  - Implement non-pharmacological measures as appropriate and evaluate response  - Consider cultural and social influences on pain and pain management  - Notify physician/advanced practitioner if interventions unsuccessful or patient reports new pain  Outcome: Progressing     Problem: INFECTION - ADULT  Goal: Absence or prevention of progression during hospitalization  Description: INTERVENTIONS:  - Assess and monitor for signs and symptoms of infection  - Monitor lab/diagnostic results  - Monitor all insertion sites, i.e. indwelling lines, tubes, and drains  - Monitor endotracheal if appropriate and nasal secretions for changes in amount and color  - [place] appropriate cooling/warming therapies per order  - Administer medications as ordered  - Instruct and encourage patient and family to use good hand hygiene technique  - Identify and instruct in appropriate isolation precautions for identified infection/condition  Outcome: Progressing     Problem: SAFETY ADULT  Goal: Patient will remain free of falls  Description: INTERVENTIONS:  - Educate patient/family on patient safety including physical limitations  - Instruct patient to call for assistance with activity   - Consult OT/PT to assist with strengthening/mobility   - Keep Call bell within reach  - Keep bed low and locked with side rails adjusted as appropriate  - Keep care items and personal belongings within reach  - Initiate and maintain comfort rounds  - Make Fall Risk Sign visible to staff  - Offer Toileting every 2 Hours, in advance of need  - Initiate/Maintain bed alarm  - Apply yellow socks and bracelet for high fall risk patients  - Consider moving patient to room near nurses station  Outcome: Progressing  Goal: Maintain or return to baseline ADL function  Description: INTERVENTIONS:  -  Assess patient's ability to carry out ADLs; assess patient's baseline for ADL function and identify physical deficits which impact ability to perform ADLs (bathing, care of mouth/teeth, toileting, grooming, dressing, etc.)  - Assess/evaluate cause of self-care deficits   - Assess range of motion  - Assess patient's mobility; develop plan if impaired  - Assess patient's need for assistive devices and provide as appropriate  - Encourage maximum independence but intervene and supervise when necessary  - Involve family in performance of ADLs  - Assess for home care needs following discharge   - Consider OT consult to assist with ADL evaluation and planning for discharge  - Provide patient education as appropriate  Outcome: Progressing  Goal: Maintains/Returns to pre admission functional level  Description: INTERVENTIONS:  - Perform BMAT or MOVE assessment daily.   - Set and communicate daily mobility goal to care team and patient/family/caregiver. - Collaborate with rehabilitation services on mobility goals if consulted  - Perform Range of Motion 2 times a day. - Reposition patient every 2 hours.   - Dangle patient 2 times a day  - Stand patient 2 times a day  - Ambulate patient 2 times a day  - Out of bed to chair 2 times a day   - Out of bed for meals 2 times a day  - Out of bed for toileting  - Record patient progress and toleration of activity level   Outcome: Progressing     Problem: DISCHARGE PLANNING  Goal: Discharge to home or other facility with appropriate resources  Description: INTERVENTIONS:  - Identify barriers to discharge w/patient and caregiver  - Arrange for needed discharge resources and transportation as appropriate  - Identify discharge learning needs (meds, wound care, etc.)  - Arrange for interpretive services to assist at discharge as needed  - Refer to Case Management Department for coordinating discharge planning if the patient needs post-hospital services based on physician/advanced practitioner order or complex needs related to functional status, cognitive ability, or social support system  Outcome: Progressing     Problem: Knowledge Deficit  Goal: Patient/family/caregiver demonstrates understanding of disease process, treatment plan, medications, and discharge instructions  Description: Complete learning assessment and assess knowledge base.   Interventions:  - Provide teaching at level of understanding  - Provide teaching via preferred learning methods  Outcome: Progressing

## 2023-10-09 NOTE — PROGRESS NOTES
Progress Note - General Surgery   Erica Carrillo 44 y.o. male MRN: [id number]  Unit/Bed#: [address] [place] [address] Encounter: [id number]    Assessment:  44 y.o. M with prior elective epigastric hernia repair on 9/20 presented with an abdominal wall hematoma, now s/p evacuation of hematoma, washout and vac placement 10/6    Afebrile. VSS    Plan:  Continue regular diet  Continue wound vac veraflow therapy   Plan for return to the operating room on Wednesday 10/11 for repeat washout, possible closure possible wound vac placement   DVT ppx  OOB/Ambulate     Subjective/Objective     Subjective: No acute events overnight. He is passing gas and having bowel function. Denies abdominal pain, fevers or chills. Objective:     Blood pressure 127/80, pulse 92, temperature 98.3 °F (36.8 °C), temperature source Oral, resp. rate 16, SpO2 95 %. ,There is no height or weight on file to calculate BMI.       Intake/Output Summary (Last 24 hours) at 10/9/2023 1003  Last data filed at 10/8/2023 2031  Gross per 24 hour   Intake 200 ml   Output 1075 ml   Net -875 ml       Invasive Devices     Peripheral Intravenous Line  Duration           Peripheral IV 10/08/23 Proximal;Right;Ventral (anterior) Forearm <1 day                Physical Exam:   NAD, alert and oriented x3  Normocephalic, atraumatic  Moist mucous membranes   Norm resp effort on room air   Regular rate  Abd soft, NT/ND  Midline abdominal wound vac in place with serosanguinous drainage   No calf tenderness or peripheral edema  CN grossly intact   Skin is warm and dry      Lab, Imaging and other studies:  CBC:   Lab Results   Component Value Date    WBC 7.51 10/09/2023    HGB 14.1 10/09/2023    HCT 43.0 10/09/2023    MCV 91 10/09/2023     (H) 10/09/2023    RBC 4.71 10/09/2023    MCH 29.9 10/09/2023    MCHC 32.8 10/09/2023    RDW 13.2 10/09/2023    MPV 8.7 (L) 10/09/2023    NRBC 0 10/09/2023   , CMP:   Lab Results   Component Value Date    SODIUM 137 10/09/2023    K 4.0 10/09/2023  10/09/2023    CO2 24 10/09/2023    BUN 10 10/09/2023    CREATININE 0.71 10/09/2023    CALCIUM 9.8 10/09/2023    EGFR 115 10/09/2023     VTE Pharmacologic Prophylaxis: Heparin  VTE Mechanical Prophylaxis: sequential compression device

## 2023-10-10 ENCOUNTER — ANESTHESIA EVENT (INPATIENT)
Dept: PERIOP | Facility: HOSPITAL | Age: 43
End: 2023-10-10
Payer: COMMERCIAL

## 2023-10-10 PROCEDURE — 99024 POSTOP FOLLOW-UP VISIT: CPT | Performed by: SPECIALIST

## 2023-10-10 RX ADMIN — HEPARIN SODIUM 5000 UNITS: 5000 INJECTION INTRAVENOUS; SUBCUTANEOUS at 21:00

## 2023-10-10 RX ADMIN — HEPARIN SODIUM 5000 UNITS: 5000 INJECTION INTRAVENOUS; SUBCUTANEOUS at 14:51

## 2023-10-10 RX ADMIN — OXYCODONE HYDROCHLORIDE AND ACETAMINOPHEN 1 TABLET: 5; 325 TABLET ORAL at 22:58

## 2023-10-10 RX ADMIN — HEPARIN SODIUM 5000 UNITS: 5000 INJECTION INTRAVENOUS; SUBCUTANEOUS at 05:49

## 2023-10-10 NOTE — PROGRESS NOTES
Progress Note - General Surgery   [facility] 44 y.o. male MRN: [id number]  Unit/Bed#: [address] [place] [address] Encounter: [id number]    Assessment:  45 y/o m hx elective epigastric hernia repair 9/20 presented with abdominal wall hematoma, S/p evacuation of hematoma, washout and vac placement 10/6    AVSS, no leukocytosis. Plan:  Continue Veraflow wound vac. NPO at midnight. Plan to return to operating 10/11 for repeat washout and possible closure. DVT ppx. OOB and ambulate. Subjective/Objective   Chief Complaint:" I am doing good."     Subjective: Patient was seen and examined bedside. Patient reports no acute changes through the     Objective:     Blood pressure 120/76, pulse 95, temperature 98.8 °F (37.1 °C), temperature source Oral, resp. rate 17, height 5' 9" (1.753 m), weight 109 kg (240 lb), SpO2 95 %. ,Body mass index is 35.44 kg/m². No intake or output data in the 24 hours ending 10/10/23 0758    Invasive Devices     Peripheral Intravenous Line  Duration           Peripheral IV 10/08/23 Proximal;Right;Ventral (anterior) Forearm 1 day                Physical Exam: /76 (BP Location: Right arm)   Pulse 95   Temp 98.8 °F (37.1 °C) (Oral)   Resp 17   Ht 5' 9" (1.753 m)   Wt 109 kg (240 lb)   SpO2 95%   BMI 35.44 kg/m²   General appearance: alert and oriented, in no acute distress  Head: Normocephalic, without obvious abnormality, atraumatic  Lungs: clear to auscultation bilaterally  Heart: regular rate and rhythm, S1, S2 normal, no murmur, click, rub or gallop  Abdomen: soft, non-tender; bowel sounds normal; no masses,  no organomegaly and wound vac in place. Lab, Imaging and other studies:I have personally reviewed pertinent lab results.   , CBC: No results found for: "WBC", "HGB", "HCT", "MCV", "PLT", "ADJUSTEDWBC", "RBC", "MCH", "MCHC", "RDW", "MPV", "NRBC", CMP: No results found for: "SODIUM", "K", "CL", "CO2", "ANIONGAP", "BUN", "CREATININE", "GLUCOSE", "CALCIUM", "AST", "ALT", "ALKPHOS", "PROT", "BILITOT", "EGFR"  VTE Pharmacologic Prophylaxis: Heparin  VTE Mechanical Prophylaxis: sequential compression device

## 2023-10-10 NOTE — PLAN OF CARE
Problem: PAIN - ADULT  Goal: Verbalizes/displays adequate comfort level or baseline comfort level  Description: Interventions:  - Encourage patient to monitor pain and request assistance  - Assess pain using appropriate pain scale  - Administer analgesics based on type and severity of pain and evaluate response  - Implement non-pharmacological measures as appropriate and evaluate response  - Consider cultural and social influences on pain and pain management  - Notify physician/advanced practitioner if interventions unsuccessful or patient reports new pain  Outcome: Progressing     Problem: INFECTION - ADULT  Goal: Absence or prevention of progression during hospitalization  Description: INTERVENTIONS:  - Assess and monitor for signs and symptoms of infection  - Monitor lab/diagnostic results  - Monitor all insertion sites, i.e. indwelling lines, tubes, and drains  - Monitor endotracheal if appropriate and nasal secretions for changes in amount and color  - [place] appropriate cooling/warming therapies per order  - Administer medications as ordered  - Instruct and encourage patient and family to use good hand hygiene technique  - Identify and instruct in appropriate isolation precautions for identified infection/condition  Outcome: Progressing     Problem: SAFETY ADULT  Goal: Patient will remain free of falls  Description: INTERVENTIONS:  - Educate patient/family on patient safety including physical limitations  - Instruct patient to call for assistance with activity   - Consult OT/PT to assist with strengthening/mobility   - Keep Call bell within reach  - Keep bed low and locked with side rails adjusted as appropriate  - Keep care items and personal belongings within reach  - Initiate and maintain comfort rounds  - Make Fall Risk Sign visible to staff  - Offer Toileting every 2 Hours, in advance of need  - Initiate/Maintain bed alarm  - Apply yellow socks and bracelet for high fall risk patients  - Consider moving patient to room near nurses station  Outcome: Progressing

## 2023-10-10 NOTE — PLAN OF CARE
Problem: PAIN - ADULT  Goal: Verbalizes/displays adequate comfort level or baseline comfort level  Description: Interventions:  - Encourage patient to monitor pain and request assistance  - Assess pain using appropriate pain scale  - Administer analgesics based on type and severity of pain and evaluate response  - Implement non-pharmacological measures as appropriate and evaluate response  - Consider cultural and social influences on pain and pain management  - Notify physician/advanced practitioner if interventions unsuccessful or patient reports new pain  Outcome: Progressing     Problem: INFECTION - ADULT  Goal: Absence or prevention of progression during hospitalization  Description: INTERVENTIONS:  - Assess and monitor for signs and symptoms of infection  - Monitor lab/diagnostic results  - Monitor all insertion sites, i.e. indwelling lines, tubes, and drains  - Monitor endotracheal if appropriate and nasal secretions for changes in amount and color  - [place] appropriate cooling/warming therapies per order  - Administer medications as ordered  - Instruct and encourage patient and family to use good hand hygiene technique  - Identify and instruct in appropriate isolation precautions for identified infection/condition  Outcome: Progressing     Problem: SAFETY ADULT  Goal: Patient will remain free of falls  Description: INTERVENTIONS:  - Educate patient/family on patient safety including physical limitations  - Instruct patient to call for assistance with activity   - Consult OT/PT to assist with strengthening/mobility   - Keep Call bell within reach  - Keep bed low and locked with side rails adjusted as appropriate  - Keep care items and personal belongings within reach  - Initiate and maintain comfort rounds  - Make Fall Risk Sign visible to staff  - Offer Toileting every 2 Hours, in advance of need  - Initiate/Maintain bed alarm  - Apply yellow socks and bracelet for high fall risk patients  - Consider moving patient to room near nurses station  Outcome: Progressing  Goal: Maintain or return to baseline ADL function  Description: INTERVENTIONS:  -  Assess patient's ability to carry out ADLs; assess patient's baseline for ADL function and identify physical deficits which impact ability to perform ADLs (bathing, care of mouth/teeth, toileting, grooming, dressing, etc.)  - Assess/evaluate cause of self-care deficits   - Assess range of motion  - Assess patient's mobility; develop plan if impaired  - Assess patient's need for assistive devices and provide as appropriate  - Encourage maximum independence but intervene and supervise when necessary  - Involve family in performance of ADLs  - Assess for home care needs following discharge   - Consider OT consult to assist with ADL evaluation and planning for discharge  - Provide patient education as appropriate  Outcome: Progressing  Goal: Maintains/Returns to pre admission functional level  Description: INTERVENTIONS:  - Perform BMAT or MOVE assessment daily.   - Set and communicate daily mobility goal to care team and patient/family/caregiver. - Collaborate with rehabilitation services on mobility goals if consulted  - Perform Range of Motion 2 times a day. - Reposition patient every 2 hours.   - Dangle patient 2 times a day  - Stand patient 2 times a day  - Ambulate patient 2 times a day  - Out of bed to chair 2 times a day   - Out of bed for meals 2 times a day  - Out of bed for toileting  - Record patient progress and toleration of activity level   Outcome: Progressing     Problem: DISCHARGE PLANNING  Goal: Discharge to home or other facility with appropriate resources  Description: INTERVENTIONS:  - Identify barriers to discharge w/patient and caregiver  - Arrange for needed discharge resources and transportation as appropriate  - Identify discharge learning needs (meds, wound care, etc.)  - Arrange for interpretive services to assist at discharge as needed  - Refer to Case Management Department for coordinating discharge planning if the patient needs post-hospital services based on physician/advanced practitioner order or complex needs related to functional status, cognitive ability, or social support system  Outcome: Progressing     Problem: Knowledge Deficit  Goal: Patient/family/caregiver demonstrates understanding of disease process, treatment plan, medications, and discharge instructions  Description: Complete learning assessment and assess knowledge base.   Interventions:  - Provide teaching at level of understanding  - Provide teaching via preferred learning methods  Outcome: Progressing

## 2023-10-11 ENCOUNTER — ANESTHESIA (INPATIENT)
Dept: PERIOP | Facility: HOSPITAL | Age: 43
End: 2023-10-11
Payer: COMMERCIAL

## 2023-10-11 LAB
ANION GAP SERPL CALCULATED.3IONS-SCNC: 9 MMOL/L
BASOPHILS # BLD AUTO: 0.04 THOUSANDS/ÂΜL (ref 0–0.1)
BASOPHILS NFR BLD AUTO: 1 % (ref 0–1)
BUN SERPL-MCNC: 14 MG/DL (ref 5–25)
CALCIUM SERPL-MCNC: 9.1 MG/DL (ref 8.4–10.2)
CHLORIDE SERPL-SCNC: 103 MMOL/L (ref 96–108)
CO2 SERPL-SCNC: 25 MMOL/L (ref 21–32)
CREAT SERPL-MCNC: 0.63 MG/DL (ref 0.6–1.3)
EOSINOPHIL # BLD AUTO: 0.22 THOUSAND/ÂΜL (ref 0–0.61)
EOSINOPHIL NFR BLD AUTO: 3 % (ref 0–6)
ERYTHROCYTE [DISTWIDTH] IN BLOOD BY AUTOMATED COUNT: 13.3 % (ref 11.6–15.1)
GFR SERPL CREATININE-BSD FRML MDRD: 121 ML/MIN/1.73SQ M
GLUCOSE SERPL-MCNC: 93 MG/DL (ref 65–140)
HCT VFR BLD AUTO: 42.9 % (ref 36.5–49.3)
HGB BLD-MCNC: 14.3 G/DL (ref 12–17)
IMM GRANULOCYTES # BLD AUTO: 0.04 THOUSAND/UL (ref 0–0.2)
IMM GRANULOCYTES NFR BLD AUTO: 1 % (ref 0–2)
LYMPHOCYTES # BLD AUTO: 1.94 THOUSANDS/ÂΜL (ref 0.6–4.47)
LYMPHOCYTES NFR BLD AUTO: 23 % (ref 14–44)
MCH RBC QN AUTO: 30.3 PG (ref 26.8–34.3)
MCHC RBC AUTO-ENTMCNC: 33.3 G/DL (ref 31.4–37.4)
MCV RBC AUTO: 91 FL (ref 82–98)
MONOCYTES # BLD AUTO: 0.62 THOUSAND/ÂΜL (ref 0.17–1.22)
MONOCYTES NFR BLD AUTO: 7 % (ref 4–12)
NEUTROPHILS # BLD AUTO: 5.64 THOUSANDS/ÂΜL (ref 1.85–7.62)
NEUTS SEG NFR BLD AUTO: 65 % (ref 43–75)
NRBC BLD AUTO-RTO: 0 /100 WBCS
PLATELET # BLD AUTO: 433 THOUSANDS/UL (ref 149–390)
PMV BLD AUTO: 9.1 FL (ref 8.9–12.7)
POTASSIUM SERPL-SCNC: 3.6 MMOL/L (ref 3.5–5.3)
RBC # BLD AUTO: 4.72 MILLION/UL (ref 3.88–5.62)
SODIUM SERPL-SCNC: 137 MMOL/L (ref 135–147)
WBC # BLD AUTO: 8.5 THOUSAND/UL (ref 4.31–10.16)

## 2023-10-11 PROCEDURE — 0JC80ZZ EXTIRPATION OF MATTER FROM ABDOMEN SUBCUTANEOUS TISSUE AND FASCIA, OPEN APPROACH: ICD-10-PCS | Performed by: SURGERY

## 2023-10-11 PROCEDURE — 80048 BASIC METABOLIC PNL TOTAL CA: CPT | Performed by: PHYSICIAN ASSISTANT

## 2023-10-11 PROCEDURE — 10180 I&D COMPLEX PO WOUND INFCTJ: CPT | Performed by: SURGERY

## 2023-10-11 PROCEDURE — 87147 CULTURE TYPE IMMUNOLOGIC: CPT | Performed by: SURGERY

## 2023-10-11 PROCEDURE — 97606 NEG PRS WND THER DME>50 SQCM: CPT | Performed by: SURGERY

## 2023-10-11 PROCEDURE — 99024 POSTOP FOLLOW-UP VISIT: CPT | Performed by: SURGERY

## 2023-10-11 PROCEDURE — 87075 CULTR BACTERIA EXCEPT BLOOD: CPT | Performed by: SURGERY

## 2023-10-11 PROCEDURE — 87205 SMEAR GRAM STAIN: CPT | Performed by: SURGERY

## 2023-10-11 PROCEDURE — 87186 SC STD MICRODIL/AGAR DIL: CPT | Performed by: SURGERY

## 2023-10-11 PROCEDURE — 87077 CULTURE AEROBIC IDENTIFY: CPT | Performed by: SURGERY

## 2023-10-11 PROCEDURE — 87070 CULTURE OTHR SPECIMN AEROBIC: CPT | Performed by: SURGERY

## 2023-10-11 PROCEDURE — 85025 COMPLETE CBC W/AUTO DIFF WBC: CPT | Performed by: PHYSICIAN ASSISTANT

## 2023-10-11 PROCEDURE — 11042 DBRDMT SUBQ TIS 1ST 20SQCM/<: CPT | Performed by: SURGERY

## 2023-10-11 PROCEDURE — 11045 DBRDMT SUBQ TISS EACH ADDL: CPT | Performed by: SURGERY

## 2023-10-11 PROCEDURE — 0JB80ZZ EXCISION OF ABDOMEN SUBCUTANEOUS TISSUE AND FASCIA, OPEN APPROACH: ICD-10-PCS | Performed by: SURGERY

## 2023-10-11 RX ORDER — NEOSTIGMINE METHYLSULFATE 1 MG/ML
INJECTION INTRAVENOUS AS NEEDED
Status: DISCONTINUED | OUTPATIENT
Start: 2023-10-11 | End: 2023-10-11

## 2023-10-11 RX ORDER — CEFAZOLIN SODIUM 2 G/50ML
SOLUTION INTRAVENOUS AS NEEDED
Status: DISCONTINUED | OUTPATIENT
Start: 2023-10-11 | End: 2023-10-11

## 2023-10-11 RX ORDER — FENTANYL CITRATE 50 UG/ML
INJECTION, SOLUTION INTRAMUSCULAR; INTRAVENOUS AS NEEDED
Status: DISCONTINUED | OUTPATIENT
Start: 2023-10-11 | End: 2023-10-11

## 2023-10-11 RX ORDER — METRONIDAZOLE 500 MG/100ML
500 INJECTION, SOLUTION INTRAVENOUS EVERY 8 HOURS
Status: DISCONTINUED | OUTPATIENT
Start: 2023-10-11 | End: 2023-10-11

## 2023-10-11 RX ORDER — LIDOCAINE HYDROCHLORIDE 10 MG/ML
INJECTION, SOLUTION EPIDURAL; INFILTRATION; INTRACAUDAL; PERINEURAL AS NEEDED
Status: DISCONTINUED | OUTPATIENT
Start: 2023-10-11 | End: 2023-10-11

## 2023-10-11 RX ORDER — MIDAZOLAM HYDROCHLORIDE 2 MG/2ML
INJECTION, SOLUTION INTRAMUSCULAR; INTRAVENOUS AS NEEDED
Status: DISCONTINUED | OUTPATIENT
Start: 2023-10-11 | End: 2023-10-11

## 2023-10-11 RX ORDER — SODIUM CHLORIDE, SODIUM LACTATE, POTASSIUM CHLORIDE, CALCIUM CHLORIDE 600; 310; 30; 20 MG/100ML; MG/100ML; MG/100ML; MG/100ML
INJECTION, SOLUTION INTRAVENOUS CONTINUOUS PRN
Status: DISCONTINUED | OUTPATIENT
Start: 2023-10-11 | End: 2023-10-11

## 2023-10-11 RX ORDER — MAGNESIUM HYDROXIDE 1200 MG/15ML
LIQUID ORAL AS NEEDED
Status: DISCONTINUED | OUTPATIENT
Start: 2023-10-11 | End: 2023-10-11 | Stop reason: HOSPADM

## 2023-10-11 RX ORDER — FENTANYL CITRATE/PF 50 MCG/ML
50 SYRINGE (ML) INJECTION
Status: DISCONTINUED | OUTPATIENT
Start: 2023-10-11 | End: 2023-10-11 | Stop reason: HOSPADM

## 2023-10-11 RX ORDER — ROCURONIUM BROMIDE 10 MG/ML
INJECTION, SOLUTION INTRAVENOUS AS NEEDED
Status: DISCONTINUED | OUTPATIENT
Start: 2023-10-11 | End: 2023-10-11

## 2023-10-11 RX ORDER — SODIUM CHLORIDE, SODIUM LACTATE, POTASSIUM CHLORIDE, CALCIUM CHLORIDE 600; 310; 30; 20 MG/100ML; MG/100ML; MG/100ML; MG/100ML
100 INJECTION, SOLUTION INTRAVENOUS CONTINUOUS
Status: DISCONTINUED | OUTPATIENT
Start: 2023-10-11 | End: 2023-10-13

## 2023-10-11 RX ORDER — GLYCOPYRROLATE 0.2 MG/ML
INJECTION INTRAMUSCULAR; INTRAVENOUS AS NEEDED
Status: DISCONTINUED | OUTPATIENT
Start: 2023-10-11 | End: 2023-10-11

## 2023-10-11 RX ORDER — METRONIDAZOLE 500 MG/100ML
INJECTION, SOLUTION INTRAVENOUS CONTINUOUS PRN
Status: DISCONTINUED | OUTPATIENT
Start: 2023-10-11 | End: 2023-10-11

## 2023-10-11 RX ORDER — PROPOFOL 10 MG/ML
INJECTION, EMULSION INTRAVENOUS AS NEEDED
Status: DISCONTINUED | OUTPATIENT
Start: 2023-10-11 | End: 2023-10-11

## 2023-10-11 RX ORDER — ONDANSETRON 2 MG/ML
4 INJECTION INTRAMUSCULAR; INTRAVENOUS ONCE AS NEEDED
Status: DISCONTINUED | OUTPATIENT
Start: 2023-10-11 | End: 2023-10-11 | Stop reason: HOSPADM

## 2023-10-11 RX ORDER — CEFAZOLIN SODIUM 2 G/50ML
2000 SOLUTION INTRAVENOUS ONCE
Status: DISCONTINUED | OUTPATIENT
Start: 2023-10-11 | End: 2023-10-11

## 2023-10-11 RX ORDER — DEXAMETHASONE SODIUM PHOSPHATE 10 MG/ML
INJECTION, SOLUTION INTRAMUSCULAR; INTRAVENOUS AS NEEDED
Status: DISCONTINUED | OUTPATIENT
Start: 2023-10-11 | End: 2023-10-11

## 2023-10-11 RX ORDER — DIPHENHYDRAMINE HYDROCHLORIDE 50 MG/ML
25 INJECTION INTRAMUSCULAR; INTRAVENOUS EVERY 6 HOURS PRN
Status: DISCONTINUED | OUTPATIENT
Start: 2023-10-11 | End: 2023-10-13

## 2023-10-11 RX ORDER — CEFAZOLIN SODIUM 2 G/50ML
2000 SOLUTION INTRAVENOUS EVERY 8 HOURS
Status: DISCONTINUED | OUTPATIENT
Start: 2023-10-11 | End: 2023-10-11

## 2023-10-11 RX ORDER — ONDANSETRON 2 MG/ML
INJECTION INTRAMUSCULAR; INTRAVENOUS AS NEEDED
Status: DISCONTINUED | OUTPATIENT
Start: 2023-10-11 | End: 2023-10-11

## 2023-10-11 RX ADMIN — DEXAMETHASONE SODIUM PHOSPHATE 10 MG: 10 INJECTION, SOLUTION INTRAMUSCULAR; INTRAVENOUS at 08:18

## 2023-10-11 RX ADMIN — HEPARIN SODIUM 5000 UNITS: 5000 INJECTION INTRAVENOUS; SUBCUTANEOUS at 14:14

## 2023-10-11 RX ADMIN — OXYCODONE HYDROCHLORIDE AND ACETAMINOPHEN 1 TABLET: 5; 325 TABLET ORAL at 21:38

## 2023-10-11 RX ADMIN — METRONIDAZOLE 500 MG: 500 INJECTION, SOLUTION INTRAVENOUS at 17:22

## 2023-10-11 RX ADMIN — MIDAZOLAM 2 MG: 1 INJECTION INTRAMUSCULAR; INTRAVENOUS at 08:06

## 2023-10-11 RX ADMIN — LIDOCAINE HYDROCHLORIDE 50 MG: 10 INJECTION, SOLUTION EPIDURAL; INFILTRATION; INTRACAUDAL; PERINEURAL at 08:10

## 2023-10-11 RX ADMIN — PROPOFOL 200 MG: 10 INJECTION, EMULSION INTRAVENOUS at 08:10

## 2023-10-11 RX ADMIN — FENTANYL CITRATE 50 MCG: 50 INJECTION, SOLUTION INTRAMUSCULAR; INTRAVENOUS at 08:13

## 2023-10-11 RX ADMIN — HEPARIN SODIUM 5000 UNITS: 5000 INJECTION INTRAVENOUS; SUBCUTANEOUS at 05:02

## 2023-10-11 RX ADMIN — METRONIDAZOLE: 500 INJECTION, SOLUTION INTRAVENOUS at 08:56

## 2023-10-11 RX ADMIN — CEFAZOLIN SODIUM 2000 MG: 2 SOLUTION INTRAVENOUS at 16:13

## 2023-10-11 RX ADMIN — ROCURONIUM BROMIDE 50 MG: 10 INJECTION, SOLUTION INTRAVENOUS at 08:11

## 2023-10-11 RX ADMIN — SODIUM CHLORIDE, SODIUM LACTATE, POTASSIUM CHLORIDE, AND CALCIUM CHLORIDE: .6; .31; .03; .02 INJECTION, SOLUTION INTRAVENOUS at 08:00

## 2023-10-11 RX ADMIN — DIPHENHYDRAMINE HYDROCHLORIDE 25 MG: 50 INJECTION, SOLUTION INTRAMUSCULAR; INTRAVENOUS at 21:21

## 2023-10-11 RX ADMIN — NEOSTIGMINE METHYLSULFATE 4 MG: 1 INJECTION INTRAVENOUS at 09:25

## 2023-10-11 RX ADMIN — SODIUM CHLORIDE, SODIUM LACTATE, POTASSIUM CHLORIDE, AND CALCIUM CHLORIDE 100 ML/HR: .6; .31; .03; .02 INJECTION, SOLUTION INTRAVENOUS at 11:31

## 2023-10-11 RX ADMIN — HEPARIN SODIUM 5000 UNITS: 5000 INJECTION INTRAVENOUS; SUBCUTANEOUS at 21:16

## 2023-10-11 RX ADMIN — FENTANYL CITRATE 50 MCG: 50 INJECTION, SOLUTION INTRAMUSCULAR; INTRAVENOUS at 08:10

## 2023-10-11 RX ADMIN — GLYCOPYRROLATE 0.8 MG: 0.2 INJECTION, SOLUTION INTRAMUSCULAR; INTRAVENOUS at 09:25

## 2023-10-11 RX ADMIN — ONDANSETRON 4 MG: 2 INJECTION INTRAMUSCULAR; INTRAVENOUS at 08:18

## 2023-10-11 RX ADMIN — CEFAZOLIN SODIUM 2000 MG: 2 SOLUTION INTRAVENOUS at 08:15

## 2023-10-11 NOTE — ANESTHESIA POSTPROCEDURE EVALUATION
Post-Op Assessment Note    CV Status:  Stable  Pain Score: 0    Pain management: adequate     Mental Status:  Awake and sleepy   Hydration Status:  Stable   PONV Controlled:  None   Airway Patency:  Patent      Post Op Vitals Reviewed: Yes      Staff: CRNA   Comments: spontaneously breathing, protecting airway, simple mask to O2, vss, fully endorsed to recoveyr w/o AC        No notable events documented.     BP   126/68   Temp      Pulse  72   Resp   14   SpO2   99

## 2023-10-11 NOTE — ANESTHESIA PREPROCEDURE EVALUATION
Procedure:  CLOSURE WOUND ABDOMINAL/TRUNK (Abdomen)    Relevant Problems   ANESTHESIA (within normal limits)      CARDIO   (+) Benign essential hypertension   (+) Migraine headache   (+) Mixed hyperlipidemia      ENDO   (+) Type 2 diabetes mellitus without complication, with long-term current use of insulin (HCC)      GI/HEPATIC   (+) GERD (gastroesophageal reflux disease)      NEURO/PSYCH   (+) Anxiety   (+) Migraine headache      PULMONARY   (+) MANUELA (obstructive sleep apnea)        Physical Exam    Airway    Mallampati score: II  TM Distance: >3 FB  Neck ROM: full     Dental   Comment: Very poor dentition, multiple missing teeth. Cardiovascular  Rhythm: regular, Rate: normal    Pulmonary   Breath sounds clear to auscultation    Other Findings      Anesthesia Plan  ASA Score- 2     Anesthesia Type- general with ASA Monitors. Additional Monitors:     Airway Plan: ETT. Plan Factors-Exercise tolerance (METS): >4 METS. Chart reviewed. Imaging results reviewed. Existing labs reviewed. Patient summary reviewed. Patient is not a current smoker. Obstructive sleep apnea risk education given perioperatively. Induction- intravenous. Postoperative Plan- Plan for postoperative opioid use. Informed Consent- Anesthetic plan and risks discussed with patient. I personally reviewed this patient with the CRNA. Discussed and agreed on the Anesthesia Plan with the CRNA. Aida Fernandez

## 2023-10-11 NOTE — PLAN OF CARE
Problem: PAIN - ADULT  Goal: Verbalizes/displays adequate comfort level or baseline comfort level  Description: Interventions:  - Encourage patient to monitor pain and request assistance  - Assess pain using appropriate pain scale  - Administer analgesics based on type and severity of pain and evaluate response  - Implement non-pharmacological measures as appropriate and evaluate response  - Consider cultural and social influences on pain and pain management  - Notify physician/advanced practitioner if interventions unsuccessful or patient reports new pain  Outcome: Progressing     Problem: INFECTION - ADULT  Goal: Absence or prevention of progression during hospitalization  Description: INTERVENTIONS:  - Assess and monitor for signs and symptoms of infection  - Monitor lab/diagnostic results  - Monitor all insertion sites, i.e. indwelling lines, tubes, and drains  - Monitor endotracheal if appropriate and nasal secretions for changes in amount and color  - [place] appropriate cooling/warming therapies per order  - Administer medications as ordered  - Instruct and encourage patient and family to use good hand hygiene technique  - Identify and instruct in appropriate isolation precautions for identified infection/condition  Outcome: Progressing     Problem: SAFETY ADULT  Goal: Patient will remain free of falls  Description: INTERVENTIONS:  - Educate patient/family on patient safety including physical limitations  - Instruct patient to call for assistance with activity   - Consult OT/PT to assist with strengthening/mobility   - Keep Call bell within reach  - Keep bed low and locked with side rails adjusted as appropriate  - Keep care items and personal belongings within reach  - Initiate and maintain comfort rounds  - Make Fall Risk Sign visible to staff  - Offer Toileting every 2 Hours, in advance of need  - Initiate/Maintain bed alarm  - Apply yellow socks and bracelet for high fall risk patients  - Consider moving patient to room near nurses station  Outcome: Progressing  Goal: Maintain or return to baseline ADL function  Description: INTERVENTIONS:  -  Assess patient's ability to carry out ADLs; assess patient's baseline for ADL function and identify physical deficits which impact ability to perform ADLs (bathing, care of mouth/teeth, toileting, grooming, dressing, etc.)  - Assess/evaluate cause of self-care deficits   - Assess range of motion  - Assess patient's mobility; develop plan if impaired  - Assess patient's need for assistive devices and provide as appropriate  - Encourage maximum independence but intervene and supervise when necessary  - Involve family in performance of ADLs  - Assess for home care needs following discharge   - Consider OT consult to assist with ADL evaluation and planning for discharge  - Provide patient education as appropriate  Outcome: Progressing  Goal: Maintains/Returns to pre admission functional level  Description: INTERVENTIONS:  - Perform BMAT or MOVE assessment daily.   - Set and communicate daily mobility goal to care team and patient/family/caregiver. - Collaborate with rehabilitation services on mobility goals if consulted  - Perform Range of Motion 2 times a day. - Reposition patient every 2 hours. - Dangle patient 2 times a day  - Stand patient 2 times a day  - Ambulate patient 2 times a day  - Out of bed to chair 2 times a day   - Out of bed for meals 2 times a day  - Out of bed for toileting  - Record patient progress and toleration of activity level   Outcome: Progressing     Problem: Knowledge Deficit  Goal: Patient/family/caregiver demonstrates understanding of disease process, treatment plan, medications, and discharge instructions  Description: Complete learning assessment and assess knowledge base.   Interventions:  - Provide teaching at level of understanding  - Provide teaching via preferred learning methods  Outcome: Progressing

## 2023-10-11 NOTE — PROGRESS NOTES
Progress Note - General Surgery   Virgen Bowers 44 y.o. male MRN: [id number]  Unit/Bed#: OR [place] Encounter: [id number]    Assessment:  45 y/o m hx elective epigastric hernia repair 9/20 presented with abdominal wall hematoma, S/p evacuation of hematoma, washout and vac placement 10/6     Plan:  Plan for OR today for abdominal wound washout and possible wound closure. Subjective/Objective       Subjective: Patient seen and examined bedside. Patient offers no complaints over night. Objective:    Blood pressure 134/80, pulse 104, temperature 98.5 °F (36.9 °C), temperature source Oral, resp. rate 17, height 5' 9" (1.753 m), weight 109 kg (240 lb), SpO2 97 %. ,Body mass index is 35.44 kg/m². Intake/Output Summary (Last 24 hours) at 10/11/2023 0743  Last data filed at 10/10/2023 0900  Gross per 24 hour   Intake --   Output 500 ml   Net -500 ml       Invasive Devices       Peripheral Intravenous Line  Duration             Peripheral IV 10/08/23 Proximal;Right;Ventral (anterior) Forearm 2 days                    Physical Exam: General appearance: alert  Head: Normocephalic, without obvious abnormality, atraumatic  Lungs: normal effort. Heart: regular rate and rhythm, S1, S2 normal, no murmur, click, rub or gallop  Abdomen: Abdominal wound vac intact. Skin: Skin color, texture, turgor normal. No rashes or lesions    Lab, Imaging and other studies:I have personally reviewed pertinent lab results.   , CBC:   Lab Results   Component Value Date    WBC 8.50 10/11/2023    HGB 14.3 10/11/2023    HCT 42.9 10/11/2023    MCV 91 10/11/2023     (H) 10/11/2023    RBC 4.72 10/11/2023    MCH 30.3 10/11/2023    MCHC 33.3 10/11/2023    RDW 13.3 10/11/2023    MPV 9.1 10/11/2023    NRBC 0 10/11/2023   , CMP:   Lab Results   Component Value Date    SODIUM 137 10/11/2023    K 3.6 10/11/2023     10/11/2023    CO2 25 10/11/2023    BUN 14 10/11/2023    CREATININE 0.63 10/11/2023    CALCIUM 9.1 10/11/2023    EGFR 121 10/11/2023     VTE Pharmacologic Prophylaxis: Heparin  VTE Mechanical Prophylaxis: sequential compression device

## 2023-10-11 NOTE — PLAN OF CARE
Problem: PAIN - ADULT  Goal: Verbalizes/displays adequate comfort level or baseline comfort level  Description: Interventions:  - Encourage patient to monitor pain and request assistance  - Assess pain using appropriate pain scale  - Administer analgesics based on type and severity of pain and evaluate response  - Implement non-pharmacological measures as appropriate and evaluate response  - Consider cultural and social influences on pain and pain management  - Notify physician/advanced practitioner if interventions unsuccessful or patient reports new pain  Outcome: Progressing     Problem: INFECTION - ADULT  Goal: Absence or prevention of progression during hospitalization  Description: INTERVENTIONS:  - Assess and monitor for signs and symptoms of infection  - Monitor lab/diagnostic results  - Monitor all insertion sites, i.e. indwelling lines, tubes, and drains  - Monitor endotracheal if appropriate and nasal secretions for changes in amount and color  - [place] appropriate cooling/warming therapies per order  - Administer medications as ordered  - Instruct and encourage patient and family to use good hand hygiene technique  - Identify and instruct in appropriate isolation precautions for identified infection/condition  Outcome: Progressing     Problem: SAFETY ADULT  Goal: Patient will remain free of falls  Description: INTERVENTIONS:  - Educate patient/family on patient safety including physical limitations  - Instruct patient to call for assistance with activity   - Consult OT/PT to assist with strengthening/mobility   - Keep Call bell within reach  - Keep bed low and locked with side rails adjusted as appropriate  - Keep care items and personal belongings within reach  - Initiate and maintain comfort rounds  - Make Fall Risk Sign visible to staff  - Offer Toileting every 2 Hours, in advance of need  - Initiate/Maintain bed alarm  - Apply yellow socks and bracelet for high fall risk patients  - Consider moving patient to room near nurses station  Outcome: Progressing     Problem: DISCHARGE PLANNING  Goal: Discharge to home or other facility with appropriate resources  Description: INTERVENTIONS:  - Identify barriers to discharge w/patient and caregiver  - Arrange for needed discharge resources and transportation as appropriate  - Identify discharge learning needs (meds, wound care, etc.)  - Arrange for interpretive services to assist at discharge as needed  - Refer to Case Management Department for coordinating discharge planning if the patient needs post-hospital services based on physician/advanced practitioner order or complex needs related to functional status, cognitive ability, or social support system  Outcome: Progressing     Problem: Knowledge Deficit  Goal: Patient/family/caregiver demonstrates understanding of disease process, treatment plan, medications, and discharge instructions  Description: Complete learning assessment and assess knowledge base.   Interventions:  - Provide teaching at level of understanding  - Provide teaching via preferred learning methods  Outcome: Progressing

## 2023-10-11 NOTE — OP NOTE
OPERATIVE REPORT  PATIENT NAME: Sam Rivero    :  1980  MRN: [id number]  Pt Location: [place] OR ROOM 03    SURGERY DATE: 10/11/2023    Surgeon(s) and Role:     * Rahul Porter MD - Primary     * BOSSMAN Finch-C - [facility], DO - Assisting    Preop Diagnosis:  Hematoma [T14. 8XXA]    Post-Op Diagnosis Codes:     * Hematoma [T14. 8XXA]    Procedure(s):  REMOVAL WOUND VAC. ABDOMINAL WOUND EXPLORATION. [address] OUT. EXCISIONAL DEBRIDEMENT WOUND. WOUND VAC APPLICATION    Specimen(s):  ID Type Source Tests Collected by Time Destination   A : ABD WOUND Tissue Soft Tissue, Other CULTURE, TISSUE AND GRAM STAIN Rahul Porter MD 10/11/2023 2974    B : ABDOMINAL WOUND Wound Wound ANAEROBIC CULTURE AND GRAM STAIN, STAT GRAM STAIN (Canceled), WOUND CULTURE Rahul Porter MD 10/11/2023 3390        Estimated Blood Loss:   Minimal    Drains:  * No LDAs found *    Anesthesia Type:   Choice    Operative Indications:  Hematoma [T14. 8XXA]    Operative Findings:  Abscess between mesh and anterior fascia, drained. Posterior fascia intact. Sharp excisional debridement of necrotic and purulent appearing subcutaneous tissue  Wound cultures sent   Wound vac replaced using one adaptic and two black sponges    Complications:   None    Procedure and Technique:  The patient was brought to the operating room and identified verbally and via wristband. He was transferred to the operating room table and positioned supine. General endotracheal anesthesia was induced successfully. The patient's abdominal wound vac was removed including three black/gray sponges. The abdominal wound was then  prepped and draped in the usual sterile fashion. Pre-operative antibiotics were administered. A time out was performed confirming the patient, procedure, and site. All parties were in agreement. Attention was turned to the abdomen where the abdominal wound was explored board.   There was a central area of purulence in the middle of the incision which tracked beneath the anterior fascia down to the level of the mesh. The PDS suture that was previously placed in this area was no longer intact. In order to explore this wound further, 1 additional PDS suture was removed with heavy scissors. The Ovitex mesh located here had disintegrated. The measurement of this area was 6 cm x 2 cm. This abscess cavity did however track inferiorly and superiorly approximately 4 cm in both directions. This area was irrigated with normal saline and suctioned. The posterior fascia located here was still intact and therefore the abdomen was not entered. 1 figure-of-eight using 1 PDS  suture was placed through the anterior fascia to close this defect. Attention was then turned to the subcutaneous tissue where a moderate amount of necrotic and purulent tissue was visualized. This was excised sharply with a 10 blade scalpel down to the level of healthy bleeding tissue. Wound cultures were taken. Total wound dimensions of the subcutaneous wound after debridement measured 12.5 cm x 6 cm x 6 cm. This wound did under mean superiorly by 2 cm inferiorly by 6 cm to the right by 2 cm into the left by 4 cm. The entire wound was irrigated with 3 L of normal saline using the Pulsavac. Given the infective nature of this wound, decision was made to not close and replace the wound VAC. Adaptic was placed overlying the now close defect in the anterior fascia. 2 black sponges were cut to size and placed in the wound. The wound VAC was set to 125 mmHg. The patient was then allowed to awaken, extubated, and transferred to the PACU having tolerated the procedure well. All instrument, needle, and sponge counts were correct at the end of the case. Radiofrequency detection was negative.     Dr. Jolene Vallejo was present for the entire procedure      Patient Disposition:  PACU         SIGNATURE: Tiarra Winters DO  DATE: October 11, 2023  TIME: 12:06 PM

## 2023-10-12 ENCOUNTER — ANESTHESIA EVENT (INPATIENT)
Dept: PERIOP | Facility: HOSPITAL | Age: 43
End: 2023-10-12
Payer: COMMERCIAL

## 2023-10-12 LAB
ANION GAP SERPL CALCULATED.3IONS-SCNC: 10 MMOL/L
BUN SERPL-MCNC: 11 MG/DL (ref 5–25)
CALCIUM SERPL-MCNC: 8.5 MG/DL (ref 8.4–10.2)
CHLORIDE SERPL-SCNC: 105 MMOL/L (ref 96–108)
CO2 SERPL-SCNC: 23 MMOL/L (ref 21–32)
CREAT SERPL-MCNC: 0.54 MG/DL (ref 0.6–1.3)
ERYTHROCYTE [DISTWIDTH] IN BLOOD BY AUTOMATED COUNT: 13.2 % (ref 11.6–15.1)
GFR SERPL CREATININE-BSD FRML MDRD: 129 ML/MIN/1.73SQ M
GLUCOSE SERPL-MCNC: 86 MG/DL (ref 65–140)
HCT VFR BLD AUTO: 36.7 % (ref 36.5–49.3)
HGB BLD-MCNC: 12.1 G/DL (ref 12–17)
MCH RBC QN AUTO: 30 PG (ref 26.8–34.3)
MCHC RBC AUTO-ENTMCNC: 33 G/DL (ref 31.4–37.4)
MCV RBC AUTO: 91 FL (ref 82–98)
PLATELET # BLD AUTO: 406 THOUSANDS/UL (ref 149–390)
PMV BLD AUTO: 9.3 FL (ref 8.9–12.7)
POTASSIUM SERPL-SCNC: 3.7 MMOL/L (ref 3.5–5.3)
RBC # BLD AUTO: 4.03 MILLION/UL (ref 3.88–5.62)
SODIUM SERPL-SCNC: 138 MMOL/L (ref 135–147)
WBC # BLD AUTO: 8.3 THOUSAND/UL (ref 4.31–10.16)

## 2023-10-12 PROCEDURE — 99024 POSTOP FOLLOW-UP VISIT: CPT | Performed by: SPECIALIST

## 2023-10-12 PROCEDURE — 85027 COMPLETE CBC AUTOMATED: CPT | Performed by: PHYSICIAN ASSISTANT

## 2023-10-12 PROCEDURE — 80048 BASIC METABOLIC PNL TOTAL CA: CPT | Performed by: PHYSICIAN ASSISTANT

## 2023-10-12 RX ADMIN — HEPARIN SODIUM 5000 UNITS: 5000 INJECTION INTRAVENOUS; SUBCUTANEOUS at 21:01

## 2023-10-12 RX ADMIN — PIPERACILLIN AND TAZOBACTAM 3.38 G: 36; 4.5 INJECTION, POWDER, FOR SOLUTION INTRAVENOUS at 13:32

## 2023-10-12 RX ADMIN — HEPARIN SODIUM 5000 UNITS: 5000 INJECTION INTRAVENOUS; SUBCUTANEOUS at 05:07

## 2023-10-12 RX ADMIN — PIPERACILLIN AND TAZOBACTAM 3.38 G: 36; 4.5 INJECTION, POWDER, FOR SOLUTION INTRAVENOUS at 20:13

## 2023-10-12 RX ADMIN — SODIUM CHLORIDE, SODIUM LACTATE, POTASSIUM CHLORIDE, AND CALCIUM CHLORIDE 100 ML/HR: .6; .31; .03; .02 INJECTION, SOLUTION INTRAVENOUS at 20:58

## 2023-10-12 RX ADMIN — SODIUM CHLORIDE, SODIUM LACTATE, POTASSIUM CHLORIDE, AND CALCIUM CHLORIDE 100 ML/HR: .6; .31; .03; .02 INJECTION, SOLUTION INTRAVENOUS at 07:33

## 2023-10-12 RX ADMIN — HEPARIN SODIUM 5000 UNITS: 5000 INJECTION INTRAVENOUS; SUBCUTANEOUS at 13:32

## 2023-10-12 RX ADMIN — PIPERACILLIN AND TAZOBACTAM 3.38 G: 36; 4.5 INJECTION, POWDER, FOR SOLUTION INTRAVENOUS at 08:58

## 2023-10-12 RX ADMIN — PIPERACILLIN AND TAZOBACTAM 3.38 G: 36; 4.5 INJECTION, POWDER, FOR SOLUTION INTRAVENOUS at 01:26

## 2023-10-12 RX ADMIN — OXYCODONE HYDROCHLORIDE AND ACETAMINOPHEN 1 TABLET: 5; 325 TABLET ORAL at 23:24

## 2023-10-12 NOTE — PLAN OF CARE
Problem: PAIN - ADULT  Goal: Verbalizes/displays adequate comfort level or baseline comfort level  Description: Interventions:  - Encourage patient to monitor pain and request assistance  - Assess pain using appropriate pain scale  - Administer analgesics based on type and severity of pain and evaluate response  - Implement non-pharmacological measures as appropriate and evaluate response  - Consider cultural and social influences on pain and pain management  - Notify physician/advanced practitioner if interventions unsuccessful or patient reports new pain  Outcome: Progressing     Problem: INFECTION - ADULT  Goal: Absence or prevention of progression during hospitalization  Description: INTERVENTIONS:  - Assess and monitor for signs and symptoms of infection  - Monitor lab/diagnostic results  - Monitor all insertion sites, i.e. indwelling lines, tubes, and drains  - Monitor endotracheal if appropriate and nasal secretions for changes in amount and color  - [place] appropriate cooling/warming therapies per order  - Administer medications as ordered  - Instruct and encourage patient and family to use good hand hygiene technique  - Identify and instruct in appropriate isolation precautions for identified infection/condition  Outcome: Progressing     Problem: SAFETY ADULT  Goal: Patient will remain free of falls  Description: INTERVENTIONS:  - Educate patient/family on patient safety including physical limitations  - Instruct patient to call for assistance with activity   - Consult OT/PT to assist with strengthening/mobility   - Keep Call bell within reach  - Keep bed low and locked with side rails adjusted as appropriate  - Keep care items and personal belongings within reach  - Initiate and maintain comfort rounds  - Make Fall Risk Sign visible to staff  - Offer Toileting every 2 Hours, in advance of need  - Initiate/Maintain bed alarm  - Apply yellow socks and bracelet for high fall risk patients  - Consider moving patient to room near nurses station  Outcome: Progressing

## 2023-10-12 NOTE — CASE MANAGEMENT
Case Management Assessment & Discharge Planning Note    Patient name Raj Chowdhury  Location 2 [facility] 203/2 [address] 0 MRN [id number]  : 1980 Date 10/12/2023       Current Admission Date: 10/5/2023  Current Admission [facility]   Patient Active Problem List    Diagnosis Date Noted    Hematoma 10/06/2023    Morbid (severe) obesity due to excess calories ([address]) 2023    Obesity, Class II, BMI 35-39.9 2023    History of diabetes mellitus, type II 2023    S/P bariatric surgery 2022    Hyperhidrosis 2022    Dizziness 10/06/2022    Intertriginous candidiasis 10/06/2022    Postsurgical malabsorption 2022    Encounter for surgical aftercare following surgery of digestive system 2022    Morbid obesity due to excess calories ([address]) 2022    MANUELA (obstructive sleep apnea)     Abnormal flow volume loop concerning for fixed airway obstruction 2021    Type 2 diabetes mellitus without complication, with long-term current use of insulin ([address]) 2021    Ventral hernia without obstruction or gangrene 2021    Benign essential hypertension 2018    Irritable bowel syndrome with diarrhea 2018    GERD (gastroesophageal reflux disease) 2017    Migraine headache 2014    Anxiety 2013    Mixed hyperlipidemia 2013      LOS (days): 7  Geometric Mean LOS (GMLOS) (days):   Days to GMLOS:     OBJECTIVE:  PATIENT READMITTED TO HOSPITAL  Risk of Unplanned Readmission Score: 8.83     Current admission status: Inpatient    Preferred Pharmacy:   [facility] [address] [place] [address]  Phone: [phone] Fax: [phone]    Primary Care Provider: Blas Unger MD    Primary Insurance: Serenity SCHULZ  Secondary Insurance:     ASSESSMENT:  [place] Proxies    There are no active Health Care Proxies on file.        Readmission Root Cause  30 Day Readmission: Yes  Who directed you to return to the hospital?: Specialist (Surgeon)  Did you understand whom to contact if you had questions or problems?: Yes  Did you get your prescriptions before you left the hospital?: No  Reason[de-identified] Preference for own pharmacy  Were you able to get your prescriptions filled when you left the hospital?: Yes  Did you take your medications as prescribed?: Yes  Were you able to get to your follow-up appointments?: Yes  During previous admission, was a post-acute recommendation made?: No  Patient was readmitted due to: hematoma, s/p epigastric hernia repair with retrorectus biologic mesh 9/20/2023  Action Plan: surgical management    Patient Information  Admitted from[de-identified] Home  Mental Status: Alert  During Assessment patient was accompanied by: Not accompanied during assessment  Assessment information provided by[de-identified] Patient  Primary Caregiver: Self  Support Systems: Spouse/significant other  [place] of Residence: [address] [place] [address] do you live in?: [address] [place] [address] Road entry access options.  Select all that apply.: Stairs  Number of steps to enter home.:  (several flights)  Type of Current Residence: Apartment  Floor Level: 3  Upon entering residence, is there a bedroom on the main floor (no further steps)?: Yes  Upon entering residence, is there a bathroom on the main floor (no further steps)?: Yes  In the last 12 months, was there a time when you were not able to pay the mortgage or rent on time?: Yes  In the last 12 months, how many places have you lived?: 1  In the last 12 months, was there a time when you did not have a steady place to sleep or slept in a shelter (including now)?: No  Homeless/housing insecurity resource given?: No (pt has been in contact with multiple agencies already, per pt no need for further information)  Living Arrangements: Lives w/ Spouse/significant other (and two sons)    Activities of Daily Living Prior to Admission  Functional Status: Independent  Completes ADLs independently?: Yes  Ambulates independently?: Yes  Does patient use assisted devices?: No  Does patient currently own DME?: Yes  What DME does the patient currently own?: [employer]  Does patient have a history of Outpatient Therapy (PT/OT)?: No  Does the patient have a history of Short-Term Rehab?: No  Does patient have a history of HHC?: No  Does patient currently have [facility]?: No    Patient Information Continued  Income Source: Unemployed  Does patient have prescription coverage?: Yes ([employer])  Within the past 12 months, you worried that your food would run out before you got the money to buy more.: Never true  Within the past 12 months, the food you bought just didn't last and you didn't have money to get more.: Never true  Food insecurity resource given?: N/A (on food stamps)  Does patient receive dialysis treatments?: No    Means of Transportation  Means of Transport to Appts[de-identified] Drives Self  In the past 12 months, has lack of transportation kept you from medical appointments or from getting medications?: No  In the past 12 months, has lack of transportation kept you from meetings, work, or from getting things needed for daily living?: No  Was application for public transport provided?: N/A    DISCHARGE DETAILS:    Discharge planning discussed with[de-identified] Patient  Freedom of Choice: Yes  Comments - Freedom of Choice: SW met with pt to assess needs and discuss plans. Earlier today PA with surgery requested wound vac and home care services be arranged for pt in the event vac is needed upon discharge. SW discussed recommendations with pt. Pt is hoping he will not need vac at home but understands need to plan ahead. Reviewed plan to make blanket home care referrals to determine agency availability as well as initiate wound vac order. Pt agreeable with referral proces. SW will follow to monitor needs and assist as needed.     Requested [address]          Is the patient interested in [facility] at discharge?: Yes  [facility] requested[de-identified] [facility] Name[de-identified] Other  [address] Provider[de-identified] Referring Provider Ayan Rodney MD)  Home Health Services Needed[de-identified] Wound/Ostomy Care  Homebound Criteria Met[de-identified] Requires the Assistance of Another Person for Safe Ambulation or to Leave the Home  Supporting Clincal Findings[de-identified] Limited Endurance    DME Referral Provided  Referral made for DME?: No    Other Referral/Resources/Interventions Provided:  Interventions: HHC, Wound Vac  Referral Comments: Referrals for home care have been made. Wound vac order form completed by PA and sent to [facility] Dis[facility] Support for processing.     Treatment Team Recommendation: Home with [address] [facility]  Discharge Destination Plan[de-identified] Home with [address] [facility] at Discharge : Family

## 2023-10-12 NOTE — PLAN OF CARE
Problem: PAIN - ADULT  Goal: Verbalizes/displays adequate comfort level or baseline comfort level  Description: Interventions:  - Encourage patient to monitor pain and request assistance  - Assess pain using appropriate pain scale  - Administer analgesics based on type and severity of pain and evaluate response  - Implement non-pharmacological measures as appropriate and evaluate response  - Consider cultural and social influences on pain and pain management  - Notify physician/advanced practitioner if interventions unsuccessful or patient reports new pain  Outcome: Progressing     Problem: INFECTION - ADULT  Goal: Absence or prevention of progression during hospitalization  Description: INTERVENTIONS:  - Assess and monitor for signs and symptoms of infection  - Monitor lab/diagnostic results  - Monitor all insertion sites, i.e. indwelling lines, tubes, and drains  - Monitor endotracheal if appropriate and nasal secretions for changes in amount and color  - [place] appropriate cooling/warming therapies per order  - Administer medications as ordered  - Instruct and encourage patient and family to use good hand hygiene technique  - Identify and instruct in appropriate isolation precautions for identified infection/condition  Outcome: Progressing     Problem: SAFETY ADULT  Goal: Patient will remain free of falls  Description: INTERVENTIONS:  - Educate patient/family on patient safety including physical limitations  - Instruct patient to call for assistance with activity   - Consult OT/PT to assist with strengthening/mobility   - Keep Call bell within reach  - Keep bed low and locked with side rails adjusted as appropriate  - Keep care items and personal belongings within reach  - Initiate and maintain comfort rounds  - Make Fall Risk Sign visible to staff  - Offer Toileting every 2 Hours, in advance of need  - Initiate/Maintain bed alarm  - Apply yellow socks and bracelet for high fall risk patients  - Consider moving patient to room near nurses station  Outcome: Progressing  Goal: Maintain or return to baseline ADL function  Description: INTERVENTIONS:  -  Assess patient's ability to carry out ADLs; assess patient's baseline for ADL function and identify physical deficits which impact ability to perform ADLs (bathing, care of mouth/teeth, toileting, grooming, dressing, etc.)  - Assess/evaluate cause of self-care deficits   - Assess range of motion  - Assess patient's mobility; develop plan if impaired  - Assess patient's need for assistive devices and provide as appropriate  - Encourage maximum independence but intervene and supervise when necessary  - Involve family in performance of ADLs  - Assess for home care needs following discharge   - Consider OT consult to assist with ADL evaluation and planning for discharge  - Provide patient education as appropriate  Outcome: Progressing  Goal: Maintains/Returns to pre admission functional level  Description: INTERVENTIONS:  - Perform BMAT or MOVE assessment daily.   - Set and communicate daily mobility goal to care team and patient/family/caregiver. - Collaborate with rehabilitation services on mobility goals if consulted  - Perform Range of Motion 2 times a day. - Reposition patient every 2 hours.   - Dangle patient 2 times a day  - Stand patient 2 times a day  - Ambulate patient 2 times a day  - Out of bed to chair 2 times a day   - Out of bed for meals 2 times a day  - Out of bed for toileting  - Record patient progress and toleration of activity level   Outcome: Progressing     Problem: DISCHARGE PLANNING  Goal: Discharge to home or other facility with appropriate resources  Description: INTERVENTIONS:  - Identify barriers to discharge w/patient and caregiver  - Arrange for needed discharge resources and transportation as appropriate  - Identify discharge learning needs (meds, wound care, etc.)  - Arrange for interpretive services to assist at discharge as needed  - Refer to Case Management Department for coordinating discharge planning if the patient needs post-hospital services based on physician/advanced practitioner order or complex needs related to functional status, cognitive ability, or social support system  Outcome: Progressing     Problem: Knowledge Deficit  Goal: Patient/family/caregiver demonstrates understanding of disease process, treatment plan, medications, and discharge instructions  Description: Complete learning assessment and assess knowledge base.   Interventions:  - Provide teaching at level of understanding  - Provide teaching via preferred learning methods  Outcome: Progressing

## 2023-10-12 NOTE — CASE MANAGEMENT
Case Management Progress Note    Patient name Yady Mckeon  Location [address]  [facility] MRN [id number]  : 1980 Date 10/12/2023       LOS (days): 7  Geometric Mean LOS (GMLOS) (days):   Days to [facility]:        OBJECTIVE:        Current admission status: Inpatient  Preferred Pharmacy:   [address] [place] [address], [address] [place] [address] [address] [address]  Phone: [phone] Fax: [phone]    Primary Care Provider: Lauren Nj MD    Primary Insurance: [facility]  Secondary Insurance:     PROGRESS NOTE:    Weekly Care Management Length of Stay Review     Current LOS: 7 Days    Most Recent Labs:     Lab Results   Component Value Date/Time    WBC 8.30 10/12/2023 05:06 AM    HGB 12.1 10/12/2023 05:06 AM    HCT 36.7 10/12/2023 05:06 AM     (H) 10/12/2023 05:06 AM    SODIUM 138 10/12/2023 05:06 AM    K 3.7 10/12/2023 05:06 AM     10/12/2023 05:06 AM    CO2 23 10/12/2023 05:06 AM    BUN 11 10/12/2023 05:06 AM    CREATININE 0.54 (L) 10/12/2023 05:06 AM    GLUC 86 10/12/2023 05:06 AM       Most Recent Vitals:   Vitals:    10/12/23 0728   BP: 115/79   Pulse:    Resp: 18   Temp: 98.3 °F (36.8 °C)   SpO2: 97%        Identified Barriers to Discharge/Discharge Goals/Care Management Interventions:     C/w wound vac, IV ABX, OR 10/13. Not medically stable.      Intended Discharge Disposition: TBD    Expected Discharge Date: TBD

## 2023-10-12 NOTE — ANESTHESIA PREPROCEDURE EVALUATION
Procedure:  DEBRIDEMENT WOUND ABDOMINAL [facility] OUT) (Abdomen)    ECG: ST  TTE: EF 65%, RV wnl  Prior GA Mac 3 G1v    Obese class I  MANUELA CPAP HS  Zosyn ABX    Denies the following: CP/SOB with exertion, asthma, COPD, stroke/TIA, seizure      Relevant Problems   CARDIO   (+) Benign essential hypertension   (+) Migraine headache   (+) Mixed hyperlipidemia      ENDO   (+) Type 2 diabetes mellitus without complication, with long-term current use of insulin (HCC)      GI/HEPATIC   (+) GERD (gastroesophageal reflux disease)      NEURO/PSYCH   (+) Anxiety   (+) Migraine headache      PULMONARY   (+) MANUELA (obstructive sleep apnea)        Physical Exam    Airway    Mallampati score: II  TM Distance: >3 FB  Neck ROM: full     Dental        Cardiovascular      Pulmonary      Other Findings        Anesthesia Plan  ASA Score- 2     Anesthesia Type- general with ASA Monitors. Additional Monitors:     Airway Plan: ETT. Plan Factors-Exercise tolerance (METS): >4 METS. Chart reviewed. EKG reviewed. Existing labs reviewed. Patient summary reviewed. Patient is not a current smoker. Obstructive sleep apnea risk education given perioperatively. Induction- intravenous. Postoperative Plan-     Informed Consent- Anesthetic plan and risks discussed with patient. I personally reviewed this patient with the CRNA. Discussed and agreed on the Anesthesia Plan with the CRNA. Lidia Phalen

## 2023-10-13 ENCOUNTER — ANESTHESIA (INPATIENT)
Dept: PERIOP | Facility: HOSPITAL | Age: 43
End: 2023-10-13
Payer: COMMERCIAL

## 2023-10-13 VITALS
RESPIRATION RATE: 18 BRPM | TEMPERATURE: 98.2 F | SYSTOLIC BLOOD PRESSURE: 121 MMHG | WEIGHT: 240 LBS | HEART RATE: 60 BPM | BODY MASS INDEX: 35.55 KG/M2 | HEIGHT: 69 IN | DIASTOLIC BLOOD PRESSURE: 82 MMHG | OXYGEN SATURATION: 96 %

## 2023-10-13 LAB
ANION GAP SERPL CALCULATED.3IONS-SCNC: 8 MMOL/L
BACTERIA SPEC ANAEROBE CULT: NORMAL
BUN SERPL-MCNC: 11 MG/DL (ref 5–25)
CALCIUM SERPL-MCNC: 8.6 MG/DL (ref 8.4–10.2)
CHLORIDE SERPL-SCNC: 107 MMOL/L (ref 96–108)
CO2 SERPL-SCNC: 26 MMOL/L (ref 21–32)
CREAT SERPL-MCNC: 0.63 MG/DL (ref 0.6–1.3)
ERYTHROCYTE [DISTWIDTH] IN BLOOD BY AUTOMATED COUNT: 13.2 % (ref 11.6–15.1)
GFR SERPL CREATININE-BSD FRML MDRD: 121 ML/MIN/1.73SQ M
GLUCOSE SERPL-MCNC: 80 MG/DL (ref 65–140)
HCT VFR BLD AUTO: 39.7 % (ref 36.5–49.3)
HGB BLD-MCNC: 12.5 G/DL (ref 12–17)
MCH RBC QN AUTO: 28.8 PG (ref 26.8–34.3)
MCHC RBC AUTO-ENTMCNC: 31.5 G/DL (ref 31.4–37.4)
MCV RBC AUTO: 92 FL (ref 82–98)
PLATELET # BLD AUTO: 382 THOUSANDS/UL (ref 149–390)
PMV BLD AUTO: 9.1 FL (ref 8.9–12.7)
POTASSIUM SERPL-SCNC: 4 MMOL/L (ref 3.5–5.3)
RBC # BLD AUTO: 4.34 MILLION/UL (ref 3.88–5.62)
SODIUM SERPL-SCNC: 141 MMOL/L (ref 135–147)
WBC # BLD AUTO: 7.14 THOUSAND/UL (ref 4.31–10.16)

## 2023-10-13 PROCEDURE — 0JD80ZZ EXTRACTION OF ABDOMEN SUBCUTANEOUS TISSUE AND FASCIA, OPEN APPROACH: ICD-10-PCS | Performed by: SURGERY

## 2023-10-13 PROCEDURE — 97605 NEG PRS WND THER DME<=50SQCM: CPT | Performed by: SURGERY

## 2023-10-13 PROCEDURE — 80048 BASIC METABOLIC PNL TOTAL CA: CPT | Performed by: PHYSICIAN ASSISTANT

## 2023-10-13 PROCEDURE — NC001 PR NO CHARGE: Performed by: SURGERY

## 2023-10-13 PROCEDURE — 97605 NEG PRS WND THER DME<=50SQCM: CPT | Performed by: PHYSICIAN ASSISTANT

## 2023-10-13 PROCEDURE — 85027 COMPLETE CBC AUTOMATED: CPT | Performed by: PHYSICIAN ASSISTANT

## 2023-10-13 PROCEDURE — 99024 POSTOP FOLLOW-UP VISIT: CPT | Performed by: SURGERY

## 2023-10-13 RX ORDER — MAGNESIUM HYDROXIDE 1200 MG/15ML
LIQUID ORAL AS NEEDED
Status: DISCONTINUED | OUTPATIENT
Start: 2023-10-13 | End: 2023-10-13 | Stop reason: HOSPADM

## 2023-10-13 RX ORDER — ONDANSETRON 2 MG/ML
4 INJECTION INTRAMUSCULAR; INTRAVENOUS ONCE AS NEEDED
Status: DISCONTINUED | OUTPATIENT
Start: 2023-10-13 | End: 2023-10-13 | Stop reason: HOSPADM

## 2023-10-13 RX ORDER — PROPOFOL 10 MG/ML
INJECTION, EMULSION INTRAVENOUS AS NEEDED
Status: DISCONTINUED | OUTPATIENT
Start: 2023-10-13 | End: 2023-10-13

## 2023-10-13 RX ORDER — SODIUM CHLORIDE 9 MG/ML
INJECTION, SOLUTION INTRAVENOUS AS NEEDED
Status: DISCONTINUED | OUTPATIENT
Start: 2023-10-13 | End: 2023-10-13 | Stop reason: HOSPADM

## 2023-10-13 RX ORDER — HYDROMORPHONE HCL/PF 1 MG/ML
0.5 SYRINGE (ML) INJECTION
Status: DISCONTINUED | OUTPATIENT
Start: 2023-10-13 | End: 2023-10-13 | Stop reason: HOSPADM

## 2023-10-13 RX ORDER — HYDROMORPHONE HCL/PF 1 MG/ML
0.2 SYRINGE (ML) INJECTION
Status: DISCONTINUED | OUTPATIENT
Start: 2023-10-13 | End: 2023-10-13 | Stop reason: HOSPADM

## 2023-10-13 RX ORDER — METOPROLOL SUCCINATE 25 MG/1
25 TABLET, EXTENDED RELEASE ORAL DAILY
Status: DISCONTINUED | OUTPATIENT
Start: 2023-10-14 | End: 2023-10-13 | Stop reason: HOSPADM

## 2023-10-13 RX ORDER — ALBUTEROL SULFATE 2.5 MG/3ML
2.5 SOLUTION RESPIRATORY (INHALATION) ONCE AS NEEDED
Status: DISCONTINUED | OUTPATIENT
Start: 2023-10-13 | End: 2023-10-13 | Stop reason: HOSPADM

## 2023-10-13 RX ORDER — ACETAMINOPHEN 325 MG/1
650 TABLET ORAL EVERY 6 HOURS PRN
Status: DISCONTINUED | OUTPATIENT
Start: 2023-10-13 | End: 2023-10-13 | Stop reason: HOSPADM

## 2023-10-13 RX ORDER — AMOXICILLIN AND CLAVULANATE POTASSIUM 875; 125 MG/1; MG/1
1 TABLET, FILM COATED ORAL EVERY 12 HOURS SCHEDULED
Qty: 24 TABLET | Refills: 0 | Status: SHIPPED | OUTPATIENT
Start: 2023-10-13 | End: 2023-10-25

## 2023-10-13 RX ORDER — ROCURONIUM BROMIDE 10 MG/ML
INJECTION, SOLUTION INTRAVENOUS AS NEEDED
Status: DISCONTINUED | OUTPATIENT
Start: 2023-10-13 | End: 2023-10-13

## 2023-10-13 RX ORDER — OXYCODONE HYDROCHLORIDE AND ACETAMINOPHEN 5; 325 MG/1; MG/1
1 TABLET ORAL EVERY 4 HOURS PRN
Status: DISCONTINUED | OUTPATIENT
Start: 2023-10-13 | End: 2023-10-13 | Stop reason: HOSPADM

## 2023-10-13 RX ORDER — FENTANYL CITRATE/PF 50 MCG/ML
25 SYRINGE (ML) INJECTION
Status: DISCONTINUED | OUTPATIENT
Start: 2023-10-13 | End: 2023-10-13 | Stop reason: HOSPADM

## 2023-10-13 RX ORDER — LIDOCAINE HYDROCHLORIDE 10 MG/ML
INJECTION, SOLUTION EPIDURAL; INFILTRATION; INTRACAUDAL; PERINEURAL AS NEEDED
Status: DISCONTINUED | OUTPATIENT
Start: 2023-10-13 | End: 2023-10-13

## 2023-10-13 RX ORDER — FENTANYL CITRATE 50 UG/ML
INJECTION, SOLUTION INTRAMUSCULAR; INTRAVENOUS AS NEEDED
Status: DISCONTINUED | OUTPATIENT
Start: 2023-10-13 | End: 2023-10-13

## 2023-10-13 RX ORDER — MIDAZOLAM HYDROCHLORIDE 2 MG/2ML
INJECTION, SOLUTION INTRAMUSCULAR; INTRAVENOUS AS NEEDED
Status: DISCONTINUED | OUTPATIENT
Start: 2023-10-13 | End: 2023-10-13

## 2023-10-13 RX ORDER — DIPHENHYDRAMINE HYDROCHLORIDE 50 MG/ML
25 INJECTION INTRAMUSCULAR; INTRAVENOUS EVERY 6 HOURS PRN
Status: DISCONTINUED | OUTPATIENT
Start: 2023-10-13 | End: 2023-10-13 | Stop reason: HOSPADM

## 2023-10-13 RX ORDER — AMOXICILLIN AND CLAVULANATE POTASSIUM 875; 125 MG/1; MG/1
1 TABLET, FILM COATED ORAL EVERY 12 HOURS SCHEDULED
Status: DISCONTINUED | OUTPATIENT
Start: 2023-10-13 | End: 2023-10-13 | Stop reason: HOSPADM

## 2023-10-13 RX ADMIN — PIPERACILLIN AND TAZOBACTAM 3.38 G: 36; 4.5 INJECTION, POWDER, FOR SOLUTION INTRAVENOUS at 08:19

## 2023-10-13 RX ADMIN — SUGAMMADEX 200 MG: 100 INJECTION, SOLUTION INTRAVENOUS at 11:15

## 2023-10-13 RX ADMIN — HEPARIN SODIUM 5000 UNITS: 5000 INJECTION INTRAVENOUS; SUBCUTANEOUS at 13:40

## 2023-10-13 RX ADMIN — LIDOCAINE HYDROCHLORIDE 50 MG: 10 INJECTION, SOLUTION EPIDURAL; INFILTRATION; INTRACAUDAL; PERINEURAL at 10:46

## 2023-10-13 RX ADMIN — PROPOFOL 200 MG: 10 INJECTION, EMULSION INTRAVENOUS at 10:46

## 2023-10-13 RX ADMIN — SODIUM CHLORIDE, SODIUM LACTATE, POTASSIUM CHLORIDE, AND CALCIUM CHLORIDE 100 ML/HR: .6; .31; .03; .02 INJECTION, SOLUTION INTRAVENOUS at 05:30

## 2023-10-13 RX ADMIN — FENTANYL CITRATE 50 MCG: 50 INJECTION, SOLUTION INTRAMUSCULAR; INTRAVENOUS at 10:48

## 2023-10-13 RX ADMIN — PIPERACILLIN AND TAZOBACTAM 3.38 G: 36; 4.5 INJECTION, POWDER, FOR SOLUTION INTRAVENOUS at 01:00

## 2023-10-13 RX ADMIN — MIDAZOLAM 2 MG: 1 INJECTION INTRAMUSCULAR; INTRAVENOUS at 10:38

## 2023-10-13 RX ADMIN — FENTANYL CITRATE 50 MCG: 50 INJECTION, SOLUTION INTRAMUSCULAR; INTRAVENOUS at 11:06

## 2023-10-13 RX ADMIN — ONDANSETRON 4 MG: 2 INJECTION INTRAMUSCULAR; INTRAVENOUS at 11:09

## 2023-10-13 RX ADMIN — ROCURONIUM BROMIDE 50 MG: 10 INJECTION, SOLUTION INTRAVENOUS at 10:46

## 2023-10-13 RX ADMIN — HYDROMORPHONE HYDROCHLORIDE 0.5 MG: 1 INJECTION, SOLUTION INTRAMUSCULAR; INTRAVENOUS; SUBCUTANEOUS at 05:30

## 2023-10-13 NOTE — CASE MANAGEMENT
Case Management Discharge Planning Note    Patient name Dean Roper 2 [place]  Renetta 0 MRN [id number]  : 1980 Date 10/13/2023       Current Admission Date: 10/5/2023  Current Admission [facility]   Patient Active Problem List    Diagnosis Date Noted    Hematoma 10/06/2023    Morbid (severe) obesity due to excess calories ([address]) 2023    Obesity, Class II, BMI 35-39.9 2023    History of diabetes mellitus, type II 2023    S/P bariatric surgery 2022    Hyperhidrosis 2022    Dizziness 10/06/2022    Intertriginous candidiasis 10/06/2022    Postsurgical malabsorption 2022    Encounter for surgical aftercare following surgery of digestive system 2022    Morbid obesity due to excess calories ([address]) 2022    MANUELA (obstructive sleep apnea)     Abnormal flow volume loop concerning for fixed airway obstruction 2021    Type 2 diabetes mellitus without complication, with long-term current use of insulin ([address]) 2021    Ventral hernia without obstruction or gangrene 2021    Benign essential hypertension 2018    Irritable bowel syndrome with diarrhea 2018    GERD (gastroesophageal reflux disease) 2017    Migraine headache 2014    Anxiety 2013    Mixed hyperlipidemia 2013      LOS (days): 8  Geometric Mean LOS (GMLOS) (days):   Days to GMLOS:     OBJECTIVE:  Risk of Unplanned Readmission Score: 9.08     Current admission status: Inpatient   Preferred Pharmacy:   [facility] [address] [place] [address]  Phone: [phone] Fax: [phone]    Primary Care Provider: Tracey Bentley MD    Primary Insurance: [employer]  Secondary Insurance:     DISCHARGE DETAILS:    Discharge planning discussed with[de-identified] Patient  Freedom of Choice: Yes  Comments - Freedom of Choice: SW following to assist with DCP.   Per surgery PA discahrge is planned for today.  Wound vac order was approved and wound vac was delivered to pt's room by ANNE. [facility]. ANNE provided pt with list of accepting agency and pt requested services through [facility]. Agency will be [facility] in [address] No other discharge needs expressed by pt. Requested [address]         Is the patient interested in [address] at discharge?: Yes  [facility] requested[de-identified] [facility] Name[de-identified] [address] Provider[de-identified] Referring Provider (Dr. Corwin Coles MD)  [facility][de[facility]identified] [facility]  Homebound Criteria Met[de-identified] Requires the Assistance of Another Person for Safe Ambulation or to Leave the Home  Supporting Clincal Findings[de-identified] Limited Endurance    Other Referral/Resources/Interventions Provided:  Interventions: HHC, Wound Vac  Referral Comments: [facility] and are aware of discharge. Wound vac form signed by pt, returned to [facility] [address] and faxed to [facility].     Treatment Team Recommendation: Home with [address]   Discharge Destination Plan[de-identified] Home with [address] at Discharge : Family

## 2023-10-13 NOTE — PLAN OF CARE
Problem: PAIN - ADULT  Goal: Verbalizes/displays adequate comfort level or baseline comfort level  Description: Interventions:  - Encourage patient to monitor pain and request assistance  - Assess pain using appropriate pain scale  - Administer analgesics based on type and severity of pain and evaluate response  - Implement non-pharmacological measures as appropriate and evaluate response  - Consider cultural and social influences on pain and pain management  - Notify physician/advanced practitioner if interventions unsuccessful or patient reports new pain  Outcome: Progressing     Problem: INFECTION - ADULT  Goal: Absence or prevention of progression during hospitalization  Description: INTERVENTIONS:  - Assess and monitor for signs and symptoms of infection  - Monitor lab/diagnostic results  - Monitor all insertion sites, i.e. indwelling lines, tubes, and drains  - Monitor endotracheal if appropriate and nasal secretions for changes in amount and color  - [place] appropriate cooling/warming therapies per order  - Administer medications as ordered  - Instruct and encourage patient and family to use good hand hygiene technique  - Identify and instruct in appropriate isolation precautions for identified infection/condition  Outcome: Progressing     Problem: SAFETY ADULT  Goal: Patient will remain free of falls  Description: INTERVENTIONS:  - Educate patient/family on patient safety including physical limitations  - Instruct patient to call for assistance with activity   - Consult OT/PT to assist with strengthening/mobility   - Keep Call bell within reach  - Keep bed low and locked with side rails adjusted as appropriate  - Keep care items and personal belongings within reach  - Initiate and maintain comfort rounds  - Make Fall Risk Sign visible to staff  - Offer Toileting every 2 Hours, in advance of need  - Initiate/Maintain bed alarm  - Apply yellow socks and bracelet for high fall risk patients  - Consider moving patient to room near nurses station  Outcome: Progressing  Goal: Maintain or return to baseline ADL function  Description: INTERVENTIONS:  -  Assess patient's ability to carry out ADLs; assess patient's baseline for ADL function and identify physical deficits which impact ability to perform ADLs (bathing, care of mouth/teeth, toileting, grooming, dressing, etc.)  - Assess/evaluate cause of self-care deficits   - Assess range of motion  - Assess patient's mobility; develop plan if impaired  - Assess patient's need for assistive devices and provide as appropriate  - Encourage maximum independence but intervene and supervise when necessary  - Involve family in performance of ADLs  - Assess for home care needs following discharge   - Consider OT consult to assist with ADL evaluation and planning for discharge  - Provide patient education as appropriate  Outcome: Progressing  Goal: Maintains/Returns to pre admission functional level  Description: INTERVENTIONS:  - Perform BMAT or MOVE assessment daily.   - Set and communicate daily mobility goal to care team and patient/family/caregiver. - Collaborate with rehabilitation services on mobility goals if consulted  - Perform Range of Motion 2 times a day. - Reposition patient every 2 hours.   - Dangle patient 2 times a day  - Stand patient 2 times a day  - Ambulate patient 2 times a day  - Out of bed to chair 2 times a day   - Out of bed for meals 2 times a day  - Out of bed for toileting  - Record patient progress and toleration of activity level   Outcome: Progressing     Problem: DISCHARGE PLANNING  Goal: Discharge to home or other facility with appropriate resources  Description: INTERVENTIONS:  - Identify barriers to discharge w/patient and caregiver  - Arrange for needed discharge resources and transportation as appropriate  - Identify discharge learning needs (meds, wound care, etc.)  - Arrange for interpretive services to assist at discharge as needed  - Refer to Case Management Department for coordinating discharge planning if the patient needs post-hospital services based on physician/advanced practitioner order or complex needs related to functional status, cognitive ability, or social support system  Outcome: Progressing     Problem: Knowledge Deficit  Goal: Patient/family/caregiver demonstrates understanding of disease process, treatment plan, medications, and discharge instructions  Description: Complete learning assessment and assess knowledge base. Interventions:  - Provide teaching at level of understanding  - Provide teaching via preferred learning methods  Outcome: Progressing     Problem: Nutrition/Hydration-ADULT  Goal: Nutrient/Hydration intake appropriate for improving, restoring or maintaining nutritional needs  Description: Monitor and assess patient's nutrition/hydration status for malnutrition. Collaborate with interdisciplinary team and initiate plan and interventions as ordered. Monitor patient's weight and dietary intake as ordered or per policy. Utilize nutrition screening tool and intervene as necessary. Determine patient's food preferences and provide high-protein, high-caloric foods as appropriate.      INTERVENTIONS:  - Monitor oral intake, urinary output, labs, and treatment plans  - Assess nutrition and hydration status and recommend course of action  - Evaluate amount of meals eaten  - Assist patient with eating if necessary   - Allow adequate time for meals  - Recommend/ encourage appropriate diets, oral nutritional supplements, and vitamin/mineral supplements  - Order, calculate, and assess calorie counts as needed  - Recommend, monitor, and adjust tube feedings and TPN based on assessed needs  - Assess need for intravenous fluids  - Provide nutrition/hydration education as appropriate  - Include patient/family/caregiver in decisions related to nutrition  Outcome: Progressing

## 2023-10-13 NOTE — DISCHARGE SUMMARY
Discharge Summary - Jonna Hernández 44 y.o. male MRN: [id number]    Unit/Bed#: [facility] Encounter: [id number]    Admission Date: 10/5/2023   Discharge Date: 10/13/23    Admitting Diagnosis:   Hematoma of abdominal wall, initial encounter    Discharge Diagnoses: Principal Problem:    Hematoma      Consultations: N/A    Procedures Performed: Evacuation of hematoma and wound wash out with applications wound vac 57/1. Abdominal wound washout and vac placement on 10/11 and 10/13. HPI per Dr. Stephie Parnell. Jonna Hernández is a 44 y.o. male who is status post epigastric hernia repair with retrorectus biologic mesh surgery was performed on 20 September 2023. About 1 week postoperatively for ecchymosis around the incision this is most consistent with a hematoma. Since that time, patient has had some dark blood coming from the MARCELLO drain. There was approximately 110 cc in the last 24 hours. On exam, patient has likely a large hematoma that feels subcutaneous but could be coming through the fascial repair. Drain is currently clotted. Patient was admitted yesterday for planned wound exploration and washout today. Hospital Course: Jonna Hernández is a 44 y.o. male admitted for evacuation of hematoma. Patient with hx elective epigastric hernia repair 9/20 presented with abdominal wall hematoma, then evacuation of hematoma, washout and vac placement 10/6. S/p wound exploration washout and application of vac on 25/14. Patient was taken back to the OR on 10/13 for wound washout and application of hematoma. Upon discharge patient will complete total antibiotic course of 14 days. Patient with transition to PO augmentin twice daily upon discharge. Patient discharged with wound vac. Patient will following in wound center once a week and with a visiting a nurse once a week. Follow up with Dr. Stephie Parnell in 10-14 days. Patient verbalizes understanding and agrees with treatment plan.      Condition at Discharge: stable     Discharge instructions/Information to patient and family:   See after visit summary for information provided to patient and family. Provisions for Follow-Up Care:  See after visit summary for information related to follow-up care and any pertinent home health orders. Disposition: See After Visit Summary for discharge disposition information. Planned Readmission: No    Discharge Statement   I spent 30 minutes discharging the patient. This time was spent on the day of discharge. I had direct contact with the patient on the day of discharge. Additional documentation is required if more than 30 minutes were spent on discharge. Discharge Medications:  See after visit summary for reconciled discharge medications provided to patient and family.

## 2023-10-13 NOTE — PROGRESS NOTES
-- Patient:  -- MRN: [id number]  -- Aidin Request ID: [id number]  -- Level of care reserved: Brayan Duron  -- Partner Reserved: [place]ajan Juvenal, [address] [place] [address] [phone]  -- Clinical needs requested:  -- Geography searched: [phone]  -- Start of Service:  -- Request sent: 3:18pm EDT on 10/12/2023 by Sinan Bedolla  -- Partner reserved: 3:48pm EDT on 10/13/2023 by Sinan Bedolla  -- Choice list shared: 9:19am EDT on 10/13/2023 by Sinan Bedolla

## 2023-10-13 NOTE — CASE MANAGEMENT
[address] [place] [address] has received approval to release wound vac to patient. Assigned vac #: [id number]  Proof of delivery PPW given to Care Manager to deliver to patient.

## 2023-10-13 NOTE — OP NOTE
OPERATIVE REPORT  PATIENT NAME: Juaquin Cho    :  1980  MRN: [id number]  Pt Location: [facility] OR ROOM 01    SURGERY DATE: 10/13/2023    Surgeon(s) and Role:     * Kassy Mejias MD - Primary     * Nicolas Cochran PA-C - Assisting    Preop Diagnosis:  Hematoma [T14. 8XXA]    Post-Op Diagnosis Codes:     * Hematoma [T14. 8XXA]    Procedure(s):  DEBRIDEMENT WOUND ABDOMINAL ([address] [place] [address] OUT)    Specimen(s):  * No specimens in log *    Estimated Blood Loss:   Minimal    Drains:  * No LDAs found *    Anesthesia Type:   Choice    Operative Indications:  Infected hematoma    Operative Findings:  No bleeding, purulence or devitalized tissue    Complications:   None    Procedure and Technique:  Wound washout and VAC placement. Patient was taken back to main operating room, placed supine the operative table, general anesthesia was induced, the VAC dressing of the abdomen was removed and the abdomen was prepped and draped in normal fashion. Wound exploration was performed. There is no purulent fluid noted there is also no devitalized tissue. The wound was copiously irrigated with the Pulsavac device utilizing 2 L of warm saline solution. Several small bleeding areas were controlled with Bovie cautery and 3-0 Vicryl suture. The area was again irrigated and Adaptic was placed over the fascial closure. A single black sponge was placed. The size of the wound: 11 cm x 4 cm x 3.5 cm deep. The VAC dressing was placed per standard fashion. The patient woke from general anesthesia, was extubated in the operating, sent to the PACU in stable condition. BOSSMAN Cochran was required for technical assistance and retraction   I was present for the entire procedure., A qualified resident physician was not available. , and A physician assistant was required during the procedure for retraction, tissue handling, dissection and suturing.     Patient Disposition:  PACU         SIGNATURE: Kassy Mejias MD  DATE:  2023  TIME: 11:21 AM

## 2023-10-13 NOTE — PROGRESS NOTES
Progress Note - General Surgery   Marvin Moreland 44 y.o. male MRN: [id number]  Unit/Bed#: [address] [place] [address] Encounter: [id number]    Assessment:  43 y/o m hx elective epigastric hernia repair 9/20 presented with abdominal wall hematoma, then evacuation of hematoma, washout and vac placement 10/6. S/p wound exploration washout and application of vac on 76/89. Intraoperative findings: "Abscess between mesh and anterior fascia, drained. Posterior fascia intact. Sharp excisional debridement of necrotic and purulent appearing. WBC: 7.14 (8.30)     Plan:  NPO  Continue wound vac. Plan for abdominal wound washout today. IV abx zosn. Discharge planning. Subjective/Objective   Chief Complaint:" I am okay."     Subjective: Patient seen examined bedside. Patient reports no acute changes through the night. Denies any fever chills, nausea vomiting, chest pain or shortness of breath. Denies any issues with the wound VAC overnight. Objective:    Blood pressure 128/88, pulse 92, temperature 97.9 °F (36.6 °C), temperature source Oral, resp. rate 18, height 5' 9" (1.753 m), weight 109 kg (240 lb), SpO2 96 %. ,Body mass index is 35.44 kg/m². Intake/Output Summary (Last 24 hours) at 10/13/2023 0828  Last data filed at 10/12/2023 2310  Gross per 24 hour   Intake --   Output 1400 ml   Net -1400 ml       Invasive Devices       Peripheral Intravenous Line  Duration             Peripheral IV 10/12/23 Distal;Left;Ventral (anterior) Forearm <1 day                    Physical Exam: /88 (BP Location: Right arm)   Pulse 92   Temp 97.9 °F (36.6 °C) (Oral)   Resp 18   Ht 5' 9" (1.753 m)   Wt 109 kg (240 lb)   SpO2 96%   BMI 35.44 kg/m²   General appearance: alert and oriented, in no acute distress  Head: Normocephalic, without obvious abnormality, atraumatic  Lungs:  Normal effort, no respiratory distress. Heart:  Regular rate, well-perfused.   Abdomen: soft, non-tender; bowel sounds normal; no masses,  no organomegaly and wound VAC intact. Lab, Imaging and other studies:I have personally reviewed pertinent lab results.   , CBC:   Lab Results   Component Value Date    WBC 7.14 10/13/2023    HGB 12.5 10/13/2023    HCT 39.7 10/13/2023    MCV 92 10/13/2023     10/13/2023    RBC 4.34 10/13/2023    MCH 28.8 10/13/2023    MCHC 31.5 10/13/2023    RDW 13.2 10/13/2023    MPV 9.1 10/13/2023   , CMP:   Lab Results   Component Value Date    SODIUM 141 10/13/2023    K 4.0 10/13/2023     10/13/2023    CO2 26 10/13/2023    BUN 11 10/13/2023    CREATININE 0.63 10/13/2023    CALCIUM 8.6 10/13/2023    EGFR 121 10/13/2023     VTE Pharmacologic Prophylaxis: Heparin  VTE Mechanical Prophylaxis: sequential compression device

## 2023-10-13 NOTE — DISCHARGE INSTR - OTHER ORDERS
Abdominal midline wound. Change wound vac twice a week(Monday/Friday). Patient will follow with wound care center once a week and a visiting  nurse once a week. Cleanse wound with Saline. Place adaptic at base of wound. Then place back sponge on top of adaptic. Change wound vac canister as needed.      Wound Vac Settings for Discharge    Pressure:  125  Suction :  Continuous  Sponge Color:  Black

## 2023-10-13 NOTE — UTILIZATION REVIEW
Continued Stay Review    Date: 11-14 and 13-23                           Current Patient Class: inpatient   Current Level of Care: med surg    HPI:43 y.o. male initially admitted on 10-5-23    Status post elective epigastric hernia repair patient presented with abdominal wall hematoma  Status post washout and KCI wound VAC placement    10-6 evacuation of hematoma , vac placement  10-11 removal of wound vac, wound exploration, washout, excisional debridement, replacement of wound vac  10-13-23 debridement of abdominal wound / washout. Assessment/Plan:     Wound culture show pseudomonas and staph aureus. Antibiotic therapy included iv flagyl and iv ancef on 10-11-23 once each. I v zosyn q6 hr starting 10-12-23 with transition to Augmentin today 10-13. IV fluids  LR  100/hr  continued on 10-11, 12 and completed today at 1245. Prn iv zofran given at 1109 today and prn iv dilaudid given at 0503 today. Discharge plan 10-13-23 Transition to oral antibiotic today and discharge to home with wound vac. Follow up with visiting nurse weekly and wound center weekly.            Vital Signs:     Date/Time Temp Pulse Resp BP MAP (mmHg) SpO2 O2 Device   10/13/23 12:51:32 98.2 °F (36.8 °C) -- -- 121/83 96 -- --   10/13/23 1230 -- 60 20 122/74 -- -- --   10/13/23 1215 -- 84 20 121/71 -- -- --   10/13/23 1145 -- 83 20 121/71 -- -- --   10/13/23 1128 97.9 °F (36.6 °C) 88 20 124/73 -- 96 % None (Room air)   10/13/23 08:22:15 97.9 °F (36.6 °C) 92 18 128/88 101 96 % None (Room air)   10/12/23 22:30:28 97.9 °F (36.6 °C) 102 17 153/93 113 94 % None (Room air)   10/12/23 20:12:35 99.2 °F (37.3 °C) 105 18 122/82 95 99 % --   10/12/23 07:28:59 98.3 °F (36.8 °C) -- 18 115/79 91 97 % None (Room air)   10/11/23 22:53:46 98.1 °F (36.7 °C) 104 18 114/69 84 96 % --   10/11/23 19:02:27 98.2 °F (36.8 °C) 101 18 132/82 99 97 % None (Room air)   10/11/23 1500 97.8 °F (36.6 °C) 129 Abnormal  18 144/74 -- 94 % None (Room air)   10/11/23 1300 98.4 °F (36.9 °C) 110 Abnormal  18 118/74 -- 92 % None (Room air)   10/11/23 11:59:45 98 °F (36.7 °C) 111 Abnormal  18 134/83 100 96 % None (Room air)   10/11/23 1100 97.8 °F (36.6 °C) 95 18 145/84 104 97 % None (Room air)   10/11/23 10:46:14 97.8 °F (36.6 °C) -- 18 145/84 104 97 % None (Room air)   10/11/23 1020 -- 85 16 147/74 -- 97 % None (Room air)   10/11/23 1005 -- 87 16 151/78 -- 97 % None (Room air)   10/11/23 0950 -- 76 16 153/79 -- 100 % None (Room air)   10/11/23 0935 98.9 °F (37.2 °C) 86 16 128/89 -- 100 % Simple mask       Pertinent Labs/Diagnostic Results:       Results from last 7 days   Lab Units 10/13/23  0503 10/12/23  0506 10/11/23  0502 10/09/23  0534   WBC Thousand/uL 7.14 8.30 8.50 7.51   HEMOGLOBIN g/dL 12.5 12.1 14.3 14.1   HEMATOCRIT % 39.7 36.7 42.9 43.0   PLATELETS Thousands/uL 382 406* 433* 440*   NEUTROS ABS Thousands/µL  --   --  5.64 4.53         Results from last 7 days   Lab Units 10/13/23  0503 10/12/23  0506 10/11/23  0502 10/09/23  0534   SODIUM mmol/L 141 138 137 137   POTASSIUM mmol/L 4.0 3.7 3.6 4.0   CHLORIDE mmol/L 107 105 103 102   CO2 mmol/L 26 23 25 24   ANION GAP mmol/L 8 10 9 11   BUN mg/dL 11 11 14 10   CREATININE mg/dL 0.63 0.54* 0.63 0.71   EGFR ml/min/1.73sq m 121 129 121 115   CALCIUM mg/dL 8.6 8.5 9.1 9.8         Results from last 7 days   Lab Units 10/08/23  2111 10/08/23  1549 10/08/23  1103 10/08/23  0704 10/07/23  1602 10/07/23  1057 10/07/23  0724   POC GLUCOSE mg/dl 85 84 84 83 96 142* 109     Results from last 7 days   Lab Units 10/13/23  0503 10/12/23  0506 10/11/23  0502 10/09/23  0534   GLUCOSE RANDOM mg/dL 80 86 93 91         Results from last 7 days   Lab Units 10/11/23  0925   GRAM STAIN RESULT  No Polys or Bacteria seen  No Polys or Bacteria seen   WOUND CULTURE  1+ Growth of Pseudomonas aeruginosa*  1+ Growth of Staphylococcus aureus*  1+ Growth of         Scheduled Medications:    amoxicillin-clavulanate, 1 tablet, Oral, Q12H AMARJIT  heparin (porcine), 5,000 Units, Subcutaneous, Q8H [address]  influenza vaccine, 0.5 mL, Intramuscular, Once  [START ON 10/14/2023] metoprolol succinate, 25 mg, Oral, Daily      Continuous IV Infusions:     PRN Meds:  acetaminophen, 650 mg, Oral, Q6H PRN  diphenhydrAMINE, 25 mg, Intravenous, Q6H PRN  HYDROmorphone, 0.2 mg, Intravenous, Q3H PRN  ondansetron, 4 mg, Intravenous, Q6H PRN  oxyCODONE-acetaminophen, 1 tablet, Oral, Q4H PRN        Discharge Plan: to be determined     Network Utilization Review Department  ATTENTION: Please call with any questions or concerns to [phone] and carefully listen to the prompts so that you are directed to the right person. All voicemails are confidential.   For Discharge needs, contact Care Management DC Support Team at [phone] opt. 2  Send all requests for admission clinical reviews, approved or denied determinations and any other requests to dedicated fax number below belonging to the campus where the patient is receiving treatment.  List of dedicated fax numbers for the Facilities:  [place] DENIALS (Administrative/Medical Necessity) [phone]   DISCHARGE SUPPORT TEAM (NETWORK) [address] [facility] (Maternity/NICU/Pediatrics) [phone]   [address] [place] [address] Drive [address] [facility] [address] 1000 [facility] [phone]   1503 [facility] 207 [facility] 5220 [facility] [address] [address] [facility] [address] [phone]

## 2023-10-14 LAB
BACTERIA TISS AEROBE CULT: ABNORMAL
BACTERIA WND AEROBE CULT: ABNORMAL
GRAM STN SPEC: ABNORMAL
GRAM STN SPEC: ABNORMAL

## 2023-10-14 NOTE — PLAN OF CARE
Problem: PAIN - ADULT  Goal: Verbalizes/displays adequate comfort level or baseline comfort level  Description: Interventions:  - Encourage patient to monitor pain and request assistance  - Assess pain using appropriate pain scale  - Administer analgesics based on type and severity of pain and evaluate response  - Implement non-pharmacological measures as appropriate and evaluate response  - Consider cultural and social influences on pain and pain management  - Notify physician/advanced practitioner if interventions unsuccessful or patient reports new pain  Outcome: Progressing     Problem: SAFETY ADULT  Goal: Maintain or return to baseline ADL function  Description: INTERVENTIONS:  -  Assess patient's ability to carry out ADLs; assess patient's baseline for ADL function and identify physical deficits which impact ability to perform ADLs (bathing, care of mouth/teeth, toileting, grooming, dressing, etc.)  - Assess/evaluate cause of self-care deficits   - Assess range of motion  - Assess patient's mobility; develop plan if impaired  - Assess patient's need for assistive devices and provide as appropriate  - Encourage maximum independence but intervene and supervise when necessary  - Involve family in performance of ADLs  - Assess for home care needs following discharge   - Consider OT consult to assist with ADL evaluation and planning for discharge  - Provide patient education as appropriate  Outcome: Progressing     Problem: DISCHARGE PLANNING  Goal: Discharge to home or other facility with appropriate resources  Description: INTERVENTIONS:  - Identify barriers to discharge w/patient and caregiver  - Arrange for needed discharge resources and transportation as appropriate  - Identify discharge learning needs (meds, wound care, etc.)  - Arrange for interpretive services to assist at discharge as needed  - Refer to Case Management Department for coordinating discharge planning if the patient needs post-hospital services based on physician/advanced practitioner order or complex needs related to functional status, cognitive ability, or social support system  Outcome: Progressing

## 2023-10-14 NOTE — NURSING NOTE
Patient's IV removed. Patient left with all belongings. AVS reviewed with patient. Patient verbalized understanding of AVS. Patient left [address] [place] [address] in stable condition with portable wound vac in place via wheelchair.

## 2023-10-17 NOTE — UTILIZATION REVIEW
NOTIFICATION OF ADMISSION DISCHARGE   This is a Notification of Discharge from [address] Please be advised that this patient has been discharge from our facility. Below you will find the admission and discharge date and time including the patient’s disposition. UTILIZATION REVIEW CONTACT:  Sunita Flor  Utilization   Network Utilization Review Department  Phone: [phone] x carefully listen to the prompts. All voicemails are confidential.  Email: [email or web address]     ADMISSION INFORMATION  PRESENTATION DATE: 10/5/2023  4:36 PM  OBERVATION ADMISSION DATE:   INPATIENT ADMISSION DATE: 10/5/23  4:36 PM   DISCHARGE DATE: 10/13/2023  5:49 PM   DISPOSITION:Home with [facility] Utilization Review Department  ATTENTION: Please call with any questions or concerns to [phone] and carefully listen to the prompts so that you are directed to the right person. All voicemails are confidential.   For Discharge needs, contact Care Management DC Support Team at [phone] opt. 2  Send all requests for admission clinical reviews, approved or denied determinations and any other requests to dedicated fax number below belonging to the campus where the patient is receiving treatment.  List of dedicated fax numbers for the Facilities:  [place] DENIALS (Administrative/Medical Necessity) [phone]   DISCHARGE SUPPORT TEAM (Network) [phone] [facility]) [phone]   [address] [facility] [address] [place] [address] [address] [phone]

## 2023-10-18 ENCOUNTER — OFFICE VISIT (OUTPATIENT)
Dept: WOUND CARE | Facility: HOSPITAL | Age: 43
End: 2023-10-18
Payer: COMMERCIAL

## 2023-10-18 VITALS
SYSTOLIC BLOOD PRESSURE: 154 MMHG | BODY MASS INDEX: 35.55 KG/M2 | RESPIRATION RATE: 16 BRPM | DIASTOLIC BLOOD PRESSURE: 97 MMHG | HEART RATE: 103 BPM | WEIGHT: 240 LBS | TEMPERATURE: 97.4 F | HEIGHT: 69 IN

## 2023-10-18 DIAGNOSIS — Z87.19 HISTORY OF HERNIA REPAIR: ICD-10-CM

## 2023-10-18 DIAGNOSIS — Z98.890 HISTORY OF EVACUATION OF HEMATOMA: ICD-10-CM

## 2023-10-18 DIAGNOSIS — Z98.890 HISTORY OF HERNIA REPAIR: ICD-10-CM

## 2023-10-18 DIAGNOSIS — Z98.84 HISTORY OF ROUX-EN-Y GASTRIC BYPASS: ICD-10-CM

## 2023-10-18 DIAGNOSIS — T81.89XA NON-HEALING SURGICAL WOUND, INITIAL ENCOUNTER: Primary | ICD-10-CM

## 2023-10-18 PROCEDURE — 99213 OFFICE O/P EST LOW 20 MIN: CPT | Performed by: STUDENT IN AN ORGANIZED HEALTH CARE EDUCATION/TRAINING PROGRAM

## 2023-10-18 RX ORDER — LIDOCAINE HYDROCHLORIDE 40 MG/ML
5 SOLUTION TOPICAL ONCE
Status: COMPLETED | OUTPATIENT
Start: 2023-10-18 | End: 2023-10-18

## 2023-10-18 RX ADMIN — LIDOCAINE HYDROCHLORIDE 5 ML: 40 SOLUTION TOPICAL at 10:36

## 2023-10-18 NOTE — PATIENT INSTRUCTIONS
Orders Placed This Encounter   Procedures    Wound cleansing and dressings     Wash your hands with soap and water. Remove old dressing, discard into plastic bag and place in trash. Cleanse the wound with  prior to applying a clean dressing. Do not use tissue or cotton balls. Do not scrub the wound. Pat dry using gauze. Shower no   Apply skin prep to skin surrounding wound  Apply adaptic to edges of the abdominal wound. Cover with wound vac- see wound vac orders  Change dressing three times weekly. Today at wound center- adaptic and gauze applied to wound bed covered with ABD secured with tape. No wound vac supplies at [facility] from patient. Patient made aware to bring supplies at next visit. Standing Status:   Future     Standing Expiration Date:   10/18/2024    Wound negative pressure wound therapy     Negative Pressure Wound Therapy Instructions    Apply adaptic to edges of wound bed, apply white foam from mid wound (covering visible mesh to tunnels at 5-6 PM, cover with black granufoam, Set negative pressure on intermittent at 150mmHG, 5 minutes on 2 minutes break. VAC dressing to be changed three times per week as ordered. VNA to change vac twice weekly. The wound center will change the vac once weekly. Standing Status:   Future     Standing Expiration Date:   10/18/2024    Wound miscellaneous orders     PROTEIN: eat protein with each meal to promote healing. Protein includes fish, eggs, protein shakes, meat, lentils, nuts, edamame     Standing Status:   Future     Standing Expiration Date:   10/18/2024    Wound miscellaneous orders     Infection: signs and symptoms of infection include fever, chills, sweats, increase in drainage that has a foul odor or a green drainage. Call the nurses that comes to your home or call [facility] M-F 8-4.   If after hours go to ER     Standing Status:   Future     Standing Expiration Date:   10/18/2024

## 2023-10-18 NOTE — PROGRESS NOTES
Patient ID: Marvin Moreland is a 44 y.o. male Date of Birth 1980     Chief Complaint  Chief Complaint   Patient presents with    New Patient Visit     Non healing surgical wound abdomen       Allergies  Bee venom, Macrolides and ketolides, Other, Pertussis vaccines, Vancomycin, Zithromax [azithromycin], Flagyl [metronidazole], Erythromycin, and Pollen extract    Assessment:       Diagnoses and all orders for this visit:    Non-healing surgical wound, initial encounter  -     lidocaine (XYLOCAINE) 4 % topical solution 5 mL  -     Wound cleansing and dressings; Future  -     Wound negative pressure wound therapy; Future  -     Wound miscellaneous orders; Future  -     Wound miscellaneous orders; Future    History of Selin-en-Y gastric bypass    History of hernia repair    History of evacuation of hematoma                  Plan: It was a pleasure to see Marvin Moreland for wound care consult today  Wound cleansed with saline and gauze today  Continue plan of care as noted below with pressure therapy, white foam on the deepest distal part of the wound, black foam, Adaptic to wound edges due to pain with removal of foam that patient reports, intermittent suction, 150 mmHg   A1C results reviewed with the patient today. No signs or symptoms of infection today. Patient understands that if any signs of infection start (such as increased redness, drainage, pain, fever, chills, diaphoresis), they should call our office or proceed to the ER or Urgent Care. Patient should continue a high protein diet to facilitate wound healing  Patient is advised to not submerge wound or leave wound open to air. Advised not to shower or get the wound wet. Follow up in 1 weeks  Given the multi-factorial nature of wound care, additional time was taken to review patient's treatment plan with other specialties and most recent pertinent lab work and imaging.    All plans of care discussed with patient at bedside who verbalized understanding with treatment plan. Wound 10/18/23 Surgical Abdomen Medial;Lower (Active)   Wound Image Images linked 10/18/23 1032   Wound Description Beefy red;Yellow;Granulation tissue;Pink; Other (Comment) (exposed mesh) 10/18/23 1032   Tracy-wound Assessment Pink; Maceration 10/18/23 1032   Wound Length (cm) 7.8 cm 10/18/23 1032   Wound Width (cm) 2.4 cm 10/18/23 1032   Wound Depth (cm) 3.5 cm 10/18/23 1032   Wound Surface Area (cm^2) 18.72 cm^2 10/18/23 1032   Wound Volume (cm^3) 65.52 cm^3 10/18/23 1032   Calculated Wound Volume (cm^3) 65.52 cm^3 10/18/23 1032   Tunneling 1.1 cm 10/18/23 1032   Tunneling in depth located at 5 oclock (tunneling 1.0 at 7 oclock) 10/18/23 1032   Undermining 0.3 10/18/23 1032   Undermining is depth extending from 4-6 o clock 10/18/23 1032   Drainage Amount Moderate 10/18/23 1032   Drainage Description Bloody 10/18/23 1032   Non-staged Wound Description Full thickness 10/18/23 1032   Dressing Wound V.A.C. 10/18/23 1032   Wound packed? Yes 10/18/23 1032   Packing- # removed 2 10/18/23 1032   Dressing Status Intact (wound vac) 10/18/23 1032       Wound 10/18/23 Surgical Abdomen Medial;Lower (Active)   Date First Assessed/Time First Assessed: 10/18/23 1030   Primary Wound Type: Surgical  Location: Abdomen  Wound Location Orientation: Medial;Lower       [REMOVED] Wound 10/06/23 Abdomen N/A (Removed)   Resolved Date: 10/18/23  Date First Assessed/Time First Assessed: 10/06/23 1302   Location: Abdomen  Wound Location Orientation: N/A  Wound Description (Comments): WOUND VAC PLACEMENT VERAFLOW, PIECESWOUND 13 CM X 7.5 CM. X 7  Wound Outcome: (c) Other. .. [REMOVED] Wound 10/06/23 Abrasion(s) Nose (Removed)   Resolved Date: 10/18/23  Date First Assessed/Time First Assessed: 10/06/23 1930   Present on Original Admission: Yes  Traumatic Wound Type: Abrasion(s)  Location: Nose  Wound Description (Comments): abrasion with scab fallen off  Wound Outcome: (c) Ot. ..        [REMOVED] Wound 10/10/23  Sacrum (Removed)   Resolved Date: 10/18/23  Date First Assessed/Time First Assessed: 10/10/23 1130   Primary Wound Type: (c)   Location: Sacrum  Wound Outcome: (c) Other (Comment)       [REMOVED] Wound 10/11/23 Abdomen (Removed)   Resolved Date: 10/18/23  Date First Assessed/Time First Assessed: 10/11/23 0936   Location: Abdomen  Wound Description (Comments): WOUND VAC  Wound Outcome: (c) Other (Comment)       [REMOVED] Wound 10/13/23 Abdomen N/A (Removed)   Resolved Date: 10/18/23  Date First Assessed/Time First Assessed: 10/13/23 1123   Location: Abdomen  Wound Location Orientation: N/A  Wound Description (Comments): wound vac in place - 1 black sponge,  adaptic  Wound Outcome: (c) Other (Comment)       Subjective:      .    10/18/23: Mauricio Lux is a pleasant 27-year-old male with past medical history of diabetes mellitus and morbid obesity. He is status post Selin-en-Y gastric bypass in 2022. He was recently hospitalized for complications of the surgery. He presented to [address] [place] [address] and was admitted from 10/5 to 10/13/2023. During that time he had evacuation of hematoma, wound washout, mesh placement, and placement of wound VAC. He required 3 different visits to the OR due to ongoing complications. MARCELLO drain was recently removed. Patient is  also following a general surgery however was referred for wound management for management of wound VAC. Denies any systemic signs of infection today. The following portions of the patient's history were reviewed and updated as appropriate: allergies, current medications, past family history, past medical history, past social history, past surgical history, and problem list.    Review of Systems   Constitutional:  Negative for chills, diaphoresis, fatigue and fever. Skin:  Positive for wound. All other systems reviewed and are negative.         Objective:       Wound 10/18/23 Surgical Abdomen Medial;Lower (Active)   Wound Image Images linked 10/18/23 1032   Wound Description Beefy red;Yellow;Granulation tissue;Pink; Other (Comment) (exposed mesh) 10/18/23 1032   Tracy-wound Assessment Pink; Maceration 10/18/23 1032   Wound Length (cm) 7.8 cm 10/18/23 1032   Wound Width (cm) 2.4 cm 10/18/23 1032   Wound Depth (cm) 3.5 cm 10/18/23 1032   Wound Surface Area (cm^2) 18.72 cm^2 10/18/23 1032   Wound Volume (cm^3) 65.52 cm^3 10/18/23 1032   Calculated Wound Volume (cm^3) 65.52 cm^3 10/18/23 1032   Tunneling 1.1 cm 10/18/23 1032   Tunneling in depth located at 5 oclock (tunneling 1.0 at 7 oclock) 10/18/23 1032   Undermining 0.3 10/18/23 1032   Undermining is depth extending from 4-6 o clock 10/18/23 1032   Drainage Amount Moderate 10/18/23 1032   Drainage Description Bloody 10/18/23 1032   Non-staged Wound Description Full thickness 10/18/23 1032   Dressing Wound V.A.C. 10/18/23 1032   Wound packed? Yes 10/18/23 1032   Packing- # removed 2 10/18/23 1032   Dressing Status Intact (wound vac) 10/18/23 1032       /97   Pulse 103   Temp (!) 97.4 °F (36.3 °C)   Resp 16   Ht 5' 9" (1.753 m)   Wt 109 kg (240 lb)   BMI 35.44 kg/m²     Physical Exam  Vitals reviewed. Constitutional:       Appearance: Normal appearance. HENT:      Head: Normocephalic and atraumatic. Eyes:      Extraocular Movements: Extraocular movements intact. Pulmonary:      Effort: Pulmonary effort is normal.   Musculoskeletal:      Cervical back: Neck supple. Skin:     Comments: Large lower abdominal wound. Granulation tissue noted on the lateral edges. Some erythema to the periwound. Mesh is visible and apparent  at the bottom of the wound. Heavy amount of serosanguineous exudate. No overt signs of infection. Neurological:      Mental Status: He is alert. Psychiatric:         Mood and Affect: Mood normal.           Wound Instructions:  Orders Placed This Encounter   Procedures    Wound cleansing and dressings     Wash your hands with soap and water.   Remove old dressing, discard into plastic bag and place in trash. Cleanse the wound with  prior to applying a clean dressing. Do not use tissue or cotton balls. Do not scrub the wound. Pat dry using gauze. Shower no   Apply skin prep to skin surrounding wound  Apply adaptic to edges of the abdominal wound. Cover with wound vac- see wound vac orders  Change dressing three times weekly. Today at wound center- adaptic and gauze applied to wound bed covered with ABD secured with tape. No wound vac supplies at [facility] from patient. Patient made aware to bring supplies at next visit. Standing Status:   Future     Standing Expiration Date:   10/18/2024    Wound negative pressure wound therapy     Negative Pressure Wound Therapy Instructions    Apply adaptic to edges of wound bed, apply white foam from mid wound (covering visible mesh to tunnels at 5-6 PM, cover with black granufoam, Set negative pressure on intermittent at 150mmHG, 5 minutes on 2 minutes break. VAC dressing to be changed three times per week as ordered. VNA to change vac twice weekly. The wound center will change the vac once weekly. Standing Status:   Future     Standing Expiration Date:   10/18/2024    Wound miscellaneous orders     PROTEIN: eat protein with each meal to promote healing. Protein includes fish, eggs, protein shakes, meat, lentils, nuts, edamame     Standing Status:   Future     Standing Expiration Date:   10/18/2024    Wound miscellaneous orders     Infection: signs and symptoms of infection include fever, chills, sweats, increase in drainage that has a foul odor or a green drainage. Call the nurses that comes to your home or call [facility] M-F 8-4. If after hours go to ER     Standing Status:   Future     Standing Expiration Date:   10/18/2024        Diagnosis ICD-10-CM Associated Orders   1.  Non-healing surgical wound, initial encounter  T81.89XA lidocaine (XYLOCAINE) 4 % topical solution 5 mL     Wound cleansing and dressings Wound negative pressure wound therapy     Wound miscellaneous orders     Wound miscellaneous orders      2. History of Selin-en-Y gastric bypass  Z98.84       3. History of hernia repair  Z98.890     Z87.19       4. History of evacuation of hematoma  Z98.890           --  Miguel Rubio MD    "This note has been constructed using a voice recognition system. Therefore there may be syntax, spelling, and/or grammatical errors. Occasional wrong word or "sound alike" substitutions may have occurred due to the inherent limitations of voice recognition software. Read the chart carefully and recognize, using context, where substitutions have occurred.  Please call if you have any questions."

## 2023-10-18 NOTE — LETTER
[address] [place] [address] WOUND CARE  [address] [facility] [address]  [address] [address]  Phone#  [phone]  Fax#  [phone]    Patient:  Sanjiv Cervantes  YOB: 1980  Phone:  [phone]  Date of Visit:  10/18/2023    Orders Placed This Encounter   Procedures   • Wound cleansing and dressings     Wash your hands with soap and water. Remove old dressing, discard into plastic bag and place in trash. Cleanse the wound with  prior to applying a clean dressing. Do not use tissue or cotton balls. Do not scrub the wound. Pat dry using gauze. Shower no   Apply skin prep to skin surrounding wound  Apply adaptic to edges of the abdominal wound. Cover with wound vac- see wound vac orders  Change dressing three times weekly. Today at wound center- adaptic and gauze applied to wound bed covered with ABD secured with tape. No wound vac supplies at [facility] from patient. Patient made aware to bring supplies at next visit. Standing Status:   Future     Standing Expiration Date:   10/18/2024   • Wound negative pressure wound therapy     Negative Pressure Wound Therapy Instructions    Apply adaptic to edges of wound bed, apply white foam from mid wound (covering visible mesh to tunnels at 5-6 PM, cover with black granufoam, Set negative pressure on intermittent at 150mmHG, 5 minutes on 2 minutes break. VAC dressing to be changed three times per week as ordered. VNA to change vac twice weekly. The wound center will change the vac once weekly. Standing Status:   Future     Standing Expiration Date:   10/18/2024   • Wound miscellaneous orders     PROTEIN: eat protein with each meal to promote healing.    Protein includes fish, eggs, protein shakes, meat, lentils, nuts, edamame     Standing Status:   Future     Standing Expiration Date:   10/18/2024   • Wound miscellaneous orders     Infection: signs and symptoms of infection include fever, chills, sweats, increase in drainage that has a foul odor or a green drainage. Call the nurses that comes to your home or call [facility] M-F 8-4.   If after hours go to ER     Standing Status:   Future     Standing Expiration Date:   10/18/2024         Electronically signed by Jose Walter MD

## 2023-10-20 ENCOUNTER — OFFICE VISIT (OUTPATIENT)
Dept: FAMILY MEDICINE CLINIC | Facility: CLINIC | Age: 43
End: 2023-10-20

## 2023-10-20 VITALS
WEIGHT: 227 LBS | DIASTOLIC BLOOD PRESSURE: 68 MMHG | OXYGEN SATURATION: 99 % | HEIGHT: 69 IN | SYSTOLIC BLOOD PRESSURE: 130 MMHG | BODY MASS INDEX: 33.62 KG/M2 | HEART RATE: 102 BPM

## 2023-10-20 DIAGNOSIS — T14.8XXA HEMATOMA: Primary | ICD-10-CM

## 2023-10-20 DIAGNOSIS — Z59.812 HOUSING INSTABILITY, HOUSED, HOMELESSNESS IN PAST 12 MONTHS: ICD-10-CM

## 2023-10-20 DIAGNOSIS — Z23 ENCOUNTER FOR IMMUNIZATION: ICD-10-CM

## 2023-10-20 SDOH — ECONOMIC STABILITY - HOUSING INSECURITY: HOMELESS IN THE PAST 12 MONTHS: Z59.812

## 2023-10-20 NOTE — PROGRESS NOTES
Assessment & Plan     1. Hematoma  Assessment & Plan:  Patient was recently discharged from [facility] on 10/13 after being managed for abdominal hematoma s/p epigastric hernia repair on 9/20/23. Patient was discharged on Augmentin 875-125mg BID and is currently on day 7/14   Patient still has wound vac in place and is getting his wound managed by visiting nurse twice a week M&F and follows with wound care center on Wednesday every week. Wound site appears clean, dry. No erythema or pus noted. Advised the patient to schedule an appt with  Dr. Hildreth Simmonds in 10-14 days. 2. Encounter for immunization  -     influenza vaccine, quadrivalent, 0.5 mL, preservative-free, for adult and pediatric patients 6 mos+ ([facility], [facility], [facility])    3. Housing instability, housed, homelessness in past 12 months  Assessment & Plan:  Patient states he has been having multiple GI surgeries since 2022 and was unable to work. Patient has 2 children - 15and 5year old boys. Patient does not work and wife is the only one bringing income to home. Patient is being evicted from the house within a week and does not have a housing plan in place. Patient has $606 every month in food stamps.  referral placed and advised the patient to call 211 if immediate assistance is needed. Orders:  -     Ambulatory Referral to Social Work Care Management Program; Future         Subjective     Transitional Care Management Review:   Valeria Fuchs is a 44 y.o. male here for TCM follow up. He has a PMHx of diabetes mellitus and morbid obesity. He is s/p Selin-en-Y gastric bypass in 2022 and s/p epigastric hernia repair performed on 9/20/23. Patient developed hematoma around the incision and required hospital stay for the management of hematoma. Patient was admitted from 10/5 - 10/13 and had evacuation of hematoma, wound wash out, mesh placement, and wound VAC placement.  different visits to the OR due to ongoing complications. During the TCM phone call patient stated:  TCM Call       Date and time call was made  4/6/2022  2:42 PM    Hospital care reviewed  Records reviewed    Patient was hospitialized at  [address] [facility]    Date of Admission  04/04/22    Date of discharge  04/05/22    Diagnosis  Morbid obesity due to excess calories     Disposition  Home    Current Symptoms  None          TCM Call       Post hospital issues  None    Should patient be enrolled in anticoag monitoring? No    Scheduled for follow up? Yes    Did you obtain your prescribed medications  Yes    Do you need help managing your prescriptions or medications  No    Is transportation to your appointment needed  No    I have advised the patient to call PCP with any new or worsening symptoms  Fernie Rees LPN    Comments  patient states he is doing well. TCM confirmed for 4/7                       Review of Systems   Constitutional:  Negative for chills, fatigue and fever. HENT:  Negative for ear pain and sore throat. Eyes:  Negative for pain and visual disturbance. Respiratory:  Negative for cough and shortness of breath. Cardiovascular:  Negative for chest pain and palpitations. Gastrointestinal:  Negative for abdominal pain, blood in stool and vomiting. Genitourinary:  Negative for dysuria and hematuria. Musculoskeletal:  Negative for arthralgias and back pain. Skin:  Negative for color change and rash. Neurological:  Negative for seizures and syncope. All other systems reviewed and are negative. Objective     /68 (BP Location: Left arm, Patient Position: Sitting, Cuff Size: Standard)   Pulse 102   Ht 5' 9" (1.753 m)   Wt 103 kg (227 lb)   SpO2 99%   BMI 33.52 kg/m²      Physical Exam  Vitals and nursing note reviewed. Constitutional:       General: He is not in acute distress. Appearance: He is well-developed. HENT:      Head: Normocephalic and atraumatic.    Eyes:      Conjunctiva/sclera: Conjunctivae normal.   Cardiovascular:      Rate and Rhythm: Normal rate and regular rhythm. Heart sounds: No murmur heard. Pulmonary:      Effort: Pulmonary effort is normal. No respiratory distress. Breath sounds: Normal breath sounds. Abdominal:      General: There is no distension. Palpations: Abdomen is soft. Tenderness: There is no abdominal tenderness. Comments: Wound in umbilical area, dressing and wound VAC in place. Site is clean and dry. No erythema or pus noted. Musculoskeletal:         General: No swelling. Cervical back: Neck supple. Skin:     General: Skin is warm and dry. Capillary Refill: Capillary refill takes less than 2 seconds. Neurological:      Mental Status: He is alert.    Psychiatric:         Mood and Affect: Mood normal.       Medications have been reviewed by provider in current encounter    Brian Jin MD

## 2023-10-20 NOTE — ASSESSMENT & PLAN NOTE
Patient was recently discharged from [facility] on 10/13 after being managed for abdominal hematoma s/p epigastric hernia repair on 9/20/23. Patient was discharged on Augmentin 875-125mg BID and is currently on day 7/14   Patient still has wound vac in place and is getting his wound managed by visiting nurse twice a week M&F and follows with wound care center on Wednesday every week. Wound site appears clean, dry. No erythema or pus noted. Advised the patient to schedule an appt with  Dr. Bushra Sloan in 10-14 days.

## 2023-10-20 NOTE — ASSESSMENT & PLAN NOTE
Patient states he has been having multiple GI surgeries since 2022 and was unable to work. Patient has 2 children - 15and 5year old boys. Patient does not work and wife is the only one bringing income to home. Patient is being evicted from the house within a week and does not have a housing plan in place. Patient has $606 every month in food stamps. SW referral placed and advised the patient to call 211 if immediate assistance is needed.

## 2023-10-23 ENCOUNTER — OFFICE VISIT (OUTPATIENT)
Dept: BARIATRICS | Facility: CLINIC | Age: 43
End: 2023-10-23

## 2023-10-23 ENCOUNTER — PATIENT OUTREACH (OUTPATIENT)
Dept: FAMILY MEDICINE CLINIC | Facility: CLINIC | Age: 43
End: 2023-10-23

## 2023-10-23 VITALS — BODY MASS INDEX: 34.11 KG/M2 | WEIGHT: 231 LBS

## 2023-10-23 VITALS — HEIGHT: 69 IN | BODY MASS INDEX: 34.21 KG/M2 | WEIGHT: 231 LBS

## 2023-10-23 DIAGNOSIS — Z59.812 HOUSING INSTABILITY, HOUSED, HOMELESSNESS IN PAST 12 MONTHS: Primary | ICD-10-CM

## 2023-10-23 DIAGNOSIS — K91.2 POSTSURGICAL MALABSORPTION: Primary | ICD-10-CM

## 2023-10-23 DIAGNOSIS — Z48.815 ENCOUNTER FOR SURGICAL AFTERCARE FOLLOWING SURGERY OF DIGESTIVE SYSTEM: Primary | ICD-10-CM

## 2023-10-23 PROCEDURE — RECHECK

## 2023-10-23 SDOH — ECONOMIC STABILITY - HOUSING INSECURITY: HOMELESS IN THE PAST 12 MONTHS: Z59.812

## 2023-10-23 NOTE — PROGRESS NOTES
Bariatric Follow Up Nutrition Note    Type of surgery  Gastric bypass: laparoscopic  Surgery Date: 4/4/2022  18 months  post-op  Surgeon: Dr Ernie Garcia  44 y.o.  male  Height 5' 9" (1.753 m), weight 105 kg (231 lb). Body mass index is 34.11 kg/m². Initial:  388#  Weight on Day of Weight Loss Surgery: 330#  Weight in (lb) to have BMI = 25: 168.8#  Pre-Op Excess Wt: 220#  Post-Op Wt Loss: 157#/ 72% EBWL in 18 month(s)    Review of History and Medications   Past Medical History:   Diagnosis Date    Abdominal wall hernia     Bell's palsy     2 bouts - idiopathic, possible stress induced    CPAP (continuous positive airway pressure) dependence     Depression     Diabetes mellitus ([address]) 03/25/2021    Blood work    GERD (gastroesophageal reflux disease)     Hyperhidrosis     Hypertension     Irregular heart beat     Morbid obesity with BMI of 60.0-69.9, adult ([address])     Neurocardiogenic syncope     Sleep apnea     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 fluctuations     Past Surgical History:   Procedure Laterality Date    ABDOMINAL ADHESION SURGERY N/A 4/4/2022    Procedure: LYSIS ADHESIONS LAPAROSCOPIC;  Surgeon: Paul Cabello MD;  Location: [facility];  Service: Bariatrics    ABDOMINAL WALL SURGERY N/A 10/11/2023    Procedure: REMOVAL WOUND VAC, ABDOMINAL WOUND EXPLORATION, [address], EXCISIONAL DEBRIDEMENT WOUND, WOUND VAC APPLICATION;  Surgeon: Aldair Bills MD;  Location: [facility];  Service: General    COLONOSCOPY N/A 2/2/2018    Procedure: COLONOSCOPY;  Surgeon: Jannet Mcguire MD;  Location: [address] [place] [address] [facility]; Service: Gastroenterology    ESOPHAGOGASTRODUODENOSCOPY N/A 2/2/2018    Procedure: ESOPHAGOGASTRODUODENOSCOPY (EGD); Surgeon: Jannet Mcguire MD;  Location: [facility] GI LAB;   Service: Gastroenterology    OMENTECTOMY N/A 9/20/2023    Procedure: PARTIAL OMENTECTOMY;  Surgeon: Aldair Bills MD;  Location: [facility]; Service: General    IA LAPS GSTR RSTCV PX W/BYP SARI-EN-Y LIMB <150 CM N/A 2022    Procedure: LAPAROSCOPIC SARI-EN-Y GASTRIC BYPASS AND INTRAOPERATIVE EGD;  Surgeon: Estefanía Santana MD;  Location: [facility];  Service: Bariatrics    IA RPR AA HERNIA 1ST < 3 CM REDUCIBLE N/A 2023    Procedure: REPAIR HERNIA EPIGASTRIC Repair rectus diastases, implantation of biologic mesh;  Surgeon: Hannah Camejo MD;  Location: [facility];  Service: General    TESTICLE SURGERY Left     infected testicle    TOENAIL EXCISION Right 2021    WOUND DEBRIDEMENT N/A 10/6/2023    Procedure: EVACUATION OF HEMATOMA, VACUUM PLACEMENT VERAFLO WITH IRRIGATION SETTINGS, EXCISIONAL DEBRIDEMENT;  Surgeon: Hannah Camejo MD;  Location: [facility];  Service: General    WOUND DEBRIDEMENT N/A 10/13/2023    Procedure: DEBRIDEMENT WOUND ABDOMINAL ([address] [facility] [address] OUT); Surgeon: Hannah Camejo MD;  Location: [facility];  Service: General     Social History     Socioeconomic History    Marital status: /Civil Union     Spouse name: Simon Sutherland    Number of children: 2    Years of education: Not on file    Highest education level: 12th grade   Occupational History    Not on file   Tobacco Use    Smoking status: Former     Packs/day: 0.25     Years: 10.00     Total pack years: 2.50     Types: Cigarettes     Start date: 7/15/1999     Quit date: 2010     Years since quittin.8     Passive exposure: Never    Smokeless tobacco: Never   Vaping Use    Vaping Use: Never used   Substance and Sexual Activity    Alcohol use: Yes     Comment: social on occasion    Drug use: No    Sexual activity: Not Currently     Partners: Female     Birth control/protection: Abstinence, I.U.D.    Other Topics Concern    Not on file   Social History Narrative    Denied: History of alcohol use (history) - as per [employer]    Always uses seat belt    Former smoker - as per [employer]    Never a smoker - as per [employer]     Social Determinants of Health     Financial Resource Strain: Not on file   Food Insecurity: No Food Insecurity (10/12/2023)    Hunger Vital Sign     Worried About Running Out of Food in the Last Year: Never true     Ran Out of Food in the Last Year: Never true   Transportation Needs: No Transportation Needs (10/12/2023)    PRAPARE - Transportation     Lack of Transportation (Medical): No     Lack of Transportation (Non-Medical):  No   Physical Activity: Not on file   Stress: Not on file   Social Connections: Not on file   Intimate Partner Violence: Not on file   Housing Stability: High Risk (10/12/2023)    Housing Stability Vital Sign     Unable to Pay for Housing in the Last Year: Yes     Number of Places Lived in the Last Year: 1     Unstable Housing in the Last Year: No       Current Outpatient Medications:     amoxicillin-clavulanate (AUGMENTIN) 875-125 mg per tablet, Take 1 tablet by mouth every 12 (twelve) hours for 12 days, Disp: 24 tablet, Rfl: 0    Calcium Carb-Cholecalciferol 500-10 MG-MCG CHEW, Chew 3 (three) times a day, Disp: , Rfl:     doxylamine (UNISOM) 25 MG tablet, Take 1 tablet (25 mg total) by mouth daily at bedtime as needed for sleep, Disp: 30 tablet, Rfl: 1    gabapentin (NEURONTIN) 400 mg capsule, Take 1 capsule (400 mg total) by mouth 3 (three) times a day, Disp: 270 capsule, Rfl: 3    metoprolol succinate (TOPROL-XL) 25 mg 24 hr tablet, Take 1 tablet by mouth once daily, Disp: 90 tablet, Rfl: 0    Multiple Vitamins-Minerals (BARIATRIC MULTIVITAMINS/IRON PO), Take by mouth, Disp: , Rfl:     Food Intake and Lifestyle Assessment   Food Intake Assessment completed via 24hr recall    Breakfast: 6am-small banquet meatloaf meal (350 cals, 1120 mg sodium 42 g carbs,14gm protein)  Snack: -   Lunch: 12-1pm:  [employer] ketty beef yazmin (350 cals, 30gm fat, 20gm protein) on pepperEnChroma farm onion roll (150cals) + 1/2 tbsp miracle whip  Snack:    Dinner: 4-7pm-rib yazmin banquet with mashed potatoes and veggie (240 cals, 12g fat, 21g carbs, 12g par)  [employer] 42gm protein shake  Large coffee with 3 tsp sugar     Suggested lower sodium meals [employer], Smart Ones, Healthy Choices) when finically able/when on sale ($155 per month of food stamps). Reports BP is normal on metoprolol     Beverage intake: water, sugar free beverages and coffee/tea  Diet texture/stage: regular  Protein supplement: still has the protein powder, but doesn't make them OR occasional [employer] with 42gm protein    Estimated protein intake per day: 60-80gm  Estimated fluid intake per day: 64oz+ calorie free fluids, Coffee w/less (has been down to 3tsp--was doing close to a 1/2 cup about one year ago)  Meals eaten away from home: varies  Typical meal pattern: 3-4 meals per day and 1-2 snacks per day  Eating Behaviors: Does not drink with meals and waits 45-minutes after meal before resuming drinking (for the most part), Mindless eating and poor meal schedule    Food allergies or intolerances: pork and steak are too dense and dry, too much pasta is too filling, sugar causes dumping  Cultural or Orthodoxy considerations: -    Vitamins: Bought a year supply so is stocked  [employer] Calcium citrate with Vit D--3 tablets per day (all at once)--Pt not sure the dosage per tablet, but states the recommended dose was cut down at last blood work. Physical Assessment  Nutrition Related Findings  Dumping--too much sugar, hasn't been happening very much any more! Now with a wound vac since infection after hernia repair    Physical Activity  Types of exercise: 2-3 miles per day with the dog (1 mile 2-3 times per day)   Current physical limitations: now had hernia sx    Psychosocial Assessment   Support systems: spouse and children  Socioeconomic factors: yes, does not work. Wife is the only one that works. Money is very tight. Nutrition Diagnosis  Diagnosis: Inappropriate intake of carbohydrates (NI-5.8. 3)  Related to:  Inability or lack of desire to manage self-care  As Evidenced by: Reports of disorded eating patterns     Interventions and Teaching   Patient educated on post-op nutrition guidelines. Patient educated and handouts provided. Surgical changes to stomach / GI  Capacity of post-surgery stomach  Diet progression  Adequate hydration  Sugar and fat restriction to decrease "dumping syndrome"  Expected weight loss  Weight loss plateaus/ possibility of weight regain  Exercise  Nutrition considerations after surgery  Protein supplements  Meal planning and preparation  Appropriate carbohydrate, protein, and fat intake, and food/fluid choices to maximize safe weight loss, nutrient intake, and tolerance   Dietary and lifestyle changes  Possible problems with poor eating habits  Intuitive eating  Techniques for self monitoring and keeping daily food journal  Potential for food intolerance after surgery, and ways to deal with them including: lactose intolerance, nausea, reflux, vomiting, diarrhea, food intolerance, appetite changes, gas  Vitamin / Mineral supplementation of Multivitamin with minerals and Calcium    Education provided to: patient    Barriers to learning: No barriers identified    Readiness to change: action    Comprehension: verbalizes understanding     Expected Compliance: good    Pt presents today with continued weight loss which is excellent at 18 months post-op. He reports to be eating three meals per day and most often a protein shake to meet over 80gm protein per day. He is eating processed, frozen meals most often from [employer] since they are cheapest.  Pt did take pictures of the food labels; noted the high sodium content. Encouraged the [employer], [employer] and Healthy choice meals for less sodium (about 1/2 the amount), but pt states money is always tight and the Banquet are the cheapest.  Pt reports BP stable. Pt did just have a hernia repair at the beginning of the month and had an infection and a wound that needs a wound vac.   Continue to encourage high protein to promote healing.       Goals  Limit to 3tsp of less of sugar in coffee  Food journal via baritastic  Exercise 30 minutes 5 times per week--continue walking the dog to accumulate 3 miles per day  Eat 3 meals per day + 1 protein planned snack or shake per day  Try Lower sodium pre-made meals when financially feasible  Continue one protein shake per day to meet higher protein needs with wound vac  Continue bariatric vitamins + calcium    Time Spent:   30 mins

## 2023-10-23 NOTE — PROGRESS NOTES
Patient presents for post-op check in, current weight 231lbs. Eating behaviors/food choices: Met with RD today, discussed eating schedule, reports having three meals a day and working to increase protein. He does rely on frozen meals due to convenience and cost, suggested he be mindful of sodium intake but overall meeting nutrition guidelines. He reports decrease in mindless and emotional eating, he is not having hard candies like he was previously and using non-food coping skills to manage. Activity/Exercise:  Patient continues to go for walks with his dog, he's going for about 2 miles per day. He spends time with his kids, is active with them. Sleep/Rest:  Patient reports he is still using CPAP and feels well rested. Mental Health/Wellness:  Patient has some stress with his housing, in the process of legally fighting an eviction so they have more time to find adequate housing. He is looking for a job but that had to be put on hold until he heals from hernia surgery, his wound vac is removed. He is hopeful that once he is more healed from his procedure he will be able to get a job and find stable housing. He has good family support with his wife, he is working on coping skills and taking it one day at a time. No significant mood changes, he is overall very happy with the weight management progress he has made.     Next Appointment:  scheduled for annual follow up with PA 4/26, is aware he can meet with ANNE and SOPHIE for free until that time

## 2023-10-23 NOTE — PROGRESS NOTES
ANNE had received a referral from Natalie Johnston MD r/t housing instability, housed, homelessness in past 12 months. [facility] had completed a chart review. Per chart, Dianna Blunt reported that he, his wife, Shalini Reyna and 2 children (16 and 5year old boys) were being evicted from the house within a week. Per chart, Dianna Blunt does not work due to multiple GI surgeries since 2022. Per chart, Shalini Reyna is the only one the works in the home. Per chart, Dianna Blunt does get $606 every month in SNAP. Per chart, Dianna Blunt was advised to call 211 if immediate assistance is needed. [facility] had called Yvan via phone. ANNE introduced herself and reason for consult. Dianna Blunt reported that he the landlord tried to lock them out on Friday. Dianna Blunt reported that he file an extensions for orderly removal on Wednesday. Dianna Blunt stated he is awaiting for  to reconsider his eviction. Dianna Blunt stated that he spoke with a  who said the landlord needs to give them 30 days to get their stuff out. Dianna Blunt stated as of now they do not know when they will be evicted. Dianna Blunt reported he plans to file a hardship order. Dianna Blunt reported they are still looking for a place to move into. Dianna Blunt reported everything is expensive. Dianna Blunt stated they are also looking into a housing voucher. Dianna Blunt reported that he called [facility] end of July/beginning of August. Dianna Blunt reported he needs to fill out application, make agreement with landlord and submit to them. Dianna Blunt reported that his landlord won't agree to anything. Dianna Blunt reported that he reached out to [facility]. Dianna Blunt reported that him and Shalini Reyna need to do 250 hours of community service with them for assistance. Yvan reported that Shalini Reyna works on days for [employer]. Yvan stated Shalini Reyna works at [employer] and makes about $3,000 a month. Dianna Blunt stated he cannot work right now but plans to go back once he is healed. Dianna Blunt reported that he has [place] [facility] that comes out on Mon and Fri. Dianna Blunt reported that he goes to wound care on Wednesdays.  Dianna Blunt reported he drives himself and walks the dog to get of the house. Thomas Payne reported it is just him, [address], the boys and 2 cats and a dog in the home. Yvan reported that [address]a makes too much money for [employer] so cannot get on temporary rental assistance (TRA) program. [facility] has suggested [facility] as they have rental assistance programs. [facility] also suggested [facility] as they have rental assistance programs as well. Thomas Payne stated he would call them. [facility] informed Yvan that she would have her [facility] [employer] regarding housing issues. Thomas Payne reported that the apartment is horrible living conditions. Thomas Payne reported that there is no working hot water and heater since August 24th. Yvan reported that the drywalls are not sealed. Thomas Payne reported that the carpets are old and moldy. Thomas Payne reported that there is brown water coming from the sink and flies. Thomas Payne reported that he has tried to get out of this apartment since 2019 but it has been hard financially. [facility] asked Yvan if he called [facility] about living conditions. Thomas Payne reported that he has not since their goal is to get out of the home. [facility] advised Yvan try to reach out as months are getting colder. Thomas Payne agreed. [facility] provided her contact information. [facility] advised Yvan reach out as needed. Yvan understood. Pérez Stahl consented to continued  outreach at this time. [facility] added Yvan and family to her reported under socially complex. [facility] had called [employer] due to safety and well-being of children. [facility] spoke with Jimmey Carrel #[id number] who documented concerns and no referral would be made to [facility] at this time. Jimmey Carrel advised [facility] have Yvan f/u with [facility] and [facility]. Jimmey Carrel advised [facility] call back if family becomes homeless, living on the streets. ANNE sent in basket to [facility]. ANNE will continue to f/u.

## 2023-10-25 ENCOUNTER — PATIENT OUTREACH (OUTPATIENT)
Dept: FAMILY MEDICINE CLINIC | Facility: CLINIC | Age: 43
End: 2023-10-25

## 2023-10-25 ENCOUNTER — OFFICE VISIT (OUTPATIENT)
Dept: WOUND CARE | Facility: HOSPITAL | Age: 43
End: 2023-10-25
Payer: COMMERCIAL

## 2023-10-25 VITALS
SYSTOLIC BLOOD PRESSURE: 131 MMHG | HEART RATE: 93 BPM | DIASTOLIC BLOOD PRESSURE: 88 MMHG | TEMPERATURE: 96.9 F | RESPIRATION RATE: 18 BRPM

## 2023-10-25 DIAGNOSIS — Z79.4 TYPE 2 DIABETES MELLITUS WITHOUT COMPLICATION, WITH LONG-TERM CURRENT USE OF INSULIN (HCC): ICD-10-CM

## 2023-10-25 DIAGNOSIS — Z98.890 HISTORY OF EVACUATION OF HEMATOMA: ICD-10-CM

## 2023-10-25 DIAGNOSIS — Z98.84 HISTORY OF ROUX-EN-Y GASTRIC BYPASS: ICD-10-CM

## 2023-10-25 DIAGNOSIS — E11.9 TYPE 2 DIABETES MELLITUS WITHOUT COMPLICATION, WITH LONG-TERM CURRENT USE OF INSULIN (HCC): ICD-10-CM

## 2023-10-25 DIAGNOSIS — Z87.19 HISTORY OF HERNIA REPAIR: ICD-10-CM

## 2023-10-25 DIAGNOSIS — Z98.890 HISTORY OF HERNIA REPAIR: ICD-10-CM

## 2023-10-25 DIAGNOSIS — T81.89XA NON-HEALING SURGICAL WOUND, INITIAL ENCOUNTER: Primary | ICD-10-CM

## 2023-10-25 PROCEDURE — 99213 OFFICE O/P EST LOW 20 MIN: CPT | Performed by: STUDENT IN AN ORGANIZED HEALTH CARE EDUCATION/TRAINING PROGRAM

## 2023-10-25 PROCEDURE — 97605 NEG PRS WND THER DME<=50SQCM: CPT

## 2023-10-25 RX ORDER — LIDOCAINE HYDROCHLORIDE 40 MG/ML
5 SOLUTION TOPICAL ONCE
Status: COMPLETED | OUTPATIENT
Start: 2023-10-25 | End: 2023-10-25

## 2023-10-25 RX ADMIN — LIDOCAINE HYDROCHLORIDE 5 ML: 40 SOLUTION TOPICAL at 11:26

## 2023-10-25 NOTE — PATIENT INSTRUCTIONS
Orders Placed This Encounter   Procedures    Wound cleansing and dressings     Wound cleansing and dressings        Wash your hands with soap and water. Remove old dressing, discard into plastic bag and place in trash. Cleanse the wound with  prior to applying a clean dressing. Do not use tissue or cotton balls. Do not scrub the wound. Pat dry using gauze. Shower no   Apply skin prep to skin surrounding wound  Apply adaptic to edges and base of the abdominal wound. Please use one piece. Cover with wound vac- see wound vac orders  Change dressing three times weekly. Wound negative pressure wound therapy      Negative Pressure Wound Therapy Instructions  Apply stomahesive strip paste to 2 creases on sides of wound. Apply adaptic to edges and base of wound bed, cover with black granufoam, Set negative pressure on intermittent at 125mmHG, 5 minutes on 2 minutes break. VAC dressing to be changed three times per week as ordered. VNA to change vac twice weekly. The wound center will change the vac once weekly. Daryn applied today. Wound miscellaneous orders      PROTEIN: eat protein with each meal to promote healing. Protein includes fish, eggs, protein shakes, meat, lentils, nuts, edamame      Infection: signs and symptoms of infection include fever, chills, sweats, increase in drainage that has a foul odor or a green drainage. Call the nurses that comes to your home or call [facility] M-F [phone]. If after hours go to ER.      Standing Status:   Future     Standing Expiration Date:   10/25/2024    Negative Pressure Wound Therapy     This order was created via procedure documentation

## 2023-10-25 NOTE — PROGRESS NOTES
Patient ID: Erica Carrillo is a 44 y.o. male Date of Birth 1980     Chief Complaint  Chief Complaint   Patient presents with    Follow Up Wound Care Visit     Abdominal wound       Allergies  Bee venom, Macrolides and ketolides, Other, Pertussis vaccines, Vancomycin, Zithromax [azithromycin], Flagyl [metronidazole], Erythromycin, and Pollen extract    Assessment:     Diagnoses and all orders for this visit:    Non-healing surgical wound, initial encounter  -     lidocaine (XYLOCAINE) 4 % topical solution 5 mL  -     Wound cleansing and dressings; Future  -     Negative Pressure Wound Therapy    History of Selin-en-Y gastric bypass  -     lidocaine (XYLOCAINE) 4 % topical solution 5 mL  -     Wound cleansing and dressings; Future  -     Negative Pressure Wound Therapy    History of hernia repair  -     lidocaine (XYLOCAINE) 4 % topical solution 5 mL  -     Wound cleansing and dressings; Future  -     Negative Pressure Wound Therapy    History of evacuation of hematoma  -     lidocaine (XYLOCAINE) 4 % topical solution 5 mL  -     Wound cleansing and dressings; Future  -     Negative Pressure Wound Therapy    Type 2 diabetes mellitus without complication, with long-term current use of insulin (HCC)  -     lidocaine (XYLOCAINE) 4 % topical solution 5 mL  -     Wound cleansing and dressings; Future  -     Negative Pressure Wound Therapy            Plan: It was a pleasure to see Erica Carrillo for wound care follow up today  Wound debrided with saline and gauze today  Wound is improving   Continue plan of care as noted below with Adaptic to wound base and edges, black foam, 125 mmHg intermittent suction negative pressure therapy. Advised again today not to shower with wound VAC   A1C results reviewed with the patient today. No signs or symptoms of infection today.  Patient understands that if any signs of infection start (such as increased redness, drainage, pain, fever, chills, diaphoresis), they should call our office or proceed to the ER or Urgent Care. Patient should continue a high protein diet to facilitate wound healing  Patient is advised to not submerge wound or leave wound open to air. Follow up in 1 weeks  Given the multi-factorial nature of wound care, additional time was taken to review patient's treatment plan with other specialties and most recent pertinent lab work and imaging. All plans of care discussed with patient at bedside who verbalized understanding with treatment plan. Wound 10/18/23 Surgical Abdomen Medial;Lower (Active)   Wound Image Images linked 10/25/23 1156   Wound Description Beefy red;Yellow;Granulation tissue;Pink; Other (Comment) (exposed mesh) 10/25/23 1119   Tracy-wound Assessment Pink 10/25/23 1119   Wound Length (cm) 6 cm 10/25/23 1119   Wound Width (cm) 2 cm 10/25/23 1119   Wound Depth (cm) 3.5 cm 10/25/23 1119   Wound Surface Area (cm^2) 12 cm^2 10/25/23 1119   Wound Volume (cm^3) 42 cm^3 10/25/23 1119   Calculated Wound Volume (cm^3) 42 cm^3 10/25/23 1119   Change in Wound Size % 35.9 10/25/23 1119   Drainage Amount Moderate 10/25/23 1119   Drainage Description Bloody; Serosanguineous 10/25/23 1119   Non-staged Wound Description Full thickness 10/25/23 1119   Dressing Wound V.A.C. 10/25/23 1119   Packing- # removed 2 (1 black, 1 white) 10/25/23 1119       Wound 10/18/23 Surgical Abdomen Medial;Lower (Active)   Date First Assessed/Time First Assessed: 10/18/23 1030   Primary Wound Type: Surgical  Location: Abdomen  Wound Location Orientation: Medial;Lower       [REMOVED] Wound 10/06/23 Abdomen N/A (Removed)   Resolved Date: 10/18/23  Date First Assessed/Time First Assessed: 10/06/23 1302   Location: Abdomen  Wound Location Orientation: N/A  Wound Description (Comments): WOUND VAC PLACEMENT VERAFLOW, PIECESWOUND 13 CM X 7.5 CM. X 7  Wound Outcome: (c) Other. ..        [REMOVED] Wound 10/06/23 Abrasion(s) Nose (Removed)   Resolved Date: 10/18/23  Date First Assessed/Time First Assessed: 10/06/23 1930   Present on Original Admission: Yes  Traumatic Wound Type: Abrasion(s)  Location: Nose  Wound Description (Comments): abrasion with scab fallen off  Wound Outcome: (c) Ot. .. [REMOVED] Wound 10/10/23  Sacrum (Removed)   Resolved Date: 10/18/23  Date First Assessed/Time First Assessed: 10/10/23 1130   Primary Wound Type: (c)   Location: Sacrum  Wound Outcome: (c) Other (Comment)       [REMOVED] Wound 10/11/23 Abdomen (Removed)   Resolved Date: 10/18/23  Date First Assessed/Time First Assessed: 10/11/23 0936   Location: Abdomen  Wound Description (Comments): WOUND VAC  Wound Outcome: (c) Other (Comment)       [REMOVED] Wound 10/13/23 Abdomen N/A (Removed)   Resolved Date: 10/18/23  Date First Assessed/Time First Assessed: 10/13/23 1123   Location: Abdomen  Wound Location Orientation: N/A  Wound Description (Comments): wound vac in place - 1 black sponge,  adaptic  Wound Outcome: (c) Other (Comment)       Subjective:      .    10/25/23: VNA has been coming to patient's home to help with wound VAC. Patient denies any local or systemic symptoms of infection today. Notes that he has a follow-up appointment with surgery tomorrow. States that he has been showering and attempting to keep the area dry. Did notice drainage underneath the drape but is not sure if it is from the shower from the wound itself. 10/18/23: Neo Robertson is a pleasant 46-year-old male with past medical history of diabetes mellitus and morbid obesity. He is status post Selin-en-Y gastric bypass in 2022. He was recently hospitalized for complications of the surgery. He presented to [address] [place] [address] and was admitted from 10/5 to 10/13/2023. During that time he had evacuation of hematoma, wound washout, mesh placement, and placement of wound VAC. He required 3 different visits to the OR due to ongoing complications. MARCELLO drain was recently removed.   Patient is  also following a general surgery however was referred for wound management for management of wound VAC. Denies any systemic signs of infection today. The following portions of the patient's history were reviewed and updated as appropriate: allergies, current medications, past family history, past medical history, past social history, past surgical history, and problem list.    Review of Systems   Constitutional:  Negative for chills, diaphoresis, fatigue and fever. Skin:  Positive for wound. All other systems reviewed and are negative. Objective:       Wound 10/18/23 Surgical Abdomen Medial;Lower (Active)   Wound Image Images linked 10/25/23 1156   Wound Description Beefy red;Yellow;Granulation tissue;Pink; Other (Comment) (exposed mesh) 10/25/23 1119   Tracy-wound Assessment Pink 10/25/23 1119   Wound Length (cm) 6 cm 10/25/23 1119   Wound Width (cm) 2 cm 10/25/23 1119   Wound Depth (cm) 3.5 cm 10/25/23 1119   Wound Surface Area (cm^2) 12 cm^2 10/25/23 1119   Wound Volume (cm^3) 42 cm^3 10/25/23 1119   Calculated Wound Volume (cm^3) 42 cm^3 10/25/23 1119   Change in Wound Size % 35.9 10/25/23 1119   Drainage Amount Moderate 10/25/23 1119   Drainage Description Bloody; Serosanguineous 10/25/23 1119   Non-staged Wound Description Full thickness 10/25/23 1119   Dressing Wound V.A.C. 10/25/23 1119   Packing- # removed 2 (1 black, 1 white) 10/25/23 1119       /88   Pulse 93   Temp (!) 96.9 °F (36.1 °C) (Temporal)   Resp 18     Physical Exam  Vitals reviewed. Constitutional:       Appearance: Normal appearance. HENT:      Head: Normocephalic and atraumatic. Eyes:      Extraocular Movements: Extraocular movements intact. Pulmonary:      Effort: Pulmonary effort is normal.   Musculoskeletal:      Cervical back: Neck supple. Skin:     Comments:   Abdominal wound smaller than last exam.  No tunneling apparent today. Granulation tissue all around.   No longer able to frankly appreciate from hernia repair however there is fibrin deposition in the base of the wound bed. Periwound is healthy and without irritation. Neurological:      Mental Status: He is alert. Psychiatric:         Mood and Affect: Mood normal.           Wound Instructions:  Orders Placed This Encounter   Procedures    Wound cleansing and dressings     Wound cleansing and dressings        Wash your hands with soap and water. Remove old dressing, discard into plastic bag and place in trash. Cleanse the wound with  prior to applying a clean dressing. Do not use tissue or cotton balls. Do not scrub the wound. Pat dry using gauze. Shower no   Apply skin prep to skin surrounding wound  Apply adaptic to edges and base of the abdominal wound. Please use one piece. Cover with wound vac- see wound vac orders  Change dressing three times weekly. Wound negative pressure wound therapy      Negative Pressure Wound Therapy Instructions  Apply stomahesive strip paste to 2 creases on sides of wound. Apply adaptic to edges and base of wound bed, cover with black granufoam, Set negative pressure on intermittent at 125mmHG, 5 minutes on 2 minutes break. VAC dressing to be changed three times per week as ordered. VNA to change vac twice weekly. The wound center will change the vac once weekly. Daryn applied today. Wound miscellaneous orders      PROTEIN: eat protein with each meal to promote healing. Protein includes fish, eggs, protein shakes, meat, lentils, nuts, edamame      Infection: signs and symptoms of infection include fever, chills, sweats, increase in drainage that has a foul odor or a green drainage. Call the nurses that comes to your home or call [facility] M-F 8-4. If after hours go to ER. Standing Status:   Future     Standing Expiration Date:   10/25/2024    Negative Pressure Wound Therapy     This order was created via procedure documentation        Diagnosis ICD-10-CM Associated Orders   1.  Non-healing surgical wound, initial encounter T81. 89XA lidocaine (XYLOCAINE) 4 % topical solution 5 mL     Wound cleansing and dressings     Negative Pressure Wound Therapy      2. History of Selin-en-Y gastric bypass  Z98.84 lidocaine (XYLOCAINE) 4 % topical solution 5 mL     Wound cleansing and dressings     Negative Pressure Wound Therapy      3. History of hernia repair  Z98.890 lidocaine (XYLOCAINE) 4 % topical solution 5 mL    Z87.19 Wound cleansing and dressings     Negative Pressure Wound Therapy      4. History of evacuation of hematoma  Z98.890 lidocaine (XYLOCAINE) 4 % topical solution 5 mL     Wound cleansing and dressings     Negative Pressure Wound Therapy      5. Type 2 diabetes mellitus without complication, with long-term current use of insulin (HCC)  E11.9 lidocaine (XYLOCAINE) 4 % topical solution 5 mL    Z79.4 Wound cleansing and dressings     Negative Pressure Wound Therapy          --  Alvaro Kirk MD    "This note has been constructed using a voice recognition system. Therefore there may be syntax, spelling, and/or grammatical errors. Occasional wrong word or "sound alike" substitutions may have occurred due to the inherent limitations of voice recognition software. Read the chart carefully and recognize, using context, where substitutions have occurred.  Please call if you have any questions."

## 2023-10-25 NOTE — PROGRESS NOTES
Negative Pressure Wound Therapy    Performed by: Sundeep Lloyd RN  Authorized by: Madison Garcia MD  Universal Protocol:  Procedure performed by: Frannie Marshall RN)  Consent: Verbal consent obtained. Risks and benefits: risks, benefits and alternatives were discussed  Consent given by: patient  Time out: Immediately prior to procedure a "time out" was called to verify the correct patient, procedure, equipment, support staff and site/side marked as required.   Timeout called at: 10/25/2023 12:02 PM.  Patient understanding: patient states understanding of the procedure being performed  Patient identity confirmed: verbally with patient    Performed by[de-identified]  Clinician  Type[de-identified]  [facility]  Coverage Size (sq cm)::  12  Pressure Type[de-identified]  Intermittent  Pressure Setting[de-identified]  125 mmHG  Sponge Type[de-identified]  Black (one inside wound, one outside wound)  Total Number Of Sponges Removed Prior To Application[de-identified]  3 (2 black, 1 white)  Total Number Of Sponges Used For This Application[de-identified]  2 (1 inside wound, 1 outside wound)  Daily Hours Of Therapy[de-identified]  24  Set Clock To 0 Hours[de-identified] No

## 2023-10-25 NOTE — PROGRESS NOTES
Telephone Encounter  10/25/2023    [facility] performed chart review. [facility] placed a call to patient. [facility] introduced herself and informed patient that she was following up on referral placed by Beatriz Nobles. regarding housing instability. [facility] spoke with Jose Miguel Fraire and Yvan via speakerphone. CMOC completed CHW assessment. Jose Miguel Fraire and Samanthasaroj Méndez stated that their housing situation has changed since speaking with SW. Patients informed [facility] that their family of 4 was currently housed in a hotel. They started that they paid for the hotel out of money that they had saved for housing. They stated that they called [employer] and were told that they were unable to help them because they did have about $2000 in the bank. Patients informed CMOC that Nikazeb Fraire is employed. Samantha Méndez stated that he is not employed but his goal is to find a job once cleared by wound care. Samantha Méndez stated that he was hospitalized 10/6-10/13 and had multiple surgeries including hernia repaid and hematoma. Yvan told [facility] that he is still taking amoxicillin for the infection and has a wound vac. Samantha Méndez has an appointment with Wound Care, Dr Keanu Birmingham today. Patients informed CMOC that paid about $435 for 4 days at the hotel. They were told that the weekly rate would be about $700. Patients stated that Deborah's mom is helpful and they receive food stamps which will come in on the 1st.     Yvan informed [facility] that he will be reaching out to [facility] today to inform them that their living situation has changed. CMOC also advised patients to call [facility], as their situation has changed and screening may be different than when they reached out in [address] stated, "Yeah, I will probably get chewed out by Zenaida Lim for not turning in paperwork."    Patients stated they they have never had to live in a hotel before and are actively seeking housing.  CMOC offered to follow up with patients to see how their house search is going, how they are managing in the hotel and the status of Yvan's job search in a week. Patients stated that 9:30-10 am is a good time to reach them. They are best reached on Yvan's phone [phone], as Deborah's work schedule fluctuates.      Next Outreach  11/01/2023

## 2023-10-26 ENCOUNTER — OFFICE VISIT (OUTPATIENT)
Dept: SURGERY | Facility: CLINIC | Age: 43
End: 2023-10-26

## 2023-10-26 ENCOUNTER — PATIENT OUTREACH (OUTPATIENT)
Dept: FAMILY MEDICINE CLINIC | Facility: CLINIC | Age: 43
End: 2023-10-26

## 2023-10-26 VITALS — BODY MASS INDEX: 33.62 KG/M2 | TEMPERATURE: 97 F | WEIGHT: 227 LBS | HEIGHT: 69 IN

## 2023-10-26 DIAGNOSIS — Z09 SURGERY FOLLOW-UP EXAMINATION: Primary | ICD-10-CM

## 2023-10-26 PROCEDURE — 99024 POSTOP FOLLOW-UP VISIT: CPT | Performed by: SURGERY

## 2023-10-26 NOTE — PROGRESS NOTES
KIERA had discussed this case with Katharine Torres. Salome Patient reported that Yvan and his family are residing in a hotel at this time. Salome Patient reported that they called [employer] and were not able to help as they have about $2,000 in the bank. Salome Patient reported that [employer] to call [facility] and [employer]. SWCM mentioned about placing Yvan and family on housing waitlist. Salome Patient will address this with Yvan at next outreach. ANNE notes Salome Patient will keep her up to date on this case. ANNECM routed note to Salome Patient. KIERA will continue to f/u.

## 2023-10-26 NOTE — PROGRESS NOTES
Patient is status post epigastric hernia repair with retrorectus biologic mesh 6 October 2023 with postop infected hematoma and washout with VAC placement 11 and 13 October. Continues with once a week home VAC changed by visiting nurse and follows up in the wound care center weekly. VAC dressing is in place with normal surrounding appearance to the skin. Plan:  1. Continue home VAC dressing change once a week. 2.  Continue weekly VAC dressing changes and wound care center.   3.  Follow-up with me after discharge from wound care center for re-exam.

## 2023-11-01 ENCOUNTER — PATIENT OUTREACH (OUTPATIENT)
Dept: FAMILY MEDICINE CLINIC | Facility: CLINIC | Age: 43
End: 2023-11-01

## 2023-11-01 ENCOUNTER — OFFICE VISIT (OUTPATIENT)
Dept: WOUND CARE | Facility: HOSPITAL | Age: 43
End: 2023-11-01
Payer: COMMERCIAL

## 2023-11-01 VITALS
TEMPERATURE: 98.1 F | RESPIRATION RATE: 18 BRPM | SYSTOLIC BLOOD PRESSURE: 145 MMHG | DIASTOLIC BLOOD PRESSURE: 83 MMHG | HEART RATE: 75 BPM

## 2023-11-01 DIAGNOSIS — E11.9 TYPE 2 DIABETES MELLITUS WITHOUT COMPLICATION, WITH LONG-TERM CURRENT USE OF INSULIN (HCC): ICD-10-CM

## 2023-11-01 DIAGNOSIS — Z87.19 HISTORY OF HERNIA REPAIR: ICD-10-CM

## 2023-11-01 DIAGNOSIS — Z59.00 HOMELESSNESS: ICD-10-CM

## 2023-11-01 DIAGNOSIS — Z98.84 HISTORY OF ROUX-EN-Y GASTRIC BYPASS: ICD-10-CM

## 2023-11-01 DIAGNOSIS — Z79.4 TYPE 2 DIABETES MELLITUS WITHOUT COMPLICATION, WITH LONG-TERM CURRENT USE OF INSULIN (HCC): ICD-10-CM

## 2023-11-01 DIAGNOSIS — T81.89XA NON-HEALING SURGICAL WOUND, INITIAL ENCOUNTER: Primary | ICD-10-CM

## 2023-11-01 DIAGNOSIS — Z98.890 HISTORY OF EVACUATION OF HEMATOMA: ICD-10-CM

## 2023-11-01 DIAGNOSIS — Z98.890 HISTORY OF HERNIA REPAIR: ICD-10-CM

## 2023-11-01 PROCEDURE — 97597 DBRDMT OPN WND 1ST 20 CM/<: CPT | Performed by: NURSE PRACTITIONER

## 2023-11-01 PROCEDURE — 99213 OFFICE O/P EST LOW 20 MIN: CPT | Performed by: NURSE PRACTITIONER

## 2023-11-01 RX ORDER — LIDOCAINE HYDROCHLORIDE 40 MG/ML
5 SOLUTION TOPICAL ONCE
Status: COMPLETED | OUTPATIENT
Start: 2023-11-01 | End: 2023-11-01

## 2023-11-01 RX ORDER — SODIUM HYPOCHLORITE 2.5 MG/ML
1 SOLUTION TOPICAL ONCE
Qty: 473 ML | Refills: 0 | Status: SHIPPED | OUTPATIENT
Start: 2023-11-01 | End: 2023-11-01

## 2023-11-01 RX ADMIN — LIDOCAINE HYDROCHLORIDE 5 ML: 40 SOLUTION TOPICAL at 13:15

## 2023-11-01 SDOH — ECONOMIC STABILITY - HOUSING INSECURITY: HOMELESSNESS UNSPECIFIED: Z59.00

## 2023-11-01 NOTE — PROGRESS NOTES
Telephone Encounter  11/1/2023    [facility] following up on referral placed by ANNE Trivedi for housing instability. CMOC performed chart review. [facility] placed call to patient at preferred phone number, at preferred time. Patient was driving at the time and spoke on speakerphone with his wife, Lana Tomlinson. [facility] asked patients how things are going, patients stated things are "going." Patients stated that they are on their way to [facility] office at the moment. Patients stated that they were staying in the hotel, paying out of pocket and they have ran out of money. Patient stated they had paid up until today. Patients stated that they have not reached back to [facility], they are going straight to the [facility] office, because that is going to be who helps them anyway. Patient stated that when they left their apartment on 10/24 they grabbed what they could. Patient stated that he had a large box of medical supplies for his wound vac that he was unable to grab at that time. Patients stated that they contacted their landlord on Thursday and Sunday to see if they can get into the apartment to get their things. Landlord informed patients that they would be able to get into the apartment one more time, but they must take everything at once, he will not allow them in multiple times. Patient stated, "Monday I got a thing that said, 'Contact the landlord'" [facility] Patients stated that they have been in contact with legal services. Patients stated that they are not able to get movers and a [employer] until 11/7. Patient confirmed appointment with wound care today. [facility] verified that patient had her phone number and requested patients call her back if needed, [facility] offered to call them back tomorrow if [facility] has not heard anything.      Next Outreach  11/02/2023

## 2023-11-01 NOTE — PATIENT INSTRUCTIONS
Orders Placed This Encounter   Procedures    Wound cleansing and dressings     Wound cleansing and dressings:  Wound vac is on hold until an address is established and visiting nurses are able to put the vac back onto the wound. Wash your hands with soap and water. Remove old dressing, discard into plastic bag and place in trash. Cleanse the wound with  prior to applying a clean dressing. Do not use tissue or cotton balls. Do not scrub the wound. Pat dry using gauze. Shower no   Apply skin prep to skin surrounding wound  Apply adaptic to edges and base of the abdominal wound. Please use one piece. Cover with wound vac- see wound vac orders  Change dressing three times weekly. Today Dakins moistened rolled gauze is put into the wound and cover with ABD and tape. Change this dressing daily until you are able to get the wound vac placed back onto the wound. Standing Status:   Future     Standing Expiration Date:   11/1/2024    Wound negative pressure wound therapy     Negative Pressure Wound Therapy Instructions:  Wound vac is on hold until supplies and address is established. Apply stomahesive strip paste to 2 creases on sides of wound. Apply adaptic to edges and base of wound bed, cover with black granufoam, Set negative pressure on intermittent at 125mmHG, 5 minutes on 2 minutes break. VAC dressing to be changed three times per week as ordered. VNA to change vac twice weekly. The wound center will change the vac once weekly. Dakins dressing applied today. Standing Status:   Future     Standing Expiration Date:   11/1/2024    Wound home care     Continue to see patient for wound care.      Standing Status:   Future     Standing Expiration Date:   11/1/2024

## 2023-11-01 NOTE — PROGRESS NOTES
Patient ID: Yonatan Garcia is a 44 y.o. male Date of Birth 1980     Chief Complaint  Chief Complaint   Patient presents with    Follow Up Wound Care Visit     Abdominal wound       Allergies  Bee venom, Macrolides and ketolides, Other, Pertussis vaccines, Vancomycin, Zithromax [azithromycin], Flagyl [metronidazole], Erythromycin, and Pollen extract    Assessment:    No problem-specific Assessment & Plan notes found for this encounter. Diagnoses and all orders for this visit:    Non-healing surgical wound, initial encounter  -     lidocaine (XYLOCAINE) 4 % topical solution 5 mL  -     Wound cleansing and dressings; Future  -     Wound negative pressure wound therapy; Future  -     Wound home care; Future  -     Debridement  -     sodium hypochlorite (DAKIN'S HALF-STRENGTH) external solution; Apply 1 Application topically once for 1 dose    Type 2 diabetes mellitus without complication, with long-term current use of insulin ([facility])    History of Selin-en-Y gastric bypass    History of hernia repair    History of evacuation of hematoma    Homelessness              Debridement   Wound 10/18/23 Surgical Abdomen Medial;Lower    Universal Protocol:  Consent: Written consent obtained. Consent given by: patient  Time out: Immediately prior to procedure a "time out" was called to verify the correct patient, procedure, equipment, support staff and site/side marked as required. Timeout called at: 11/1/2023 2:02 PM.  Patient identity confirmed: verbally with patient    Performed by: NP  Debridement type: selective  Pain control: lidocaine 4%  Pre-debridement measurements  Length (cm): 6  Width (cm): 1  Depth (cm): 2.5  Surface Area (cm^2): 6  Volume (cm^3): 15    Post-debridement measurements  Length (cm): 6  Width (cm): 1  Depth (cm): 2.5  Percent debrided: 100%  Surface Area (cm^2): 6  Area debrided (cm^2):  6  Volume (cm^3): 15  Devitalized tissue debrided: biofilm, fibrin and slough  Instrument(s) utilized: curette  Bleeding: small  Hemostasis obtained with: pressure  Procedural pain (0-10): 0  Post-procedural pain: 0   Response to treatment: procedure was tolerated well        Plan:  1. F/u visit. Wound debrided. Wound improving and measuring smaller. 2.  Due to patient not having wound care supplies due to him being evicted from his apartment last week we will not be able to continue with NPWT at this current time. Patient reports he is currently trying to work with his landlord so that he can access his personal belongings at his apartment. We will change treatment to Dakin's moistened Kerlix covered with ABD and tape change daily. Prescription for Dakin's written today. 3.  Patient will also be unable to have VNA at this current time due to patient not having a permanent home address. Patient reports he is hoping he can temporarily move in with his mother-in-law until him and his family can secure permanent housing  4. A1C results reviewed with the patient today. 5.  Patient will follow-up in 1 week with Dr. Agatha Herndon 10/18/23 Surgical Abdomen Medial;Lower (Active)   Wound Image Images linked 11/01/23 1310   Wound Description Beefy red;Granulation tissue; Yellow;Pink 11/01/23 1308   Tracy-wound Assessment [place] 11/01/23 1308   Wound Length (cm) 6 cm 11/01/23 1308   Wound Width (cm) 1 cm 11/01/23 1308   Wound Depth (cm) 2.5 cm 11/01/23 1308   Wound Surface Area (cm^2) 6 cm^2 11/01/23 1308   Wound Volume (cm^3) 15 cm^3 11/01/23 1308   Calculated Wound Volume (cm^3) 15 cm^3 11/01/23 1308   Change in Wound Size % 77.11 11/01/23 1308   Drainage Amount Small 11/01/23 1308   Drainage Description Bloody; Serosanguineous 11/01/23 1308   Non-staged Wound Description Full thickness 11/01/23 1308   Dressing Wound V.A.C. (one black one adaptic, then a black for the disc) 11/01/23 1308   Wound packed?  Yes 11/01/23 1308   Packing- # removed 2 11/01/23 1308   Dressing Status Intact 11/01/23 1308       Wound 10/18/23 Surgical Abdomen Medial;Lower (Active)   Date First Assessed/Time First Assessed: 10/18/23 1030   Primary Wound Type: Surgical  Location: Abdomen  Wound Location Orientation: Medial;Lower       [REMOVED] Wound 10/06/23 Abdomen N/A (Removed)   Resolved Date: 10/18/23  Date First Assessed/Time First Assessed: 10/06/23 1302   Location: Abdomen  Wound Location Orientation: N/A  Wound Description (Comments): WOUND VAC PLACEMENT VERAFLOW, PIECESWOUND 13 CM X 7.5 CM. X 7  Wound Outcome: (c) Other. .. [REMOVED] Wound 10/06/23 Abrasion(s) Nose (Removed)   Resolved Date: 10/18/23  Date First Assessed/Time First Assessed: 10/06/23 1930   Present on Original Admission: Yes  Traumatic Wound Type: Abrasion(s)  Location: Nose  Wound Description (Comments): abrasion with scab fallen off  Wound Outcome: (c) Ot. .. [REMOVED] Wound 10/10/23  Sacrum (Removed)   Resolved Date: 10/18/23  Date First Assessed/Time First Assessed: 10/10/23 1130   Primary Wound Type: (c)   Location: Sacrum  Wound Outcome: (c) Other (Comment)       [REMOVED] Wound 10/11/23 Abdomen (Removed)   Resolved Date: 10/18/23  Date First Assessed/Time First Assessed: 10/11/23 0936   Location: Abdomen  Wound Description (Comments): WOUND VAC  Wound Outcome: (c) Other (Comment)       [REMOVED] Wound 10/13/23 Abdomen N/A (Removed)   Resolved Date: 10/18/23  Date First Assessed/Time First Assessed: 10/13/23 1123   Location: Abdomen  Wound Location Orientation: N/A  Wound Description (Comments): wound vac in place - 1 black sponge,  adaptic  Wound Outcome: (c) Other (Comment)       Subjective:      . F/u visit for nonhealing surgical wound of abdomen. Patient reports he was evicted from his apartment last week by his landlord. Patient reports he was given an opportunity to move out all of his belongings from the apartment by his landlord, however he reports he is financially unable to remove everything from the apartment in one trip.   Patient reports he took 3 empty wound VAC canisters and wound VAC with him when they were initially evicted. He does not have any black foam for dressing changes. Patient reports this past week him and his family have been living in a hotel, however they are financially unable to afford to continue living there. He is hoping he can temporarily live with his mother-in-law until they can secure permanent housing. Patient reports he has been working with the [employer] and patient has been working with a  through [employer] legal assistance so that patient can obtain his belongings from his apartment. He denies any pain, fevers, or chills. 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   He   Patient Active Problem List    Diagnosis Date Noted    Housing instability, housed, homelessness in past 12 months 10/20/2023    Hematoma 10/06/2023    Morbid (severe) obesity due to excess calories ([address]) 04/21/2023    Obesity, Class II, BMI 35-39.9 04/21/2023    History of diabetes mellitus, type II 04/21/2023    S/P bariatric surgery 11/28/2022    Hyperhidrosis 11/28/2022    Dizziness 10/06/2022    Intertriginous candidiasis 10/06/2022    Postsurgical malabsorption 07/05/2022    Encounter for surgical aftercare following surgery of digestive system 04/05/2022    Morbid obesity due to excess calories ([address]) 04/04/2022    MANUELA (obstructive sleep apnea)     Abnormal flow volume loop concerning for fixed airway obstruction 09/30/2021    Type 2 diabetes mellitus without complication, with long-term current use of insulin ([address]) 05/24/2021    Ventral hernia without obstruction or gangrene 02/05/2021    Benign essential hypertension 09/18/2018    Irritable bowel syndrome with diarrhea 01/19/2018    GERD (gastroesophageal reflux disease) 07/24/2017    Migraine headache 08/13/2014    Anxiety 12/20/2013    Mixed hyperlipidemia 11/21/2013     He  has a past surgical history that includes Testicle surgery (Left); Esophagogastroduodenoscopy (N/A, 2/2/2018); Colonoscopy (N/A, 2/2/2018); Toenail excision (Right, 09/07/2021); pr laps gstr rstcv px w/byp mariya-en-y limb <150 cm (N/A, 4/4/2022); Abdominal adhesion surgery (N/A, 4/4/2022); pr rpr aa hernia 1st < 3 cm reducible (N/A, 9/20/2023); Omentectomy (N/A, 9/20/2023); Wound debridement (N/A, 10/6/2023); Abdominal wall surgery (N/A, 10/11/2023); and Wound debridement (N/A, 10/13/2023). His family history includes ADD / ADHD in his son; Atrial fibrillation in his mother; COPD in his paternal grandfather; Cancer in his mother; Colon cancer in his family; Diabetes type II in his family and mother; Heart disease in his family; Hyperlipidemia in his family; Hypertension in his family; Hypothyroidism in his family; Liver disease in his father; Osteoporosis in his mother; Supraventricular tachycardia in his mother; Ulcerative colitis in his father. He  reports that he quit smoking about 13 years ago. His smoking use included cigarettes. He started smoking about 24 years ago. He has a 2.50 pack-year smoking history. He has never been exposed to tobacco smoke. He has never used smokeless tobacco. He reports current alcohol use. He reports that he does not use drugs.   Current Outpatient Medications   Medication Sig Dispense Refill    sodium hypochlorite (DAKIN'S HALF-STRENGTH) external solution Apply 1 Application topically once for 1 dose 473 mL 0    Calcium Carb-Cholecalciferol 500-10 MG-MCG CHEW Chew 3 (three) times a day      doxylamine (UNISOM) 25 MG tablet Take 1 tablet (25 mg total) by mouth daily at bedtime as needed for sleep 30 tablet 1    gabapentin (NEURONTIN) 400 mg capsule Take 1 capsule (400 mg total) by mouth 3 (three) times a day 270 capsule 3    metoprolol succinate (TOPROL-XL) 25 mg 24 hr tablet Take 1 tablet by mouth once daily 90 tablet 0    Multiple Vitamins-Minerals (BARIATRIC MULTIVITAMINS/IRON PO) Take by mouth       No current facility-administered medications for this visit. He is allergic to bee venom, macrolides and ketolides, other, pertussis vaccines, vancomycin, zithromax [azithromycin], flagyl [metronidazole], erythromycin, and pollen extract. .    Review of Systems   Constitutional: Negative. HENT:  Negative for ear pain and hearing loss. Eyes:  Negative for pain. Respiratory:  Negative for chest tightness and shortness of breath. Cardiovascular:  Negative for chest pain, palpitations and leg swelling. Gastrointestinal:  Negative for diarrhea, nausea and vomiting. Genitourinary:  Negative for dysuria. Musculoskeletal:  Negative for gait problem. Skin:  Positive for wound. Neurological:  Negative for tremors and weakness. Psychiatric/Behavioral:  Negative for behavioral problems, confusion and suicidal ideas. Objective:       Wound 10/18/23 Surgical Abdomen Medial;Lower (Active)   Wound Image Images linked 11/01/23 1310   Wound Description Beefy red;Granulation tissue; Yellow;Pink 11/01/23 1308   Tracy-wound Assessment [place] 11/01/23 1308   Wound Length (cm) 6 cm 11/01/23 1308   Wound Width (cm) 1 cm 11/01/23 1308   Wound Depth (cm) 2.5 cm 11/01/23 1308   Wound Surface Area (cm^2) 6 cm^2 11/01/23 1308   Wound Volume (cm^3) 15 cm^3 11/01/23 1308   Calculated Wound Volume (cm^3) 15 cm^3 11/01/23 1308   Change in Wound Size % 77.11 11/01/23 1308   Drainage Amount Small 11/01/23 1308   Drainage Description Bloody; Serosanguineous 11/01/23 1308   Non-staged Wound Description Full thickness 11/01/23 1308   Dressing Wound V.A.C. (one black one adaptic, then a black for the disc) 11/01/23 1308   Wound packed? Yes 11/01/23 1308   Packing- # removed 2 11/01/23 1308   Dressing Status Intact 11/01/23 1308       /83   Pulse 75   Temp 98.1 °F (36.7 °C)   Resp 18         Wound Instructions:  Orders Placed This Encounter   Procedures    Wound cleansing and dressings     Wound cleansing and dressings:  Wound vac is on hold until an address is established and visiting nurses are able to put the vac back onto the wound. Wash your hands with soap and water. Remove old dressing, discard into plastic bag and place in trash. Cleanse the wound with  prior to applying a clean dressing. Do not use tissue or cotton balls. Do not scrub the wound. Pat dry using gauze. Shower no   Apply skin prep to skin surrounding wound  Apply adaptic to edges and base of the abdominal wound. Please use one piece. Cover with wound vac- see wound vac orders  Change dressing three times weekly. Today Dakins moistened rolled gauze is put into the wound and cover with ABD and tape. Change this dressing daily until you are able to get the wound vac placed back onto the wound. Standing Status:   Future     Standing Expiration Date:   11/1/2024    Wound negative pressure wound therapy     Negative Pressure Wound Therapy Instructions:  Wound vac is on hold until supplies and address is established. Apply stomahesive strip paste to 2 creases on sides of wound. Apply adaptic to edges and base of wound bed, cover with black granufoam, Set negative pressure on intermittent at 125mmHG, 5 minutes on 2 minutes break. VAC dressing to be changed three times per week as ordered. VNA to change vac twice weekly. The wound center will change the vac once weekly. Dakins dressing applied today. Standing Status:   Future     Standing Expiration Date:   11/1/2024    Wound home care     Continue to see patient for wound care.      Standing Status:   Future     Standing Expiration Date:   11/1/2024    Debridement     This order was created via procedure documentation        Diagnosis ICD-10-CM Associated Orders   1. Non-healing surgical wound, initial encounter  T81.89XA lidocaine (XYLOCAINE) 4 % topical solution 5 mL     Wound cleansing and dressings     Wound negative pressure wound therapy     Wound home care     Debridement     sodium hypochlorite (DAKIN'S HALF-STRENGTH) external solution      2. Type 2 diabetes mellitus without complication, with long-term current use of insulin ([facility])  E11.9     Z79.4       3. History of Selin-en-Y gastric bypass  Z98.84       4. History of hernia repair  Z98.890     Z87.19       5. History of evacuation of hematoma  Z98.890       6.  Homelessness  Z59.00

## 2023-11-02 ENCOUNTER — PATIENT OUTREACH (OUTPATIENT)
Dept: FAMILY MEDICINE CLINIC | Facility: CLINIC | Age: 43
End: 2023-11-02

## 2023-11-02 ENCOUNTER — TELEPHONE (OUTPATIENT)
Dept: WOUND CARE | Facility: HOSPITAL | Age: 43
End: 2023-11-02

## 2023-11-02 NOTE — PROGRESS NOTES
KIERA had discussed this case with Melia Moore. Rossana Weber mentioned that Yvan and his family are still in the hotel. Rossana Weber mentioned that Milind Coffey had not called [employer] as funds are low. Rossana Weber reported that Yvan and his wife, Janet Thomas were going in person to [address]. Rossana Weber reported Milind Coffey had no update on [employer] application completion. KIERA notes Rossana Weber will keep her up to date on this case. KIERA routed note to Rossana Weber. KIERA will continue to f/u.

## 2023-11-02 NOTE — TELEPHONE ENCOUNTER
Pt called and left message that his insurance will not cover Dakin's solution and is there a substitution? I spoke with Dr. Adi Marquez and then called pt and informed him that there is no substitution we can order; advised him that if he cannot get the Dakin's, he should wash the wound every day with soap and water, gently pat dry and apply gauze dressing daily. Verbalized understanding of this. Pt is still trying to get permission to go back to apartment they were evicted from to obtain his wound vac supplies. Pt aware from his recent wound center visit that he needs his supplies and needs an address so the visiting nurses can reapply wound vac and resume vna visits.

## 2023-11-02 NOTE — PROGRESS NOTES
Telephone Outreach  11/2/2023    [facility] performed chart review. [facility] placed a call to patient regarding referral that was placed by Will Bowen regarding housing instability. At last outreach patient and wife were en route to [facility] office to attempt to get emergency assistance with housing. [facility] inquired with patient about meeting at [facility]. Patient informed [facility] that they met with  Lizeth who asked them questions regarding their situation and informed them that she would contact them if  could help, patient has not heard from Lizeth. Lizeth did offer to assist patient with [address] application, but informed patient that the process could take a year or more. [facility] offered her assistance with that as well. Patient stated that they contacted [phone] but were told that without [facility] help, [address] couldn't help. Patient stated his mother in law has assisted them with money for a few days in the hotel and they have paid up until Monday. Patient stated that he would like to stay in the [facility]. Patient stated that his children are in [facility] and his wife works at [employer] . Patient is also hoping to find a place that accepts dogs. Patient stated that his mother in law has other people living with her, so that is not ideal.     [facility] encouraged patient to reach out to [facility] again. CMOC explained that advocating for him and his family is the best thing he can do right now. Patient stated that he reached out to [facility] in August, he spoke with Melinda Britt. Melinda Britt had requested pay stubs from patient wife, Robin Clarke, but patients did not follow through. Patient agreed to contact [facility] [employer]. Patient stated that he served 100 days in the [employer] so will also reach out to [facility] which provides rapid rehousing (according to patient).      Patient stated that he has a wound care appt on Thursday 11/9. Patient stated that he has a visiting nurse that comes to the hotel on Mondays and Fridays. Patient stated that he could not get into his apartment for wound vac supplies, was prescribed Dakins solution from [facility], but can't afford it. Patient is thankful for [facility] calling and would like [facility] to call back on Monday or Susannah Amaral. Patient stated that Tuesday Morning him and his wife have a meeting at [employer] to see if they can get an advance on their tax refund. Patient stated that the average 3br apartment has been around $1950. Patient stated that this is a frustrating time for them. [facility] informed patient that she understood and encouraged patient to continue advocating and reaching out to different organizations. [facility] placed call to [facility] [address], Dakins solution is $15.85-patient can not use GoodRx coupon for this medication and his insurance will not cover it. CMOC will route note to MICHELLE Palma.      Next Outreach   11/07/2023

## 2023-11-07 ENCOUNTER — PATIENT OUTREACH (OUTPATIENT)
Dept: FAMILY MEDICINE CLINIC | Facility: CLINIC | Age: 43
End: 2023-11-07

## 2023-11-07 NOTE — PROGRESS NOTES
[facility] had called Yvan via phone. [facility] asked Yvan how he and the family are doing. Alicia Yanez reported that they are doing well. Alicia Yanez reported that he and his wife, Steffany Ellis went to [facility] last week. Alicia Yanez stated he met up with Lizeth GIL and is awaiting a call back about how they can help. Alicia Yanez stated he did called yesterday and left a voicemail. Yvan reported that Steffany Ellis submitted the application for [employer]. Alicia Yanez stated they have tried to call about their section 8 application. Yvan sated they left a voicemail and are awaiting to hear back. [facility] suggested Alicia Yanez go in person if needed. Patient understood. Alicia Yanez stated he called [facility] [employer] and spoke with them about services. Alicia Yanez reported that he is approved for help through their SSRD program. Alicia Yanez stated that he awaiting for a call back from a CM to go over paperwork. Alicia Yanez reported he has not followed up with [facility] as he is plans to get help from [facility] [employer]. Alicia Yanez stated they are going to call him by today. SW advised Yvan that if they do not call today then he should call tomorrow. Patient agreed. [facility] advised Yvan keep her and [address] [place] [address] update on his situation. Patient agreed. Tawanda Bonilla will keep her up to date on this case as well. SWCM routed note to Tawanda Bonilla. KIERA will continue to f/u.

## 2023-11-08 ENCOUNTER — PATIENT OUTREACH (OUTPATIENT)
Dept: FAMILY MEDICINE CLINIC | Facility: CLINIC | Age: 43
End: 2023-11-08

## 2023-11-08 NOTE — PROGRESS NOTES
Telephone Encounter  11/8/2023    [address] following up on referral for housing instability. Per chart review, patient had informed Silas Albright that patient was seeking re-homing assistance through [facility]. At last [address] outreach patient stated that he served 100 days in the [employer] and was planning to reach out to them. At last outreach patient and family were staying in a hotel and paying out of pocket weekly. [address] placed a call to patient. Patient stated they are still in the hotel, the hotel is paid through Monday and on Monday they will pay for an additional week. Patient stated that he is still waiting for calls from the [facility], and to hear from a  with [employer]. [address] asked if [facility] would be helping the entire family and Ugo Burtonora confirmed they would. Ugo Burtonora stated that the last time he spoke with them was Friday and they said he was eligible. CMOC confirmed Ugo Vanegas told them his family is currently homeless, living in a hotel. [address] encouraged patient to keep reaching out to these organizations to advocate for his family. [address] confirmed patient has her phone number.      Next outreach   11/15/2023

## 2023-11-09 ENCOUNTER — OFFICE VISIT (OUTPATIENT)
Dept: WOUND CARE | Facility: HOSPITAL | Age: 43
End: 2023-11-09
Payer: COMMERCIAL

## 2023-11-09 VITALS — HEART RATE: 76 BPM | SYSTOLIC BLOOD PRESSURE: 130 MMHG | DIASTOLIC BLOOD PRESSURE: 82 MMHG | TEMPERATURE: 97.5 F

## 2023-11-09 DIAGNOSIS — Z98.84 HISTORY OF ROUX-EN-Y GASTRIC BYPASS: ICD-10-CM

## 2023-11-09 DIAGNOSIS — T81.89XA NON-HEALING SURGICAL WOUND, INITIAL ENCOUNTER: Primary | ICD-10-CM

## 2023-11-09 DIAGNOSIS — Z98.890 HISTORY OF HERNIA REPAIR: ICD-10-CM

## 2023-11-09 DIAGNOSIS — Z98.890 HISTORY OF EVACUATION OF HEMATOMA: ICD-10-CM

## 2023-11-09 DIAGNOSIS — Z87.19 HISTORY OF HERNIA REPAIR: ICD-10-CM

## 2023-11-09 DIAGNOSIS — Z79.4 TYPE 2 DIABETES MELLITUS WITHOUT COMPLICATION, WITH LONG-TERM CURRENT USE OF INSULIN (HCC): ICD-10-CM

## 2023-11-09 DIAGNOSIS — E11.9 TYPE 2 DIABETES MELLITUS WITHOUT COMPLICATION, WITH LONG-TERM CURRENT USE OF INSULIN (HCC): ICD-10-CM

## 2023-11-09 PROCEDURE — 97597 DBRDMT OPN WND 1ST 20 CM/<: CPT | Performed by: STUDENT IN AN ORGANIZED HEALTH CARE EDUCATION/TRAINING PROGRAM

## 2023-11-09 RX ORDER — LIDOCAINE HYDROCHLORIDE 40 MG/ML
5 SOLUTION TOPICAL ONCE
Status: COMPLETED | OUTPATIENT
Start: 2023-11-09 | End: 2023-11-09

## 2023-11-09 RX ADMIN — LIDOCAINE HYDROCHLORIDE 5 ML: 40 SOLUTION TOPICAL at 08:49

## 2023-11-09 NOTE — PATIENT INSTRUCTIONS
Orders Placed This Encounter   Procedures    Wound cleansing and dressings     Wound cleansing and dressings        Wash your hands with soap and water. Remove old dressing, discard into plastic bag and place in trash. Cleanse the wound with  prior to applying a clean dressing. Do not use tissue or cotton balls. Do not scrub the wound. Pat dry using gauze. Shower no   Apply skin prep to skin surrounding wound  Apply adaptic to edges and base of the abdominal wound. Please use one piece. Cover with wound vac- see wound vac orders  Change dressing three times weekly. Standing Status:   Future     Standing Expiration Date:   11/9/2024    Wound negative pressure wound therapy     Wound negative pressure wound therapy      Negative Pressure Wound Therapy Instructions  Apply stomahesive strip paste to 2 creases on sides of wound. Apply black granufoam to wound bed (discontinue adaptic), Set negative pressure on intermittent at 125mmHG, 5 minutes on 2 minutes break. VAC dressing to be changed three times per week as ordered. VNA to change vac twice weekly. The wound center will change the vac once weekly. Standing Status:   Future     Standing Expiration Date:   11/9/2024    Wound miscellaneous orders     Wound miscellaneous orders      PROTEIN: eat protein with each meal to promote healing. Protein includes fish, eggs, protein shakes, meat, lentils, nuts, edamame      Infection: signs and symptoms of infection include fever, chills, sweats, increase in drainage that has a foul odor or a green drainage. Call the nurses that comes to your home or call [facility] M-F 8-4. If after hours go to ER.      Standing Status:   Future     Standing Expiration Date:   11/9/2024

## 2023-11-09 NOTE — LETTER
[address] [place] [address] WOUND CARE  [address] [facility] [address] [facility] [address]  Phone#  [phone]  Fax#  [phone]    Patient:  Palak Meng  YOB: 1980  Phone:  [phone]  Date of Visit:  11/9/2023    Orders Placed This Encounter   Procedures   • Wound cleansing and dressings     Wound cleansing and dressings        Wash your hands with soap and water. Remove old dressing, discard into plastic bag and place in trash. Cleanse the wound with  prior to applying a clean dressing. Do not use tissue or cotton balls. Do not scrub the wound. Pat dry using gauze. Shower no   Apply skin prep to skin surrounding wound  Apply adaptic to edges and base of the abdominal wound. Please use one piece. Cover with wound vac- see wound vac orders  Change dressing three times weekly. Standing Status:   Future     Standing Expiration Date:   11/9/2024   • Wound negative pressure wound therapy     Wound negative pressure wound therapy      Negative Pressure Wound Therapy Instructions  Apply stomahesive strip paste to 2 creases on sides of wound. Apply black granufoam to wound bed (discontinue adaptic), Set negative pressure on intermittent at 125mmHG, 5 minutes on 2 minutes break. VAC dressing to be changed three times per week as ordered. VNA to change vac twice weekly. The wound center will change the vac once weekly. Standing Status:   Future     Standing Expiration Date:   11/9/2024   • Wound miscellaneous orders     Wound miscellaneous orders      PROTEIN: eat protein with each meal to promote healing. Protein includes fish, eggs, protein shakes, meat, lentils, nuts, edamame      Infection: signs and symptoms of infection include fever, chills, sweats, increase in drainage that has a foul odor or a green drainage. Call the nurses that comes to your home or call [facility] M-F 8-4. If after hours go to ER.      Standing Status:   Future     Standing Expiration Date:   11/9/2024   • Debridement     This order was created via procedure documentation         Electronically signed by Darylene Maduro, MD

## 2023-11-09 NOTE — LETTER
[address] [place] [address] WOUND CARE  [facility] [facility] [address] [facility] [address]  Phone#  [phone]  Fax#  [phone]    Patient:  Jae Dillon  YOB: 1980  Phone:  [phone]  Date of Visit:  11/9/2023    Orders Placed This Encounter   Procedures   • Wound cleansing and dressings     Wound cleansing and dressings        Wash your hands with soap and water. Remove old dressing, discard into plastic bag and place in trash. Cleanse the wound with  prior to applying a clean dressing. Do not use tissue or cotton balls. Do not scrub the wound. Pat dry using gauze. Shower no   Apply skin prep to skin surrounding wound  Apply adaptic to edges and base of the abdominal wound. Please use one piece. Cover with wound vac- see wound vac orders  Change dressing three times weekly. Standing Status:   Future     Standing Expiration Date:   11/9/2024   • Wound negative pressure wound therapy     Wound negative pressure wound therapy      Negative Pressure Wound Therapy Instructions  Apply stomahesive strip paste to 2 creases on sides of wound. Apply black granufoam to wound bed (discontinue adaptic), Set negative pressure on intermittent at 125mmHG, 5 minutes on 2 minutes break. VAC dressing to be changed three times per week as ordered. VNA to change vac twice weekly. The wound center will change the vac once weekly. Standing Status:   Future     Standing Expiration Date:   11/9/2024   • Wound miscellaneous orders     Wound miscellaneous orders      PROTEIN: eat protein with each meal to promote healing. Protein includes fish, eggs, protein shakes, meat, lentils, nuts, edamame      Infection: signs and symptoms of infection include fever, chills, sweats, increase in drainage that has a foul odor or a green drainage. Call the nurses that comes to your home or call [facility] M-F 8-4. If after hours go to ER.      Standing Status:   Future     Standing Expiration Date:   11/9/2024 Electronically signed by Chino Elizabeth MD

## 2023-11-09 NOTE — PROGRESS NOTES
Patient ID: Khadijah Millard is a 44 y.o. male Date of Birth 1980     Chief Complaint  Chief Complaint   Patient presents with    Follow Up Wound Care Visit     Open surgical wound abdomen       Allergies  Bee venom, Macrolides and ketolides, Other, Pertussis vaccines, Vancomycin, Zithromax [azithromycin], Flagyl [metronidazole], Erythromycin, and Pollen extract    Assessment:     Diagnoses and all orders for this visit:    Non-healing surgical wound, initial encounter  -     lidocaine (XYLOCAINE) 4 % topical solution 5 mL  -     Wound cleansing and dressings; Future  -     Wound negative pressure wound therapy; Future  -     Wound miscellaneous orders; Future  -     Debridement    Type 2 diabetes mellitus without complication, with long-term current use of insulin (HCC)  -     lidocaine (XYLOCAINE) 4 % topical solution 5 mL  -     Wound cleansing and dressings; Future  -     Wound negative pressure wound therapy; Future  -     Wound miscellaneous orders; Future    History of Selin-en-Y gastric bypass  -     lidocaine (XYLOCAINE) 4 % topical solution 5 mL  -     Wound cleansing and dressings; Future  -     Wound negative pressure wound therapy; Future  -     Wound miscellaneous orders; Future    History of hernia repair  -     lidocaine (XYLOCAINE) 4 % topical solution 5 mL  -     Wound cleansing and dressings; Future  -     Wound negative pressure wound therapy; Future  -     Wound miscellaneous orders; Future    History of evacuation of hematoma  -     lidocaine (XYLOCAINE) 4 % topical solution 5 mL  -     Wound cleansing and dressings; Future  -     Wound negative pressure wound therapy; Future  -     Wound miscellaneous orders; Future              Debridement   Wound 10/18/23 Surgical Abdomen Medial;Lower    Universal Protocol:  Consent: Verbal consent obtained.   Risks and benefits: risks, benefits and alternatives were discussed  Consent given by: patient  Time out: Immediately prior to procedure a "time out" was called to verify the correct patient, procedure, equipment, support staff and site/side marked as required. Patient identity confirmed: verbally with patient    Performed by: physician  Debridement type: selective  Pain control: lidocaine 4%  Post-debridement measurements  Length (cm): 4.3  Width (cm): 2.4  Depth (cm): 3  Percent debrided: 90%  Surface Area (cm^2): 10.3  Area debrided (cm^2): 9.3  Volume (cm^3): 31  Devitalized tissue debrided: biofilm and exudate  Instrument(s) utilized: curette  Bleeding: small  Hemostasis obtained with: pressure  Procedural pain (0-10): 1  Post-procedural pain: 0   Response to treatment: procedure was tolerated well        Plan: It was a pleasure to see Steve  for wound care follow up today  Selective debridement performed today as above  Wound is improving   Continue plan of care as noted below with negative pressure therapy, black foam only, 125 mmHg continuous suction  No signs or symptoms of infection today. Patient understands that if any signs of infection start (such as increased redness, drainage, pain, fever, chills, diaphoresis), they should call our office or proceed to the ER or Urgent Care. Patient should continue a high protein diet to facilitate wound healing  Patient is advised to not submerge wound or leave wound open to air. Follow up in 1 weeks  Given the multi-factorial nature of wound care, additional time was taken to review patient's treatment plan with other specialties and most recent pertinent lab work and imaging. All plans of care discussed with patient at bedside who verbalized understanding with treatment plan. Wound 10/18/23 Surgical Abdomen Medial;Lower (Active)   Wound Image Images linked 11/09/23 0841   Wound Description Beefy red;Granulation tissue; Yellow;Pink;Slough 11/09/23 0841   Tracy-wound Assessment Erythema 11/09/23 0841   Wound Length (cm) 4.3 cm 11/09/23 0841   Wound Width (cm) 2.4 cm 11/09/23 0841   Wound Depth (cm) 3 cm 11/09/23 0841   Wound Surface Area (cm^2) 10.32 cm^2 11/09/23 0841   Wound Volume (cm^3) 30.96 cm^3 11/09/23 0841   Calculated Wound Volume (cm^3) 30.96 cm^3 11/09/23 0841   Change in Wound Size % 52.75 11/09/23 0841   Drainage Amount Small 11/09/23 0841   Drainage Description Serosanguineous 11/09/23 0841   Non-staged Wound Description Full thickness 11/09/23 0841   Dressing Wound V.A.C. 11/09/23 0841   Packing- # removed 1 11/09/23 0841   Dressing Status Intact; Old drainage;New drainage 11/09/23 0841       Wound 10/18/23 Surgical Abdomen Medial;Lower (Active)   Date First Assessed/Time First Assessed: 10/18/23 1030   Primary Wound Type: Surgical  Location: Abdomen  Wound Location Orientation: Medial;Lower       [REMOVED] Wound 10/06/23 Abdomen N/A (Removed)   Resolved Date: 10/18/23  Date First Assessed/Time First Assessed: 10/06/23 1302   Location: Abdomen  Wound Location Orientation: N/A  Wound Description (Comments): WOUND VAC PLACEMENT VERAFLOW, PIECESWOUND 13 CM X 7.5 CM. X 7  Wound Outcome: (c) Other. .. [REMOVED] Wound 10/06/23 Abrasion(s) Nose (Removed)   Resolved Date: 10/18/23  Date First Assessed/Time First Assessed: 10/06/23 1930   Present on Original Admission: Yes  Traumatic Wound Type: Abrasion(s)  Location: Nose  Wound Description (Comments): abrasion with scab fallen off  Wound Outcome: (c) Ot. ..        [REMOVED] Wound 10/10/23  Sacrum (Removed)   Resolved Date: 10/18/23  Date First Assessed/Time First Assessed: 10/10/23 1130   Primary Wound Type: (c)   Location: Sacrum  Wound Outcome: (c) Other (Comment)       [REMOVED] Wound 10/11/23 Abdomen (Removed)   Resolved Date: 10/18/23  Date First Assessed/Time First Assessed: 10/11/23 0936   Location: Abdomen  Wound Description (Comments): WOUND VAC  Wound Outcome: (c) Other (Comment)       [REMOVED] Wound 10/13/23 Abdomen N/A (Removed)   Resolved Date: 10/18/23  Date First Assessed/Time First Assessed: 10/13/23 0923   Location: Abdomen  Wound Location Orientation: N/A  Wound Description (Comments): wound vac in place - 1 black sponge,  adaptic  Wound Outcome: (c) Other (Comment)       Subjective:      .    11/9/23: Patient notes today that wound VAC has been applied since Monday as he is currently living in a hotel with his family. REBEKAH is coming to his new address on M/W/F. Prior to that he did have a gap of approximately 5 days when the wound VAC was not being applied. Denies any symptoms of infection today including fever chills diaphoresis. He was also able to obtain access to his belongings at his prior apartment and does have his wound VAC supplies. 11/1/23: Patient seen by covering CRNA. At that visit patient noted that he had been evicted from his apartment and no longer had a physical arduous review needed, nor did he have access to wound care supplies were left at his last place of residence. Wound VAC was temporarily held and patient was advised to use Dakin soaked Kerlix until this problem is remediated. 10/25/23: REBEKAH has been coming to patient's home to help with wound VAC. Patient denies any local or systemic symptoms of infection today. Notes that he has a follow-up appointment with surgery tomorrow. States that he has been showering and attempting to keep the area dry. Did notice drainage underneath the drape but is not sure if it is from the shower from the wound itself. 10/18/23: Neo Barajasneil Blunt is a pleasant 52-year-old male with past medical history of diabetes mellitus and morbid obesity. He is status post Selin-en-Y gastric bypass in 2022. He was recently hospitalized for complications of the surgery. He presented to [address] [place] [address] and was admitted from 10/5 to 10/13/2023. During that time he had evacuation of hematoma, wound washout, mesh placement, and placement of wound VAC. He required 3 different visits to the OR due to ongoing complications. MARCELLO drain was recently removed. Patient is  also following a general surgery however was referred for wound management for management of wound VAC. Denies any systemic signs of infection today. The following portions of the patient's history were reviewed and updated as appropriate: allergies, current medications, past family history, past medical history, past social history, past surgical history, and problem list.    Review of Systems   Constitutional:  Negative for chills, diaphoresis and fever. Skin:  Positive for wound. All other systems reviewed and are negative. Objective:       Wound 10/18/23 Surgical Abdomen Medial;Lower (Active)   Wound Image Images linked 11/09/23 0841   Wound Description Beefy red;Granulation tissue; Yellow;Pink;Slough 11/09/23 0841   Tracy-wound Assessment Erythema 11/09/23 0841   Wound Length (cm) 4.3 cm 11/09/23 0841   Wound Width (cm) 2.4 cm 11/09/23 0841   Wound Depth (cm) 3 cm 11/09/23 0841   Wound Surface Area (cm^2) 10.32 cm^2 11/09/23 0841   Wound Volume (cm^3) 30.96 cm^3 11/09/23 0841   Calculated Wound Volume (cm^3) 30.96 cm^3 11/09/23 0841   Change in Wound Size % 52.75 11/09/23 0841   Drainage Amount Small 11/09/23 0841   Drainage Description Serosanguineous 11/09/23 0841   Non-staged Wound Description Full thickness 11/09/23 0841   Dressing Wound V.A.C. 11/09/23 0841   Packing- # removed 1 11/09/23 0841   Dressing Status Intact; Old drainage;New drainage 11/09/23 0841       /82   Pulse 76   Temp 97.5 °F (36.4 °C) (Temporal)     Physical Exam  Vitals reviewed. Constitutional:       Appearance: Normal appearance. HENT:      Head: Normocephalic and atraumatic. Eyes:      Extraocular Movements: Extraocular movements intact. Pulmonary:      Effort: Pulmonary effort is normal.   Musculoskeletal:      Cervical back: Neck supple. Skin:     Comments: Lower surgical abdominal wound significantly smaller than last exam.  Healthy wound bed with granulation tissue.   No local signs of infection. Neurological:      Mental Status: He is alert. Psychiatric:         Mood and Affect: Mood normal.               Wound Instructions:  Orders Placed This Encounter   Procedures    Wound cleansing and dressings     Wound cleansing and dressings        Wash your hands with soap and water. Remove old dressing, discard into plastic bag and place in trash. Cleanse the wound with  prior to applying a clean dressing. Do not use tissue or cotton balls. Do not scrub the wound. Pat dry using gauze. Shower no   Apply skin prep to skin surrounding wound  Apply adaptic to edges and base of the abdominal wound. Please use one piece. Cover with wound vac- see wound vac orders  Change dressing three times weekly. Standing Status:   Future     Standing Expiration Date:   11/9/2024    Wound negative pressure wound therapy     Wound negative pressure wound therapy      Negative Pressure Wound Therapy Instructions  Apply stomahesive strip paste to 2 creases on sides of wound. Apply black granufoam to wound bed (discontinue adaptic), Set negative pressure on intermittent at 125mmHG, 5 minutes on 2 minutes break. VAC dressing to be changed three times per week as ordered. VNA to change vac twice weekly. The wound center will change the vac once weekly. Standing Status:   Future     Standing Expiration Date:   11/9/2024    Wound miscellaneous orders     Wound miscellaneous orders      PROTEIN: eat protein with each meal to promote healing. Protein includes fish, eggs, protein shakes, meat, lentils, nuts, edamame      Infection: signs and symptoms of infection include fever, chills, sweats, increase in drainage that has a foul odor or a green drainage. Call the nurses that comes to your home or call [facility] M-F 8-4. If after hours go to ER.      Standing Status:   Future     Standing Expiration Date:   11/9/2024    Debridement     This order was created via procedure documentation        Diagnosis ICD-10-CM Associated Orders   1. Non-healing surgical wound, initial encounter  T81.89XA lidocaine (XYLOCAINE) 4 % topical solution 5 mL     Wound cleansing and dressings     Wound negative pressure wound therapy     Wound miscellaneous orders     Debridement      2. Type 2 diabetes mellitus without complication, with long-term current use of insulin (HCC)  E11.9 lidocaine (XYLOCAINE) 4 % topical solution 5 mL    Z79.4 Wound cleansing and dressings     Wound negative pressure wound therapy     Wound miscellaneous orders      3. History of Selin-en-Y gastric bypass  Z98.84 lidocaine (XYLOCAINE) 4 % topical solution 5 mL     Wound cleansing and dressings     Wound negative pressure wound therapy     Wound miscellaneous orders      4. History of hernia repair  Z98.890 lidocaine (XYLOCAINE) 4 % topical solution 5 mL    Z87.19 Wound cleansing and dressings     Wound negative pressure wound therapy     Wound miscellaneous orders      5. History of evacuation of hematoma  Z98.890 lidocaine (XYLOCAINE) 4 % topical solution 5 mL     Wound cleansing and dressings     Wound negative pressure wound therapy     Wound miscellaneous orders        --  Griffin Franklin MD    "This note has been constructed using a voice recognition system. Therefore there may be syntax, spelling, and/or grammatical errors. Occasional wrong word or "sound alike" substitutions may have occurred due to the inherent limitations of voice recognition software. Read the chart carefully and recognize, using context, where substitutions have occurred.  Please call if you have any questions." \

## 2023-11-15 ENCOUNTER — OFFICE VISIT (OUTPATIENT)
Dept: WOUND CARE | Facility: HOSPITAL | Age: 43
End: 2023-11-15
Payer: COMMERCIAL

## 2023-11-15 ENCOUNTER — PATIENT OUTREACH (OUTPATIENT)
Dept: FAMILY MEDICINE CLINIC | Facility: CLINIC | Age: 43
End: 2023-11-15

## 2023-11-15 VITALS
TEMPERATURE: 97.8 F | SYSTOLIC BLOOD PRESSURE: 147 MMHG | HEART RATE: 96 BPM | DIASTOLIC BLOOD PRESSURE: 84 MMHG | RESPIRATION RATE: 15 BRPM

## 2023-11-15 DIAGNOSIS — E11.9 TYPE 2 DIABETES MELLITUS WITHOUT COMPLICATION, WITH LONG-TERM CURRENT USE OF INSULIN (HCC): ICD-10-CM

## 2023-11-15 DIAGNOSIS — Z79.4 TYPE 2 DIABETES MELLITUS WITHOUT COMPLICATION, WITH LONG-TERM CURRENT USE OF INSULIN (HCC): ICD-10-CM

## 2023-11-15 DIAGNOSIS — Z87.19 HISTORY OF HERNIA REPAIR: ICD-10-CM

## 2023-11-15 DIAGNOSIS — Z98.890 HISTORY OF HERNIA REPAIR: ICD-10-CM

## 2023-11-15 DIAGNOSIS — T81.89XA NON-HEALING SURGICAL WOUND, INITIAL ENCOUNTER: Primary | ICD-10-CM

## 2023-11-15 DIAGNOSIS — Z98.84 HISTORY OF ROUX-EN-Y GASTRIC BYPASS: ICD-10-CM

## 2023-11-15 DIAGNOSIS — B36.9 FUNGAL DERMATITIS: ICD-10-CM

## 2023-11-15 PROCEDURE — 99214 OFFICE O/P EST MOD 30 MIN: CPT | Performed by: STUDENT IN AN ORGANIZED HEALTH CARE EDUCATION/TRAINING PROGRAM

## 2023-11-15 PROCEDURE — 97597 DBRDMT OPN WND 1ST 20 CM/<: CPT | Performed by: STUDENT IN AN ORGANIZED HEALTH CARE EDUCATION/TRAINING PROGRAM

## 2023-11-15 RX ORDER — CLOTRIMAZOLE AND BETAMETHASONE DIPROPIONATE 10; .64 MG/G; MG/G
CREAM TOPICAL 2 TIMES DAILY
Qty: 45 G | Refills: 1 | Status: SHIPPED | OUTPATIENT
Start: 2023-11-15

## 2023-11-15 RX ORDER — LIDOCAINE HYDROCHLORIDE 40 MG/ML
5 SOLUTION TOPICAL ONCE
Status: COMPLETED | OUTPATIENT
Start: 2023-11-15 | End: 2023-11-15

## 2023-11-15 RX ADMIN — LIDOCAINE HYDROCHLORIDE 5 ML: 40 SOLUTION TOPICAL at 11:40

## 2023-11-15 NOTE — PROGRESS NOTES
Patient ID: Astrid Acharya is a 44 y.o. male Date of Birth 1980     Chief Complaint  Chief Complaint   Patient presents with    Follow Up Wound Care Visit     abdomen       Allergies  Bee venom, Macrolides and ketolides, Other, Pertussis vaccines, Vancomycin, Zithromax [azithromycin], Flagyl [metronidazole], Erythromycin, and Pollen extract    Assessment:     Diagnoses and all orders for this visit:    Non-healing surgical wound, initial encounter  -     lidocaine (XYLOCAINE) 4 % topical solution 5 mL  -     Wound cleansing and dressings; Future  -     Wound miscellaneous orders; Future  -     Wound home care; Future  -     Debridement    History of Selin-en-Y gastric bypass  -     lidocaine (XYLOCAINE) 4 % topical solution 5 mL  -     Wound cleansing and dressings; Future  -     Wound miscellaneous orders; Future  -     Wound home care; Future    History of hernia repair  -     lidocaine (XYLOCAINE) 4 % topical solution 5 mL  -     Wound cleansing and dressings; Future  -     Wound miscellaneous orders; Future  -     Wound home care; Future    Type 2 diabetes mellitus without complication, with long-term current use of insulin (HCC)  -     lidocaine (XYLOCAINE) 4 % topical solution 5 mL  -     Wound cleansing and dressings; Future  -     Wound miscellaneous orders; Future  -     Wound home care; Future    Fungal dermatitis  -     clotrimazole-betamethasone (LOTRISONE) 1-0.05 % cream; Apply topically 2 (two) times a day              Debridement   Wound 10/18/23 Surgical Abdomen Medial;Lower    Universal Protocol:  Consent: Verbal consent obtained. Risks and benefits: risks, benefits and alternatives were discussed  Consent given by: patient  Time out: Immediately prior to procedure a "time out" was called to verify the correct patient, procedure, equipment, support staff and site/side marked as required.   Patient identity confirmed: verbally with patient    Performed by: physician  Debridement type: selective  Pain control: lidocaine 4%  Post-debridement measurements  Length (cm): 3.7  Width (cm): 1  Depth (cm): 0.6  Percent debrided: 90%  Surface Area (cm^2): 3.7  Area debrided (cm^2): 3.33  Volume (cm^3): 2.22  Devitalized tissue debrided: biofilm and exudate  Instrument(s) utilized: curette  Bleeding: small  Hemostasis obtained with: pressure  Procedural pain (0-10): 1  Post-procedural pain: 0   Response to treatment: procedure was tolerated well        Plan: It was a pleasure to see Yady Mckeon for wound care follow up today  Selective debridement performed today as above  Wound is improving   Continue plan of care as noted below with allergen covered with gauze, Lotrisone to periwound, wound and periwound covered with ABD. Wound VAC to be held due to large fungal infection to the periwound. Wound to resume at next week's visit if appropriate. No signs or symptoms of bacterial infection today. Patient understands that if any signs of infection start (such as increased redness, drainage, pain, fever, chills, diaphoresis), they should call our office or proceed to the ER or Urgent Care. Patient should continue a high protein diet to facilitate wound healing  Patient is advised to not submerge wound or leave wound open to air. Follow up in 1 weeks  Given the multi-factorial nature of wound care, additional time was taken to review patient's treatment plan with other specialties and most recent pertinent lab work and imaging. All plans of care discussed with patient at bedside who verbalized understanding with treatment plan. Wound 10/18/23 Surgical Abdomen Medial;Lower (Active)   Wound Image Images linked 11/15/23 1137   Wound Description Beefy red;Granulation tissue; White 11/15/23 1134   Tracy-wound Assessment Erythema 11/15/23 1134   Wound Length (cm) 3.7 cm 11/15/23 1134   Wound Width (cm) 1 cm 11/15/23 1134   Wound Depth (cm) 0.6 cm 11/15/23 1134   Wound Surface Area (cm^2) 3.7 cm^2 11/15/23 1134   Wound Volume (cm^3) 2.22 cm^3 11/15/23 1134   Calculated Wound Volume (cm^3) 2.22 cm^3 11/15/23 1134   Change in Wound Size % 96.61 11/15/23 1134   Undermining 0 11/15/23 1134   Drainage Amount Small 11/15/23 1134   Drainage Description Serosanguineous 11/15/23 1134   Non-staged Wound Description Full thickness 11/15/23 1134   Dressing Wound V.A.C. 11/15/23 1134   Dressing Status Intact (upon arrival) 11/15/23 1134       Wound 10/18/23 Surgical Abdomen Medial;Lower (Active)   Date First Assessed/Time First Assessed: 10/18/23 1030   Primary Wound Type: Surgical  Location: Abdomen  Wound Location Orientation: Medial;Lower       [REMOVED] Wound 10/06/23 Abdomen N/A (Removed)   Resolved Date: 10/18/23  Date First Assessed/Time First Assessed: 10/06/23 1302   Location: Abdomen  Wound Location Orientation: N/A  Wound Description (Comments): WOUND VAC PLACEMENT VERAFLOW, PIECESWOUND 13 CM X 7.5 CM. X 7  Wound Outcome: (c) Other. .. [REMOVED] Wound 10/06/23 Abrasion(s) Nose (Removed)   Resolved Date: 10/18/23  Date First Assessed/Time First Assessed: 10/06/23 1930   Present on Original Admission: Yes  Traumatic Wound Type: Abrasion(s)  Location: Nose  Wound Description (Comments): abrasion with scab fallen off  Wound Outcome: (c) Ot. ..        [REMOVED] Wound 10/10/23  Sacrum (Removed)   Resolved Date: 10/18/23  Date First Assessed/Time First Assessed: 10/10/23 1130   Primary Wound Type: (c)   Location: Sacrum  Wound Outcome: (c) Other (Comment)       [REMOVED] Wound 10/11/23 Abdomen (Removed)   Resolved Date: 10/18/23  Date First Assessed/Time First Assessed: 10/11/23 0936   Location: Abdomen  Wound Description (Comments): WOUND VAC  Wound Outcome: (c) Other (Comment)       [REMOVED] Wound 10/13/23 Abdomen N/A (Removed)   Resolved Date: 10/18/23  Date First Assessed/Time First Assessed: 10/13/23 1123   Location: Abdomen  Wound Location Orientation: N/A  Wound Description (Comments): wound vac in place - 1 black sponge,  adaptic  Wound Outcome: (c) Other (Comment)       Subjective:      .    11/15/23: Patient happy with wound healing. REBEKAH still coming to the home twice per week. Notes that he has an area of redness around the wound. Denies any abdominal pain. Denies any fever chills diaphoresis. 11/9/23: Patient notes today that wound VAC has been applied since Monday as he is currently living in a hotel with his family. REBEKAH is coming to his new address on M/W/F. Prior to that he did have a gap of approximately 5 days when the wound VAC was not being applied. Denies any symptoms of infection today including fever chills diaphoresis. He was also able to obtain access to his belongings at his prior apartment and does have his wound VAC supplies. 11/1/23: Patient seen by covering CRNA. At that visit patient noted that he had been evicted from his apartment and no longer had a physical arduous review needed, nor did he have access to wound care supplies were left at his last place of residence. Wound VAC was temporarily held and patient was advised to use Dakin soaked Kerlix until this problem is remediated. 10/25/23: REBEKAH has been coming to patient's home to help with wound VAC. Patient denies any local or systemic symptoms of infection today. Notes that he has a follow-up appointment with surgery tomorrow. States that he has been showering and attempting to keep the area dry. Did notice drainage underneath the drape but is not sure if it is from the shower from the wound itself. 10/18/23: Consult - Dossie Linnea is a pleasant 78-year-old male with past medical history of diabetes mellitus and morbid obesity. He is status post Selin-en-Y gastric bypass in 2022. He was recently hospitalized for complications of the surgery. He presented to [address] [place] [address] and was admitted from 10/5 to 10/13/2023.   During that time he had evacuation of hematoma, wound washout, mesh placement, and placement of wound VAC. He required 3 different visits to the OR due to ongoing complications. MARCELLO drain was recently removed. Patient is  also following a general surgery however was referred for wound management for management of wound VAC. Denies any systemic signs of infection today. The following portions of the patient's history were reviewed and updated as appropriate: allergies, current medications, past family history, past medical history, past social history, past surgical history, and problem list.    Review of Systems   Constitutional:  Negative for chills, diaphoresis and fever. Skin:  Positive for wound. All other systems reviewed and are negative. Objective:       Wound 10/18/23 Surgical Abdomen Medial;Lower (Active)   Wound Image Images linked 11/15/23 1137   Wound Description Beefy red;Granulation tissue; White 11/15/23 1134   Tracy-wound Assessment Erythema 11/15/23 1134   Wound Length (cm) 3.7 cm 11/15/23 1134   Wound Width (cm) 1 cm 11/15/23 1134   Wound Depth (cm) 0.6 cm 11/15/23 1134   Wound Surface Area (cm^2) 3.7 cm^2 11/15/23 1134   Wound Volume (cm^3) 2.22 cm^3 11/15/23 1134   Calculated Wound Volume (cm^3) 2.22 cm^3 11/15/23 1134   Change in Wound Size % 96.61 11/15/23 1134   Undermining 0 11/15/23 1134   Drainage Amount Small 11/15/23 1134   Drainage Description Serosanguineous 11/15/23 1134   Non-staged Wound Description Full thickness 11/15/23 1134   Dressing Wound V.A.C. 11/15/23 1134   Dressing Status Intact (upon arrival) 11/15/23 1134       /84   Pulse 96   Temp 97.8 °F (36.6 °C)   Resp 15     Physical Exam  Vitals reviewed. Constitutional:       Appearance: Normal appearance. HENT:      Head: Normocephalic and atraumatic. Eyes:      Extraocular Movements: Extraocular movements intact. Pulmonary:      Effort: Pulmonary effort is normal.   Musculoskeletal:      Cervical back: Neck supple.    Skin:     Comments: 1 wound itself is smaller than last exam. There is a large area of erythema that is nonblanching surrounding the wound with a lacy like pattern and satellite lesions. Neurological:      Mental Status: He is alert. Psychiatric:         Mood and Affect: Mood normal.           Wound Instructions:  Orders Placed This Encounter   Procedures    Wound cleansing and dressings     Abdominal Wound:    (WOUND VAC on hold for one week)     Wash your hands with soap and water. Remove old dressing, discard into plastic bag and place in trash. Cleanse the wound with  prior to applying a clean dressing. Do not use tissue or cotton balls. Do not scrub the wound. Pat dry using gauze. Shower no   Apply Lotrisone cream  to skin surrounding wound  Apply Puracol AG to the abdominal wound. Cover with small piece of gauze; then cover with ABD and secure with silicone tape. Change dressing every other day  This was done today. Do NOT send the wound vac back. On hold this week. Standing Status:   Future     Standing Expiration Date:   11/15/2024    Wound miscellaneous orders     PROTEIN: eat protein with each meal to promote healing. Protein includes fish, eggs, protein shakes, meat, lentils, nuts, edamame      Infection: signs and symptoms of infection include fever, chills, sweats, increase in drainage that has a foul odor or a green drainage. Call the nurses that comes to your home or call [facility] M-F 8-4. If after hours go to ER. Standing Status:   Future     Standing Expiration Date:   11/15/2024    Wound home care     [facility]: Please continue seeing pt for wound care. Please see new orders. Wound Vac on hold this week. Do not send wound vac back. Standing Status:   Future     Standing Expiration Date:   11/15/2024    Debridement     This order was created via procedure documentation        Diagnosis ICD-10-CM Associated Orders   1.  Non-healing surgical wound, initial encounter  T81.89XA lidocaine (XYLOCAINE) 4 % topical solution 5 mL     Wound cleansing and dressings     Wound miscellaneous orders     Wound home care     Debridement      2. History of Selin-en-Y gastric bypass  Z98.84 lidocaine (XYLOCAINE) 4 % topical solution 5 mL     Wound cleansing and dressings     Wound miscellaneous orders     Wound home care      3. History of hernia repair  Z98.890 lidocaine (XYLOCAINE) 4 % topical solution 5 mL    Z87.19 Wound cleansing and dressings     Wound miscellaneous orders     Wound home care      4. Type 2 diabetes mellitus without complication, with long-term current use of insulin (HCC)  E11.9 lidocaine (XYLOCAINE) 4 % topical solution 5 mL    Z79.4 Wound cleansing and dressings     Wound miscellaneous orders     Wound home care      5. Fungal dermatitis  B36.9 clotrimazole-betamethasone (LOTRISONE) 1-0.05 % cream          --  Fabi Leslie MD    "This note has been constructed using a voice recognition system. Therefore there may be syntax, spelling, and/or grammatical errors. Occasional wrong word or "sound alike" substitutions may have occurred due to the inherent limitations of voice recognition software. Read the chart carefully and recognize, using context, where substitutions have occurred.  Please call if you have any questions."

## 2023-11-15 NOTE — LETTER
[facility] WOUND CARE  [address]  [address] [facility] [address]  Phone#  [phone]  Fax#  [phone]    Patient:  Hue Castellon  YOB: 1980  Phone:  [phone]  Date of Visit:  11/15/2023    Orders Placed This Encounter   Procedures   • Wound cleansing and dressings     Abdominal Wound:    (WOUND VAC on hold for one week)     Wash your hands with soap and water. Remove old dressing, discard into plastic bag and place in trash. Cleanse the wound with  prior to applying a clean dressing. Do not use tissue or cotton balls. Do not scrub the wound. Pat dry using gauze. Shower no   Apply Lotrisone cream  to skin surrounding wound  Apply Puracol AG to the abdominal wound. Cover with small piece of gauze; then cover with ABD and secure with silicone tape. Change dressing every other day  This was done today. Do NOT send the wound vac back. On hold this week. Standing Status:   Future     Standing Expiration Date:   11/15/2024   • Wound miscellaneous orders     PROTEIN: eat protein with each meal to promote healing. Protein includes fish, eggs, protein shakes, meat, lentils, nuts, edamame      Infection: signs and symptoms of infection include fever, chills, sweats, increase in drainage that has a foul odor or a green drainage. Call the nurses that comes to your home or call [facility] M-F 8-4. If after hours go to ER. Standing Status:   Future     Standing Expiration Date:   11/15/2024   • Wound home care     [facility]: Please continue seeing pt for wound care. Please see new orders. Wound Vac on hold this week. Do not send wound vac back.      Standing Status:   Future     Standing Expiration Date:   11/15/2024         Electronically signed by Rossana Mackay MD

## 2023-11-15 NOTE — PROGRESS NOTES
Telephone Encounter  11/15/2023    [address] [place] [address] reached out to patient, following up on referral for housing instability. CMOC spoke to Saniya Avila. Saniya Avila stated they were doing good. [address] [place] [address] asked how house search was going, Saniya Avila stated it was "going". 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 Saniya Avila stated that the hotel is paid until Saturday Morning and on Saturday Morning they will figure out the next steps. [address] [place] [address] asked how the family was doing otherwise, as far as food and Ben's wound care. Saniya Avila stated they have plenty of food and are doing fine with that. Saniya Avila stated that he saw Karuna today and his wound is shrinking but has a fungal infection, so he is holding off on use of wound vac until reevaluated next week. Saniya Avila stated the visiting nurse is still coming out. Saniya Avila stated that he was able to get his things from his old apartment, around the 5th of November, which included his wound vac supplies. Saniya Avila informed [address] [place] [address] that he has a  with "[employer]" and he is expecting a phone call today, around Sil Mcmahon stated that this organization can help with house search, down payment and financial assistance. Saniya Avila stated they have no money in savings currently. Saniya Avila asked [address] [place] [address] if she could reach out around Tuesday of next week, [address] [place] [address] agreed.      Next Outreach  11/21/2023

## 2023-11-15 NOTE — PATIENT INSTRUCTIONS
Orders Placed This Encounter   Procedures    Wound cleansing and dressings     Abdominal Wound:    (WOUND VAC on hold for one week)     Wash your hands with soap and water. Remove old dressing, discard into plastic bag and place in trash. Cleanse the wound with  prior to applying a clean dressing. Do not use tissue or cotton balls. Do not scrub the wound. Pat dry using gauze. Shower no   Apply Lotrisone cream  to skin surrounding wound  Apply Puracol AG to the abdominal wound. Cover with small piece of gauze; then cover with ABD and secure with silicone tape. Change dressing every other day  This was done today. Do NOT send the wound vac back. On hold this week. Standing Status:   Future     Standing Expiration Date:   11/15/2024    Wound miscellaneous orders     PROTEIN: eat protein with each meal to promote healing. Protein includes fish, eggs, protein shakes, meat, lentils, nuts, edamame      Infection: signs and symptoms of infection include fever, chills, sweats, increase in drainage that has a foul odor or a green drainage. Call the nurses that comes to your home or call [facility] M-F 8-4. If after hours go to ER. Standing Status:   Future     Standing Expiration Date:   11/15/2024    Wound home care     [facility]: Please continue seeing pt for wound care. Please see new orders. Wound Vac on hold this week. Do not send wound vac back.      Standing Status:   Future     Standing Expiration Date:   11/15/2024

## 2023-11-15 NOTE — LETTER
[facility]  [address]  [address] [facility] [address]  Phone#  [phone]  Fax#  [phone]    Patient:  Jose Dutta  YOB: 1980  Phone:  [phone]  Date of Visit:  11/15/2023    Orders Placed This Encounter   Procedures   • Wound cleansing and dressings     Abdominal Wound:    (WOUND VAC on hold for one week)     Wash your hands with soap and water. Remove old dressing, discard into plastic bag and place in trash. Cleanse the wound with  prior to applying a clean dressing. Do not use tissue or cotton balls. Do not scrub the wound. Pat dry using gauze. Shower no   Apply Lotrisone cream  to skin surrounding wound  Apply Puracol AG to the abdominal wound. Cover with small piece of gauze; then cover with ABD and secure with silicone tape. Change dressing every other day  This was done today. Do NOT send the wound vac back. On hold this week. Standing Status:   Future     Standing Expiration Date:   11/15/2024   • Wound miscellaneous orders     PROTEIN: eat protein with each meal to promote healing. Protein includes fish, eggs, protein shakes, meat, lentils, nuts, edamame      Infection: signs and symptoms of infection include fever, chills, sweats, increase in drainage that has a foul odor or a green drainage. Call the nurses that comes to your home or call [facility] M-F 8-4. If after hours go to ER. Standing Status:   Future     Standing Expiration Date:   11/15/2024   • Wound home care     [facility]: Please continue seeing pt for wound care. Please see new orders. Wound Vac on hold this week. Do not send wound vac back.      Standing Status:   Future     Standing Expiration Date:   11/15/2024         Electronically signed by Marian Xiao MD

## 2023-11-16 ENCOUNTER — PATIENT OUTREACH (OUTPATIENT)
Dept: FAMILY MEDICINE CLINIC | Facility: CLINIC | Age: 43
End: 2023-11-16

## 2023-11-16 NOTE — PROGRESS NOTES
KIERA had discussed this case with Joseph Fierro. Ingrid Cope mentioned that Yvan and his family are still in the hotel and managing. Ingrid Cope reported that Chyna Camejo had a meeting with his CM from [facility] [employer] for [employer]. Ingrid Cope reported that they help with house search, down payment and financial assistance. [facility] notes Ingrid Cope will keep her up to date on this case. [facility] routed note to Ingrid Cope. KIERA will continue to f/u.

## 2023-11-21 ENCOUNTER — PATIENT OUTREACH (OUTPATIENT)
Dept: FAMILY MEDICINE CLINIC | Facility: CLINIC | Age: 43
End: 2023-11-21

## 2023-11-22 ENCOUNTER — OFFICE VISIT (OUTPATIENT)
Dept: WOUND CARE | Facility: HOSPITAL | Age: 43
End: 2023-11-22
Payer: COMMERCIAL

## 2023-11-22 VITALS
DIASTOLIC BLOOD PRESSURE: 88 MMHG | SYSTOLIC BLOOD PRESSURE: 154 MMHG | TEMPERATURE: 97.1 F | HEART RATE: 106 BPM | RESPIRATION RATE: 18 BRPM

## 2023-11-22 DIAGNOSIS — Z79.4 TYPE 2 DIABETES MELLITUS WITHOUT COMPLICATION, WITH LONG-TERM CURRENT USE OF INSULIN (HCC): ICD-10-CM

## 2023-11-22 DIAGNOSIS — Z98.84 HISTORY OF ROUX-EN-Y GASTRIC BYPASS: ICD-10-CM

## 2023-11-22 DIAGNOSIS — E11.9 TYPE 2 DIABETES MELLITUS WITHOUT COMPLICATION, WITH LONG-TERM CURRENT USE OF INSULIN (HCC): ICD-10-CM

## 2023-11-22 DIAGNOSIS — Z87.19 HISTORY OF HERNIA REPAIR: ICD-10-CM

## 2023-11-22 DIAGNOSIS — Z98.890 HISTORY OF HERNIA REPAIR: ICD-10-CM

## 2023-11-22 DIAGNOSIS — T81.89XA NON-HEALING SURGICAL WOUND, INITIAL ENCOUNTER: Primary | ICD-10-CM

## 2023-11-22 PROCEDURE — 97597 DBRDMT OPN WND 1ST 20 CM/<: CPT | Performed by: STUDENT IN AN ORGANIZED HEALTH CARE EDUCATION/TRAINING PROGRAM

## 2023-11-22 RX ORDER — LIDOCAINE HYDROCHLORIDE 40 MG/ML
5 SOLUTION TOPICAL ONCE
Status: COMPLETED | OUTPATIENT
Start: 2023-11-22 | End: 2023-11-22

## 2023-11-22 RX ADMIN — LIDOCAINE HYDROCHLORIDE 5 ML: 40 SOLUTION TOPICAL at 08:47

## 2023-11-22 NOTE — PROGRESS NOTES
Patient ID: Mitzy Brennan is a 44 y.o. male Date of Birth 1980     Chief Complaint  Chief Complaint   Patient presents with    Follow Up Wound Care Visit     Non healing surgical wound       Allergies  Bee venom, Macrolides and ketolides, Other, Pertussis vaccines, Vancomycin, Zithromax [azithromycin], Flagyl [metronidazole], Erythromycin, and Pollen extract    Assessment:     Diagnoses and all orders for this visit:    Non-healing surgical wound, initial encounter  -     lidocaine (XYLOCAINE) 4 % topical solution 5 mL  -     Wound cleansing and dressings; Future  -     Wound negative pressure wound therapy; Future  -     Wound home care; Future    History of Selin-en-Y gastric bypass    History of hernia repair    Type 2 diabetes mellitus without complication, with long-term current use of insulin ([address])    Other orders  -     Debridement              Debridement   Wound 10/18/23 Surgical Abdomen Medial;Lower    Universal Protocol:  Consent: Verbal consent obtained. Risks and benefits: risks, benefits and alternatives were discussed  Consent given by: patient  Time out: Immediately prior to procedure a "time out" was called to verify the correct patient, procedure, equipment, support staff and site/side marked as required. Patient identity confirmed: verbally with patient    Performed by: physician  Debridement type: selective  Pain control: lidocaine 4%  Post-debridement measurements  Length (cm): 2  Width (cm): 0.5  Depth (cm): 0.3  Percent debrided: 90%  Surface Area (cm^2): 1  Area debrided (cm^2): 0.9  Volume (cm^3): 0.3  Devitalized tissue debrided: biofilm and exudate  Instrument(s) utilized: curette  Bleeding: small  Hemostasis obtained with: pressure  Procedural pain (0-10): 1  Post-procedural pain: 0   Response to treatment: procedure was tolerated well        Plan:    It was a pleasure to see Mitzy Brennan for wound care follow up today  Selective debridement performed today as above  Wound is improving   Continue plan of care as noted below with puracol, abd. . Patient OK to send wound vac -  no longer clinical indication for use   A1C results reviewed with the patient today. No signs or symptoms of infection today. Patient understands that if any signs of infection start (such as increased redness, drainage, pain, fever, chills, diaphoresis), they should call our office or proceed to the ER or Urgent Care. Patient should continue a high protein diet to facilitate wound healing  Patient is advised to not submerge wound or leave wound open to air. Follow up in 1 weeks  Given the multi-factorial nature of wound care, additional time was taken to review patient's treatment plan with other specialties and most recent pertinent lab work and imaging. All plans of care discussed with patient at bedside who verbalized understanding with treatment plan. Wound 10/18/23 Surgical Abdomen Medial;Lower (Active)   Wound Image Images linked 11/22/23 0845   Wound Description Beefy red;Granulation tissue; Epithelialization 11/22/23 0845   Tracy-wound Assessment Intact;Dry 11/22/23 0845   Wound Length (cm) 2 cm 11/22/23 0845   Wound Width (cm) 0.5 cm 11/22/23 0845   Wound Depth (cm) 0.3 cm 11/22/23 0845   Wound Surface Area (cm^2) 1 cm^2 11/22/23 0845   Wound Volume (cm^3) 0.3 cm^3 11/22/23 0845   Calculated Wound Volume (cm^3) 0.3 cm^3 11/22/23 0845   Change in Wound Size % 99.54 11/22/23 0845   Drainage Amount Scant 11/22/23 0845   Drainage Description Serosanguineous 11/22/23 0845   Non-staged Wound Description Full thickness 11/22/23 0845   Dressing Status Intact 11/22/23 0845       Wound 10/18/23 Surgical Abdomen Medial;Lower (Active)   Date First Assessed/Time First Assessed: 10/18/23 1030   Primary Wound Type: Surgical  Location: Abdomen  Wound Location Orientation: Medial;Lower       [REMOVED] Wound 10/06/23 Abdomen N/A (Removed)   Resolved Date: 10/18/23  Date First Assessed/Time First Assessed: 10/06/23 1302   Location: Abdomen  Wound Location Orientation: N/A  Wound Description (Comments): WOUND VAC PLACEMENT BERNA PIECESWOUND 13 CM X 7.5 CM. X 7  Wound Outcome: (c) Other. .. [REMOVED] Wound 10/06/23 Abrasion(s) Nose (Removed)   Resolved Date: 10/18/23  Date First Assessed/Time First Assessed: 10/06/23 1930   Present on Original Admission: Yes  Traumatic Wound Type: Abrasion(s)  Location: Nose  Wound Description (Comments): abrasion with scab fallen off  Wound Outcome: (c) Ot. .. [REMOVED] Wound 10/10/23  Sacrum (Removed)   Resolved Date: 10/18/23  Date First Assessed/Time First Assessed: 10/10/23 1130   Primary Wound Type: (c)   Location: Sacrum  Wound Outcome: (c) Other (Comment)       [REMOVED] Wound 10/11/23 Abdomen (Removed)   Resolved Date: 10/18/23  Date First Assessed/Time First Assessed: 10/11/23 0936   Location: Abdomen  Wound Description (Comments): WOUND VAC  Wound Outcome: (c) Other (Comment)       [REMOVED] Wound 10/13/23 Abdomen N/A (Removed)   Resolved Date: 10/18/23  Date First Assessed/Time First Assessed: 10/13/23 1123   Location: Abdomen  Wound Location Orientation: N/A  Wound Description (Comments): wound vac in place - 1 black sponge,  adaptic  Wound Outcome: (c) Other (Comment)       Subjective:      .    11/22/23: Patient appendectomy with wound healing. Using Puracol as instructed. No symptoms of infection. Believes he is okay to discontinue wound VAC today    11/15/23: Patient happy with wound healing. REBEKAH still coming to the home twice per week. Notes that he has an area of redness around the wound. Denies any abdominal pain. Denies any fever chills diaphoresis. 11/9/23: Patient notes today that wound VAC has been applied since Monday as he is currently living in a hotel with his family. REBEKAH is coming to his new address on M/W/F. Prior to that he did have a gap of approximately 5 days when the wound VAC was not being applied.   Denies any symptoms of infection today including fever chills diaphoresis. He was also able to obtain access to his belongings at his prior apartment and does have his wound VAC supplies. 11/1/23: Patient seen by covering CRNA. At that visit patient noted that he had been evicted from his apartment and no longer had a physical arduous review needed, nor did he have access to wound care supplies were left at his last place of residence. Wound VAC was temporarily held and patient was advised to use Dakin soaked Kerlix until this problem is remediated. 10/25/23: VNA has been coming to patient's home to help with wound VAC. Patient denies any local or systemic symptoms of infection today. Notes that he has a follow-up appointment with surgery tomorrow. States that he has been showering and attempting to keep the area dry. Did notice drainage underneath the drape but is not sure if it is from the shower from the wound itself. 10/18/23: Neo - Samuel Encarnacion is a pleasant 42-year-old male with past medical history of diabetes mellitus and morbid obesity. He is status post Selin-en-Y gastric bypass in 2022. He was recently hospitalized for complications of the surgery. He presented to [address] [place] [address] and was admitted from 10/5 to 10/13/2023. During that time he had evacuation of hematoma, wound washout, mesh placement, and placement of wound VAC. He required 3 different visits to the OR due to ongoing complications. MARCELLO drain was recently removed. Patient is  also following a general surgery however was referred for wound management for management of wound VAC. Denies any systemic signs of infection today. The following portions of the patient's history were reviewed and updated as appropriate: allergies, current medications, past family history, past medical history, past social history, past surgical history, and problem list.    Review of Systems   Constitutional:  Negative for chills, diaphoresis and fever. Skin:  Positive for wound. All other systems reviewed and are negative. Objective:       Wound 10/18/23 Surgical Abdomen Medial;Lower (Active)   Wound Image Images linked 11/22/23 0845   Wound Description Beefy red;Granulation tissue; Epithelialization 11/22/23 0845   Tracy-wound Assessment Intact;Dry 11/22/23 0845   Wound Length (cm) 2 cm 11/22/23 0845   Wound Width (cm) 0.5 cm 11/22/23 0845   Wound Depth (cm) 0.3 cm 11/22/23 0845   Wound Surface Area (cm^2) 1 cm^2 11/22/23 0845   Wound Volume (cm^3) 0.3 cm^3 11/22/23 0845   Calculated Wound Volume (cm^3) 0.3 cm^3 11/22/23 0845   Change in Wound Size % 99.54 11/22/23 0845   Drainage Amount Scant 11/22/23 0845   Drainage Description Serosanguineous 11/22/23 0845   Non-staged Wound Description Full thickness 11/22/23 0845   Dressing Status Intact 11/22/23 0845       /88   Pulse (!) 106   Temp (!) 97.1 °F (36.2 °C)   Resp 18     Physical Exam  Vitals reviewed. Constitutional:       Appearance: Normal appearance. HENT:      Head: Normocephalic and atraumatic. Eyes:      Extraocular Movements: Extraocular movements intact. Pulmonary:      Effort: Pulmonary effort is normal.   Musculoskeletal:      Cervical back: Neck supple. Skin:     Comments: Abdominal wound significantly smaller than last exam.  No maceration. Healthy periwound. Very little depth today. Healthy wound bed tissue. Neurological:      Mental Status: He is alert. Psychiatric:         Mood and Affect: Mood normal.           Wound Instructions:  Orders Placed This Encounter   Procedures    Wound cleansing and dressings     Abdominal Wound:       Wash your hands with soap and water. Remove old dressing, discard into plastic bag and place in trash. Cleanse the wound with  prior to applying a clean dressing. Do not use tissue or cotton balls. Do not scrub the wound. Pat dry using gauze. Shower no   Apply Puracol AG to the abdominal wound.   Cover with small piece of gauze; then cover with ABD and secure with silicone tape. Change dressing every other day  This was done today. Standing Status:   Future     Standing Expiration Date:   11/22/2024    Wound negative pressure wound therapy     The wound vac is discontinued. You can send the wound vac back to the [facility]     Standing Status:   Future     Standing Expiration Date:   11/22/2024    Wound home care     Continue home care for wound care. Standing Status:   Future     Standing Expiration Date:   11/22/2024    Debridement     This order was created via procedure documentation        Diagnosis ICD-10-CM Associated Orders   1. Non-healing surgical wound, initial encounter  T81.89XA lidocaine (XYLOCAINE) 4 % topical solution 5 mL     Wound cleansing and dressings     Wound negative pressure wound therapy     Wound home care      2. History of Selin-en-Y gastric bypass  Z98.84       3. History of hernia repair  Z98.890     Z87.19       4. Type 2 diabetes mellitus without complication, with long-term current use of insulin ([facility])  E11.9     Z79.4           --  Carter Montoya MD    "This note has been constructed using a voice recognition system. Therefore there may be syntax, spelling, and/or grammatical errors. Occasional wrong word or "sound alike" substitutions may have occurred due to the inherent limitations of voice recognition software. Read the chart carefully and recognize, using context, where substitutions have occurred.  Please call if you have any questions."

## 2023-11-22 NOTE — PATIENT INSTRUCTIONS
Orders Placed This Encounter   Procedures    Wound cleansing and dressings     Abdominal Wound:       Wash your hands with soap and water. Remove old dressing, discard into plastic bag and place in trash. Cleanse the wound with  prior to applying a clean dressing. Do not use tissue or cotton balls. Do not scrub the wound. Pat dry using gauze. Shower no   Apply Puracol AG to the abdominal wound. Cover with small piece of gauze; then cover with ABD and secure with silicone tape. Change dressing every other day  This was done today. Standing Status:   Future     Standing Expiration Date:   11/22/2024    Wound negative pressure wound therapy     The wound vac is discontinued. You can send the wound vac back to the [facility]     Standing Status:   Future     Standing Expiration Date:   11/22/2024    Wound home care     Continue home care for wound care.      Standing Status:   Future     Standing Expiration Date:   11/22/2024

## 2023-11-28 ENCOUNTER — PATIENT OUTREACH (OUTPATIENT)
Dept: FAMILY MEDICINE CLINIC | Facility: CLINIC | Age: 43
End: 2023-11-28

## 2023-11-28 NOTE — LETTER
11/28/23    Dear Juaquin Cho,    I am a [facility] Worker with   [facility] [address] EVA GRIFFIN  [address]  [address] [address]. I have made several attempts to call you by phone. It is important that you contact me back at [phone] so that I can assist with your care needs.      Sincerely,       Jagdeep Morales

## 2023-11-28 NOTE — PROGRESS NOTES
[facility] had discussed this case with Clemente Rosas. Yanni Lemon informed [facility] that she has not been able to get in contact with Yvan. KIERA and Yanni Lemon discussed closing case as Uday Barr has resources to f/u on his own. Yanni Lemon will attempt to call patient today. [facility] routed note to Yanni Lemon. ANNE will continue to f/u.

## 2023-11-28 NOTE — PROGRESS NOTES
[facility] received incoming voicemail,  "Toya Cortez, this is Gabriela Patel. Just giving you a call back from where you called me this morning. You have my number [phone]. Talk to you later.  Goodbye."

## 2023-11-28 NOTE — PROGRESS NOTES
Telephone Encounter  11/28/2023    [facility] following up on referral for housing instability. [facility] performed chart review. [facility] notes that address in chart has changed to [facility]. Previous address was [address] [place] [address] placed a call to patient, [facility] left voicemail with [facility] contact information. [facility] will send unable to reach letter via [address] [place] [address]. [facility] will continue to follow up.      Next Outreach  12/5/2023

## 2023-11-29 ENCOUNTER — OFFICE VISIT (OUTPATIENT)
Dept: WOUND CARE | Facility: HOSPITAL | Age: 43
End: 2023-11-29
Payer: COMMERCIAL

## 2023-11-29 ENCOUNTER — PATIENT OUTREACH (OUTPATIENT)
Dept: FAMILY MEDICINE CLINIC | Facility: CLINIC | Age: 43
End: 2023-11-29

## 2023-11-29 VITALS
DIASTOLIC BLOOD PRESSURE: 95 MMHG | HEART RATE: 109 BPM | SYSTOLIC BLOOD PRESSURE: 132 MMHG | RESPIRATION RATE: 16 BRPM | TEMPERATURE: 98.1 F

## 2023-11-29 DIAGNOSIS — Z98.84 HISTORY OF ROUX-EN-Y GASTRIC BYPASS: ICD-10-CM

## 2023-11-29 DIAGNOSIS — Z98.890 HISTORY OF EVACUATION OF HEMATOMA: ICD-10-CM

## 2023-11-29 DIAGNOSIS — Z59.812 HOUSING INSTABILITY AFTER RECENT HOMELESSNESS: ICD-10-CM

## 2023-11-29 DIAGNOSIS — Z87.19 HISTORY OF HERNIA REPAIR: ICD-10-CM

## 2023-11-29 DIAGNOSIS — Z79.4 TYPE 2 DIABETES MELLITUS WITHOUT COMPLICATION, WITH LONG-TERM CURRENT USE OF INSULIN (HCC): ICD-10-CM

## 2023-11-29 DIAGNOSIS — Z98.890 HISTORY OF HERNIA REPAIR: ICD-10-CM

## 2023-11-29 DIAGNOSIS — E11.9 TYPE 2 DIABETES MELLITUS WITHOUT COMPLICATION, WITH LONG-TERM CURRENT USE OF INSULIN (HCC): ICD-10-CM

## 2023-11-29 DIAGNOSIS — T81.89XA NON-HEALING SURGICAL WOUND, INITIAL ENCOUNTER: Primary | ICD-10-CM

## 2023-11-29 PROCEDURE — 97597 DBRDMT OPN WND 1ST 20 CM/<: CPT | Performed by: STUDENT IN AN ORGANIZED HEALTH CARE EDUCATION/TRAINING PROGRAM

## 2023-11-29 SDOH — ECONOMIC STABILITY - HOUSING INSECURITY: HOMELESS IN THE PAST 12 MONTHS: Z59.812

## 2023-11-29 NOTE — PROGRESS NOTES
Patient ID: Julian Bliss is a 44 y.o. male Date of Birth 1980     Chief Complaint  Chief Complaint   Patient presents with    Follow Up Wound Care Visit     Non healing abdominal surgical wound       Allergies  Bee venom, Macrolides and ketolides, Other, Pertussis vaccines, Vancomycin, Zithromax [azithromycin], Flagyl [metronidazole], Erythromycin, and Pollen extract    Assessment:     Diagnoses and all orders for this visit:    Non-healing surgical wound, initial encounter  -     Wound cleansing and dressings; Future  -     Wound home care; Future    History of hernia repair    History of Selin-en-Y gastric bypass    Type 2 diabetes mellitus without complication, with long-term current use of insulin ([address])    History of evacuation of hematoma    Housing instability after recent homelessness    Other orders  -     Debridement              Debridement   Wound 10/18/23 Surgical Abdomen Medial;Lower    Universal Protocol:  Consent: Verbal consent obtained. Risks and benefits: risks, benefits and alternatives were discussed  Consent given by: patient  Time out: Immediately prior to procedure a "time out" was called to verify the correct patient, procedure, equipment, support staff and site/side marked as required. Patient identity confirmed: verbally with patient    Performed by: physician  Debridement type: selective  Pain control: lidocaine 4%  Post-debridement measurements  Length (cm): 3  Width (cm): 0.4  Depth (cm): 0.3  Percent debrided: 90%  Surface Area (cm^2): 1.2  Area debrided (cm^2): 1.08  Volume (cm^3): 0.36  Devitalized tissue debrided: biofilm and exudate  Instrument(s) utilized: curette  Bleeding: small  Hemostasis obtained with: pressure  Procedural pain (0-10): 1  Post-procedural pain: 0   Response to treatment: procedure was tolerated well        Plan:   It was a pleasure to see Julian Bliss for wound care follow up today  Selective debridement performed today as above  Wound is improving   Continue plan of care as noted below with puracol ag  VNA to discharge patient as he currently does not have a home to live in where they can do wound care. Currently residing in his [facility] with his dog. No signs or symptoms of infection today. Patient understands that if any signs of infection start (such as increased redness, drainage, pain, fever, chills, diaphoresis), they should call our office or proceed to the ER or Urgent Care. Patient should continue a high protein diet to facilitate wound healing  Patient is advised to not submerge wound or leave wound open to air. Follow up in 2 weeks  Given the multi-factorial nature of wound care, additional time was taken to review patient's treatment plan with other specialties and most recent pertinent lab work and imaging. All plans of care discussed with patient at bedside who verbalized understanding with treatment plan. Wound 10/18/23 Surgical Abdomen Medial;Lower (Active)   Wound Image Images linked 11/29/23 0848   Wound Description Beefy red;Granulation tissue; Epithelialization 11/29/23 0847   Tracy-wound Assessment Intact;Rash;Maceration 11/29/23 0847   Wound Length (cm) 3 cm 11/29/23 0847   Wound Width (cm) 0.4 cm 11/29/23 0847   Wound Depth (cm) 0.3 cm 11/29/23 0847   Wound Surface Area (cm^2) 1.2 cm^2 11/29/23 0847   Wound Volume (cm^3) 0.36 cm^3 11/29/23 0847   Calculated Wound Volume (cm^3) 0.36 cm^3 11/29/23 0847   Change in Wound Size % 99.45 11/29/23 0847   Drainage Amount Scant 11/29/23 0847   Drainage Description Bloody 11/29/23 0847   Non-staged Wound Description Full thickness 11/29/23 0847   Dressing Status Intact 11/29/23 0847       Wound 10/18/23 Surgical Abdomen Medial;Lower (Active)   Date First Assessed/Time First Assessed: 10/18/23 1030   Primary Wound Type: Surgical  Location: Abdomen  Wound Location Orientation: Medial;Lower       [REMOVED] Wound 10/06/23 Abdomen N/A (Removed)   Resolved Date: 10/18/23  Date First Assessed/Time First Assessed: 10/06/23 1302   Location: Abdomen  Wound Location Orientation: N/A  Wound Description (Comments): WOUND VAC PLACEMENT BERNA, PIECESWOUND 13 CM X 7.5 CM. X 7  Wound Outcome: (c) Other. .. [REMOVED] Wound 10/06/23 Abrasion(s) Nose (Removed)   Resolved Date: 10/18/23  Date First Assessed/Time First Assessed: 10/06/23 1930   Present on Original Admission: Yes  Traumatic Wound Type: Abrasion(s)  Location: Nose  Wound Description (Comments): abrasion with scab fallen off  Wound Outcome: (c) Ot. .. [REMOVED] Wound 10/10/23  Sacrum (Removed)   Resolved Date: 10/18/23  Date First Assessed/Time First Assessed: 10/10/23 1130   Primary Wound Type: (c)   Location: Sacrum  Wound Outcome: (c) Other (Comment)       [REMOVED] Wound 10/11/23 Abdomen (Removed)   Resolved Date: 10/18/23  Date First Assessed/Time First Assessed: 10/11/23 0936   Location: Abdomen  Wound Description (Comments): WOUND VAC  Wound Outcome: (c) Other (Comment)       [REMOVED] Wound 10/13/23 Abdomen N/A (Removed)   Resolved Date: 10/18/23  Date First Assessed/Time First Assessed: 10/13/23 1123   Location: Abdomen  Wound Location Orientation: N/A  Wound Description (Comments): wound vac in place - 1 black sponge,  adaptic  Wound Outcome: (c) Other (Comment)       Subjective:      .    11/29/23, 11/22/23: Patient happy  with wound healing. Using Puracol as instructed. No symptoms of infection. DC if wound VAC on 11/22     11/15/23: Patient happy with wound healing. REBEKAH still coming to the home twice per week. Notes that he has an area of redness around the wound. Denies any abdominal pain. Denies any fever chills diaphoresis. 11/9/23: Patient notes today that wound VAC has been applied since Monday as he is currently living in a hotel with his family. REBEKAH is coming to his new address on M/W/F. Prior to that he did have a gap of approximately 5 days when the wound VAC was not being applied.   Denies any symptoms of infection today including fever chills diaphoresis. He was also able to obtain access to his belongings at his prior apartment and does have his wound VAC supplies. 11/1/23: Patient seen by covering CRNA. At that visit patient noted that he had been evicted from his apartment and no longer had a physical arduous review needed, nor did he have access to wound care supplies were left at his last place of residence. Wound VAC was temporarily held and patient was advised to use Dakin soaked Kerlix until this problem is remediated. 10/25/23: VNA has been coming to patient's home to help with wound VAC. Patient denies any local or systemic symptoms of infection today. Notes that he has a follow-up appointment with surgery tomorrow. States that he has been showering and attempting to keep the area dry. Did notice drainage underneath the drape but is not sure if it is from the shower from the wound itself. 10/18/23: Neo Leonor Chyna Camejo is a pleasant 81-year-old male with past medical history of diabetes mellitus and morbid obesity. He is status post Selin-en-Y gastric bypass in 2022. He was recently hospitalized for complications of the surgery. He presented to [address] [place] [address] and was admitted from 10/5 to 10/13/2023. During that time he had evacuation of hematoma, wound washout, mesh placement, and placement of wound VAC. He required 3 different visits to the OR due to ongoing complications. MARCELLO drain was recently removed. Patient is  also following a general surgery however was referred for wound management for management of wound VAC. Denies any systemic signs of infection today.           The following portions of the patient's history were reviewed and updated as appropriate: allergies, current medications, past family history, past medical history, past social history, past surgical history, and problem list.    Review of Systems   Constitutional:  Negative for chills, diaphoresis and fever. Skin:  Positive for wound. All other systems reviewed and are negative. Objective:       Wound 10/18/23 Surgical Abdomen Medial;Lower (Active)   Wound Image Images linked 11/29/23 0848   Wound Description Beefy red;Granulation tissue; Epithelialization 11/29/23 0847   Tracy-wound Assessment Intact;Rash;Maceration 11/29/23 0847   Wound Length (cm) 3 cm 11/29/23 0847   Wound Width (cm) 0.4 cm 11/29/23 0847   Wound Depth (cm) 0.3 cm 11/29/23 0847   Wound Surface Area (cm^2) 1.2 cm^2 11/29/23 0847   Wound Volume (cm^3) 0.36 cm^3 11/29/23 0847   Calculated Wound Volume (cm^3) 0.36 cm^3 11/29/23 0847   Change in Wound Size % 99.45 11/29/23 0847   Drainage Amount Scant 11/29/23 0847   Drainage Description Bloody 11/29/23 0847   Non-staged Wound Description Full thickness 11/29/23 0847   Dressing Status Intact 11/29/23 0847       /95   Pulse (!) 109   Temp 98.1 °F (36.7 °C)   Resp 16     Physical Exam  Vitals reviewed. Constitutional:       Appearance: Normal appearance. HENT:      Head: Normocephalic and atraumatic. Eyes:      Extraocular Movements: Extraocular movements intact. Pulmonary:      Effort: Pulmonary effort is normal.   Musculoskeletal:      Cervical back: Neck supple. Skin:     Comments: Abdominal wound smaller than last exam with healthy wound bed tissue. Some irritation to periwound. No signs of infection   Neurological:      Mental Status: He is alert. Psychiatric:         Mood and Affect: Mood normal.           Wound Instructions:  Orders Placed This Encounter   Procedures    Wound cleansing and dressings     Abdominal Wound:       Wash your hands with soap and water. Remove old dressing, discard into plastic bag and place in trash. Cleanse the wound with  prior to applying a clean dressing. Do not use tissue or cotton balls. Do not scrub the wound. Pat dry using gauze. Shower no   Apply Puracol AG to the abdominal wound.   Cover with small piece of gauze; then cover with ABD and secure with silicone tape. Change dressing every other day  This was done today. Standing Status:   Future     Standing Expiration Date:   11/29/2024    Wound home care     Discontinue VNA services     Standing Status:   Future     Standing Expiration Date:   11/29/2024    Debridement     This order was created via procedure documentation        Diagnosis ICD-10-CM Associated Orders   1. Non-healing surgical wound, initial encounter  T81.89XA Wound cleansing and dressings     Wound home care      2. History of hernia repair  Z98.890     Z87.19       3. History of Selin-en-Y gastric bypass  Z98.84       4. Type 2 diabetes mellitus without complication, with long-term current use of insulin (HCC)  E11.9     Z79.4       5. History of evacuation of hematoma  Z98.890       6. Housing instability after recent homelessness  Z59.812           --  Madison Garcia MD    "This note has been constructed using a voice recognition system. Therefore there may be syntax, spelling, and/or grammatical errors. Occasional wrong word or "sound alike" substitutions may have occurred due to the inherent limitations of voice recognition software. Read the chart carefully and recognize, using context, where substitutions have occurred.  Please call if you have any questions."

## 2023-11-29 NOTE — PROGRESS NOTES
[facility] [address] spoke with patient. Patient informed [facility] that patient and wife have decided to separate. Patient stated that on 11/24 they ran out of funds for the hotel. Patient stated at that time patient wife and children went to stay with her mother. Patient stated that mother in law has several people staying with her and not enough room for patient and his dog. Patient stated that he is sleeping in his vehicle in [place] truck stop parking lot. Patient stated that when he can not idle his vehicle to keep warm, he goes inside. [facility] [address] asked patient if he has called [address]. Patient stated that over the day the funds ran out was black Friday. When he called [address] he was told that the closest hotel would be on [address] in [address] [place] [address] Drive for Sunday Night. Patient stated that was too far away from the area. Patient stated that his dog, Klelie Benson, is with him which plays a factor in where he can stay. Patient stated that him and his wife have decided to separate. She will be staying with her mother and he is searching for a 2br apt for him and his children (to come for weekends and after school). Patient stated that it is a mutual decision. Jhonatan Deleon hopes to be in an apt by March. Patient stated that his wife, Steffany Ellis plans to stay with her mother until at least the end of the school year. Patient stated that his children are transferring at the end of the week to [employer] from [place]. Jhonatan Deleon stated tat they don't want children jumping back and forth from schools. Jhonatan Deleon stated that he has applied for assistance from [employer], his 's name is Deidre Kennedy. Patient stated application included [place] and food stamps. Patient also stated that [employer] and [employer] are helping him. Patient stated that he cannot go to [facility] or [facility] with his dog, but is looking into getting it classified as an emotional support animal.     CMOC explained Med 1 form to patient.  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 Patient stated that his wound is healing and doesn't have to follow up with them for 2 weeks. Patient stated that Dr Porsha Lei discontinued VNA services, however patient will call out of courtesy to make sure they are aware. [address] explained to patient that his situation has changed drastically over the past few months and to not get discouraged. OC stated that when he originally called [facility] in August he was insecure in his housing, he has since become homeless in a hotel and now street homeless. [address] encouraged him to keep reaching out. Patient agreed. [facility] offered to follow up with patient next week to see if he had an update on  assistance. [address] also let patient know to call if needed.      Next Outreach  12/6/2023

## 2023-11-29 NOTE — PROGRESS NOTES
Telephone Encounter  11/29/2023    [address] [place] [address] 165 placed a return call to patient. Patient and family have housing instability which [facility] has been following up on. Per chart notes from wound care today, "VNA to discharge patient as he currently does not have a home to live in where they can do wound care. Currently residing in his [facility] with his dog."     [address] [place] [address] 165 left voicemail with [address] [place] [address] 165 return phone number and office hours. CMOC will continue to outreach.  [address] [place] [address] 165 will keep scheduled outreach 12/5/2023

## 2023-11-29 NOTE — PATIENT INSTRUCTIONS
Orders Placed This Encounter   Procedures    Wound cleansing and dressings     Abdominal Wound:       Wash your hands with soap and water. Remove old dressing, discard into plastic bag and place in trash. Cleanse the wound with  prior to applying a clean dressing. Do not use tissue or cotton balls. Do not scrub the wound. Pat dry using gauze. Shower no   Apply Puracol AG to the abdominal wound. Cover with small piece of gauze; then cover with ABD and secure with silicone tape. Change dressing every other day  This was done today.      Standing Status:   Future     Standing Expiration Date:   11/29/2024    Wound home care     Discontinue VNA services     Standing Status:   Future     Standing Expiration Date:   11/29/2024

## 2023-12-05 ENCOUNTER — PATIENT OUTREACH (OUTPATIENT)
Dept: FAMILY MEDICINE CLINIC | Facility: CLINIC | Age: 43
End: 2023-12-05

## 2023-12-05 NOTE — PROGRESS NOTES
ANNE had discussed this case with Sam Macias during huddle. Keith Hopkins reported that Yvan and Hector Franks are . Keith Hopkins reported Hector Franks is living with her mom and the children. Keith Hopkins reported Zuly Lu is living in the car with the dog. Keith Hopkins reported patient had called 80 and closest hotel is in [place], [facility] reported Zuly Lu was [facility] for help. Keith Hopkins encouraged [employer] and completed application. Keith Hopkins reported she has called Hector Franks for further assistance but no response. Keith Hopkins will keep ANNE updated on case. [facility] routed note to Keith Hopkins. KIERA will continue to f/u.

## 2023-12-06 ENCOUNTER — PATIENT OUTREACH (OUTPATIENT)
Dept: FAMILY MEDICINE CLINIC | Facility: CLINIC | Age: 43
End: 2023-12-06

## 2023-12-06 NOTE — PROGRESS NOTES
Telephone Encounter  12/6/2023    [facility] placed a call to patient, following up on referral for housing instability. Dianna Blunt stated that he was ok.     -He applied for [place] had his interview and turned his paperwork in 2 days ago    -he looked at an apartment (2BR $1600- Gas stove. No fridge. Rent is regulated by landlord) Close to school bus stops and [address] working with [place] from [facility] for [address] (program called [employer] which helps with 1st month rent, security deposit and possibility of up to 2 years assistance with 1/2 of each month rent) However his wife, Deborah's income is still a factor    -Still sleeping in his vehicle at [employer] Knows of warming centers and how to find their locations    -[facility] informed [facility] is on code blue status for cold. Informed to call 211 or access website. Patient stated that he wears a lot of layers, sleeps with a lot of blankets and has his dog with him. Dog cannot go to shelter. Trying to get dog classified as emotional support animal.     -CMOC asked about hotel, Dianna Blunt stated that cost is $85/night plus $20 for the dog and $100 deposit.     -Dianna Blunt stated that his children like their new school and neighborhood. Their house is in view of the school, only 1 black away. They are making friends and have signed up for [employer] cards at the [employer]. Shalini Reyna plans to stay with her mom until the end of the school year. She will save her income tax for a security deposit. The goal is to become one family unit again after gaining financial stability. Dianna Blunt stated "We've been through this before"    - Dianna Blunt had appointment on Tuesday with Annalisa Arcos at [facility]. Stated his intake appointment was over 3 hours long. Was scheduled for follow up today at 2:30pm, had to cancel- car trouble. -Car trouble, having AAA come out. Stated it has gas, thinking maybe battery or something else.  Does not start. Patient stated that he appreciates [facility] call and feels like the weekly check in to offer suggestions and assistance is important to him right now. [facility] will continue to follow up. Patient will follow up with [facility] if updates become available. Aware of resources at this time.      Next Outreach   12/13/2023

## 2023-12-07 ENCOUNTER — PATIENT OUTREACH (OUTPATIENT)
Dept: FAMILY MEDICINE CLINIC | Facility: CLINIC | Age: 43
End: 2023-12-07

## 2023-12-07 NOTE — PROGRESS NOTES
[facility] had discussed this case with Ryan Schafer during huddle. Woodrow Moore reported that Nehemiah Dugan found a 2 bedroom apartment but it has no refrigerator. Woodrow Moore reported that [facility] for [place] would help with 1st months rent and security deposit. Woodrow Moore reported that Nehemiah Dugan would also be referred to their subsidy program that could pay a portion of the rent for 2 years. Woodrow Moore reported patient remains in the car with his dog. Woodrow Moore reported Nehemiah Dugan is awaiting on [facility] application. Woodrow Moore reported no updated on Yvan's wife, Nadira Lopez aside from the 2 boys are doing well in school. Woodrow Moore reported that [place] up services with the [employer] and [employer] [place] [phone]. Per chart, patient has upcoming appointment with them tomorrow at [address] will keep [facility] updated on case. [facility] routed note to Woodrow Moore. ANNE will continue to f/u.

## 2023-12-08 ENCOUNTER — PATIENT OUTREACH (OUTPATIENT)
Dept: FAMILY MEDICINE CLINIC | Facility: CLINIC | Age: 43
End: 2023-12-08

## 2023-12-08 NOTE — PROGRESS NOTES
Telephone orsina  12/8/2023    [facility] placed a call to patient wife, Lana Tomlinson, following up on referral for housing instability. [facility] notes that patient has reported that patient and wife are . Lana Tomlinson and children are living in [place] with Deborah's mother and Zohaib Kat is sleeping in his car. [facility] spoke with Lana Tomlinson. She reports she is alright. Lana Tomlinson stated she is staying with her mom and has her children with her. Lana Tomlinson stated that she plans to stay there until she can get money saved and be back on her feet. Her goal is to find a place by June, but she can stay with her mom as long as she needs to. Lana Tomlinson would like to find a place in [place]. Lana Tomlinson stated that her children, Ronny Restrepo and Hayder Restrepo are attending [employer]. Lana Tomlinson stated that they like the school, they have made friends. Lana Tomlinson stated that the boys have cousins that attend the school so they already knew people, which made the transition easier. Lana Tomlinson stated that Trung Jama is  through eighth grade and she would like to keep Hayder Restrepo enrolled there next school year so she does not have to transfer him again. Lana Tomlinson stated that Ronny Restrepo will attend [employer] next year. Lana Tomlinson is saving money while staying with her mom and hopes to use her income tax return to help with moving expenses. Lana Tomlinson informed [facility] that patient and their dog are not able to stay with her mother and them. Deborah's mother has a dog (pitbull) that has not been introduced to her dog Gambit ([address]) which is a barrier. Lana Tomlinson reported wanting to find a foster home for Shereen Wood but stated that the dog is helping her  emotionally right now. Lana Tomlinson stated that Zohaib Kat has met with a therapist and is trying to get a letter to have Shereen Wood listed as an emotional support animal. Dillon Frankel from [facility] advised Zohaib Kat to obtain the letter to help with rehoming or shelters for Zohaib Kat.      Lana Davi stated she would like to see her  go to a shelter. Jose Tipton informed [address] [place] [address] that her  is currently at the truck stop in his vehicle, which is broken down. [address] [place] [address] asked how he is keeping warm, Jose Tipton stated he has like 5 blankets. Jose Tipton stated that when Yasmani Rodríguez has reached out to 211 in the past, the only available hotel which allowed pets was over 2 hours away. Jose Tipton stated that her  does not have family in this area. His mother  last year and his step father does not talk to him. Jose Tipton stated that Ben's father lives in [place] but does not talk to Yasmani Rodríguez. Jose Tipton stated to [address] [place] [address] she is not sure where her relationship is going with her . She stated that he lacks ambition and has a hard time keeping a job. She stated that he may have a job for a short time, but something always happens that gets him fired. CMOC reminded Jose Tipton of her role and the ability to help her connect to resources in the community. [address] [place] [address] notes Jose Tipton states she will not need assistance connecting with community partners at this time. Jose Tipton stated that her living situation with her mother is not ideal, but it is stable. Jose Banksg will reach out if future needs arise for [address] [place] [address]. [facility] will continue to follow up with Yasmani Rodríguez.

## 2023-12-11 ENCOUNTER — PATIENT OUTREACH (OUTPATIENT)
Dept: FAMILY MEDICINE CLINIC | Facility: CLINIC | Age: 43
End: 2023-12-11

## 2023-12-11 NOTE — PROGRESS NOTES
KIERA had discussed this case with Sam Macias during huddle. Keith Hopkins reported Hector Franks and the children will remain living with her mom. Keith Hopkins reported that Hector Franks plans to save up for her own place with the children. Keith Hopkins reported Hector Franks is aware of housing resources in the area. Keith Hopkins reported that as of now Hector Franks plans to remain  from [facility]. Keith Hopkins reported [facility] is sleeping in broke down car with their dog. Hector Franks reported that they want to find a foster home for the dog. KIERA and Keith Hopkins no longer following Hector Franks and children. KIERA and Keith Hopkins will continue to help Yvan as needed. KIERA routed note to Keith Hopkins. KIERA will continue to f/u.

## 2023-12-13 ENCOUNTER — PATIENT OUTREACH (OUTPATIENT)
Dept: FAMILY MEDICINE CLINIC | Facility: CLINIC | Age: 43
End: 2023-12-13

## 2023-12-13 NOTE — PROGRESS NOTES
Telephone Encounter  12/13/2023    [facility] placed a call to patient to follow up on current housing instability. Per chart, patient canceled wound care appointment scheduled for today due to lack of transportation. Per patient at last outreach, car was broke down. [facility] notes patient was aware of services through 211 and warming locations as patient was currently homeless, sleeping in his broken down vehicle [place]. [facility] left voicemail. [facility] will continue to follow up.     Next outreach  12/20/2023

## 2023-12-20 ENCOUNTER — PATIENT OUTREACH (OUTPATIENT)
Dept: FAMILY MEDICINE CLINIC | Facility: CLINIC | Age: 43
End: 2023-12-20

## 2023-12-20 NOTE — PROGRESS NOTES
Telephone Encounter  12/20/2023    CMOC placed a call to patient, following up on housing instability.     CMOC connected the call with patient. Patient stated that he was driving with his wife and son to a doctor appointment at [place] at the moment. Patient stated that his son is not feeling well. Patient stated he wanted CMOC to know that he is currently in a hotel in [facility].     CMOC will continue to follow up.    Next Outreach  12/27/2023

## 2023-12-27 ENCOUNTER — PATIENT OUTREACH (OUTPATIENT)
Dept: FAMILY MEDICINE CLINIC | Facility: CLINIC | Age: 43
End: 2023-12-27

## 2023-12-27 NOTE — PROGRESS NOTES
Telephone Encounter  12/27/2023    CMOC placed a call to patient, following up on housing instability.     CMOC notes that at last outreach patient stated that he was staying in [facility] in [place], [place].     CMOC connected the call. Patient stated he is doing ok. He stated that he was staying at the [facility] and his room is paid until January 8th by [employer]. Yvan stated that he got a letter stating that his dog is his emotional support dog and he is with him.    Yvan stated that he has been Talking with Efrem at [facility] but would like to transfer to [facility] in [place]. Yvan stated he does in person appt's with Efrem when he can, telehealth when he cannot.     Yelena from [facility] helped get the car fixed and it is running good now. Yvan stated that he is using a lot of gas driving back and forth from [place] to [place] when he is helping his family make it to doctors appointments and things.     CMOC asked patient if he had access to food in the hotel. Patient stated that he does not have a refrigerator or microwave in his room. He stated that he does have an electric kettle for boiling water for ramen noodles, oatmeal, and stuffing. Patient stated that he believes there is a food pantry next to [facility] in [place] that he can go to. Patient stated he is aware of food pantries in [place] as well as prepared drop in meals such as those served at [facility] and [facility] in [place].   CMOC offered to sent patient resources via [employer] for food pantries, patient appreciated that.    Patient stated that Anisa at [employer] will help him get an apartment. The limit is $1171 for a loft and $1277 for a 1 bedroom  That would include utilities. Anisa also stated that patient may qualify for [employer] to assist with utility payments.     Patient stated he is having a Hard time finding a place, and a lot of places say no pets.      CMOC sent links via Find Help and will follow up with patient next week to assist as needed.    Next outreach  1/3/2024

## 2023-12-28 ENCOUNTER — PATIENT OUTREACH (OUTPATIENT)
Dept: FAMILY MEDICINE CLINIC | Facility: CLINIC | Age: 43
End: 2023-12-28

## 2023-12-28 NOTE — PROGRESS NOTES
ANNE had discussed this case with CMOC Tammy Lawson during huddle. Tammy reported patient was placed in a motel until January 8th, 2024 through [facility]. Tammy reported patient is looking for an apartment as [facility] will help as well as utilities if he qualifies. Tammy reported patient's car was fixed through [employer]. Tammy reported patient is [facility] to [facility] [phone]. Tammy reported patient will be transitioning to [facility] [phone] for continued care once able to establish. Tammy discussed food pantry options with patient. Tammy will keep KIERA updated on case. KIERA routed note to Tammy. KIERA will continue to f/u.

## 2024-01-03 ENCOUNTER — PATIENT OUTREACH (OUTPATIENT)
Dept: FAMILY MEDICINE CLINIC | Facility: CLINIC | Age: 44
End: 2024-01-03

## 2024-01-03 NOTE — PROGRESS NOTES
Telephone Encounter  1/3/2024    [facility] placed a call to patient, following up on housing insecurities.     OC connected the call with patient. Patient stated that he is still at the hotel, he has looked at 2 places but neither is going to work out for him, so he will keep looking. One of them was not an optimal place for children, but will accept assistance from [facility]. The other is [facility] in [place] but will not accept assistance from any organization because they are above fair market value.   CMOC suggested [facility] in [place], patient stated that he will look into that.     OC asked patient if he has a plan for January 8th. Patient stated that the State may extend hotel because he is actively looking.     Patient stated his , Anisa, stated that if he can't find something within the suggested limit and/or they wont accept assistance he may need  involved.     OC asked if patient received referral from [facility] for food recommendations. Patient stated he is good on food. Patient stated that he had enough food on hand to last him until January 1st when this months assistance came through.      Patient stated that he had his last appt with Efrem at [facility]. Patient stated the program was only for 30 days. Patient stated he is still trying to get into [facility]. Patient stated he called last week and will call again tomorrow. Patient stated that he left a voicemail but his call was not returned. [facility] informed patient that she will inform ANNE CAMACHO of that update.     [facility] asked patient when he would like [facility] to reach out since Monday is potentially the last day of his hotel stay. Patient requested [facility] call Wednesday, CMOC agreed to that plan.     Next outreach   1/10/2024

## 2024-01-04 ENCOUNTER — PATIENT OUTREACH (OUTPATIENT)
Dept: FAMILY MEDICINE CLINIC | Facility: CLINIC | Age: 44
End: 2024-01-04

## 2024-01-04 NOTE — PROGRESS NOTES
ANNE had discussed this case with CMOC Tammy Lawson during huddle. Tammy reported patient is actively looking for an apartment. Tammy reported patient's hotel stay may be extended because he is actively looking. Tammy reported patient's CM, Anisa had suggested getting  involved as they cannot find a place within suggested limit. Tammy reported patient's assistance began 1/1. Tammy reported patient actively trying to get into [facility] [phone] for continued care once able to establish. Tammy will keep [facility] updated on case. [facility] routed note to Tammy. KIERA will continue to f/u.

## 2024-01-10 ENCOUNTER — PATIENT OUTREACH (OUTPATIENT)
Dept: FAMILY MEDICINE CLINIC | Facility: CLINIC | Age: 44
End: 2024-01-10

## 2024-01-10 NOTE — PROGRESS NOTES
Telephone Encounter  1/10/2024    CMOC placed a call to patient, following up on referral for housing instability.     Per previous outreach, patient had hotel paid for by  until 1/8.     Patient connected the call and stated that he was driving, on his way to look at an apartment and requested CMOC call him back later in the week.    CMOC will continue to follow up.     Next outreach  1/12/2024

## 2024-01-11 ENCOUNTER — PATIENT OUTREACH (OUTPATIENT)
Dept: FAMILY MEDICINE CLINIC | Facility: CLINIC | Age: 44
End: 2024-01-11

## 2024-01-11 NOTE — PROGRESS NOTES
[facility] had called the patient via phone. [facility] asked patient how he is doing. Patient reported he is doing alright. [facility] asked patient if he was able to set up continued MH services with [facility] [phone]. Patient stated he had his intake yesterday at 2:45pm. Patient stated he is awaiting a text message with date and time for virtual services. Patient stated they are trying to find him a therapist that can do virtual.    [facility] asked patient if he was able to speak with [facility]. Patient stated he was and they extended his hotel stay until 1/22. Patient stated they will only help fund a studio or 1 bedroom apartment. Patient stated his CM, Yelena from [employer] told him they can help with whatever he finds.    Patient stated he saw a 2 bedroom apartment yesterday. Patient stated the rent is $1,800 a month. Patient stated he is filling out the application to submit for it. Patient stated he hopes he gets the place. [facility] told the patient that she hopes so too. [facility] told patient to keep her and OC Tammy Lawson up to date on his situation. Patient agreed. [facility] routed note to Tammy. JEFFERY will continue to f/u.

## 2024-01-12 ENCOUNTER — PATIENT OUTREACH (OUTPATIENT)
Dept: FAMILY MEDICINE CLINIC | Facility: CLINIC | Age: 44
End: 2024-01-12

## 2024-01-12 NOTE — PROGRESS NOTES
Telephone Encounter  1/12/2024      CM placed a call to patient, following up on housing insecurites.     Patient stated that he went and looked at a 2br 1 ba townhouse on [address] in [place] for $1800 month. Patient stated that he is filling out application. Patient stated that the home is 3 stories and has a yard for his dog Gambit. Patient stated that he is hopeful and feels like he could get back on his feet and back to work.     Patient stated that  will only help with a studio or 1 bedroom apartment and Yelena with [employer] should be able to help with this 2 bedroom.     Patient stated that he got the application for the apartment and he will fill it out today. Patient stated that a background check is required. Patient stated that Yelena will be his  through the realtor. Yelena will be assisting patient with necessary paperwork. Patient stated that [facility] informed there was one /other applicant, with incomplete application.       Patient stated that he is still in the hotel, his stay is paid for through January 22nd. Patient stated that he hopes that things are finalized before the 22nd    Patient stated that his children had a half of day of school and he spent time with them and took them to the park.     Patient stated the he will continue to look for apartments while applying for the place on [address]. Patient stated that he has called several other apartments as well.     [facility] will continue to follow up.    Next Outreach  1/19/2024

## 2024-01-12 NOTE — PROGRESS NOTES
ANNE notes case hand off to ANNE Walters who will be temporarily covering while out of office. SW added Pati to the care team. ANNE noted she discussed this case with Pati and CMOC Tammy Lawson. Patient working with [facility] and [facility] regarding housing. Tammy follows up on this as well. Nati established with [facility] [phone]  for MH services. [facility] routed note to Pati and aTmmy.

## 2024-01-19 ENCOUNTER — PATIENT OUTREACH (OUTPATIENT)
Dept: FAMILY MEDICINE CLINIC | Facility: CLINIC | Age: 44
End: 2024-01-19

## 2024-01-19 NOTE — PROGRESS NOTES
"Telephone Encounter  1/19/2024    CMOC placed call to patient following up on housing instability.     CMOC connected the call with patient and asked how things are going. Patient stated that at last outreach he spoke about a potential rental, patient stated that he has since submitted the application to the realtor and is currently waiting to hear if Yelena from [place] [employer] can provide assistance.     Patient stated that \"Everything is sorta good\" going onto say that his  Anisa may need to extend his hotel stay as it is currently paid up until Monday. Patient stated that he has been in communication with Anisa and she appreciated that.    Patient stated that he is looking forward to spending time with his children, as Sunday his younger son turns 10 years old.     CMOC spoke with patient regarding his community support needs, patient stated that Yelena can assist patient with moving truck and costs. Patient stated that he may have 2 coffee tables and a dining table. Patient stated that Yelena informed him that they work with mattress professionals. Patient stated that he may look for furniture at [employer]. Patient stated that he has used [employer] in the past and [employer] giving groups.     CMOC spoke with patient about his community needs. CMOC discussed closing out his case when he gets into an apartment. Patient stated that he appreciates all the help CM has provided and support along his difficult time.     Patient stated that he has his appointment scheduled with Kelli on Monday with Diana Lyles at 11am.     CMOC spoke with patient about using [employer]. CMOC logged referral for Homeless to [place]. CMOC explained to patient that the resources found on there were free or low cost.     CMOC will continue to follow up.    Next Outreach  1/26/2024      "

## 2024-01-25 ENCOUNTER — PATIENT OUTREACH (OUTPATIENT)
Dept: FAMILY MEDICINE CLINIC | Facility: CLINIC | Age: 44
End: 2024-01-25

## 2024-01-25 NOTE — PROGRESS NOTES
OP SWCM in person consult with CMOC Tammy Lawson regarding pt status. OP SWCM to reach out to see if pt was established with [facility].    OP SWCM called pt introducing self, role and reason for calling. Pt stated he did have his [facility] appt on Monday with Diana Lyles Via Zoom and he has another one tomorrow for a follow up, also via Zoom.     Pt states that he is also in the process of filling out an application and completing the background check for an apartment in [place]. Pt has no further questions or needs at this time. Pt appreciative of all the help the CM team has provided him with. CMOC to continue to follow pt until he obtains an apartment and is settled. OP SWCM to remain on case and follow up as needed.

## 2024-01-26 ENCOUNTER — PATIENT OUTREACH (OUTPATIENT)
Dept: FAMILY MEDICINE CLINIC | Facility: CLINIC | Age: 44
End: 2024-01-26

## 2024-01-26 NOTE — PROGRESS NOTES
CMOC called patient, following up on housing insecurity.     Patient informed CMOC that his hotel in [place] is currently paid until 2/5. Patient state that as of Last night his paperwork was submitted to the landlord from [facility] for a potential rental in [place]. CMOC encouraged patient to continue to look for apartments in the event that this one would fall through. Patient stated that he is checking [employer] and has a few others to follow up on.     Patient stated that he is looking forward to spending he weekend with his wife and children. Patient stated that he takes his wife to work some days and/or picks her up as she does not drive. Patient stated that he is really looking forward to moving to [place] as he is spending a lot on gas.     Patient stated that he is keeping up with appointments at [facility] and has been having them virtually.     CMOC will continue to follow up    Next Outreach   1/31/2024

## 2024-01-29 NOTE — PROGRESS NOTES
Progress Note - Cardiology Office  [facility]    Ben Erickson 43 y.o. male MRN: [id number]  : 1980  Encounter: [id number]      ASSESSMENT:   Dizziness and palpitations  No recurrence of symptoms since last OV  Wears compression stockings on most days except today     30-day ambulatory extended Holter monitor: 2022:  Underlying rhythm was sinus  Average heart rate was 80 and ranged from 5280/min  There was 1% PVCs and 4% PACs detected  No episodes of atrial fibrillation/flutter, SVT, VT, significant pauses or heart blocks detected.  7 patient triggered events for symptoms like lightheadedness, shortness of breath, dizziness, heart racing correlated with sinus rhythm with PACs or mild sinus tachycardia with PACs but no sustained arrhythmias     Severe obesity: BMI 34.56  S/P Bariatric surgery with substantial weight loss about 170 lbs     GERD     Diabetes mellitus with polyneuropathy  Hyperlipidemia     Hypertension  BP today is 120/70 mmHg with heart rate of 69/min      Family history of cardiac problems  Mother had CAD/MI atrial fibrillation, ventricular tachycardia and stroke        RECOMMENDATIONS:  Continue Toprol to 25 mg daily  Avoid dehydration  Compression stockings to be worn regularly during the day  Continue diet, exercise and weight loss strategies        Please call [phone] if any questions.    HPI :     Ben Erickson is a 43 y.o. year old male who came for follow up.  He generally feels well and has not had any recurrence of symptoms.  He continues to lose weight since his weight loss surgery.  He is walking daily and weight is compression stockings almost daily except for today    REVIEW OF SYSTEMS:  Denies any new or acute cardiac symptoms today.  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 fluctuations     Past Surgical History:   Procedure Laterality Date    ABDOMINAL ADHESION SURGERY N/A 4/4/2022    Procedure: LYSIS ADHESIONS LAPAROSCOPIC;  Surgeon: Tammi Chu MD;  Location: [facility];  Service: Bariatrics    ABDOMINAL WALL SURGERY N/A 10/11/2023    Procedure: REMOVAL WOUND VAC, ABDOMINAL WOUND EXPLORATION, WASH OUT, EXCISIONAL DEBRIDEMENT WOUND, WOUND VAC APPLICATION;  Surgeon: Miguel Croft MD;  Location: [facility];  Service: General    COLONOSCOPY N/A 2/2/2018    Procedure: COLONOSCOPY;  Surgeon: Stella Shukla MD;  Location: [facility];  Service: Gastroenterology    ESOPHAGOGASTRODUODENOSCOPY N/A 2/2/2018    Procedure: ESOPHAGOGASTRODUODENOSCOPY (EGD);  Surgeon: Stella Shukla MD;  Location: [facility];  Service: Gastroenterology    OMENTECTOMY N/A 9/20/2023    Procedure: PARTIAL OMENTECTOMY;  Surgeon: Miguel Croft MD;  Location: [facility];  Service: General    CO LAPS GSTR RSTCV PX W/BYP SARI-EN-Y LIMB <150 CM N/A 4/4/2022    Procedure: LAPAROSCOPIC SARI-EN-Y GASTRIC BYPASS AND INTRAOPERATIVE EGD;  Surgeon: Tammi Chu MD;  Location: [facility];  Service: Bariatrics    CO RPR AA HERNIA 1ST < 3 CM REDUCIBLE N/A 9/20/2023    Procedure: REPAIR HERNIA EPIGASTRIC Repair rectus diastases, implantation of biologic mesh;  Surgeon: Miguel Croft MD;  Location: [facility];  Service: General    TESTICLE SURGERY Left     infected testicle    TOENAIL EXCISION Right 09/07/2021    WOUND DEBRIDEMENT N/A 10/6/2023    Procedure: EVACUATION OF  HEMATOMA, VACUUM PLACEMENT VERAFLO WITH IRRIGATION SETTINGS, EXCISIONAL DEBRIDEMENT;  Surgeon: Miguel Croft MD;  [facility]: [facility];  Service: General    WOUND DEBRIDEMENT N/A 10/13/2023    Procedure: DEBRIDEMENT WOUND ABDOMINAL (WASH OUT);  Surgeon: Miguel Croft MD;  Location: [facility];  Service: General     Social History     Substance and Sexual Activity   Alcohol Use Yes    Comment: social on occasion     Social History     Substance and Sexual Activity   Drug Use No     Social History     Tobacco Use   Smoking Status Former    Current packs/day: 0.00    Average packs/day: 0.3 packs/day for 10.5 years (2.6 ttl pk-yrs)    Types: Cigarettes    Start date: 7/15/1999    Quit date:     Years since quittin.0    Passive exposure: Never   Smokeless Tobacco Never     Family History:   Family History   Problem Relation Age of Onset    Supraventricular tachycardia Mother     Atrial fibrillation Mother     Diabetes type II Mother     Cancer Mother     Osteoporosis Mother     Ulcerative colitis Father     Liver disease Father     ADD / ADHD Son     COPD Paternal Grandfather     Colon cancer Family     Diabetes type II Family     Hyperlipidemia Family     Hypertension Family     Hypothyroidism Family     Heart disease Family     Lung cancer Neg Hx     Asthma Neg Hx        Meds/Allergies     Allergies   Allergen Reactions    Bee Venom Anaphylaxis     Annotation - [id number]: wasp, hornet also    Macrolides And Ketolides Anaphylaxis and Rash    Other Hives, Shortness Of Breath and Wheezing     Annotation - [id number]:  violet    Pertussis Vaccines Anaphylaxis and Rash    Vancomycin Shortness Of Breath    Zithromax [Azithromycin] Anaphylaxis    Flagyl [Metronidazole] Hives    Erythromycin Rash    Pollen Extract Sneezing       Current Outpatient Medications:     Calcium Carb-Cholecalciferol 500-10 MG-MCG CHEW, Chew 3 (three) times a day, Disp: , Rfl:     doxylamine (UNISOM) 25 MG tablet, Take 1 tablet (25  "mg total) by mouth daily at bedtime as needed for sleep, Disp: 30 tablet, Rfl: 1    metoprolol succinate (TOPROL-XL) 25 mg 24 hr tablet, Take 1 tablet (25 mg total) by mouth daily, Disp: 30 tablet, Rfl: 7    Multiple Vitamins-Minerals (BARIATRIC MULTIVITAMINS/IRON PO), Take by mouth, Disp: , Rfl:     clotrimazole-betamethasone (LOTRISONE) 1-0.05 % cream, Apply topically 2 (two) times a day, Disp: 45 g, Rfl: 1    gabapentin (NEURONTIN) 400 mg capsule, Take 1 capsule (400 mg total) by mouth 3 (three) times a day, Disp: 270 capsule, Rfl: 3    Vitals: Blood pressure 120/70, pulse 69, height 5' 9\" (1.753 m), weight 106 kg (234 lb), SpO2 97%.    Body mass index is 34.56 kg/m².  Vitals:    01/30/24 1112   Weight: 106 kg (234 lb)     BP Readings from Last 3 Encounters:   01/30/24 120/70   11/29/23 132/95   11/22/23 154/88       Physical Exam:  Physical Exam    Neurologic:  Alert & oriented x 3, no new focal deficits, Not in any acute distress,  Constitutional: Obese  Eyes:  Pupil equal and reacting to light, conjunctiva normal,   HENT:  Atraumatic, oropharynx moist, Neck- normal range of motion, no tenderness,  Neck supple, No JVP, No LNP   Respiratory:  Bilateral air entry, mostly clear to auscultation  Cardiovascular: S1-S2 regular with a I/VI systolic murmur   GI:  Soft, nondistended, normal bowel sounds, nontender, no hepatosplenomegaly appreciated.  Musculoskeletal: no tenderness, no deformities.   Skin:  Well hydrated, no rash   Lymphatic:  No lymphadenopathy noted   Extremities:  No edema and distal pulses are present        Diagnostic Studies Review Cardio:      EKG: Normal sinus rhythm, heart rate 69/min    Cardiac testing:   Results for orders placed during the hospital encounter of 06/02/21    Echo complete with contrast if indicated    Narrative  [address] [id number] [phone]    Transthoracic Echocardiogram  2D, M-mode, Doppler, and Color Doppler    Study " date:  2021    Patient: ROSALEE ROMERO  MR number: [id number]  Account number: [id number]  : 1980  Age: 40 years  Gender: Male  Status: Outpatient  Location: Echo lab  Height: 69 in  Weight: 368.3 lb  BP: 134/ 82 mmHg    Indications: Hypertension    Diagnoses: 401.9 - HYPERTENSION NOS    Sonographer:  CHRIS Haile  Primary Physician:  Corby Heath MD  Referring Physician:  Padmini Banks MD  Group:  [facility]  Interpreting Physician:  Padmini Banks MD    SUMMARY    LEFT VENTRICLE:  Normal LV chamber size and wall thickness  Preserved LV systolic function with ejection fraction of about 65%  No definite regional wall motion abnormality seen  Normal diastolic filling pattern    RIGHT VENTRICLE:  Normal right ventricular size and systolic function  TAPSE is 2.2 cm    MITRAL VALVE:  There was trace regurgitation.    TRICUSPID VALVE:  There is trace tricuspid regurgitation  PA pressure is normal    HISTORY: PRIOR HISTORY: HTN,. OObesity, SA, GERD, IBS, DM, Migraine    PROCEDURE: The procedure was performed in the echo lab. This was a routine study. The transthoracic approach was used. The study included complete 2D imaging, M-mode, complete spectral Doppler, and color Doppler. The heart rate was 89 bpm,  at the start of the study. Echocardiographic views were limited due to restricted patient mobility, poor acoustic window availability, and decreased penetration. This was a technically difficult study.    LEFT VENTRICLE: Normal LV chamber size and wall thickness  Preserved LV systolic function with ejection fraction of about 65%  No definite regional wall motion abnormality seen  Normal diastolic filling pattern    RIGHT VENTRICLE: Normal right ventricular size and systolic function  TAPSE is 2.2 cm    LEFT ATRIUM: Size was normal.    RIGHT ATRIUM: Size was normal.    MITRAL VALVE: Valve structure was normal. There was normal leaflet separation. DOPPLER: The transmitral  velocity was within the normal range. There was no evidence for stenosis. There was trace regurgitation.    AORTIC VALVE: The valve was trileaflet. Leaflets exhibited normal thickness and normal cuspal separation. DOPPLER: Transaortic velocity was within the normal range. There was no evidence for stenosis. There was no regurgitation.    TRICUSPID VALVE: There is trace tricuspid regurgitation  PA pressure is normal    PULMONIC VALVE: No significant pulmonary regurgitation seen    PERICARDIUM: No pericardial effusion seen    AORTA: The root exhibited normal size.    SYSTEM MEASUREMENT TABLES    2D  EF (Teich): 68.16 %  %FS: 38.42 %  Ao Diam: 3.79 cm  EDV(Teich): 143.84 ml  ESV(Teich): 45.8 ml  IVSd: 1 cm  LA Area: 16.2 cm2  LA Diam: 4.25 cm  LVEDV MOD A4C: 81.8 ml  LVEF MOD A4C: 46.38 %  LVESV MOD A4C: 43.86 ml  LVIDd: 5.44 cm  LVIDs: 3.35 cm  LVLd A4C: 7.65 cm  LVLs A4C: 6.86 cm  LVPWd: 1.13 cm  RA Area: 15.21 cm2  RVIDd: 3.53 cm  SV (Teich): 98.04 ml  SV MOD A4C: 37.94 ml    CW  TR Vmax: 2.12 m/s  TR maxP.99 mmHg    MM  TAPSE: 2.18 cm    PW  MV E/A Ratio: 2.01  E' Sept: 0.16 m/s  E/E' Sept: 6.95  MV A Colin: 0.54 m/s  MV Dec D[place]am: 5.21 m/s2  MV DecT: 213.14 ms  MV E Colin: 1.09 m/s    [employer] Accredited Echocardiography Laboratory    Prepared and electronically signed by    Padmini Banks MD  Signed 2021 15:27:18        Imaging:  Chest X-Ray:   No Chest XR results available for this patient.    CT-scan of the chest:     No CTA results available for this patient.  Lab Review   Lab Results   Component Value Date    WBC 7.14 10/13/2023    HGB 12.5 10/13/2023    HCT 39.7 10/13/2023    MCV 92 10/13/2023    RDW 13.2 10/13/2023     10/13/2023     BMP:  Lab Results   Component Value Date    SODIUM 141 10/13/2023    K 4.0 10/13/2023     10/13/2023    CO2 26 10/13/2023    BUN 11 10/13/2023    CREATININE 0.63 10/13/2023    GLUC 80 10/13/2023    CALCIUM 8.6 10/13/2023    CORRECTEDCA 10.4 (H)  "01/28/2021    EGFR 121 10/13/2023    MG 1.9 10/06/2023     LFT:  Lab Results   Component Value Date    AST 23 09/06/2023    ALT 21 09/06/2023    ALKPHOS 112 01/28/2021    TP 7.2 09/06/2023    ALB 4.4 09/06/2023      No components found for: \"TSH3\"  Lab Results   Component Value Date    QEX0ADNQFDKJ 1.800 07/25/2017     Lab Results   Component Value Date    HGBA1C 5.1 05/10/2023     Lipid Profile:   Lab Results   Component Value Date    CHOLESTEROL 178 05/10/2023    HDL 36 (L) 05/10/2023    LDLCALC 114 (H) 05/10/2023    TRIG 158 (H) 05/10/2023     Lab Results   Component Value Date    CHOLESTEROL 178 05/10/2023    CHOLESTEROL 172 10/12/2022     No results found for: \"CKTOTAL\", \"CKMB\", \"CKMBINDEX\", \"TROPONINI\"  No results found for: \"NTBNP\"   No results found for this or any previous visit (from the past 672 hour(s)).          Dr. Padmini Banks MD, FACC      \"This note has been constructed using a voice recognition system.Therefore there may be syntax, spelling, and/or grammatical errors. Please call if you have any questions. \"  "

## 2024-01-30 ENCOUNTER — OFFICE VISIT (OUTPATIENT)
Dept: CARDIOLOGY CLINIC | Facility: CLINIC | Age: 44
End: 2024-01-30
Payer: COMMERCIAL

## 2024-01-30 VITALS
SYSTOLIC BLOOD PRESSURE: 120 MMHG | WEIGHT: 234 LBS | DIASTOLIC BLOOD PRESSURE: 70 MMHG | BODY MASS INDEX: 34.66 KG/M2 | HEART RATE: 69 BPM | HEIGHT: 69 IN | OXYGEN SATURATION: 97 %

## 2024-01-30 DIAGNOSIS — I10 BENIGN ESSENTIAL HYPERTENSION: Primary | ICD-10-CM

## 2024-01-30 PROCEDURE — 99214 OFFICE O/P EST MOD 30 MIN: CPT | Performed by: INTERNAL MEDICINE

## 2024-01-30 PROCEDURE — 3078F DIAST BP <80 MM HG: CPT | Performed by: INTERNAL MEDICINE

## 2024-01-30 PROCEDURE — 93000 ELECTROCARDIOGRAM COMPLETE: CPT | Performed by: INTERNAL MEDICINE

## 2024-01-30 PROCEDURE — 3074F SYST BP LT 130 MM HG: CPT | Performed by: INTERNAL MEDICINE

## 2024-01-30 RX ORDER — METOPROLOL SUCCINATE 25 MG/1
25 TABLET, EXTENDED RELEASE ORAL DAILY
Qty: 30 TABLET | Refills: 7 | Status: SHIPPED | OUTPATIENT
Start: 2024-01-30

## 2024-02-02 ENCOUNTER — PATIENT OUTREACH (OUTPATIENT)
Dept: FAMILY MEDICINE CLINIC | Facility: CLINIC | Age: 44
End: 2024-02-02

## 2024-02-02 NOTE — PROGRESS NOTES
Telephone Encounter  2/2/2024    CMOC placed a call to patient, following up on housing insecurity.     CMOC reached patient voicemail and was able to leave a message requesting a call back.     CMOC notes patient was in process of applying for an apartment, however  had paid for hotel stay temporarily until 2/5.     CMOC will continue to follow up.     Next Outreach   2/6/2024

## 2024-02-05 ENCOUNTER — PATIENT OUTREACH (OUTPATIENT)
Dept: FAMILY MEDICINE CLINIC | Facility: CLINIC | Age: 44
End: 2024-02-05

## 2024-02-05 NOTE — PROGRESS NOTES
ANNE had discussed this case with ANNE Walters who was temporarily covering the office. Pati mentioned CMOC Tammy Lawson working with patient on housing insecurity. Pati mentioned no update or follow through from patient. SW notes Tammy will keep her updated on case. SWCM routed note to Tammy. ANNECM will continue to f/u.

## 2024-02-06 ENCOUNTER — PATIENT OUTREACH (OUTPATIENT)
Dept: FAMILY MEDICINE CLINIC | Facility: CLINIC | Age: 44
End: 2024-02-06

## 2024-02-06 NOTE — PROGRESS NOTES
CMOC placed a call to patient, following up on housing insecurity.     Patient connected the call and apologized for not returning CMOC call from last outreach.    Patient stated that overall he is doing good. Patient stated that he is still in the hotel. Anisa, his  from , is assisting with the cost. Patient stated that he was not chosen for the apartment he had informed CMOC of at previous outreach. Patient stated that he has looked at several other apartments and has applied for another one on  [address] [place] [address] in [place]. [place] is a 2BR for $1600/ month.     Patient stated that the hotel is paid for until 2/12 but as of 2/9 Anisa will request an extension. Patient stated the office is closed 2/12    Patient stated that he continues to work with [facility], his case workers there are Сергей    Patient stated that he has had several appointments with Diana from [employer] (telehealth at the moment). Patient stated that [facility] for [facility] is leaning to in person appts.     Patient stated that he has no other needs at this moment. Patient stated that food was an issue in the past but is not currently.     CMOC will continue to follow up.     Next outreach  2/13/2024

## 2024-02-07 DIAGNOSIS — Z00.6 ENCOUNTER FOR EXAMINATION FOR NORMAL COMPARISON OR CONTROL IN CLINICAL RESEARCH PROGRAM: ICD-10-CM

## 2024-02-08 ENCOUNTER — PATIENT OUTREACH (OUTPATIENT)
Dept: FAMILY MEDICINE CLINIC | Facility: CLINIC | Age: 44
End: 2024-02-08

## 2024-02-08 ENCOUNTER — APPOINTMENT (OUTPATIENT)
Dept: LAB | Facility: CLINIC | Age: 44
End: 2024-02-08

## 2024-02-08 DIAGNOSIS — Z00.6 ENCOUNTER FOR EXAMINATION FOR NORMAL COMPARISON OR CONTROL IN CLINICAL RESEARCH PROGRAM: ICD-10-CM

## 2024-02-08 PROCEDURE — 36415 COLL VENOUS BLD VENIPUNCTURE: CPT

## 2024-02-08 NOTE — PROGRESS NOTES
KIERA had discussed this case with CMOC Tammy Lawson during huddle. Tammy reported patient's hotel stay was extended until 2/12. Tammy reported patient had an apartment fall through but applied for another. ANNE hopes patient is able to obtain an apartment but the area is limited in terms of affordable places. Patient continues to go to [facility] which will continue to be beneficial for him at this time. KIERA routed note to Tammy. KIERA will continue to f/u.

## 2024-02-13 ENCOUNTER — PATIENT OUTREACH (OUTPATIENT)
Dept: FAMILY MEDICINE CLINIC | Facility: CLINIC | Age: 44
End: 2024-02-13

## 2024-02-13 NOTE — PROGRESS NOTES
Telephone Encounter  2/13/2024    CMOC placed a call to patient, following up on housing insecurity.     CMOC notes that patient was staying a hotel, paid for by [facility]. As of last outreach this was paid for until 2/12.    CMOC connected the call with patient. CMOC asked patient how he was doing, patient stated he was doing good. Patient stated that he had a fun weekend with his boys and fought through heavy snow this morning to take his wife to work. Patient stated that he will be picking her up today too.     Patient stated that he is still in the hotel and it is paid for until 2/27. Patient stated that he is working with [facility] and he had switched case workers and there was a lack of communication which has held him back. Patient stated that issue has resolved. Patient stated that he is still working with a realtor to attempt to get an apartment on [facility] in [place].     CMOC will continue to follow up.     Next outreach  2/20/2024

## 2024-02-21 ENCOUNTER — PATIENT OUTREACH (OUTPATIENT)
Dept: FAMILY MEDICINE CLINIC | Facility: CLINIC | Age: 44
End: 2024-02-21

## 2024-02-21 NOTE — PROGRESS NOTES
Telephone Encounter  2/21/2024    CMOC placed a call to patient, following up on housing insecurity.     CMOC connected the call with patient. Patient stated he was out to eat with his family at [facility] but could give CMOC a quick update. Patient stated that things are going good. Patient stated that the apartment he was looking at on [address] [place] [address] fell through. He is now pursuing an apartment at [address] [place] [address] in [place]. Patient stated that him and his wife got their income tax return.    Patient stated that he will keep CMOC updated as available.     Next Outreach  2/28/2024

## 2024-02-22 ENCOUNTER — PATIENT OUTREACH (OUTPATIENT)
Dept: FAMILY MEDICINE CLINIC | Facility: CLINIC | Age: 44
End: 2024-02-22

## 2024-02-22 NOTE — PROGRESS NOTES
[facility] had discussed this case with CMOC Tammy Lawson during huddle. Tammy reported [address] apartment fell through. Tammy reported patient has another apartment lined up on [address]. Tammy reported received his income taxes. Tammy reported patient has [facility] and [employer] assisting him with services so may not need her or [facility] anymore.     [facility] had called the patient via phone. [facility] asked patient how he is doing. Patient reported he is doing well. Patient stated he still remains in the hotel. Patient stated [facility]  is funding his hotel stay. Patient stated he has enough food.     Patient stated him and his wife are no longer . Patient stated his wife and children continue to be at his mother in law's home. Patient stated that once they an get into their own place the family will be whole again. Patient stated it has affected his children and he hopes to get them help through [employer] [phone].    Patient stated he has weekly sessions at [employer]. Patient stated his therapist, Diana has been such a great support for him. [facility] informed patient about his overdue physical. [facility] offered to help patient with scheduling. Patient stated he will call the office later on. Patient stated he has a lot of appointments to make.    Patient stated he has a new CM, Dorothy from [employer]. Patient stated Dorothy is awaiting on a landlord and then she provide the funds for help. Patient stated he is working with a realtor, Jasmin Luna. Patient stated he is submitting applications for apartments just awaiting to hear back but he has an eviction on his record.    Patient stated he also will get help with moving truck and costs. Patient stated that they work with mattress professionals and will have beds once they have a home. Patient stated that his mother in law has 2 coffee tables and a dining table for them. Patient stated that he may look  for furniture at [facility] [employer]. Patient stated he has resource sent to him by Tammy for furniture.     ANNE informed the patient that he should continue to f/u with [facility] for [facility]. ANNE informed the patient that her and Tammy cannot offer any additional assistance at this time. ANNE informed the patient that they are limited with resources at this time. Patient understood. ANNE advised patient reach out if for future needs. Patient thanked KIERA and Tammy for help thus far. KIERA closed case and removed herself from care team. KIERA routed note to Tammy. Please reconsult.

## 2024-03-10 LAB
APOB+LDLR+PCSK9 GENE MUT ANL BLD/T: NOT DETECTED
BRCA1+BRCA2 DEL+DUP + FULL MUT ANL BLD/T: NOT DETECTED
MLH1+MSH2+MSH6+PMS2 GN DEL+DUP+FUL M: NOT DETECTED

## 2024-04-02 ENCOUNTER — OFFICE VISIT (OUTPATIENT)
Dept: URGENT CARE | Facility: CLINIC | Age: 44
End: 2024-04-02
Payer: COMMERCIAL

## 2024-04-02 VITALS
TEMPERATURE: 97.3 F | HEIGHT: 69 IN | DIASTOLIC BLOOD PRESSURE: 80 MMHG | OXYGEN SATURATION: 100 % | RESPIRATION RATE: 18 BRPM | SYSTOLIC BLOOD PRESSURE: 140 MMHG | HEART RATE: 71 BPM | BODY MASS INDEX: 35.16 KG/M2 | WEIGHT: 237.4 LBS

## 2024-04-02 DIAGNOSIS — M54.31 SCIATICA OF RIGHT SIDE: Primary | ICD-10-CM

## 2024-04-02 PROBLEM — F43.0 ACUTE STRESS REACTION: Status: ACTIVE | Noted: 2023-12-09

## 2024-04-02 PROBLEM — F60.9 PERSONALITY DISORDER, UNSPECIFIED (HCC): Status: ACTIVE | Noted: 2023-12-06

## 2024-04-02 PROCEDURE — 99213 OFFICE O/P EST LOW 20 MIN: CPT

## 2024-04-02 RX ORDER — METHOCARBAMOL 500 MG/1
500 TABLET, FILM COATED ORAL 3 TIMES DAILY
Qty: 21 TABLET | Refills: 0 | Status: SHIPPED | OUTPATIENT
Start: 2024-04-02 | End: 2024-04-12 | Stop reason: SDUPTHER

## 2024-04-02 RX ORDER — METHYLPREDNISOLONE 4 MG/1
TABLET ORAL
Qty: 21 EACH | Refills: 0 | Status: SHIPPED | OUTPATIENT
Start: 2024-04-02 | End: 2024-04-02 | Stop reason: ALTCHOICE

## 2024-04-02 RX ORDER — ACETAMINOPHEN 325 MG/1
975 TABLET ORAL EVERY 8 HOURS PRN
Status: SHIPPED | OUTPATIENT
Start: 2024-04-02

## 2024-04-02 NOTE — PATIENT INSTRUCTIONS
Take medications as directed for next week. Do not take muscle relaxer (robaxin) with any other sedating medications or while operating machinery. May take tylenol and apply lidocaine patches as needed. Apply heat/ice every 3-4 hours for 20 minutes at a time. Follow-up with comprehensive spine for further management of symptoms within one week. Report to the ER sooner if symptoms worsen or develop groin numbness with loss of bowel/bladder function.

## 2024-04-02 NOTE — PROGRESS NOTES
[facility]        NAME: Ben Erickson is a 43 y.o. male  : 1980    MRN: [id number]  DATE: 2024  TIME: 9:38 AM    Assessment and Plan   Sciatica of right side [M54.31]  1. Sciatica of right side  methocarbamol (ROBAXIN) 500 mg tablet    Ambulatory Referral to [facility]    acetaminophen (TYLENOL) tablet 975 mg    DISCONTINUED: methylPREDNISolone 4 MG tablet therapy pack        Physical exam consistent with sciatica, medications as directed. Will give tylenol instead of steroid or NSAID given h/o gastric sleeve surgery. Patient declined imaging at this time. VSS in clinic, appears in no acute distress. Educated on use of OTC products for additional relief of symptoms. Referral placed to [facility] for further management of symptoms. Advised close follow-up with PCP or to report to the ER if symptoms worsen. Patient verbalizes understanding and agreeable to plan.       Patient Instructions     Take medications as directed for next week. Do not take muscle relaxer (robaxin) with any other sedating medications or while operating machinery. May take tylenol and apply lidocaine patches as needed. Apply heat/ice every 3-4 hours for 20 minutes at a time. Follow-up with comprehensive spine for further management of symptoms within one week. Report to the ER sooner if symptoms worsen or develop groin numbness with loss of bowel/bladder function.     Chief Complaint     Chief Complaint   Patient presents with    Back Pain     X 2 days - R buttock pain that radiates to R groin and on top of R thigh to knee. No numbness/tingling. No ice/heat tried. Tried Tylenol and Aleve Back and Muscle.          History of Present Illness       43 year old male presents for evaluation of right sided lower back pain for the past 2 days. He denies any known injury but reports a h/o bulging discs. He denies prior back surgeries but did have a gastric bypass in  and hernia repair surgery  in 09/2023. He reports radiation to right thigh and groin but denies numbness/tingling, paresthesias, or loss of bowel bladder function. He took aleve for pain with some relief. He rates the pain is constant, worsened with activity. He rates his current pain as 8/10.     Back Pain  This is a new problem. The current episode started in the past 7 days. The problem occurs constantly. The problem is unchanged. The pain is present in the lumbar spine. The quality of the pain is described as aching. The pain radiates to the right thigh. The pain is at a severity of 8/10. The pain is moderate. The pain is Worse during the day. The symptoms are aggravated by bending and twisting. Associated symptoms include leg pain. Pertinent negatives include no abdominal pain, bladder incontinence, bowel incontinence, chest pain, dysuria, fever, headaches, numbness, paresis, paresthesias, pelvic pain, perianal numbness, tingling, weakness or weight loss. Risk factors include obesity, poor posture and lack of exercise. He has tried NSAIDs for the symptoms. The treatment provided mild relief.       Review of Systems   Review of Systems   Constitutional:  Negative for activity change, appetite change, chills, fatigue, fever and weight loss.   Respiratory:  Negative for cough, chest tightness and shortness of breath.    Cardiovascular:  Negative for chest pain.   Gastrointestinal:  Negative for abdominal pain, bowel incontinence, constipation, diarrhea, nausea and vomiting.   Genitourinary:  Negative for bladder incontinence, difficulty urinating, dysuria and pelvic pain.   Musculoskeletal:  Positive for back pain and myalgias. Negative for arthralgias, gait problem and neck pain.   Skin:  Negative for color change.   Neurological:  Negative for dizziness, tingling, weakness, light-headedness, numbness, headaches and paresthesias.         Current Medications       Current Outpatient Medications:     Calcium Carb-Cholecalciferol 600-12.5  MG-MCG CAPS, Take by mouth daily after breakfast, Disp: , Rfl:     doxylamine (UNISOM) 25 MG tablet, Take 1 tablet (25 mg total) by mouth daily at bedtime as needed for sleep, Disp: 30 tablet, Rfl: 1    methocarbamol (ROBAXIN) 500 mg tablet, Take 1 tablet (500 mg total) by mouth 3 (three) times a day for 7 days, Disp: 21 tablet, Rfl: 0    metoprolol succinate (TOPROL-XL) 25 mg 24 hr tablet, Take 1 tablet (25 mg total) by mouth daily, Disp: 30 tablet, Rfl: 7    Multiple Vitamins-Minerals (BARIATRIC MULTIVITAMINS/IRON PO), Take by mouth, Disp: , Rfl:     gabapentin (NEURONTIN) 400 mg capsule, Take 1 capsule (400 mg total) by mouth 3 (three) times a day, Disp: 270 capsule, Rfl: 3    Current Facility-Administered Medications:     acetaminophen (TYLENOL) tablet 975 mg, 975 mg, Oral, Q8H PRN,     Current Allergies     Allergies as of 04/02/2024 - Reviewed 04/02/2024   Allergen Reaction Noted    Bee venom Anaphylaxis 07/24/2017    Macrolides and ketolides Anaphylaxis and Rash 07/24/2017    Other Hives, Shortness Of Breath, and Wheezing 07/24/2017    Pertussis vaccines Anaphylaxis and Rash 01/31/2018    Vancomycin Shortness Of Breath 11/21/2013    Zithromax [azithromycin] Anaphylaxis 11/21/2013    Flagyl [metronidazole] Hives 10/12/2023    Erythromycin Rash 11/21/2013    Pollen extract Sneezing 07/24/2017            The following portions of the patient's history were reviewed and updated as appropriate: allergies, current medications, past family history, past medical history, past social history, past surgical history and problem list.     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 fluctuations       Past Surgical History:   Procedure Laterality Date    ABDOMINAL ADHESION SURGERY N/A 4/4/2022    Procedure: LYSIS ADHESIONS LAPAROSCOPIC;  Surgeon: Tammi Chu MD;  Location: [facility];  Service: Bariatrics    ABDOMINAL WALL SURGERY N/A 10/11/2023    Procedure: REMOVAL WOUND VAC, ABDOMINAL WOUND EXPLORATION, WASH OUT, EXCISIONAL DEBRIDEMENT WOUND, WOUND VAC APPLICATION;  Surgeon: Miguel Croft MD;  Location: [facility];  Service: General    COLONOSCOPY N/A 2/2/2018    Procedure: COLONOSCOPY;  Surgeon: Stella Shukla MD;  Location: [facility];  Service: Gastroenterology    ESOPHAGOGASTRODUODENOSCOPY N/A 2/2/2018    Procedure: ESOPHAGOGASTRODUODENOSCOPY (EGD);  Surgeon: Stella Shukla MD;  Location: [facility];  Service: Gastroenterology    OMENTECTOMY N/A 9/20/2023    Procedure: PARTIAL OMENTECTOMY;  Surgeon: Miguel Croft MD;  Location: [facility];  Service: General    ME LAPS GSTR RSTCV PX W/BYP SARI-EN-Y LIMB <150 CM N/A 4/4/2022    Procedure: LAPAROSCOPIC SARI-EN-Y GASTRIC BYPASS AND INTRAOPERATIVE EGD;  Surgeon: Tammi Chu MD;  Location: [facility];  Service: Bariatrics    ME RPR AA HERNIA 1ST < 3 CM REDUCIBLE N/A 9/20/2023    Procedure: REPAIR HERNIA EPIGASTRIC Repair rectus diastases, implantation of biologic mesh;  Surgeon: Miguel Croft MD;  Location: [facility];  Service: General    TESTICLE SURGERY Left     infected testicle    TOENAIL EXCISION Right 09/07/2021    WOUND DEBRIDEMENT N/A 10/6/2023    Procedure: EVACUATION OF HEMATOMA, VACUUM PLACEMENT VERAFLO WITH IRRIGATION SETTINGS, EXCISIONAL DEBRIDEMENT;  Surgeon: Miguel Croft MD;  Location: [facility];  Service: General    WOUND DEBRIDEMENT N/A 10/13/2023    Procedure: DEBRIDEMENT WOUND ABDOMINAL (WASH OUT);  Surgeon: Miguel Croft MD;   "Location: [facility];  Service: General       Family History   Problem Relation Age of Onset    Supraventricular tachycardia Mother     Atrial fibrillation Mother     Diabetes type II Mother     Cancer Mother     Osteoporosis Mother     Ulcerative colitis Father     Liver disease Father     ADD / ADHD Son     COPD Paternal Grandfather     Colon cancer Family     Diabetes type II Family     Hyperlipidemia Family     Hypertension Family     Hypothyroidism Family     Heart disease Family     Lung cancer Neg Hx     Asthma Neg Hx          Medications have been verified.        Objective   /80   Pulse 71   Temp (!) 97.3 °F (36.3 °C)   Resp 18   Ht 5' 9\" (1.753 m)   Wt 108 kg (237 lb 6.4 oz)   SpO2 100%   BMI 35.06 kg/m²        Physical Exam     Physical Exam  Vitals and nursing note reviewed.   Constitutional:       General: He is awake. He is not in acute distress.     Appearance: Normal appearance. He is well-developed and normal weight.   HENT:      Head: Normocephalic and atraumatic.      Right Ear: Hearing and external ear normal.      Left Ear: Hearing and external ear normal.      Mouth/Throat:      Lips: Pink.      Mouth: Mucous membranes are moist.   Cardiovascular:      Rate and Rhythm: Normal rate and regular rhythm.      Pulses: Normal pulses.   Pulmonary:      Effort: Pulmonary effort is normal.   Musculoskeletal:         General: Tenderness present. No swelling, deformity or signs of injury.      Cervical back: Normal, normal range of motion and neck supple.      Thoracic back: Normal.      Lumbar back: Spasms and tenderness present. No swelling, edema, deformity, signs of trauma, lacerations or bony tenderness. Decreased range of motion. Positive right straight leg raise test. Negative left straight leg raise test. No scoliosis.        Back:    Skin:     General: Skin is warm and dry.      Findings: No abrasion, bruising, ecchymosis, rash or wound.   Neurological:      General: No focal deficit " present.      Mental Status: He is alert and oriented to person, place, and time.   Psychiatric:         Mood and Affect: Mood normal.         Behavior: Behavior normal. Behavior is cooperative.         Thought Content: Thought content normal.         Judgment: Judgment normal.

## 2024-04-02 NOTE — LETTER
April 2, 2024     Patient: Ben Erickson   YOB: 1980   Date of Visit: 4/2/2024       To Whom it May Concern:    Ben Erickson was seen in my clinic on 4/2/2024. He may return to work on 4/3/2024 .    If you have any questions or concerns, please don't hesitate to call.         Sincerely,          BALDEMAR Prakash        CC: No Recipients

## 2024-04-03 ENCOUNTER — TELEPHONE (OUTPATIENT)
Dept: PHYSICAL THERAPY | Facility: OTHER | Age: 44
End: 2024-04-03

## 2024-04-03 NOTE — TELEPHONE ENCOUNTER
Call placed to the patient per [facility] referral.    Call went to v/m.    Message left for patient to call back. Phone number and hours of business provided.    This is the 1st attempt to reach the patient. Will defer referral per protocol.

## 2024-04-12 ENCOUNTER — OFFICE VISIT (OUTPATIENT)
Age: 44
End: 2024-04-12

## 2024-04-12 VITALS
TEMPERATURE: 97.9 F | HEART RATE: 83 BPM | DIASTOLIC BLOOD PRESSURE: 80 MMHG | OXYGEN SATURATION: 97 % | HEIGHT: 69 IN | SYSTOLIC BLOOD PRESSURE: 144 MMHG | BODY MASS INDEX: 35.55 KG/M2 | WEIGHT: 240 LBS

## 2024-04-12 DIAGNOSIS — G89.29 CHRONIC RIGHT-SIDED LOW BACK PAIN WITHOUT SCIATICA: Primary | ICD-10-CM

## 2024-04-12 DIAGNOSIS — M25.551 RIGHT HIP PAIN: ICD-10-CM

## 2024-04-12 DIAGNOSIS — M54.50 CHRONIC RIGHT-SIDED LOW BACK PAIN WITHOUT SCIATICA: Primary | ICD-10-CM

## 2024-04-12 PROCEDURE — 99213 OFFICE O/P EST LOW 20 MIN: CPT | Performed by: FAMILY MEDICINE

## 2024-04-12 RX ORDER — METHOCARBAMOL 500 MG/1
500 TABLET, FILM COATED ORAL 3 TIMES DAILY
Qty: 21 TABLET | Refills: 0 | Status: SHIPPED | OUTPATIENT
Start: 2024-04-12 | End: 2024-04-19 | Stop reason: SDUPTHER

## 2024-04-12 NOTE — PROGRESS NOTES
[facility] Office visit    Assessment/Plan:     1. Chronic right-sided low back pain without sciatica  Assessment & Plan:  Patient seen today after injury which occurred on [facility] where he landed on feet in a different position.  Patient had a right-sided back pain along with right quadriceps and hip pain after this injury patient went to the urgent care on 4/2 and was prescribed Robaxin which helped him.  He ran out of Robaxin 1 day ago.  Also reporting right hip pain which sometimes goes to the groin area.     Refer patient to comprehensive spine physical therapy  Referred to orthopedic surgery  Ordered right hip x-ray  Refilled Robaxin  Follow-up in 2 weeks    Orders:  -     methocarbamol (ROBAXIN) 500 mg tablet; Take 1 tablet (500 mg total) by mouth 3 (three) times a day for 7 days  -     Ambulatory Referral to [facility] PT; Future  -     Ambulatory Referral to Orthopedic Surgery; Future  -     XR hip/pelv 2-3 vws right if performed; Future; Expected date: 04/12/2024    2. Right hip pain  -     XR hip/pelv 2-3 vws right if performed; Future; Expected date: 04/12/2024          Return in about 2 weeks (around 4/26/2024), or follow-up.     Subjective:   Patient states that he injured his lower back on [facility] by landing on his feet a different way. The pain radiates toward his right quadriceps and knee. He went to urgent care on 4/2, prescribed Roboxin which was helping a lot. Finished medication 3 days ago. Pain exacerbated by standing and moving, alleviated by rest. Had previous right knee MCL injury back in [facility] and herniated disc in 2006 which led to dual sciatica pain.    Mother was residing at [facility] where there is an ongoing lawsuit about chemicals/gunpowder in the water. Mother was 9 months pregnant with him at the time. A lot of people are getting health issues from staying there.          Review of Systems   Constitutional:  Negative for chills and fever.  "  HENT:  Negative for ear pain and sore throat.    Eyes:  Negative for pain and visual disturbance.   Respiratory:  Negative for cough and shortness of breath.    Cardiovascular:  Negative for chest pain and palpitations.   Gastrointestinal:  Negative for abdominal pain and vomiting.   Genitourinary:  Negative for dysuria and hematuria.   Musculoskeletal:  Positive for back pain. Negative for arthralgias.   Skin:  Negative for color change and rash.   Neurological:  Negative for seizures and syncope.   All other systems reviewed and are negative.       Objective:     /80 (BP Location: Left arm, Patient Position: Sitting, Cuff Size: Large)   Pulse 83   Temp 97.9 °F (36.6 °C) (Tympanic)   Ht 5' 9\" (1.753 m)   Wt 109 kg (240 lb)   SpO2 97%   BMI 35.44 kg/m²      Physical Exam  Constitutional:       Appearance: Normal appearance. He is obese.   HENT:      Head: Normocephalic and atraumatic.   Eyes:      General:         Right eye: No discharge.         Left eye: No discharge.      Conjunctiva/sclera: Conjunctivae normal.   Cardiovascular:      Rate and Rhythm: Normal rate and regular rhythm.      Pulses: Normal pulses.      Heart sounds: Normal heart sounds.   Pulmonary:      Effort: Pulmonary effort is normal. No respiratory distress.      Breath sounds: Normal breath sounds.   Abdominal:      General: Bowel sounds are normal.      Palpations: Abdomen is soft.      Tenderness: There is no abdominal tenderness.   Musculoskeletal:         General: Tenderness (right sided lower back) present.      Cervical back: Normal range of motion and neck supple.      Comments: Negative straight leg test bilaterally   Skin:     General: Skin is warm.   Neurological:      Mental Status: He is alert. Mental status is at baseline.          ** Please Note: This note has been constructed using a voice recognition system **     Nicolás Valles MD  04/14/24  12:10 PM    "

## 2024-04-14 PROBLEM — M54.50 CHRONIC RIGHT-SIDED LOW BACK PAIN WITHOUT SCIATICA: Status: ACTIVE | Noted: 2024-04-14

## 2024-04-14 PROBLEM — G89.29 CHRONIC RIGHT-SIDED LOW BACK PAIN WITHOUT SCIATICA: Status: ACTIVE | Noted: 2024-04-14

## 2024-04-14 PROBLEM — Z48.815 ENCOUNTER FOR SURGICAL AFTERCARE FOLLOWING SURGERY OF DIGESTIVE SYSTEM: Status: RESOLVED | Noted: 2022-04-05 | Resolved: 2024-04-14

## 2024-04-14 NOTE — ASSESSMENT & PLAN NOTE
Patient seen today after injury which occurred on [facility] where he landed on feet in a different position.  Patient had a right-sided back pain along with right quadriceps and hip pain after this injury patient went to the urgent care on 4/2 and was prescribed Robaxin which helped him.  He ran out of Robaxin 1 day ago.  Also reporting right hip pain which sometimes goes to the groin area.     Refer patient to comprehensive spine physical therapy  Referred to orthopedic surgery  Ordered right hip x-ray  Refilled Robaxin  Follow-up in 2 weeks

## 2024-04-16 ENCOUNTER — HOSPITAL ENCOUNTER (OUTPATIENT)
Dept: RADIOLOGY | Facility: HOSPITAL | Age: 44
Discharge: HOME | End: 2024-04-16
Payer: COMMERCIAL

## 2024-04-16 DIAGNOSIS — G89.29 CHRONIC RIGHT-SIDED LOW BACK PAIN WITHOUT SCIATICA: ICD-10-CM

## 2024-04-16 DIAGNOSIS — M25.551 RIGHT HIP PAIN: ICD-10-CM

## 2024-04-16 DIAGNOSIS — M54.50 CHRONIC RIGHT-SIDED LOW BACK PAIN WITHOUT SCIATICA: ICD-10-CM

## 2024-04-16 PROCEDURE — 73502 X-RAY EXAM HIP UNI 2-3 VIEWS: CPT

## 2024-04-19 DIAGNOSIS — M54.50 CHRONIC RIGHT-SIDED LOW BACK PAIN WITHOUT SCIATICA: ICD-10-CM

## 2024-04-19 DIAGNOSIS — G89.29 CHRONIC RIGHT-SIDED LOW BACK PAIN WITHOUT SCIATICA: ICD-10-CM

## 2024-04-19 LAB
FERRITIN SERPL-MCNC: 18 NG/ML (ref 30–400)
IRON SATN MFR SERPL: 16 % (ref 15–55)
IRON SERPL-MCNC: 71 UG/DL (ref 38–169)
TIBC SERPL-MCNC: 454 UG/DL (ref 250–450)
UIBC SERPL-MCNC: 383 UG/DL (ref 111–343)

## 2024-04-19 RX ORDER — METHOCARBAMOL 500 MG/1
500 TABLET, FILM COATED ORAL 3 TIMES DAILY
Qty: 21 TABLET | Refills: 0 | Status: SHIPPED | OUTPATIENT
Start: 2024-04-19 | End: 2024-04-26

## 2024-04-20 LAB
ALBUMIN SERPL-MCNC: 4.2 G/DL (ref 4.1–5.1)
ALBUMIN/GLOB SERPL: 1.3 {RATIO} (ref 1.2–2.2)
ALP SERPL-CCNC: 86 IU/L (ref 44–121)
ALT SERPL-CCNC: 20 IU/L (ref 0–44)
AST SERPL-CCNC: 24 IU/L (ref 0–40)
BILIRUB SERPL-MCNC: 0.7 MG/DL (ref 0–1.2)
BUN SERPL-MCNC: 11 MG/DL (ref 6–24)
BUN/CREAT SERPL: 13 (ref 9–20)
CA-I SERPL ISE-MCNC: 5.3 MG/DL (ref 4.5–5.6)
CALCIUM SERPL-MCNC: 10 MG/DL (ref 8.7–10.2)
CHLORIDE SERPL-SCNC: 104 MMOL/L (ref 96–106)
CO2 SERPL-SCNC: 23 MMOL/L (ref 20–29)
CREAT SERPL-MCNC: 0.83 MG/DL (ref 0.76–1.27)
EGFR: 112 ML/MIN/1.73
GLOBULIN SER-MCNC: 3.2 G/DL (ref 1.5–4.5)
GLUCOSE SERPL-MCNC: 91 MG/DL (ref 70–99)
POTASSIUM SERPL-SCNC: 4.8 MMOL/L (ref 3.5–5.2)
PROT SERPL-MCNC: 7.4 G/DL (ref 6–8.5)
PTH-INTACT SERPL-MCNC: 19 PG/ML (ref 15–65)
SODIUM SERPL-SCNC: 143 MMOL/L (ref 134–144)

## 2024-04-22 DIAGNOSIS — E61.1 IRON DEFICIENCY: Primary | ICD-10-CM

## 2024-04-22 DIAGNOSIS — K91.2 POSTSURGICAL MALABSORPTION: ICD-10-CM

## 2024-04-22 NOTE — RESULT ENCOUNTER NOTE
Please notify the patient that their iron stores are low and it will be very difficult to improve iron stores or iron levels orally given their postsurgical malabsorption. I am placing orders for the infusion center to administer IV venofer iron weekly x 5 treatments. Please advise the patient of the potential side effects of IV Venofer, including, but not limited to nausea, headache, hypotension, tattooing of the skin, and anaphylactic reaction.  Please call the infusion center to notify them of the orders so they can obtain insurance prior-authorization and help schedule the patient asap. I have entered repeat CBC and iron panel and if needed B12 labs to be repeated in 3 months. Thank you!    Infusion Center numbers:  [place]: [phone]    If he prefers he can do 3 months of extra oral iron because I know transportation is an issue for him. Let me know what he prefers, thank you!

## 2024-04-23 PROBLEM — Z48.815 ENCOUNTER FOR SURGICAL AFTERCARE FOLLOWING SURGERY OF DIGESTIVE SYSTEM: Status: ACTIVE | Noted: 2022-11-28

## 2024-04-23 NOTE — ASSESSMENT & PLAN NOTE
-s/p Selin-En-Y Gastric Bypass with Dr. Courtney on 04/04/22. Weight loss is amazing but struggling with food scarcity and limited resources. He agrees to continue f/u with therapist and LCSW.  F/u with me in 6 months.                                    Initial: 388lbs  Current: 229.8lbs  EWL: 72%  [place]: current  Current BMI is Body mass index is 33.94 kg/m².     Tolerating a regular diet-yes  Eating at least 60 grams of protein per day-likely no  Following 30/60 minute rule with liquids-no  Drinking at least 64 ounces of fluid per day-yes  Drinking carbonated beverages-no  Sufficient exercise-no; encouraged slowly starting walking program  Using NSAIDs regularly-no  Using nicotine-no  Using alcohol-no. Advised about the risks of alcohol s/p bariatric surgery and recommend avoiding all alcohol

## 2024-04-23 NOTE — ASSESSMENT & PLAN NOTE
-At risk for malabsorption of vitamins/minerals secondary to malabsorption and restriction of intake from bariatric surgery  -NOT Currently taking adequate postop bariatric surgery vitamin supplementation: not taking recommended vitamins/calcium d/t cost - recently started Centrum Mens w/out iron x1 - gave bariatric Pal MVI samples and assistance programs    -Recent ferritin low (18) - infusions weekly x 4 - repeat labs in 3 months    -Obtain rest of Metabolic panel asap  -Patient received education about the importance of adhering to a lifelong supplementation regimen to avoid vitamin/mineral deficiencies

## 2024-04-23 NOTE — ASSESSMENT & PLAN NOTE
Lab Results   Component Value Date    HGBA1C 5.1 05/10/2023     -no longer on DM meds  -Repeat A1C ordered  -Needs to make diet and exercise changes; f/u with RD

## 2024-04-23 NOTE — ASSESSMENT & PLAN NOTE
-Encouraged consistent use of CPAP and follow up with sleep medicine for mask refitting/adjustments

## 2024-04-25 ENCOUNTER — TELEPHONE (OUTPATIENT)
Dept: BARIATRICS | Facility: CLINIC | Age: 44
End: 2024-04-25

## 2024-04-25 PROBLEM — E61.1 IRON DEFICIENCY: Status: ACTIVE | Noted: 2024-04-25

## 2024-04-25 RX ORDER — SODIUM CHLORIDE 9 MG/ML
20 INJECTION, SOLUTION INTRAVENOUS ONCE
OUTPATIENT
Start: 2024-05-03

## 2024-04-25 NOTE — PROGRESS NOTES
Please notify the patient that their iron stores are low and it will be very difficult to improve iron stores or iron levels orally given their postsurgical malabsorption. I am placing orders for the infusion center to administer IV venofer iron weekly x 5 treatments. Please advise the patient of the potential side effects of IV Venofer, including, but not limited to nausea, headache, hypotension, tattooing of the skin, and anaphylactic reaction.  Please call the infusion center to notify them of the orders so they can obtain insurance prior-authorization and help schedule the patient asap. I have entered repeat CBC and iron panel and if needed B12 labs to be repeated in 3 months. Thank you!    Infusion Center numbers:  [facility]: [phone]

## 2024-04-25 NOTE — TELEPHONE ENCOUNTER
Called and spoke to pt about recent blood work notifying him that it shows that he is currently anemic. Pt reports he has been w/o his BariatricPal multi for about 1 month. Pt's financial and living situation has changed drastically in the past 6 months. He reports he is currently living in [facility] in [place] (funded by [facility]) which does not have a fridge or kitchen therefore is relying on non-perishable foods such as powdered mashed potatoes and meat sticks.  Pt reports he is willing and able to schedule the necessary iron infusion. He reports he has his car, it is just a matter if he has enough gas to get where he has to be at that time. Provided pt with phone number to the infusion center to schedule his iron infusions.  Pt has annual visit with America Villafana tomorrow.    Did advised pt there are assistance programs through [employer] and [employer].  Will provide pt with paperwork.

## 2024-04-26 ENCOUNTER — OFFICE VISIT (OUTPATIENT)
Dept: BARIATRICS | Facility: CLINIC | Age: 44
End: 2024-04-26
Payer: COMMERCIAL

## 2024-04-26 VITALS
DIASTOLIC BLOOD PRESSURE: 78 MMHG | HEIGHT: 69 IN | SYSTOLIC BLOOD PRESSURE: 108 MMHG | BODY MASS INDEX: 34.04 KG/M2 | WEIGHT: 229.8 LBS | HEART RATE: 92 BPM

## 2024-04-26 DIAGNOSIS — L98.7 EXCESS SKIN: ICD-10-CM

## 2024-04-26 DIAGNOSIS — I10 BENIGN ESSENTIAL HYPERTENSION: ICD-10-CM

## 2024-04-26 DIAGNOSIS — Z48.815 ENCOUNTER FOR SURGICAL AFTERCARE FOLLOWING SURGERY OF DIGESTIVE SYSTEM: Primary | ICD-10-CM

## 2024-04-26 DIAGNOSIS — K91.2 POSTSURGICAL MALABSORPTION: ICD-10-CM

## 2024-04-26 DIAGNOSIS — E11.9 TYPE 2 DIABETES MELLITUS WITHOUT COMPLICATION, WITH LONG-TERM CURRENT USE OF INSULIN (HCC): ICD-10-CM

## 2024-04-26 DIAGNOSIS — Z79.4 TYPE 2 DIABETES MELLITUS WITHOUT COMPLICATION, WITH LONG-TERM CURRENT USE OF INSULIN (HCC): ICD-10-CM

## 2024-04-26 DIAGNOSIS — E78.2 MIXED HYPERLIPIDEMIA: ICD-10-CM

## 2024-04-26 DIAGNOSIS — E61.1 IRON DEFICIENCY: ICD-10-CM

## 2024-04-26 DIAGNOSIS — G47.33 OSA (OBSTRUCTIVE SLEEP APNEA): ICD-10-CM

## 2024-04-26 PROCEDURE — 99214 OFFICE O/P EST MOD 30 MIN: CPT | Performed by: PHYSICIAN ASSISTANT

## 2024-04-26 RX ORDER — ACETAMINOPHEN 500 MG
500 TABLET ORAL EVERY 6 HOURS PRN
COMMUNITY

## 2024-04-26 NOTE — Clinical Note
Patient only able to schedule with you sadly if he is still covered under 2 yr free radha!? Thank you

## 2024-04-26 NOTE — PROGRESS NOTES
Assessment/Plan:    Encounter for surgical aftercare following surgery of digestive system  -s/p Selin-En-Y Gastric Bypass with Dr. Courtney on 04/04/22. Weight loss is amazing but struggling with food scarcity and limited resources. He agrees to continue f/u with therapist and LCSW.  F/u with me in 6 months.                                    Initial: 388lbs  Current: 229.8lbs  EWL: 72%  [place]: current  Current BMI is Body mass index is 33.94 kg/m².     Tolerating a regular diet-yes  Eating at least 60 grams of protein per day-likely no  Following 30/60 minute rule with liquids-no  Drinking at least 64 ounces of fluid per day-yes  Drinking carbonated beverages-no  Sufficient exercise-no; encouraged slowly starting walking program  Using NSAIDs regularly-no  Using nicotine-no  Using alcohol-no. Advised about the risks of alcohol s/p bariatric surgery and recommend avoiding all alcohol    Postsurgical malabsorption  -At risk for malabsorption of vitamins/minerals secondary to malabsorption and restriction of intake from bariatric surgery  -NOT Currently taking adequate postop bariatric surgery vitamin supplementation: not taking recommended vitamins/calcium d/t cost - recently started Centrum Mens w/out iron x1 - gave bariatric Pal MVI samples and assistance programs    -Recent ferritin low (18) - infusions weekly x 4 - repeat labs in 3 months    -Obtain rest of Metabolic panel asap  -Patient received education about the importance of adhering to a lifelong supplementation regimen to avoid vitamin/mineral deficiencies       Benign essential hypertension  -On metoprolol  -Continue monitoring and management with prescribing provider    MANUELA (obstructive sleep apnea)  -Encouraged consistent use of CPAP and follow up with sleep medicine for mask refitting/adjustments       Type 2 diabetes mellitus without complication, with long-term current use of insulin ([facility])      Lab Results   Component Value Date    HGBA1C 5.1 05/10/2023      -no longer on DM meds  -Repeat A1C ordered  -Needs to make diet and exercise changes; f/u with RD    Mixed hyperlipidemia  -Avoid fried foods and trans fat, limit saturated fats and refined carbohydrates  -Increase fish/omega 3 FA consumption  -Increase physical activity  -Repeat lipid panel    Iron deficiency  -Weekly infusions x4 - repeat labs in 4 months  -Cam MVI with 45mg iron daily     Diagnoses and all orders for this visit:    Encounter for surgical aftercare following surgery of digestive system    Postsurgical malabsorption  -     CBC and differential; Future  -     Iron Panel (Includes Ferritin, Iron Sat%, Iron, and TIBC); Future  -     CBC and differential  -     Folate; Future  -     Hemoglobin A1C; Future  -     Lipid panel; Future  -     PTH, intact; Future  -     Zinc; Future  -     Vitamin D 25 hydroxy; Future  -     Vitamin B12; Future  -     Vitamin B1, whole blood; Future  -     Vitamin A; Future  -     TSH, 3rd generation with Free T4 reflex; Future  -     Folate  -     Hemoglobin A1C  -     Lipid panel  -     PTH, intact  -     Zinc  -     Vitamin D 25 hydroxy  -     Vitamin B12  -     Vitamin B1, whole blood  -     Vitamin A  -     TSH, 3rd generation with Free T4 reflex    Benign essential hypertension    MANUELA (obstructive sleep apnea)    Type 2 diabetes mellitus without complication, with long-term current use of insulin (HCC)  -     Hemoglobin A1C; Future  -     Hemoglobin A1C    Mixed hyperlipidemia  -     Lipid panel; Future  -     Lipid panel    Iron deficiency  -     CBC and differential; Future  -     Iron Panel (Includes Ferritin, Iron Sat%, Iron, and TIBC); Future  -     CBC and differential    Excess skin  -     Ambulatory referral to Plastic Surgery; Future    Other orders  -     acetaminophen (TYLENOL) 500 mg tablet; Take 500 mg by mouth every 6 (six) hours as needed for mild pain 6 tablets daily          Subjective:      Patient ID: Ben Erickson is a 43 y.o.  "male.    -s/p Selin-En-Y Gastric Bypass with Dr. Courtney on 04/04/22. Presents to the office today for routine follow up. Tolerating diet without issues; denies N/V, dysphagia, reflux. He is at his lowest weight but facing food scarcity. In October 2023 he was evicted from his home and was living in a hotel for a month and then in his car for 3 weeks in December. Eating mostly packaged foods - mashed potato, ramen, tuna.     Has weekly therapist, working on finding work.  Has frequent rashes under abdominal skin folds - causing pain in his hips and back and preventing him from exercising.     He is s/p epigastric hernia repair with retrorectus biologic mesh on 09/20/23 with postop infected hematoma and washout with VAC placement. Now well healed and pleased with results.       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  He is a  ([employer])    The following portions of the patient's history were reviewed and updated as appropriate: allergies, current medications, past family history, past medical history, past social history, past surgical history and problem list.    Review of Systems   Constitutional:  Negative for chills and fever. Unexpected weight change: planned weight loss.  HENT:  Negative for trouble swallowing.    Respiratory:  Negative for cough and shortness of breath.    Cardiovascular:  Negative for chest pain and palpitations.   Gastrointestinal:  Negative for abdominal pain, constipation, diarrhea, nausea and vomiting.   Musculoskeletal:  Positive for arthralgias and back pain.   Skin:  Positive for rash.   Neurological:  Negative for dizziness.   Psychiatric/Behavioral:          High stress         Objective:      /78 (BP Location: Left arm, Patient Position: Sitting, Cuff Size: Large)   Pulse 92   Ht 5' 9\" (1.753 m)   Wt 104 kg (229 lb 12.8 oz)   BMI 33.94 kg/m²     Colonoscopy-Not applicable       Physical Exam  Vitals reviewed.   Constitutional:       General: He is not in acute distress.     " Appearance: He is well-developed.   HENT:      Head: Normocephalic and atraumatic.   Eyes:      General: No scleral icterus.  Cardiovascular:      Rate and Rhythm: Normal rate and regular rhythm.   Pulmonary:      Effort: Pulmonary effort is normal. No respiratory distress.   Abdominal:      General: There is no distension.      Palpations: Abdomen is soft.   Skin:     General: Skin is warm and dry.   Neurological:      Mental Status: He is alert.   Psychiatric:         Mood and Affect: Mood normal.         Behavior: Behavior normal.           BARRIERS: none identified    GOALS:   Continued/Maintain healthy weight loss with good nutrition intakes.  Adequate hydration with at least 64oz. fluid intake.  Normal vitamin and mineral levels.  Exercise as tolerated.    Follow-up in 6 months. We kindly ask that your arrive 15 minutes before your scheduled appointment time with your provider to allow our staff to room you, get your vital signs and update your chart.    Follow diet as discussed.      Get lab work done in the next 2 weeks.  You have been given a lab slip today.  Please call the office if you need a replacement.  It is recommended to check with your insurance BEFORE getting labs done to make sure they are covered by your policy.  Also, please check with your PCP and other providers before getting labs to avoid duplicate labs. Make sure to HOLD any multivitamins that may contain biotin and any biotin supplements FOR 5 DAYS before any labs since it can affect the results.    Follow vitamin and mineral recommendations as reviewed with you.    Call our office if you have any problems with abdominal pain especially associated with fever, chills, nausea, vomiting or any other concerns.    All  Post-bariatric surgery patients should be aware that very small quantities of any alcohol can cause impairment and it is very possible not to feel the effect. The effect can be in the system for several hours.  It is also a  stomach irritant.     It is advised to AVOID alcohol, Nonsteroidal antiinflammatory drugs (NSAIDS) and nicotine of all forms . Any of these can cause stomach irritation/pain.

## 2024-05-13 ENCOUNTER — HOSPITAL ENCOUNTER (EMERGENCY)
Facility: HOSPITAL | Age: 44
Discharge: HOME | End: 2024-05-13
Attending: EMERGENCY MEDICINE | Admitting: EMERGENCY MEDICINE
Payer: COMMERCIAL

## 2024-05-13 VITALS
BODY MASS INDEX: 34.53 KG/M2 | DIASTOLIC BLOOD PRESSURE: 93 MMHG | SYSTOLIC BLOOD PRESSURE: 145 MMHG | TEMPERATURE: 99.1 F | WEIGHT: 233.8 LBS | OXYGEN SATURATION: 99 % | RESPIRATION RATE: 18 BRPM | HEART RATE: 94 BPM

## 2024-05-13 DIAGNOSIS — L02.91 ABSCESS: ICD-10-CM

## 2024-05-13 DIAGNOSIS — Z22.322 MRSA (METHICILLIN RESISTANT STAPH AUREUS) CULTURE POSITIVE: Primary | ICD-10-CM

## 2024-05-13 PROCEDURE — 10060 I&D ABSCESS SIMPLE/SINGLE: CPT

## 2024-05-13 PROCEDURE — 99282 EMERGENCY DEPT VISIT SF MDM: CPT

## 2024-05-13 PROCEDURE — 87070 CULTURE OTHR SPECIMN AEROBIC: CPT

## 2024-05-13 PROCEDURE — 87147 CULTURE TYPE IMMUNOLOGIC: CPT

## 2024-05-13 PROCEDURE — 99284 EMERGENCY DEPT VISIT MOD MDM: CPT

## 2024-05-13 PROCEDURE — 87186 SC STD MICRODIL/AGAR DIL: CPT

## 2024-05-13 PROCEDURE — 87205 SMEAR GRAM STAIN: CPT

## 2024-05-13 RX ORDER — CEPHALEXIN 500 MG/1
500 CAPSULE ORAL ONCE
Status: COMPLETED | OUTPATIENT
Start: 2024-05-13 | End: 2024-05-13

## 2024-05-13 RX ORDER — CEPHALEXIN 500 MG/1
500 CAPSULE ORAL EVERY 8 HOURS SCHEDULED
Qty: 30 CAPSULE | Refills: 0 | Status: SHIPPED | OUTPATIENT
Start: 2024-05-14 | End: 2024-05-24

## 2024-05-13 RX ORDER — LIDOCAINE HYDROCHLORIDE 10 MG/ML
10 INJECTION, SOLUTION EPIDURAL; INFILTRATION; INTRACAUDAL; PERINEURAL ONCE
Status: COMPLETED | OUTPATIENT
Start: 2024-05-13 | End: 2024-05-13

## 2024-05-13 RX ADMIN — LIDOCAINE HYDROCHLORIDE 10 ML: 10 INJECTION, SOLUTION EPIDURAL; INFILTRATION; INTRACAUDAL at 19:33

## 2024-05-13 RX ADMIN — CEPHALEXIN 500 MG: 500 CAPSULE ORAL at 20:21

## 2024-05-14 NOTE — ED PROVIDER NOTES
History  Chief Complaint   Patient presents with    Abscess     States he had a pimple on his right buttock last week and tried to drain it . States now size of golf ball     43 year old male presenting with abscess on right buttock x 5 days. Reports it first appeared as a pimple and reports he tried to express contents, but unsure if he was successful. Patient states that the swelling is getting worse and reports it is also increasing in size. Pain is described as mild in nature. Denies any topical cream or antibiotic use. Patient has history of the abscess on testicle 14 years ago for which he states he was hospitalized for. No fever, chills, other skin rashes, and denies spider or other bug bites. Patient is currently living in a hotel and had no way of getting to the hospital for evaluation sooner. No other complaints at this time.       Abscess  Associated symptoms: no fever        Prior to Admission Medications   Prescriptions Last Dose Informant Patient Reported? Taking?   Calcium Carb-Cholecalciferol 600-12.5 MG-MCG CAPS   Yes No   Sig: Take by mouth daily after breakfast   Multiple Vitamins-Minerals (BARIATRIC MULTIVITAMINS/IRON PO)  Self Yes No   Sig: Take by mouth   acetaminophen (TYLENOL) 500 mg tablet   Yes No   Sig: Take 500 mg by mouth every 6 (six) hours as needed for mild pain 6 tablets daily   doxylamine (UNISOM) 25 MG tablet   No No   Sig: Take 1 tablet (25 mg total) by mouth daily at bedtime as needed for sleep   gabapentin (NEURONTIN) 400 mg capsule   No No   Sig: Take 1 capsule (400 mg total) by mouth 3 (three) times a day   methocarbamol (ROBAXIN) 500 mg tablet   No No   Sig: Take 1 tablet (500 mg total) by mouth 3 (three) times a day for 7 days   metoprolol succinate (TOPROL-XL) 25 mg 24 hr tablet   No No   Sig: Take 1 tablet (25 mg total) by mouth daily      Facility-Administered Medications Last Administration Doses Remaining   acetaminophen (TYLENOL) tablet 975 mg None recorded            Past Medical History:   Diagnosis Date    Abdominal wall hernia     Bell's palsy     2 bouts - idiopathic, possible stress induced    CPAP (continuous positive airway pressure) dependence     Depression     Diabetes mellitus (HCC) 03/25/2021    Blood work    GERD (gastroesophageal reflux disease)     Hyperhidrosis     Hypertension     Irregular heart beat     Morbid obesity with BMI of 60.0-69.9, adult ([facility])     Neurocardiogenic syncope     Sleep apnea     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 fluctuations       Past Surgical History:   Procedure Laterality Date    ABDOMINAL ADHESION SURGERY N/A 4/4/2022    Procedure: LYSIS ADHESIONS LAPAROSCOPIC;  Surgeon: Tammi Chu MD;  Location: [facility];  Service: Bariatrics    ABDOMINAL WALL SURGERY N/A 10/11/2023    Procedure: REMOVAL WOUND VAC, ABDOMINAL WOUND EXPLORATION, WASH OUT, EXCISIONAL DEBRIDEMENT WOUND, WOUND VAC APPLICATION;  Surgeon: Miguel Croft MD;  Location: [facility];  Service: General    COLONOSCOPY N/A 2/2/2018    Procedure: COLONOSCOPY;  Surgeon: Stella Shukla MD;  Location: [facility] GI LAB;  Service: Gastroenterology    ESOPHAGOGASTRODUODENOSCOPY N/A 2/2/2018    Procedure: ESOPHAGOGASTRODUODENOSCOPY (EGD);  Surgeon: Stella Shukla MD;  Location: [facility] GI LAB;  Service: Gastroenterology    OMENTECTOMY N/A 9/20/2023    Procedure: PARTIAL OMENTECTOMY;  Surgeon: Miguel Croft MD;  Location: [facility];  Service: General    CO LAPS GSTR RSTCV PX W/BYP SARI-EN-Y LIMB <150 CM N/A 4/4/2022    Procedure: LAPAROSCOPIC SARI-EN-Y GASTRIC BYPASS AND INTRAOPERATIVE EGD;  Surgeon: Tammi Chu MD;  Location: [facility];  Service: Bariatrics    CO RPR AA HERNIA 1ST < 3 CM REDUCIBLE N/A 9/20/2023    Procedure: REPAIR HERNIA EPIGASTRIC Repair rectus diastases, implantation of biologic mesh;  Surgeon: Miguel Croft MD;  Location: [facility];  Service: General    TESTICLE SURGERY Left      infected testicle    TOENAIL EXCISION Right 2021    WOUND DEBRIDEMENT N/A 10/6/2023    Procedure: EVACUATION OF HEMATOMA, VACUUM PLACEMENT VERAFLO WITH IRRIGATION SETTINGS, EXCISIONAL DEBRIDEMENT;  Surgeon: Miguel Croft MD;  Location: [facility];  Service: General    WOUND DEBRIDEMENT N/A 10/13/2023    Procedure: DEBRIDEMENT WOUND ABDOMINAL (WASH OUT);  Surgeon: Miguel Croft MD;  Location: [facility];  Service: General       Family History   Problem Relation Age of Onset    Supraventricular tachycardia Mother     Atrial fibrillation Mother     Diabetes type II Mother     Cancer Mother     Osteoporosis Mother     Ulcerative colitis Father     Liver disease Father     ADD / ADHD Son     COPD Paternal Grandfather     Colon cancer Family     Diabetes type II Family     Hyperlipidemia Family     Hypertension Family     Hypothyroidism Family     Heart disease Family     Lung cancer Neg Hx     Asthma Neg Hx      I have reviewed and agree with the history as documented.    E-Cigarette/Vaping    E-Cigarette Use Never User      E-Cigarette/Vaping Substances    Nicotine No     THC No     CBD No     Flavoring No     Other No     Unknown No      Social History     Tobacco Use    Smoking status: Former     Current packs/day: 0.00     Average packs/day: 0.3 packs/day for 10.5 years (2.6 ttl pk-yrs)     Types: Cigarettes     Start date: 7/15/1999     Quit date: 2010     Years since quittin.3     Passive exposure: Never    Smokeless tobacco: Never   Vaping Use    Vaping status: Never Used   Substance Use Topics    Alcohol use: Yes     Comment: social on occasion    Drug use: No       Review of Systems   Constitutional:  Negative for chills and fever.   Respiratory:  Negative for chest tightness and shortness of breath.    Cardiovascular:  Negative for chest pain.   Musculoskeletal:  Negative for arthralgias, back pain, joint swelling and myalgias.   Skin:  Positive for color change (erythema, with a small central  dry, dark scab) and wound.   All other systems reviewed and are negative.      Physical Exam  Physical Exam  Vitals and nursing note reviewed.   Constitutional:       General: He is not in acute distress.     Appearance: Normal appearance. He is well-developed. He is not ill-appearing, toxic-appearing or diaphoretic.   HENT:      Head: Normocephalic and atraumatic.   Pulmonary:      Effort: Pulmonary effort is normal. No respiratory distress.   Musculoskeletal:         General: No swelling.      Cervical back: Neck supple.   Skin:     General: Skin is warm and dry.      Findings: Abscess and erythema present. No bruising, signs of injury or petechiae.          Neurological:      Mental Status: He is alert.   Psychiatric:         Mood and Affect: Mood normal.         Vital Signs  ED Triage Vitals [05/13/24 1850]   Temperature Pulse Respirations Blood Pressure SpO2   99.1 °F (37.3 °C) 94 18 145/93 99 %      Temp Source Heart Rate Source Patient Position - Orthostatic VS BP Location FiO2 (%)   Tympanic Monitor Sitting Left arm --      Pain Score       8           Vitals:    05/13/24 1850   BP: 145/93   Pulse: 94   Patient Position - Orthostatic VS: Sitting         Visual Acuity      ED Medications  Medications   lidocaine (PF) (XYLOCAINE-MPF) 1 % injection 10 mL (10 mL Infiltration Given 5/13/24 1933)   cephalexin (KEFLEX) capsule 500 mg (500 mg Oral Given 5/13/24 2021)       Diagnostic Studies  Results Reviewed       Procedure Component Value Units Date/Time    Wound culture and Gram stain [[id number]] Collected: 05/13/24 2001    Lab Status: In process Specimen: Wound from Buttock Updated: 05/13/24 2006                   No orders to display              Procedures  Incision and drain    Date/Time: 5/13/2024 8:14 PM    Performed by: Mervat Kee PA-C  Authorized by: Mervat Kee PA-C  Universal Protocol:  Consent: Verbal consent obtained.  Risks and benefits: risks, benefits and alternatives were discussed  Consent  given by: patient  Timeout called at: 5/13/2024 8:15 PM.  Patient understanding: patient states understanding of the procedure being performed  Patient consent: the patient's understanding of the procedure matches consent given  Procedure consent: procedure consent matches procedure scheduled  Relevant documents: relevant documents present and verified  Test results: test results available and properly labeled  Site marked: the operative site was marked  Radiology Images displayed and confirmed. If images not available, report reviewed: imaging studies available  Required items: required blood products, implants, devices, and special equipment available  Patient identity confirmed: verbally with patient    Patient location:  ED  Location:     Type:  Abscess    Size:  5cm diameter    Location: right buttock.  Pre-procedure details:     Skin preparation:  Betadine  Anesthesia (see MAR for exact dosages):     Anesthesia method:  None  Procedure details:     Complexity:  Simple    Incision types:  Single straight    Scalpel blade:  11    Approach:  Open    Incision depth:  Subcutaneous    Wound management:  Irrigated with saline    Drainage:  Bloody and purulent    Drainage amount:  Scant    Wound treatment:  Wound left open    Packing materials:  None  Post-procedure details:     Patient tolerance of procedure:  Tolerated well, no immediate complications           ED Course                               SBIRT 20yo+      Flowsheet Row Most Recent Value   Initial Alcohol Screen: US AUDIT-C     1. How often do you have a drink containing alcohol? 0 Filed at: 05/13/2024 1924   2. How many drinks containing alcohol do you have on a typical day you are drinking?  0 Filed at: 05/13/2024 1924   3a. Male UNDER 65: How often do you have five or more drinks on one occasion? 0 Filed at: 05/13/2024 1924   3b. FEMALE Any Age, or MALE 65+: How often do you have 4 or more drinks on one occassion? 0 Filed at: 05/13/2024 1924   Audit-C  Score 0 Filed at: 05/13/2024 1924   HILDA: How many times in the past year have you...    Used an illegal drug or used a prescription medication for non-medical reasons? Never Filed at: 05/13/2024 1924                      Medical Decision Making  43-year-old male with history and exam consistent with abscess to the right upper buttock. Differential diagnosis included abscess vs cyst, and considered but unlikely for necrotizing fasciitis, and other deep space infections among others.     No immune compromise, bullae, pain out of proportion, or rapid progression. After discussion of risks, benefits, and alternatives to I&D, the patient provided verbal consent to the procedure as noted above. The abscess cavity was evacuated and probed thoroughly without evidence of significant depth or extension.  The abscess cavity was not packed due to small size of the cavity after drainage. The patient tolerated the procedure well without complications.    Antibiotic treatment indicated based on the size of the abscess at this time. One dose of keflex given in the ED. Prior to discharge, we discussed return precautions, including to, but not limited to, worsening pain, worsening redness/swelling, discharge/pus from your wounds, or for any other concerning symptoms. Patient demonstrated understanding and agreement with this plan.    Amount and/or Complexity of Data Reviewed  Labs: ordered.    Risk  Prescription drug management.             Disposition  Final diagnoses:   Abscess     Time reflects when diagnosis was documented in both MDM as applicable and the Disposition within this note       Time User Action Codes Description Comment    5/13/2024  8:09 PM Mervat Kee Add [L02.91] Abscess           ED Disposition       ED Disposition   Discharge    Condition   Stable    Date/Time   Mon May 13, 2024 2009    Comment   Ben Erickson discharge to home/self care.                   Follow-up Information       Follow up With  Specialties Details Why Contact Info Additional Information    [facility] Emergency Department Emergency Medicine Go to  If symptoms worsen [address] [facility] [id number] [phone] [facility] Emergency Department, [address] [place], [place], [address]            Discharge Medication List as of 5/13/2024  8:14 PM        START taking these medications    Details   cephalexin (KEFLEX) 500 mg capsule Take 1 capsule (500 mg total) by mouth every 8 (eight) hours for 10 days Do not start before May 14, 2024., Starting Tue 5/14/2024, Until Fri 5/24/2024, Normal           CONTINUE these medications which have NOT CHANGED    Details   acetaminophen (TYLENOL) 500 mg tablet Take 500 mg by mouth every 6 (six) hours as needed for mild pain 6 tablets daily, Historical Med      Calcium Carb-Cholecalciferol 600-12.5 MG-MCG CAPS Take by mouth daily after breakfast, Historical Med      doxylamine (UNISOM) 25 MG tablet Take 1 tablet (25 mg total) by mouth daily at bedtime as needed for sleep, Starting Wed 5/10/2023, Normal      gabapentin (NEURONTIN) 400 mg capsule Take 1 capsule (400 mg total) by mouth 3 (three) times a day, Starting Tue 7/18/2023, Until Fri 4/26/2024, Normal      methocarbamol (ROBAXIN) 500 mg tablet Take 1 tablet (500 mg total) by mouth 3 (three) times a day for 7 days, Starting Fri 4/19/2024, Until Fri 4/26/2024, Normal      metoprolol succinate (TOPROL-XL) 25 mg 24 hr tablet Take 1 tablet (25 mg total) by mouth daily, Starting Tue 1/30/2024, Normal      Multiple Vitamins-Minerals (BARIATRIC MULTIVITAMINS/IRON PO) Take by mouth, Historical Med             No discharge procedures on file.    PDMP Review         Value Time User    PDMP Reviewed  Yes 4/5/2022  8:08 AM America Taveras PA-C            ED Provider  Electronically Signed by             Mervat Kee PA-C  05/13/24 2041

## 2024-05-15 LAB
BACTERIA WND AEROBE CULT: ABNORMAL
GRAM STN SPEC: ABNORMAL
GRAM STN SPEC: ABNORMAL

## 2024-05-15 RX ORDER — SULFAMETHOXAZOLE AND TRIMETHOPRIM 800; 160 MG/1; MG/1
1 TABLET ORAL 2 TIMES DAILY
Qty: 14 TABLET | Refills: 0 | Status: SHIPPED | OUTPATIENT
Start: 2024-05-15 | End: 2024-05-22

## 2024-10-18 NOTE — PLAN OF CARE
Left message for patient to return phone call.    Recall entered.   Problem: PAIN - ADULT  Goal: Verbalizes/displays adequate comfort level or baseline comfort level  Description: Interventions:  - Encourage patient to monitor pain and request assistance  - Assess pain using appropriate pain scale  - Administer analgesics based on type and severity of pain and evaluate response  - Implement non-pharmacological measures as appropriate and evaluate response  - Consider cultural and social influences on pain and pain management  - Notify physician/advanced practitioner if interventions unsuccessful or patient reports new pain  Outcome: Progressing     Problem: INFECTION - ADULT  Goal: Absence or prevention of progression during hospitalization  Description: INTERVENTIONS:  - Assess and monitor for signs and symptoms of infection  - Monitor lab/diagnostic results  - Monitor all insertion sites, i.e. indwelling lines, tubes, and drains  - Monitor endotracheal if appropriate and nasal secretions for changes in amount and color  - [place] appropriate cooling/warming therapies per order  - Administer medications as ordered  - Instruct and encourage patient and family to use good hand hygiene technique  - Identify and instruct in appropriate isolation precautions for identified infection/condition  Outcome: Progressing     Problem: SAFETY ADULT  Goal: Patient will remain free of falls  Description: INTERVENTIONS:  - Educate patient/family on patient safety including physical limitations  - Instruct patient to call for assistance with activity   - Consult OT/PT to assist with strengthening/mobility   - Keep Call bell within reach  - Keep bed low and locked with side rails adjusted as appropriate  - Keep care items and personal belongings within reach  - Initiate and maintain comfort rounds  - Make Fall Risk Sign visible to staff  - Offer Toileting every 2 Hours, in advance of need  - Initiate/Maintain bed alarm  - Apply yellow socks and bracelet for high fall risk patients  - Consider moving patient to room near nurses station  Outcome: Progressing  Goal: Maintain or return to baseline ADL function  Description: INTERVENTIONS:  -  Assess patient's ability to carry out ADLs; assess patient's baseline for ADL function and identify physical deficits which impact ability to perform ADLs (bathing, care of mouth/teeth, toileting, grooming, dressing, etc.)  - Assess/evaluate cause of self-care deficits   - Assess range of motion  - Assess patient's mobility; develop plan if impaired  - Assess patient's need for assistive devices and provide as appropriate  - Encourage maximum independence but intervene and supervise when necessary  - Involve family in performance of ADLs  - Assess for home care needs following discharge   - Consider OT consult to assist with ADL evaluation and planning for discharge  - Provide patient education as appropriate  Outcome: Progressing  Goal: Maintains/Returns to pre admission functional level  Description: INTERVENTIONS:  - Perform BMAT or MOVE assessment daily.   - Set and communicate daily mobility goal to care team and patient/family/caregiver. - Collaborate with rehabilitation services on mobility goals if consulted  - Perform Range of Motion 2 times a day. - Reposition patient every 2 hours.   - Dangle patient 2 times a day  - Stand patient 2 times a day  - Ambulate patient 2 times a day  - Out of bed to chair 2 times a day   - Out of bed for meals 2 times a day  - Out of bed for toileting  - Record patient progress and toleration of activity level   Outcome: Progressing     Problem: DISCHARGE PLANNING  Goal: Discharge to home or other facility with appropriate resources  Description: INTERVENTIONS:  - Identify barriers to discharge w/patient and caregiver  - Arrange for needed discharge resources and transportation as appropriate  - Identify discharge learning needs (meds, wound care, etc.)  - Arrange for interpretive services to assist at discharge as needed  - Refer to Case Management Department for coordinating discharge planning if the patient needs post-hospital services based on physician/advanced practitioner order or complex needs related to functional status, cognitive ability, or social support system  Outcome: Progressing     Problem: Knowledge Deficit  Goal: Patient/family/caregiver demonstrates understanding of disease process, treatment plan, medications, and discharge instructions  Description: Complete learning assessment and assess knowledge base.   Interventions:  - Provide teaching at level of understanding  - Provide teaching via preferred learning methods  Outcome: Progressing

## 2024-10-22 ENCOUNTER — TELEMEDICINE (OUTPATIENT)
Age: 44
End: 2024-10-22

## 2024-10-22 DIAGNOSIS — F99 MENTAL HEALTH DISORDER: Primary | ICD-10-CM

## 2024-10-22 PROCEDURE — 99213 OFFICE O/P EST LOW 20 MIN: CPT | Performed by: FAMILY MEDICINE

## 2024-10-22 NOTE — PROGRESS NOTES
Virtual Brief Visit  Name: Ben Erickson      : 1980      MRN: [id number]  Encounter Provider: Kathy Dupont MD  Encounter Date: 10/22/2024   Encounter department: [facility]    This Visit is being completed by telephone. The Patient is located at Home and in the following state in which I hold an active license [place]    The patient was identified by name and date of birth. Ben Erickson was informed that this is a telemedicine visit and that the visit is being conducted through the [employer] Teams platform. He agrees to proceed..  My office door was closed. No one else was in the room.  He acknowledged consent and understanding of privacy and security of the video platform. The patient has agreed to participate and understands they can discontinue the visit at any time.    Patient is aware this is a billable service.     Assessment & Plan  Mental health disorder  Patient requested telemedicine visit for med 1 form to be filled  Psychiatrist will not fill out MedOne form; advised patient PCP will  We do not follow patient for psychological issues at this office  We have only seen patient once in the office in the past year for right hip pain    Plan  PCP updated per patient request  I have declined filling Med1 form            History of Present Illness   Ben is a 44-year-old male who requested telemedicine visit for MedOne form.  Inform patient I was going to document the various components of our conversation.  Patient notes that he follows with [facility] for his psychological/mental health.  Per patient, provider at [facility] has declined filling out the MedOne form and told patient that his primary care provider must fill this out for his psychological diagnosis.  Informed patient that we have not seen him in the office for his psychological issues and that we cannot fill out a form for a different provider.  Additionally explained to patient that  "if he had an orthopedic problem the primary care provider would not be filling out a med1 form that the orthopedic doctor and likewise if he had a cardiac problem the PCP would not be filling out the Med1 form for the cardiologist.  Patient was upset with this because it is \" are full that we do not provide psychological services at [place]\".  Additionally, patient asked to his PCP was and I had informed him that we had Dr. Antonio listed.  Patient notes that this is not his PCP and his actual PCP is Dr. Calderon.  I have made the changes to reflect this in the chart.  Additionally patient notes that he will be filling out the forms to have his documentation sent in from [employer] for his PCP to review to see if they are willing to fill out the Med1 form.        Visit Time  Total Visit Duration: 25            "

## 2024-10-24 ENCOUNTER — PATIENT OUTREACH (OUTPATIENT)
Age: 44
End: 2024-10-24

## 2024-10-24 NOTE — PROGRESS NOTES
"[facility] received in basket message from Tri SMITH:  \"Tri Tierney sent to Tammy Reyes Brooke,    This gentleman asked for you to call him back. Thank you!    Tri\"    OC returned patient call. OC was UTR patient and received a message that the call could not be completed/. [facility] was unable to leave a voicemail.      "

## 2024-10-29 ENCOUNTER — PATIENT OUTREACH (OUTPATIENT)
Age: 44
End: 2024-10-29

## 2024-10-29 NOTE — PROGRESS NOTES
"CMOC received incoming voicemail:  HiBrooke, it's Yvan. Sorry you couldn't get through last time. I don't know what happened. I guess sometimes my phone hits a dead cell. I'm not sure. I'm still in the hotel. So I need to talk to you concerning a medical one that I'm trying to get signed through [employer]. If you can give me a call back at [phone] that would be greatly appreciated. Once again, the number is [phone] and if you have an issue trying to get through, just keep calling and you should get through. Thank you. Good day.    CMOC returned patient call. CMOC was UTR patient and a voicemail was left, requesting a call back.     CMOC notes that Med-1 form is not scanned into chart. CMOC notes that provider was not willing to complete and referred patient to follow up with Psych provider to complete as they are treating him.     CMOC received incoming call from patient. Patient reviewed the timeline of obtaining Med-1 form and needing it filled out.     Patient stated that he has been staying in a hotel since December 15th of 2023. Patient stated he is working with a  with [employer]. Patient stated he has a housing voucher. Patient stated his \"EA\" case started on 12/1/2023 and is valid for 1 year. Patient stated he needs the Med-1 to verify ongoing need. Patient stated if not received by 11/30, he will lose assistance.     Patient stated that his [facility] therapist, Anselmo Barnett was willing to complete form, but did not have the credentials to sign off on it. Patient stated Dr Monroe was not willing to sign off as he does not treat patient with medication.     Patient stated he has requested records from [employer] to be sent to [facility]. Patient stated he was advised that PCP (Kv[facility]) could review records to potentially complete. CMOC advised that form has also not been released from [facility] to [facility]. CMOC advised patient that form would have to come directly from the [facility], not " [facility]. OC stressed to patient that [facility] does not treat him for the condition that he is requesting for be completed for (PTSD),therefore has not obligation to complete the form.     CMOC placed a call to [facility], and a voicemail was left for Yadi to return call to CM.     CMOC placed a call to [employer] and a voicemail was left for Anselmo Arias to verify if records were sent to [facility].     CMOC routing note to OP SW to determine if case should be reopened at this time, as patient continues to have ongoing housing issues and requesting CMOC assistance with Med-1, which has been denied.

## 2024-10-30 ENCOUNTER — PATIENT OUTREACH (OUTPATIENT)
Age: 44
End: 2024-10-30

## 2024-10-31 ENCOUNTER — PATIENT OUTREACH (OUTPATIENT)
Age: 44
End: 2024-10-31

## 2024-10-31 NOTE — PROGRESS NOTES
"[facility] received routed note from CMOC Lulu related to patient need. As per CMOC, patient requesting assistance with  Med-1 to verify ongoing need. Per CMOC, patient continues to have ongoing housing issues and requesting CMOC assistance with Med-1, which has been denied.     [facility] completed chart review. Per chart, patient telemedicine visit on 10/22/24 per patient's request for Med-1 form. Per chart patient reported provider at [facility] has declined filling out the Med-1 form and told patient that his primary care provider must fill this out for his psychological diagnosis. Per chart, provider informed patient that he has not been seen at [facility] for his psychological issues and that Med-1 form could not be completed for a different provider. Per chart, provider explained to patient during visit that if he had an orthopedic problem the primary care provider would not be filling out a med1 form that the orthopedic doctor and likewise if he had a cardiac problem the PCP would not be filling out the Med1 form for the cardiologist. Per chart, patient was upset with this because it is \" are full that we do not provide psychological services at [place]\".     Per chart, patient called [facility] on 10/24/24 requesting to speak to CMOC, Lulu. Per chart, CMOC spoke with patient on 10/29/24. Per chart, patient reports he has been staying in a hotel since December 15th of 2023. Per chart, patient stated he is working with a  with [employer] and has a housing voucher. Per chart, patient stated he needs the Med-1 to verify ongoing need and if not received by 11/30, he will lose assistance. Per chart, patient has therapist via [employer], Anselmo Barnett. Therapist unable to complete Med-1 and psychiatrist with [employer], Dr Monroe, not willing to sign off as he does not treat patient with medication. Per chart, patient has requested records from [employer] to be sent to [facility]. Patient stated he was advised " that PCP (Kvng) could review records to potentially complete. Per chart, CMOC advised patient Med-1 had not been released to from [facility] to [facility], that form would have to come directly from the [facility], not [facility], and that [facility] does not treat him for the condition that he is requesting for be completed for (PTSD),therefore has not obligation to complete the form.     Per CMOC note on 10/30/24, CMOC spoke with Yadi at [facility], Yadi conveyed that patient has requested Med-1 form be sent to various providers over the past 8 months and requires a face to face appointment within 90 days.     SWCM called patient to follow up and assist with needs. SWCM spoke with patient. SWCM introduced self, role, and reason for outreach. Contact information provided. SWCM explained role of CMOC with regard to patient's needs.     Patient reports need for Med-1 form to be completed. Patient stated if not received by 11/30, he will lose assistance. Patient reports being overseen by [facility] since January MH needs. Patient reports he has had two therapists in between and current therapist is Anselmo. As per patient, therapist is advocating for patient. As per patient, Dr. Monroe  wants to have more patient contact before completing Med-1 form. Per patient due to treatment plan excluding need for medication,  Dr Monroe he has not had enough contact with patient.     Patient discussed MH issues and reason for trigger of PTSD, including eviction, becoming homeless and passing of his mother. SWCM provided emotional support via active and empathetic listening.     Patient reports he is living in [place] at a hotel. Discussed transportation needs with patient. Patient reports he is aware of [employer]. Patient reports having an emotional support dog that he is unable to bring with him on [employer] as he is not a certified service animal, making it difficult for him to see provider (Kvng) in person. KIERA  encouraged patient to sign up for [employer] in the event needs arises for in-person visit. SWCM provided phone number and discussed Medical Exception Form for [employer] in the event he is more than 35 miles from [facility]. Patient agreeable to call [employer]. Patient reports no other needs currently.     SWCM encouraged patient to call with questions/ needs. SWCM will continue to follow up and remain available to assist as needed. SWCM to route note to CMOC.

## 2024-11-07 ENCOUNTER — TELEPHONE (OUTPATIENT)
Age: 44
End: 2024-11-07

## 2024-11-07 NOTE — TELEPHONE ENCOUNTER
Called patient on 11/07/2024 to make an appointment for a diabetic follow up. Pt states he's not sure where he is going to be living and is currently staying at a hotel with no transportation. Pt mentioned being in touch with  records show calls being made end of October. He will call me back to make an appointment, has not been seen since 4/12/2024.

## 2024-11-08 ENCOUNTER — PATIENT OUTREACH (OUTPATIENT)
Age: 44
End: 2024-11-08

## 2024-11-08 NOTE — PROGRESS NOTES
CMOC received incoming call from patient.     Patient stated that his 2br HUD voucher is up for renewal. Patient stated he would like to request to expand to a 3BR voucher. Patient claims his son requires his own bedroom. Patient requesting a letter stating his own bedroom would be in his best interest. CMOC sent telephone encounter in sons chart.     Patient interested if records have been received from [facility]. CMOC confirmed records are received. Patient stated that he plans to reach out to Yadi at [facility] to request Med-1 form to be released to PCP. CMOC continually expressed to patient that PCP is not overseeing the MH care he is receiving at [facility], therefore has the right to refuse to sign the med 1 form, patient aware.     Patient became argumentative when CMOC advised on Med 1 expectations from PCP and also in regards to transportation.     CMOC advised patient to reach out to  staff and OP SW to help get connected to the correct person as CMOC is not currently assigned to his care team. Patient understood. Patient stated he has office phone number and OP SW Antonia contact information.     CMOC placed a call to OP SW to inform.     Documenting as one time outreach.

## 2024-11-08 NOTE — PROGRESS NOTES
SWCM received call from CMOC to provide update on patient's most recent communication with CMOC. As per CMOC, MED-1 form has not been received and CMOC informed patient. CMOC informed patient records from [facility] received. Per CMOC, patient stated that he plans to reach out to [facility] to request Med-1 form to be released to PCP. CMOC continually expressed to patient that PCP is not overseeing the  care he is receiving at [facility], therefore has the right to refuse to sign the med 1 form, patient aware. Per CMOC, CMOC advised patient to reach out to  staff and OP SW to help get connected to the correct person as CMOC is not currently assigned to his care team.     SWCM to continue to follow up and assist patient as needed.

## 2024-11-12 ENCOUNTER — PATIENT OUTREACH (OUTPATIENT)
Age: 44
End: 2024-11-12

## 2024-11-12 NOTE — PROGRESS NOTES
SWCM completed chart review. SWCM called patient to follow up and assist with needs. SWCM spoke with patient.     [facility] informed patient MED-1 form has not been received from [facility] office. Patient reports leaving a message for Yadi with [facility] last week with no return call. Patient reports he will call attempt to call Yadi today to have MED-1 form sent to clinic. Patient reports he is also working with counselor, Aidee, to have Dr. Monroe complete MED-1; Aidee was to have conversation with Dr. Monroe on 11/8 to advocate on patient's behalf. Per patient, he will check on outcome.    SWCM reiterated to patient regarding completion of MED-1 form by PCP as PCP is not overseeing the MH care and ay deny completing. Patient expressed understanding. [facility] encouraged patient to call [facility] with update if Dr. Monroe completes Med-1 form. Patient agreeable.     [facility] to route not to CMOC to provide update. CM will continue to follow up and remain available to assist as needed.

## 2024-11-13 ENCOUNTER — TELEPHONE (OUTPATIENT)
Age: 44
End: 2024-11-13

## 2024-11-13 PROBLEM — Z59.01 SHELTERED HOMELESSNESS: Status: ACTIVE | Noted: 2024-11-13

## 2024-11-14 NOTE — TELEPHONE ENCOUNTER
Spoke with patient - scheduled appt with Dr. Johnson for 11/22. Patient needed appt as soon as possible. Placing form in Alex's folder.

## 2024-11-15 ENCOUNTER — PATIENT OUTREACH (OUTPATIENT)
Age: 44
End: 2024-11-15

## 2024-11-15 NOTE — PROGRESS NOTES
SWCM completed chart review. SWCM called patient to follow up and assist with needs. SWCM spoke with patient.    SWCM and patient discussed receipt of MED-1 form and appointment with provider on 11/22/24 to address MED-1 form needs. SWCM informed patient SWCM will follow up with patient after PCP visit and assist based on outcome of visit. Patient expressed understanding.     SWCM encouraged patient to call with questions/ needs. SWCM will continue to follow up and remain available to assist as needed.

## 2024-11-22 ENCOUNTER — OFFICE VISIT (OUTPATIENT)
Age: 44
End: 2024-11-22

## 2024-11-22 VITALS
HEART RATE: 72 BPM | TEMPERATURE: 98.4 F | WEIGHT: 241 LBS | BODY MASS INDEX: 35.59 KG/M2 | OXYGEN SATURATION: 97 % | RESPIRATION RATE: 20 BRPM | SYSTOLIC BLOOD PRESSURE: 146 MMHG | DIASTOLIC BLOOD PRESSURE: 96 MMHG

## 2024-11-22 DIAGNOSIS — F43.10 PTSD (POST-TRAUMATIC STRESS DISORDER): Primary | ICD-10-CM

## 2024-11-22 DIAGNOSIS — Z23 ENCOUNTER FOR IMMUNIZATION: ICD-10-CM

## 2024-11-22 DIAGNOSIS — Z79.4 TYPE 2 DIABETES MELLITUS WITHOUT COMPLICATION, WITH LONG-TERM CURRENT USE OF INSULIN (HCC): ICD-10-CM

## 2024-11-22 DIAGNOSIS — Z59.812 HOUSING INSTABILITY, HOUSED, HOMELESSNESS IN PAST 12 MONTHS: ICD-10-CM

## 2024-11-22 DIAGNOSIS — E11.9 TYPE 2 DIABETES MELLITUS WITHOUT COMPLICATION, WITH LONG-TERM CURRENT USE OF INSULIN (HCC): ICD-10-CM

## 2024-11-22 LAB
GLUCOSE SERPL-MCNC: 82 MG/DL (ref 65–140)
LEFT EYE DIABETIC RETINOPATHY: NORMAL
LEFT EYE IMAGE QUALITY: NORMAL
LEFT EYE MACULAR EDEMA: NORMAL
LEFT EYE OTHER RETINOPATHY: NORMAL
RIGHT EYE DIABETIC RETINOPATHY: NORMAL
RIGHT EYE IMAGE QUALITY: NORMAL
RIGHT EYE MACULAR EDEMA: NORMAL
RIGHT EYE OTHER RETINOPATHY: NORMAL
SEVERITY (EYE EXAM): NORMAL
SL AMB POCT HEMOGLOBIN AIC: 5.1 (ref ?–6.5)

## 2024-11-22 PROCEDURE — 90471 IMMUNIZATION ADMIN: CPT | Performed by: FAMILY MEDICINE

## 2024-11-22 PROCEDURE — 90656 IIV3 VACC NO PRSV 0.5 ML IM: CPT | Performed by: FAMILY MEDICINE

## 2024-11-22 PROCEDURE — 83036 HEMOGLOBIN GLYCOSYLATED A1C: CPT | Performed by: FAMILY MEDICINE

## 2024-11-22 PROCEDURE — 99213 OFFICE O/P EST LOW 20 MIN: CPT | Performed by: FAMILY MEDICINE

## 2024-11-22 SDOH — ECONOMIC STABILITY - HOUSING INSECURITY: HOMELESS IN THE PAST 12 MONTHS: Z59.812

## 2024-11-22 NOTE — ASSESSMENT & PLAN NOTE
Patient presents today to get MedOne form filled out for housing security for 1 year.  Pt follows up with a therapist at [facility], weekly. Thearpist is unable to sign this form since he is not licensed. According to the pt, the pshciatrist that the therapist falls under would not sign the paper, since he is not directly taking care of the patient.  Patient was on Wellbutrin many years ago but stopped taking them because he states that it was not helpful.    Pt has a diagnosis of complex-PTSD, he is a prior  and has stopped working since 2020. Stopped working due to overweight, hernia repair, gastric bypass surgery, knee injury, osteoporosis, acid reflux, postsurgical malabsorption.  Today patients wants to get Med01 form for housing filled out for 1 year. Patient has to get this form to the [facility] before 11/30 or he will lose his housing (hotel) on December 1.     [facility]. Wife is currently living with her mom with her 2 kids and is not working.    Explained to patient that we cannot fill this form out since he has to be thoroughly evaluated by psychiatrist and the psychiatrist will be able to fill this form out.  Psychiatry referral sent   social care management referral sent  Orders:    AMB E-CONSULT TO PSYCHIATRY    Ambulatory Referral to Social Work Care Management Program; Future    Ambulatory Referral to Complex Care Management Program; Future

## 2024-11-22 NOTE — ASSESSMENT & PLAN NOTE
Lab Results   Component Value Date    HGBA1C 5.1 11/22/2024       Orders:    POCT hemoglobin A1c    IRIS Diabetic eye exam    Fingerstick Glucose (POCT)

## 2024-11-22 NOTE — PROGRESS NOTES
Name: Ben Erickson      : 1980      MRN: [id number]  Encounter Provider: Veronica Johnson MD  Encounter Date: 2024   Encounter department: [facility]  :  Assessment & Plan  PTSD (post-traumatic stress disorder)  Patient presents today to get [employer] form filled out for housing security for 1 year.  Pt follows up with a therapist at [facility], weekly. Thearpist is unable to sign this form since he is not licensed. According to the pt, the pshciatrist that the therapist falls under would not sign the paper, since he is not directly taking care of the patient.  Patient was on Wellbutrin many years ago but stopped taking them because he states that it was not helpful.    Pt has a diagnosis of complex-PTSD, he is a prior  and has stopped working since . Stopped working due to overweight, hernia repair, gastric bypass surgery, knee injury, osteoporosis, acid reflux, postsurgical malabsorption.  Today patients wants to get Med01 form for housing filled out for 1 year. [facility]  or he will lose his housing (hotel) on .     [facility]. Wife is currently living with her mom with her 2 kids and is not working.    Explained to patient that we cannot fill this form out since he has to be thoroughly evaluated by psychiatrist and the psychiatrist will be able to fill this form out.  Psychiatry referral sent   social care management referral sent  Orders:    AMB E-CONSULT TO PSYCHIATRY    Ambulatory Referral to Social Work Care Management Program; Future    Ambulatory Referral to Complex Care Management Program; Future    Type 2 diabetes mellitus without complication, with long-term current use of insulin ([facility])    Lab Results   Component Value Date    HGBA1C 5.1 2024       Orders:    POCT hemoglobin A1c    IRIS Diabetic eye exam    Fingerstick Glucose  (POCT)    Encounter for immunization    Orders:    influenza vaccine preservative-free 0.5 mL IM (Fluzone, Afluria, Fluarix, Flulaval)    Housing instability, housed, homelessness in past 12 months                History of Present Illness     HPI  Patient is a 44-year-old male with past medically of hypertension, migraine headache, MANUELA, acid reflux, type 2 diabetes, anxiety, PTSD presents today to get [employer] form filled out for state housing.  Currently lives at a hotel which she is going to have to evacuate on 11/30.  Patient states that he is in the process of getting a apartment which is on hold because they have to do inspections and if he does not get this form filled out and submitted to the [facility] by 11/30 has he will become homeless on December 1.  Patient states that he has been trying to get this paperwork filled out by a psychiatrist since June 2024.  This was brought to our discretion very recently in the last month, patient has not been following regularly with us.  Last annual physical was in 2022.      Review of Systems   HENT: Negative.     Eyes: Negative.    Respiratory: Negative.     Cardiovascular: Negative.    Genitourinary: Negative.           Objective   /96 (BP Location: Left arm, Patient Position: Sitting, Cuff Size: Large)   Pulse 72   Temp 98.4 °F (36.9 °C)   Resp 20   Wt 109 kg (241 lb)   SpO2 97%   BMI 35.59 kg/m²      Physical Exam  Constitutional:       Appearance: He is obese.   Cardiovascular:      Rate and Rhythm: Normal rate and regular rhythm.      Pulses: Normal pulses.      Heart sounds: Normal heart sounds.   Pulmonary:      Effort: Pulmonary effort is normal.      Breath sounds: Normal breath sounds.   Abdominal:      Palpations: Abdomen is soft.

## 2024-11-22 NOTE — ASSESSMENT & PLAN NOTE
Patient presents today to get [employer] form filled out for housing security for 1 year.  Pt follows up with a therapist at [facility], weekly. Thearpist is unable to sign a form since he is not licensed. According to the pt, the pshciatrist that the therapist follows under would not sign the paper, since he is not directly taking care of the patient.  Patient was on Wellbutrin many years ago but stopped taking them because he states that it was not helpful.    Pt has a diagnosis of complex-PTSD, he is a prior  and has stopped working since 2020. Stopped working due to overweight, hernia repair, gastric bypass surgery, knee injury, osteoporosis, acid reflux, postsurgical malabsorption.  Med01 form for housing, goes to the [facility] assistance and social service.  Patient has to get this form to the [facility] before 11/30 or he will lose his housing on December 1.     [facility]. Wife is currently living with her mom with her 2 kids and is not working.    Explained to patient that we cannot fill this form out since he has to be thoroughly evaluated by psychiatrist and the psychiatrist will be able to fill this form out.  Psychiatry referral sent   social care management referral sent      Orders:    AMB E-CONSULT TO PSYCHIATRY    Ambulatory Referral to Social Work Care Management Program; Future    Ambulatory Referral to Complex Care Management Program; Future

## 2024-11-24 ENCOUNTER — VBI (OUTPATIENT)
Dept: ADMINISTRATIVE | Facility: OTHER | Age: 44
End: 2024-11-24

## 2024-11-24 NOTE — TELEPHONE ENCOUNTER
11/24/24 4:10 PM     Chart reviewed for Diabetic Eye Exam was/were submitted to the patient's insurance.     Marbella Richter MA   [facility]

## 2024-11-25 NOTE — PROGRESS NOTES
E-Consult unable to be completed.   This request is for psychiatric evaluation to complete housing forms, request sent for in person evaluation.

## 2024-11-25 NOTE — PROGRESS NOTES
E-Consult request received and routed to consulting Provider. Unable to charge for services: Pt has [place] Medicaid

## 2024-11-26 ENCOUNTER — PATIENT OUTREACH (OUTPATIENT)
Age: 44
End: 2024-11-26

## 2024-11-26 NOTE — PROGRESS NOTES
"SWCM completed chart review. SWCM called patient to follow up and assist with needs. SWCM spoke with patient.     Patient reports he is doing well. Patient reports having \"other avenues\" for him to stayed housed beyond 12/1/24. As per patient, [employer] with [employer] working with patient to secure an apartment. Patient reports apartment tentative move in date is 12/1/24 and if not ready he can stay in the hotel he is currently staying at. Patient reports no other needs currently. SWCM encouraged patient to call with questions/ needs.     SWCM will be closing case at this time and removing self from care team.    SWCM will remain available to assist as needed.    "

## 2024-12-14 NOTE — ASSESSMENT & PLAN NOTE
This appears resolved s/p Bariatric surgery & wt loss  Lab Results   Component Value Date    HGBA1C 5.1 11/22/2024       Orders:    Albumin / creatinine urine ratio; Future

## 2024-12-14 NOTE — ASSESSMENT & PLAN NOTE
Orders:    metoprolol succinate (TOPROL-XL) 25 mg 24 hr tablet; Take 1 tablet (25 mg total) by mouth daily    Vitamin D 25 hydroxy; Future    magnesium (MAGTAB) 84 MG (7MEQ) TBCR; Take 1 tablet (84 mg total) by mouth 2 (two) times a day    UA (URINE) with reflex to Scope; Future

## 2024-12-14 NOTE — PROGRESS NOTES
ADULT ANNUAL PHYSICAL  [facility]      Assessment & Plan  Annual physical exam         Benign essential hypertension         MANUELA (obstructive sleep apnea)         Type 2 diabetes mellitus without complication, with long-term current use of insulin (HCC)    Lab Results   Component Value Date    HGBA1C 5.1 2024            PTSD (post-traumatic stress disorder)         Sheltered homelessness         Obesity, Class II, BMI 35-39.9         Encounter for immunization         Metatarsalgia of both feet         Diabetic polyneuropathy associated with type 2 diabetes mellitus (HCC)    Lab Results   Component Value Date    HGBA1C 5.1 2024            Radiculopathy of lumbar region         Screening for HIV (human immunodeficiency virus)         Need for COVID-19 vaccine         Chronic rupture of medial collateral ligament of right knee         Urinary urgency              NAME: Ben Erickson  AGE: 44 y.o. SEX: male  : 1980   DATE: 2024    Assessment & Plan  Annual physical exam    Orders:    CBC and differential; Future    Comprehensive metabolic panel; Future    Iron Panel (Includes Ferritin, Iron Sat%, Iron, and TIBC); Future    Vitamin D 25 hydroxy; Future    UA (URINE) with reflex to Scope; Future    Benign essential hypertension    Orders:    metoprolol succinate (TOPROL-XL) 25 mg 24 hr tablet; Take 1 tablet (25 mg total) by mouth daily    Vitamin D 25 hydroxy; Future    magnesium (MAGTAB) 84 MG (7MEQ) TBCR; Take 1 tablet (84 mg total) by mouth 2 (two) times a day    UA (URINE) with reflex to Scope; Future    MANUELA (obstructive sleep apnea)  Clinically resolved with wt loss from bariatric surgery       Type 2 diabetes mellitus without complication, with long-term current use of insulin (HCC)  This appears resolved s/p Bariatric surgery & wt loss  Lab Results   Component Value Date    HGBA1C 5.1 2024       Orders:     Albumin / creatinine urine ratio; Future    PTSD (post-traumatic stress disorder)  Will need further eval of resources  Orders:    magnesium (MAGTAB) 84 MG (7MEQ) TBCR; Take 1 tablet (84 mg total) by mouth 2 (two) times a day    Sheltered homelessness  Now has housing, which opens many doors       Obesity, Class II, BMI 35-39.9    Orders:    Lipid panel; Future    Encounter for immunization         Metatarsalgia of both feet    Orders:    gabapentin (NEURONTIN) 400 mg capsule; Take 1 capsule (400 mg total) by mouth 3 (three) times a day    Diabetic polyneuropathy associated with type 2 diabetes mellitus (HCC)    Lab Results   Component Value Date    HGBA1C 5.1 11/22/2024       Orders:    gabapentin (NEURONTIN) 400 mg capsule; Take 1 capsule (400 mg total) by mouth 3 (three) times a day    Radiculopathy of lumbar region    Orders:    gabapentin (NEURONTIN) 400 mg capsule; Take 1 capsule (400 mg total) by mouth 3 (three) times a day    Screening for HIV (human immunodeficiency virus)    Orders:    HIV 1/2 AG/AB w Reflex [facility] for 2 yr old and above; Future    Need for COVID-19 vaccine    Orders:    COVID-19 Pfizer mRNA vaccine 12 yr and older (Comirnaty pre-filled syringe)    COVID-19 Pfizer mRNA vaccine 12 yr and older (Comirnaty pre-filled syringe)    Chronic rupture of medial collateral ligament of right knee    Orders:    Ambulatory Referral to Orthopedic Surgery; Future    Urinary urgency    Orders:    PSA, total and free; Future      Medical chart reviewed  WBC   Date/Time Value Ref Range Status   10/13/2023 05:03 AM 7.14 4.31 - 10.16 Thousand/uL Final     Hemoglobin   Date/Time Value Ref Range Status   10/13/2023 05:03 AM 12.5 12.0 - 17.0 g/dL Final     Hematocrit   Date/Time Value Ref Range Status   10/13/2023 05:03 AM 39.7 36.5 - 49.3 % Final     MCV   Date/Time Value Ref Range Status   10/13/2023 05:03 AM 92 82 - 98 fL Final     Platelets   Date/Time Value Ref Range Status   10/13/2023 05:03  149 -  390 Thousands/uL Final     Sodium   Date/Time Value Ref Range Status   04/18/2024 09:51  134 - 144 mmol/L Final     Potassium   Date/Time Value Ref Range Status   04/18/2024 09:51 AM 4.8 3.5 - 5.2 mmol/L Final     Chloride   Date/Time Value Ref Range Status   04/18/2024 09:51  96 - 106 mmol/L Final     CO2   Date/Time Value Ref Range Status   04/18/2024 09:51 AM 23 20 - 29 mmol/L Final     ANION GAP   Date/Time Value Ref Range Status   10/13/2023 05:03 AM 8 mmol/L Final     BUN   Date/Time Value Ref Range Status   04/18/2024 09:51 AM 11 6 - 24 mg/dL Final     Creatinine   Date/Time Value Ref Range Status   04/18/2024 09:51 AM 0.83 0.76 - 1.27 mg/dL Final   10/13/2023 05:03 AM 0.63 0.60 - 1.30 mg/dL Final     Comment:     Standardized to IDMS reference method   07/25/2017 09:44 AM 0.74 (L) 0.76 - 1.27 mg/dL Final     Glucose, Random   Date/Time Value Ref Range Status   04/18/2024 09:51 AM 91 70 - 99 mg/dL Final     Calcium   Date/Time Value Ref Range Status   10/13/2023 05:03 AM 8.6 8.4 - 10.2 mg/dL Final   07/25/2017 09:44 AM 9.6 8.7 - 10.2 mg/dL Final     AST   Date/Time Value Ref Range Status   04/18/2024 09:51 AM 24 0 - 40 IU/L Final     ALT   Date/Time Value Ref Range Status   04/18/2024 09:51 AM 20 0 - 44 IU/L Final     Alkaline Phosphatase   Date/Time Value Ref Range Status   01/28/2021 06:23  46 - 116 U/L Final   07/25/2017 09:44 AM 74 39 - 117 IU/L Final     Protein, Total   Date/Time Value Ref Range Status   04/18/2024 09:51 AM 7.4 6.0 - 8.5 g/dL Final     Albumin   Date/Time Value Ref Range Status   04/18/2024 09:51 AM 4.2 4.1 - 5.1 g/dL Final   01/28/2021 06:23 PM 2.8 (L) 3.5 - 5.0 g/dL Final   07/25/2017 09:44 AM 4.1 3.5 - 5.5 g/dL Final     TOTAL BILIRUBIN   Date/Time Value Ref Range Status   04/18/2024 09:51 AM 0.7 0.0 - 1.2 mg/dL Final     eGFR   Date/Time Value Ref Range Status   04/18/2024 09:51  >59 mL/min/1.73 Final   10/13/2023 05:03  ml/min/1.73sq m Final  "      Hemoglobin A1C   Date/Time Value Ref Range Status   11/22/2024 02:08 PM 5.1 <=6.5 Final   05/10/2023 09:56 AM 5.1 4.8 - 5.6 % Final     Comment:              Prediabetes: 5.7 - 6.4           Diabetes: >6.4           Glycemic control for adults with diabetes: <7.0       Cholesterol, Total   Date/Time Value Ref Range Status   05/10/2023 09:56  100 - 199 mg/dL Final     LDL Calculated   Date/Time Value Ref Range Status   05/10/2023 09:56  (H) 0 - 99 mg/dL Final     HDL   Date/Time Value Ref Range Status   05/10/2023 09:56 AM 36 (L) >39 mg/dL Final     T. Chol/HDL Ratio   Date/Time Value Ref Range Status   05/10/2023 09:56 AM 4.9 0.0 - 5.0 ratio Final     Comment:                                       T. Chol/HDL Ratio                                              Men  Women                                1/2 Avg.Risk  3.4    3.3                                    Avg.Risk  5.0    4.4                                 2X Avg.Risk  9.6    7.1                                 3X Avg.Risk 23.4   11.0       Triglycerides   Date/Time Value Ref Range Status   05/10/2023 09:56  (H) 0 - 149 mg/dL Final     TSH   Date/Time Value Ref Range Status   10/15/2021 11:03 AM 1.560 0.450 - 4.500 uIU/mL Final     Magnesium   Date/Time Value Ref Range Status   10/06/2023 06:21 AM 1.9 1.9 - 2.7 mg/dL Final       No results found for: \"PSAFREE\", \"PSA\"      Immunizations and preventive care screenings were discussed with patient today. Appropriate education was printed on patient's after visit summary.    Discussed risks and benefits of prostate cancer screening. We discussed the controversial history of PSA screening for prostate cancer in the [place] as well as the risk of over detection and over treatment of prostate cancer by way of PSA screening.  The patient understands that PSA blood testing is an imperfect way to screen for prostate cancer and that elevated PSA levels in the blood may also be caused by " infection, inflammation, prostatic trauma or manipulation, urological procedures, or by benign prostatic enlargement.    The role of the digital rectal examination in prostate cancer screening was also discussed and I discussed with him that there is large interobserver variability in the findings of digital rectal examination.    Counseling:  Alcohol/drug use: discussed moderation in alcohol intake, the recommendations for healthy alcohol use, and avoidance of illicit drug use.  Dental Health: discussed importance of regular tooth brushing, flossing, and dental visits.  Exercise: the importance of regular exercise/physical activity was discussed. Recommend exercise 3-5 times per week for at least 30 minutes.       Depression Screening and Follow-up Plan: Patient was screened for depression during today's encounter. They screened negative with a PHQ-2 score of 0.        Return in about 4 weeks (around 1/15/2025) for Chronic dis. f/u 1/2 hr..      Adult Annual Physical   Patient here for a comprehensive physical exam. The patient reports doing much better now has a shelter. We discussed multiple issues.  Mom was [employer]. Exposed to benzine when pregnant with Yvan. She had osteonecrosis or something similar.   He was in armed forces.      Depression Screening  PHQ-2/9 Depression Screening    Little interest or pleasure in doing things: 0 - not at all  Feeling down, depressed, or hopeless: 0 - not at all  PHQ-2 Score: 0  PHQ-2 Interpretation: Negative depression screen          Review of Systems:   Constitutional: No fever, chills or sweats  HEENT: No eye or ear symptoms, sore throat, congested nose  Cardiorespiratory: No chest pain or shortness of breath  Abdomen/Gastrointestinal: No abdominal pain or unusual change digestion or in bowel movements  Genitourinary: No change in urination  Neuromuscular: No new neurological symptoms or unexplained/new achy joints or muscles  Psych: No suicidal ideation     Past Medical  History:     Past Medical History:   Diagnosis Date    Abdominal wall hernia     Bell's palsy     2 bouts - idiopathic, possible stress induced    CPAP (continuous positive airway pressure) dependence     Depression     Diabetes mellitus ([facility]) 03/25/2021    Blood work    GERD (gastroesophageal reflux disease)     H/O gastric bypass 2022    Hyperhidrosis     Hypertension     Irregular heart beat     Morbid obesity with BMI of 60.0-69.9, adult ([facility])     Neurocardiogenic syncope     Sheltered homelessness 11/13/2024    Sleep apnea     Type 2 diabetes mellitus without complication, with long-term current use of insulin ([facility]) 05/24/2021    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 fluctuations      Past Surgical History:     Past Surgical History:   Procedure Laterality Date    ABDOMINAL ADHESION SURGERY N/A 4/4/2022    Procedure: LYSIS ADHESIONS LAPAROSCOPIC;  Surgeon: Tammi Chu MD;  Location: [facility];  Service: Bariatrics    ABDOMINAL WALL SURGERY N/A 10/11/2023    Procedure: REMOVAL WOUND VAC, ABDOMINAL WOUND EXPLORATION, WASH OUT, EXCISIONAL DEBRIDEMENT WOUND, WOUND VAC APPLICATION;  Surgeon: Miguel Croft MD;  Location: [facility];  Service: General    COLONOSCOPY N/A 2/2/2018    Procedure: COLONOSCOPY;  Surgeon: Stella Shukla MD;  Location: [facility];  Service: Gastroenterology    ESOPHAGOGASTRODUODENOSCOPY N/A 2/2/2018    Procedure: ESOPHAGOGASTRODUODENOSCOPY (EGD);  Surgeon: Stella Shukla MD;  Location: [facility];  Service: Gastroenterology    OMENTECTOMY N/A 9/20/2023    Procedure: PARTIAL OMENTECTOMY;  Surgeon: Miguel Croft MD;  Location: [facility];  Service: General    CA LAPS GSTR RSTCV PX W/BYP SARI-EN-Y LIMB <150 CM N/A 4/4/2022    Procedure: LAPAROSCOPIC SARI-EN-Y GASTRIC BYPASS AND INTRAOPERATIVE EGD;  Surgeon: Tammi Chu MD;  Location: [facility];  Service: Bariatrics    CA RPR AA HERNIA 1ST < 3 CM REDUCIBLE N/A  2023    Procedure: REPAIR HERNIA EPIGASTRIC Repair rectus diastases, implantation of biologic mesh;  Surgeon: Miguel Croft MD;  Location: [facility];  Service: General    TESTICLE SURGERY Left     infected testicle    TOENAIL EXCISION Right 2021    WOUND DEBRIDEMENT N/A 10/6/2023    Procedure: EVACUATION OF HEMATOMA, VACUUM PLACEMENT VERAFLO WITH IRRIGATION SETTINGS, EXCISIONAL DEBRIDEMENT;  Surgeon: Miguel Croft MD;  Location: [facility];  Service: General    WOUND DEBRIDEMENT N/A 10/13/2023    Procedure: DEBRIDEMENT WOUND ABDOMINAL (WASH OUT);  Surgeon: Miguel Croft MD;  Location: [facility];  Service: General      Family History:     Family History   Problem Relation Age of Onset    Supraventricular tachycardia Mother     Atrial fibrillation Mother     Diabetes type II Mother     Cancer Mother     Osteoporosis Mother     Ulcerative colitis Father     Liver disease Father     ADD / ADHD Son     COPD Paternal Grandfather     Colon cancer Family     Diabetes type II Family     Hyperlipidemia Family     Hypertension Family     Hypothyroidism Family     Heart disease Family     Lung cancer Neg Hx     Asthma Neg Hx       Social History:     Social History     Socioeconomic History    Marital status: /Civil Union     Spouse name: Deborah    Number of children: 2    Years of education: None    Highest education level: 12th grade   Occupational History    None   Tobacco Use    Smoking status: Former     Current packs/day: 0.00     Average packs/day: 0.3 packs/day for 10.5 years (2.6 ttl pk-yrs)     Types: Cigarettes     Start date: 7/15/1999     Quit date: 2010     Years since quittin.3     Passive exposure: Never    Smokeless tobacco: Never   Vaping Use    Vaping status: Never Used   Substance and Sexual Activity    Alcohol use: Not Currently     Comment: social on occasion    Drug use: No    Sexual activity: Not Currently     Partners: Female     Birth control/protection: Abstinence,  OCP   Other Topics Concern    None   Social History Narrative    Denied: History of alcohol use (history) - as per [employer]    Always uses seat belt    Former smoker - as per [employer]    Never a smoker - as per [employer]     Social Drivers of Health     Financial Resource Strain: Low Risk  (11/21/2024)    Overall Financial Resource Strain (CARDIA)     Difficulty of Paying Living Expenses: Not very hard   Food Insecurity: No Food Insecurity (11/21/2024)    Hunger Vital Sign     Worried About Running Out of Food in the Last Year: Never true     Ran Out of Food in the Last Year: Never true   Transportation Needs: Unmet Transportation Needs (11/21/2024)    PRAPARE - Transportation     Lack of Transportation (Medical): Yes     Lack of Transportation (Non-Medical): No   Physical Activity: Not on file   Stress: Stress Concern Present (10/23/2023)    [employer] [place] [employer] - Occupational Stress Questionnaire     Feeling of Stress : To some extent   Social Connections: Not on file   Intimate Partner Violence: Not on file   Housing Stability: High Risk (11/21/2024)    Housing Stability Vital Sign     Unable to Pay for Housing in the Last Year: No     Number of Times Moved in the Last Year: 1     Homeless in the Last Year: Yes      Current Medications:     Current Outpatient Medications   Medication Sig Dispense Refill    acetaminophen (TYLENOL) 500 mg tablet Take 500 mg by mouth every 6 (six) hours as needed for mild pain 6 tablets daily      Calcium Carb-Cholecalciferol 600-12.5 MG-MCG CAPS Take by mouth daily after breakfast      doxylamine (UNISOM) 25 MG tablet Take 1 tablet (25 mg total) by mouth daily at bedtime as needed for sleep 30 tablet 1    gabapentin (NEURONTIN) 400 mg capsule Take 1 capsule (400 mg total) by mouth 3 (three) times a day 270 capsule 3    magnesium (MAGTAB) 84 MG (7MEQ) TBCR Take 1 tablet (84 mg total) by mouth 2 (two) times a day 180 tablet 3    metoprolol succinate  "(TOPROL-XL) 25 mg 24 hr tablet Take 1 tablet (25 mg total) by mouth daily 30 tablet 7    Multiple Vitamins-Minerals (BARIATRIC MULTIVITAMINS/IRON PO) Take by mouth       Current Facility-Administered Medications   Medication Dose Route Frequency Provider Last Rate Last Admin    acetaminophen (TYLENOL) tablet 975 mg  975 mg Oral Q8H PRN           Allergies:     Allergies   Allergen Reactions    Bee Venom Anaphylaxis     Annotation - [id number]: wasp, hornet also    Macrolides And Ketolides Anaphylaxis and Rash    Other Hives, Shortness Of Breath and Wheezing     Annotation - [id number]:  violet    Pertussis Vaccines Anaphylaxis and Rash    Vancomycin Shortness Of Breath    Zithromax [Azithromycin] Anaphylaxis    Flagyl [Metronidazole] Hives    Erythromycin Rash    Pollen Extract Sneezing      Physical Exam:   Alert, No acute distress  /80 (BP Location: Right arm, Patient Position: Sitting, Cuff Size: Adult)   Pulse 103   Temp 98 °F (36.7 °C) (Tympanic)   Resp 19   Ht 5' 9\" (1.753 m)   Wt 112 kg (247 lb 4.8 oz)   BMI 36.52 kg/m²   Repeat 140/80      Head, ears, eyes, nose, throat, neck:  Head atraumatic, normocephalic. Conjunctiva clear, pupils equal and reactive to light  Throat: within normal limits  Tympanic membranes clear  Neck supple without concerning nodes, masses or thyromegaly    Respiratory: No respiratory distress. Lungs clear to auscultation  Cardiac: Heart regular rate and rhythm, normal S1 and S2, no murmur  Abdomen benign Soft and non-tender  Extremities: no cyanosis, clubbing or edema. R knee is unstable, likely MCL insufficiency  Skin: no concerning lesions          Terence Calderon MD  [facility]  "

## 2024-12-14 NOTE — ASSESSMENT & PLAN NOTE
Will need further eval of resources  Orders:    magnesium (MAGTAB) 84 MG (7MEQ) TBCR; Take 1 tablet (84 mg total) by mouth 2 (two) times a day

## 2024-12-18 ENCOUNTER — OFFICE VISIT (OUTPATIENT)
Age: 44
End: 2024-12-18

## 2024-12-18 VITALS
SYSTOLIC BLOOD PRESSURE: 140 MMHG | WEIGHT: 247.3 LBS | RESPIRATION RATE: 19 BRPM | DIASTOLIC BLOOD PRESSURE: 80 MMHG | TEMPERATURE: 98 F | BODY MASS INDEX: 36.63 KG/M2 | HEIGHT: 69 IN | HEART RATE: 103 BPM

## 2024-12-18 DIAGNOSIS — E11.42 DIABETIC POLYNEUROPATHY ASSOCIATED WITH TYPE 2 DIABETES MELLITUS (HCC): ICD-10-CM

## 2024-12-18 DIAGNOSIS — M77.41 METATARSALGIA OF BOTH FEET: ICD-10-CM

## 2024-12-18 DIAGNOSIS — E66.812 OBESITY, CLASS II, BMI 35-39.9: ICD-10-CM

## 2024-12-18 DIAGNOSIS — Z59.01 SHELTERED HOMELESSNESS: ICD-10-CM

## 2024-12-18 DIAGNOSIS — Z79.4 TYPE 2 DIABETES MELLITUS WITHOUT COMPLICATION, WITH LONG-TERM CURRENT USE OF INSULIN (HCC): ICD-10-CM

## 2024-12-18 DIAGNOSIS — M54.16 RADICULOPATHY OF LUMBAR REGION: ICD-10-CM

## 2024-12-18 DIAGNOSIS — R39.15 URINARY URGENCY: ICD-10-CM

## 2024-12-18 DIAGNOSIS — Z00.00 ANNUAL PHYSICAL EXAM: Primary | ICD-10-CM

## 2024-12-18 DIAGNOSIS — S83.411A: ICD-10-CM

## 2024-12-18 DIAGNOSIS — E11.9 TYPE 2 DIABETES MELLITUS WITHOUT COMPLICATION, WITH LONG-TERM CURRENT USE OF INSULIN (HCC): ICD-10-CM

## 2024-12-18 DIAGNOSIS — I10 BENIGN ESSENTIAL HYPERTENSION: ICD-10-CM

## 2024-12-18 DIAGNOSIS — Z23 ENCOUNTER FOR IMMUNIZATION: ICD-10-CM

## 2024-12-18 DIAGNOSIS — F43.10 PTSD (POST-TRAUMATIC STRESS DISORDER): ICD-10-CM

## 2024-12-18 DIAGNOSIS — Z23 NEED FOR COVID-19 VACCINE: ICD-10-CM

## 2024-12-18 DIAGNOSIS — M77.42 METATARSALGIA OF BOTH FEET: ICD-10-CM

## 2024-12-18 DIAGNOSIS — Z11.4 SCREENING FOR HIV (HUMAN IMMUNODEFICIENCY VIRUS): ICD-10-CM

## 2024-12-18 DIAGNOSIS — G47.33 OSA (OBSTRUCTIVE SLEEP APNEA): ICD-10-CM

## 2024-12-18 PROCEDURE — 99396 PREV VISIT EST AGE 40-64: CPT | Performed by: FAMILY MEDICINE

## 2024-12-18 PROCEDURE — 90480 ADMN SARSCOV2 VAC 1/ONLY CMP: CPT

## 2024-12-18 PROCEDURE — 91320 SARSCV2 VAC 30MCG TRS-SUC IM: CPT

## 2024-12-18 RX ORDER — MAGNESIUM L-LACTATE 84 MG
84 TABLET, EXTENDED RELEASE ORAL 2 TIMES DAILY
Qty: 180 TABLET | Refills: 3 | Status: SHIPPED | OUTPATIENT
Start: 2024-12-18

## 2024-12-18 RX ORDER — METOPROLOL SUCCINATE 25 MG/1
25 TABLET, EXTENDED RELEASE ORAL DAILY
Qty: 30 TABLET | Refills: 7 | Status: SHIPPED | OUTPATIENT
Start: 2024-12-18

## 2024-12-18 RX ORDER — GABAPENTIN 400 MG/1
400 CAPSULE ORAL 3 TIMES DAILY
Qty: 270 CAPSULE | Refills: 3 | Status: SHIPPED | OUTPATIENT
Start: 2024-12-18 | End: 2025-03-18

## 2024-12-18 SDOH — ECONOMIC STABILITY - HOUSING INSECURITY: SHELTERED HOMELESSNESS: Z59.01

## 2024-12-26 ENCOUNTER — APPOINTMENT (OUTPATIENT)
Dept: RADIOLOGY | Facility: CLINIC | Age: 44
End: 2024-12-26
Payer: COMMERCIAL

## 2024-12-26 ENCOUNTER — TRANSCRIBE ORDERS (OUTPATIENT)
Dept: LAB | Facility: CLINIC | Age: 44
End: 2024-12-26

## 2024-12-26 ENCOUNTER — APPOINTMENT (OUTPATIENT)
Dept: LAB | Facility: CLINIC | Age: 44
End: 2024-12-26
Payer: COMMERCIAL

## 2024-12-26 ENCOUNTER — OFFICE VISIT (OUTPATIENT)
Dept: OBGYN CLINIC | Facility: CLINIC | Age: 44
End: 2024-12-26
Payer: COMMERCIAL

## 2024-12-26 VITALS — HEIGHT: 69 IN | BODY MASS INDEX: 36.88 KG/M2 | WEIGHT: 249 LBS

## 2024-12-26 DIAGNOSIS — S83.411A: Primary | ICD-10-CM

## 2024-12-26 DIAGNOSIS — S83.411A: ICD-10-CM

## 2024-12-26 DIAGNOSIS — Z01.89 ENCOUNTER FOR LOWER EXTREMITY COMPARISON IMAGING STUDY: ICD-10-CM

## 2024-12-26 DIAGNOSIS — M54.50 CHRONIC BILATERAL LOW BACK PAIN WITHOUT SCIATICA: ICD-10-CM

## 2024-12-26 DIAGNOSIS — E66.812 OBESITY, CLASS II, BMI 35-39.9: ICD-10-CM

## 2024-12-26 DIAGNOSIS — S83.511A CHRONIC RUPTURE OF ACL OF RIGHT KNEE: ICD-10-CM

## 2024-12-26 DIAGNOSIS — Z00.00 ANNUAL PHYSICAL EXAM: ICD-10-CM

## 2024-12-26 DIAGNOSIS — Z79.4 TYPE 2 DIABETES MELLITUS WITHOUT COMPLICATION, WITH LONG-TERM CURRENT USE OF INSULIN (HCC): ICD-10-CM

## 2024-12-26 DIAGNOSIS — I10 BENIGN ESSENTIAL HYPERTENSION: ICD-10-CM

## 2024-12-26 DIAGNOSIS — G89.29 CHRONIC BILATERAL LOW BACK PAIN WITHOUT SCIATICA: ICD-10-CM

## 2024-12-26 DIAGNOSIS — R39.15 URINARY URGENCY: ICD-10-CM

## 2024-12-26 DIAGNOSIS — E11.9 TYPE 2 DIABETES MELLITUS WITHOUT COMPLICATION, WITH LONG-TERM CURRENT USE OF INSULIN (HCC): ICD-10-CM

## 2024-12-26 DIAGNOSIS — M25.561 RIGHT KNEE PAIN, UNSPECIFIED CHRONICITY: ICD-10-CM

## 2024-12-26 DIAGNOSIS — Z11.4 SCREENING FOR HIV (HUMAN IMMUNODEFICIENCY VIRUS): ICD-10-CM

## 2024-12-26 DIAGNOSIS — M23.51 CHRONIC INSTABILITY OF RIGHT KNEE: ICD-10-CM

## 2024-12-26 LAB
25(OH)D3 SERPL-MCNC: 25.2 NG/ML (ref 30–100)
ALBUMIN SERPL BCG-MCNC: 4.3 G/DL (ref 3.5–5)
ALP SERPL-CCNC: 58 U/L (ref 34–104)
ALT SERPL W P-5'-P-CCNC: 45 U/L (ref 7–52)
ANION GAP SERPL CALCULATED.3IONS-SCNC: 10 MMOL/L (ref 4–13)
AST SERPL W P-5'-P-CCNC: 42 U/L (ref 13–39)
BASOPHILS # BLD AUTO: 0.04 THOUSANDS/ÂΜL (ref 0–0.1)
BASOPHILS NFR BLD AUTO: 1 % (ref 0–1)
BILIRUB SERPL-MCNC: 0.74 MG/DL (ref 0.2–1)
BILIRUB UR QL STRIP: NEGATIVE
BUN SERPL-MCNC: 12 MG/DL (ref 5–25)
CALCIUM SERPL-MCNC: 9.5 MG/DL (ref 8.4–10.2)
CHLORIDE SERPL-SCNC: 103 MMOL/L (ref 96–108)
CLARITY UR: CLEAR
CO2 SERPL-SCNC: 25 MMOL/L (ref 21–32)
COLOR UR: NORMAL
CREAT SERPL-MCNC: 0.76 MG/DL (ref 0.6–1.3)
CREAT UR-MCNC: 84.4 MG/DL
EOSINOPHIL # BLD AUTO: 0.18 THOUSAND/ÂΜL (ref 0–0.61)
EOSINOPHIL NFR BLD AUTO: 3 % (ref 0–6)
ERYTHROCYTE [DISTWIDTH] IN BLOOD BY AUTOMATED COUNT: 12.7 % (ref 11.6–15.1)
FERRITIN SERPL-MCNC: 36 NG/ML (ref 24–336)
GFR SERPL CREATININE-BSD FRML MDRD: 111 ML/MIN/1.73SQ M
GLUCOSE SERPL-MCNC: 125 MG/DL (ref 65–140)
GLUCOSE UR STRIP-MCNC: NEGATIVE MG/DL
HCT VFR BLD AUTO: 50 % (ref 36.5–49.3)
HGB BLD-MCNC: 16.2 G/DL (ref 12–17)
HGB UR QL STRIP.AUTO: NEGATIVE
IMM GRANULOCYTES # BLD AUTO: 0.01 THOUSAND/UL (ref 0–0.2)
IMM GRANULOCYTES NFR BLD AUTO: 0 % (ref 0–2)
IRON SATN MFR SERPL: 21 % (ref 15–50)
IRON SERPL-MCNC: 99 UG/DL (ref 50–212)
KETONES UR STRIP-MCNC: NEGATIVE MG/DL
LEUKOCYTE ESTERASE UR QL STRIP: NEGATIVE
LYMPHOCYTES # BLD AUTO: 1.91 THOUSANDS/ÂΜL (ref 0.6–4.47)
LYMPHOCYTES NFR BLD AUTO: 35 % (ref 14–44)
MCH RBC QN AUTO: 29.9 PG (ref 26.8–34.3)
MCHC RBC AUTO-ENTMCNC: 32.4 G/DL (ref 31.4–37.4)
MCV RBC AUTO: 92 FL (ref 82–98)
MICROALBUMIN UR-MCNC: <7 MG/L
MONOCYTES # BLD AUTO: 0.33 THOUSAND/ÂΜL (ref 0.17–1.22)
MONOCYTES NFR BLD AUTO: 6 % (ref 4–12)
NEUTROPHILS # BLD AUTO: 3.01 THOUSANDS/ÂΜL (ref 1.85–7.62)
NEUTS SEG NFR BLD AUTO: 55 % (ref 43–75)
NITRITE UR QL STRIP: NEGATIVE
NRBC BLD AUTO-RTO: 0 /100 WBCS
PH UR STRIP.AUTO: 6 [PH]
PLATELET # BLD AUTO: 269 THOUSANDS/UL (ref 149–390)
PMV BLD AUTO: 10.2 FL (ref 8.9–12.7)
POTASSIUM SERPL-SCNC: 4 MMOL/L (ref 3.5–5.3)
PROT SERPL-MCNC: 7.3 G/DL (ref 6.4–8.4)
PROT UR STRIP-MCNC: NEGATIVE MG/DL
PSA FREE MFR SERPL: 73.83 %
PSA FREE SERPL-MCNC: 0.08 NG/ML
PSA SERPL-MCNC: 0.11 NG/ML (ref 0–4)
RBC # BLD AUTO: 5.42 MILLION/UL (ref 3.88–5.62)
SODIUM SERPL-SCNC: 138 MMOL/L (ref 135–147)
SP GR UR STRIP.AUTO: 1.01 (ref 1–1.03)
TIBC SERPL-MCNC: 464.8 UG/DL (ref 250–450)
TRANSFERRIN SERPL-MCNC: 332 MG/DL (ref 203–362)
UIBC SERPL-MCNC: 366 UG/DL (ref 155–355)
UROBILINOGEN UR STRIP-ACNC: <2 MG/DL
WBC # BLD AUTO: 5.48 THOUSAND/UL (ref 4.31–10.16)

## 2024-12-26 PROCEDURE — 85025 COMPLETE CBC W/AUTO DIFF WBC: CPT

## 2024-12-26 PROCEDURE — 84154 ASSAY OF PSA FREE: CPT

## 2024-12-26 PROCEDURE — 82570 ASSAY OF URINE CREATININE: CPT

## 2024-12-26 PROCEDURE — 36415 COLL VENOUS BLD VENIPUNCTURE: CPT

## 2024-12-26 PROCEDURE — 82306 VITAMIN D 25 HYDROXY: CPT

## 2024-12-26 PROCEDURE — 84153 ASSAY OF PSA TOTAL: CPT

## 2024-12-26 PROCEDURE — 82728 ASSAY OF FERRITIN: CPT

## 2024-12-26 PROCEDURE — 73564 X-RAY EXAM KNEE 4 OR MORE: CPT

## 2024-12-26 PROCEDURE — 80053 COMPREHEN METABOLIC PANEL: CPT

## 2024-12-26 PROCEDURE — 82043 UR ALBUMIN QUANTITATIVE: CPT

## 2024-12-26 PROCEDURE — 99203 OFFICE O/P NEW LOW 30 MIN: CPT | Performed by: ORTHOPAEDIC SURGERY

## 2024-12-26 PROCEDURE — 83550 IRON BINDING TEST: CPT

## 2024-12-26 PROCEDURE — 73562 X-RAY EXAM OF KNEE 3: CPT

## 2024-12-26 PROCEDURE — 87389 HIV-1 AG W/HIV-1&-2 AB AG IA: CPT

## 2024-12-26 PROCEDURE — 81003 URINALYSIS AUTO W/O SCOPE: CPT

## 2024-12-26 PROCEDURE — 83540 ASSAY OF IRON: CPT

## 2024-12-26 NOTE — PROGRESS NOTES
Ortho Sports Medicine New Patient Visit     Assesment:   44 y.o. 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 male who presents for initial evaluation of his right knee. He exhibits signs of chronic instability about the right knee, specifically the MCL and ACL.  He can demonstrate significant valgus instability on his own.  On physical exam he does have grade 3 valgus instability and a positive Lachman.  However he explains that this has been there since an injury in high school.  He is functioning very well at this time despite the instability.  He has no significant pain.  We discussed that our reconstructive options are possible for this injury but I do not recommend these at this time given his overall well-maintained function and no significant pain.  He is going to follow-up me as needed in the future if the knee becomes more symptomatic or troublesome for him.      Follow up:    Return if symptoms worsen or fail to improve.        Chief Complaint   Patient presents with    Right Knee - Pain       History of Present Illness:    The patient is a 44 y.o. male who presents for initial evaluation of his right knee. He states the right knee is not painful today. He recalls an injury from this past spring on the right side after a misstep, that caused him to experience pain from the right knee to the right hip. He does have an old injury of the MCL on the right knee from a football game about 15 years ago. Ever since then he has experienced intermittent valgus instability of the right knee. He does use an over the counter knee stabilizer as needed with activity.     He does also report ongoing low back pain. He discussed this with his PCP on 12/18/2024; however, a treatment plan was not established for this beside gabapentin.      Knee Surgical History:  None    Past Medical, Social and Family History:  Past Medical History:   Diagnosis Date    Abdominal wall hernia      Bell's palsy     2 bouts - idiopathic, possible stress induced    CPAP (continuous positive airway pressure) dependence     Depression     Diabetes mellitus (Formerly [facility]) 03/25/2021    Blood work    GERD (gastroesophageal reflux disease)     H/O gastric bypass 2022    Hyperhidrosis     Hypertension     Irregular heart beat     Morbid obesity with BMI of 60.0-69.9, adult (Formerly [facility])     Neurocardiogenic syncope     Sheltered homelessness 11/13/2024    Sleep apnea     Type 2 diabetes mellitus without complication, with long-term current use of insulin (Formerly [facility]) 05/24/2021    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 fluctuations     Past Surgical History:   Procedure Laterality Date    ABDOMINAL ADHESION SURGERY N/A 4/4/2022    Procedure: LYSIS ADHESIONS LAPAROSCOPIC;  Surgeon: Tammi Chu MD;  Location: [facility];  Service: Bariatrics    ABDOMINAL WALL SURGERY N/A 10/11/2023    Procedure: REMOVAL WOUND VAC, ABDOMINAL WOUND EXPLORATION, WASH OUT, EXCISIONAL DEBRIDEMENT WOUND, WOUND VAC APPLICATION;  Surgeon: Miguel Croft MD;  Location: [facility];  Service: General    COLONOSCOPY N/A 2/2/2018    Procedure: COLONOSCOPY;  Surgeon: Stella Shukla MD;  Location: [facility];  Service: Gastroenterology    ESOPHAGOGASTRODUODENOSCOPY N/A 2/2/2018    Procedure: ESOPHAGOGASTRODUODENOSCOPY (EGD);  Surgeon: Stella Shukla MD;  Location: [facility];  Service: Gastroenterology    OMENTECTOMY N/A 9/20/2023    Procedure: PARTIAL OMENTECTOMY;  Surgeon: Miguel Croft MD;  [facility]: [facility];  Service: General    SD LAPS GSTR RSTCV PX W/BYP SARI-EN-Y LIMB <150 CM N/A 4/4/2022    Procedure: LAPAROSCOPIC SARI-EN-Y GASTRIC BYPASS AND INTRAOPERATIVE EGD;  Surgeon: Tammi Chu MD;  Location: [facility];  Service: Bariatrics    SD RPR AA HERNIA 1ST < 3 CM REDUCIBLE N/A 9/20/2023    Procedure: REPAIR HERNIA EPIGASTRIC Repair rectus diastases, implantation of biologic mesh;   Surgeon: Miguel Croft MD;  Location: [facility];  Service: General    TESTICLE SURGERY Left     infected testicle    TOENAIL EXCISION Right 09/07/2021    WOUND DEBRIDEMENT N/A 10/6/2023    Procedure: EVACUATION OF HEMATOMA, VACUUM PLACEMENT VERAFLO WITH IRRIGATION SETTINGS, EXCISIONAL DEBRIDEMENT;  Surgeon: Miguel Croft MD;  [facility]: [facility];  Service: General    WOUND DEBRIDEMENT N/A 10/13/2023    Procedure: DEBRIDEMENT WOUND ABDOMINAL (WASH OUT);  Surgeon: Miguel Croft MD;  Location: [facility];  Service: General     Allergies   Allergen Reactions    Bee Venom Anaphylaxis     Annotation - [id number]: wasp, hornet also    Macrolides And Ketolides Anaphylaxis and Rash    Other Hives, Shortness Of Breath and Wheezing     Annotation - [id number]:  violet    Pertussis Vaccines Anaphylaxis and Rash    Vancomycin Shortness Of Breath    Zithromax [Azithromycin] Anaphylaxis    Flagyl [Metronidazole] Hives    Erythromycin Rash    Pollen Extract Sneezing     Current Outpatient Medications on File Prior to Visit   Medication Sig Dispense Refill    acetaminophen (TYLENOL) 500 mg tablet Take 500 mg by mouth every 6 (six) hours as needed for mild pain 6 tablets daily      Calcium Carb-Cholecalciferol 600-12.5 MG-MCG CAPS Take by mouth daily after breakfast      doxylamine (UNISOM) 25 MG tablet Take 1 tablet (25 mg total) by mouth daily at bedtime as needed for sleep 30 tablet 1    gabapentin (NEURONTIN) 400 mg capsule Take 1 capsule (400 mg total) by mouth 3 (three) times a day 270 capsule 3    magnesium (MAGTAB) 84 MG (7MEQ) TBCR Take 1 tablet (84 mg total) by mouth 2 (two) times a day 180 tablet 3    metoprolol succinate (TOPROL-XL) 25 mg 24 hr tablet Take 1 tablet (25 mg total) by mouth daily 30 tablet 7    Multiple Vitamins-Minerals (BARIATRIC MULTIVITAMINS/IRON PO) Take by mouth       Current Facility-Administered Medications on File Prior to Visit   Medication Dose Route Frequency Provider Last Rate Last  Admin    acetaminophen (TYLENOL) tablet 975 mg  975 mg Oral Q8H PRN          Social History     Socioeconomic History    Marital status: /Civil Union     Spouse name: Deborah    Number of children: 2    Years of education: Not on file    Highest education level: 12th grade   Occupational History    Not on file   Tobacco Use    Smoking status: Former     Current packs/day: 0.00     Average packs/day: 0.3 packs/day for 10.5 years (2.6 ttl pk-yrs)     Types: Cigarettes     Start date: 7/15/1999     Quit date: 2010     Years since quittin.3     Passive exposure: Never    Smokeless tobacco: Never   Vaping Use    Vaping status: Never Used   Substance and Sexual Activity    Alcohol use: Not Currently     Comment: social on occasion    Drug use: No    Sexual activity: Not Currently     Partners: Female     Birth control/protection: Abstinence, OCP   Other Topics Concern    Not on file   Social History Narrative    Denied: History of alcohol use (history) - as per [employer]    Always uses seat belt    Former smoker - as per [employer]    Never a smoker - as per [employer]     Social Drivers of Health     Financial Resource Strain: Low Risk  (2024)    Overall Financial Resource Strain (CARDIA)     Difficulty of Paying Living Expenses: Not very hard   Food Insecurity: No Food Insecurity (2024)    Hunger Vital Sign     Worried About Running Out of Food in the Last Year: Never true     Ran Out of Food in the Last Year: Never true   Transportation Needs: Unmet Transportation Needs (2024)    PRAPARE - Transportation     Lack of Transportation (Medical): Yes     Lack of Transportation (Non-Medical): No   Physical Activity: Not on file   Stress: Stress Concern Present (10/23/2023)    [employer] - Occupational Stress Questionnaire     Feeling of Stress : To some extent   Social Connections: Not on file   Intimate Partner Violence: Not on file   Housing Stability: High  "Risk (11/21/2024)    Housing Stability Vital Sign     Unable to Pay for Housing in the Last Year: No     Number of Times Moved in the Last Year: 1     Homeless in the Last Year: Yes         I have reviewed the past medical, surgical, social and family history, medications and allergies as documented in the EMR.    Review of systems: ROS is negative other than that noted in the HPI.  Constitutional: Negative for fatigue and fever.   HENT: Negative for sore throat.    Respiratory: Negative for shortness of breath.    Cardiovascular: Negative for chest pain.   Gastrointestinal: Negative for abdominal pain.   Endocrine: Negative for cold intolerance and heat intolerance.   Genitourinary: Negative for flank pain.   Musculoskeletal: Negative for back pain.   Skin: Negative for rash.   Allergic/Immunologic: Negative for immunocompromised state.   Neurological: Negative for dizziness.   Psychiatric/Behavioral: Negative for agitation.      Physical Exam:    Height 5' 9\" (1.753 m), weight 113 kg (249 lb).    General/Constitutional: NAD, well developed, well nourished  HENT: Normocephalic, atraumatic  CV: Intact distal pulses, regular rate  Resp: No respiratory distress or labored breathing  Abdomen: soft, nondistended   Lymphatic: No lymphadenopathy palpated  Neuro: Alert and Oriented x 3, no focal deficits  Psych: Normal mood, normal affect  Skin: Warm, dry, no rashes, no erythema      Knee Exam:   No significant skin lesions or deformity  Range of motion from 0° to 135°  No joint line tenderness   Knee is stable to varus stress and posterior drawer.   Grade 3A instability with valgus stress with firm endpoint  Positive Lachman  Neurovascularly intact distally    Knee Imaging    X-rays of the right knee reviewed and interpreted today. These show mild degenerative changes with no acute osseous abnormalities.    Scribe Attestation      I,:  Gabby Carranza am acting as a scribe while in the presence of the attending physician.:     "   I,:  Toñito Deluna MD personally performed the services described in this documentation    as scribed in my presence.:

## 2024-12-26 NOTE — PROGRESS NOTES
Ortho Sports Medicine New Patient Visit     Assesment:   44 y.o. male with     Plan:    ***        Follow up:    No follow-ups on file.        Chief Complaint   Patient presents with   • Right Knee - Pain       History of Present Illness:    The patient is a 44 y.o. male ***      Knee Surgical History:  {none surgery:[id number]}    Past Medical, Social and Family History:  Past Medical History:   Diagnosis Date   • Abdominal wall hernia    • Bell's palsy     2 bouts - idiopathic, possible stress induced   • CPAP (continuous positive airway pressure) dependence    • Depression    • Diabetes mellitus (Formerly [facility]) 03/25/2021    Blood work   • GERD (gastroesophageal reflux disease)    • H/O gastric bypass 2022   • Hyperhidrosis    • Hypertension    • Irregular heart beat    • Morbid obesity with BMI of 60.0-69.9, adult (Formerly [facility])    • Neurocardiogenic syncope    • Sheltered homelessness 11/13/2024   • Sleep apnea    • Type 2 diabetes mellitus without complication, with long-term current use of insulin ([facility]) 05/24/2021   • 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 fluctuations     Past Surgical History:   Procedure Laterality Date   • ABDOMINAL ADHESION SURGERY N/A 4/4/2022    Procedure: LYSIS ADHESIONS LAPAROSCOPIC;  Surgeon: Tammi Chu MD;  [facility]: [facility];  Service: Bariatrics   • ABDOMINAL WALL SURGERY N/A 10/11/2023    Procedure: REMOVAL WOUND VAC, ABDOMINAL WOUND EXPLORATION, WASH OUT, EXCISIONAL DEBRIDEMENT WOUND, WOUND VAC APPLICATION;  Surgeon: Miguel Croft MD;  Location: [facility];  Service: General   • COLONOSCOPY N/A 2/2/2018    Procedure: COLONOSCOPY;  Surgeon: Stella Shukla MD;  Location: [facility];  Service: Gastroenterology   • ESOPHAGOGASTRODUODENOSCOPY N/A 2/2/2018    Procedure: ESOPHAGOGASTRODUODENOSCOPY (EGD);  Surgeon: Stella Shukla MD;  Location: [facility];  Service: Gastroenterology   • OMENTECTOMY N/A 9/20/2023    Procedure: PARTIAL  OMENTECTOMY;  Surgeon: Miguel Croft MD;  [facility]: [facility];  Service: General   • IL LAPS GSTR RSTCV PX W/BYP SARI-EN-Y LIMB <150 CM N/A 4/4/2022    Procedure: LAPAROSCOPIC SARI-EN-Y GASTRIC BYPASS AND INTRAOPERATIVE EGD;  Surgeon: Tammi Chu MD;  [facility]: [facility];  Service: Bariatrics   • IL RPR AA HERNIA 1ST < 3 CM REDUCIBLE N/A 9/20/2023    Procedure: REPAIR HERNIA EPIGASTRIC Repair rectus diastases, implantation of biologic mesh;  Surgeon: Miguel Croft MD;  [facility]: [facility];  Service: General   • TESTICLE SURGERY Left     infected testicle   • TOENAIL EXCISION Right 09/07/2021   • WOUND DEBRIDEMENT N/A 10/6/2023    Procedure: EVACUATION OF HEMATOMA, VACUUM PLACEMENT VERAFLO WITH IRRIGATION SETTINGS, EXCISIONAL DEBRIDEMENT;  Surgeon: Miguel Croft MD;  [facility]: [facility];  Service: General   • WOUND DEBRIDEMENT N/A 10/13/2023    Procedure: DEBRIDEMENT WOUND ABDOMINAL (WASH OUT);  Surgeon: Miguel Croft MD;  Location: [facility];  Service: General     Allergies   Allergen Reactions   • Bee Venom Anaphylaxis     Annotation - [id number]: wasp, hornet also   • Macrolides And Ketolides Anaphylaxis and Rash   • Other Hives, Shortness Of Breath and Wheezing     Annotation - [id number]:  violet   • Pertussis Vaccines Anaphylaxis and Rash   • Vancomycin Shortness Of Breath   • Zithromax [Azithromycin] Anaphylaxis   • Flagyl [Metronidazole] Hives   • Erythromycin Rash   • Pollen Extract Sneezing     Current Outpatient Medications on File Prior to Visit   Medication Sig Dispense Refill   • acetaminophen (TYLENOL) 500 mg tablet Take 500 mg by mouth every 6 (six) hours as needed for mild pain 6 tablets daily     • Calcium Carb-Cholecalciferol 600-12.5 MG-MCG CAPS Take by mouth daily after breakfast     • doxylamine (UNISOM) 25 MG tablet Take 1 tablet (25 mg total) by mouth daily at bedtime as needed for sleep 30 tablet 1   • gabapentin (NEURONTIN) 400 mg capsule Take 1 capsule (400 mg  total) by mouth 3 (three) times a day 270 capsule 3   • magnesium (MAGTAB) 84 MG (7MEQ) TBCR Take 1 tablet (84 mg total) by mouth 2 (two) times a day 180 tablet 3   • metoprolol succinate (TOPROL-XL) 25 mg 24 hr tablet Take 1 tablet (25 mg total) by mouth daily 30 tablet 7   • Multiple Vitamins-Minerals (BARIATRIC MULTIVITAMINS/IRON PO) Take by mouth       Current Facility-Administered Medications on File Prior to Visit   Medication Dose Route Frequency Provider Last Rate Last Admin   • acetaminophen (TYLENOL) tablet 975 mg  975 mg Oral Q8H PRN          Social History     Socioeconomic History   • Marital status: /Civil Union     Spouse name: Deborah   • Number of children: 2   • Years of education: Not on file   • Highest education level: 12th grade   Occupational History   • Not on file   Tobacco Use   • Smoking status: Former     Current packs/day: 0.00     Average packs/day: 0.3 packs/day for 10.5 years (2.6 ttl pk-yrs)     Types: Cigarettes     Start date: 7/15/1999     Quit date: 2010     Years since quittin.3     Passive exposure: Never   • Smokeless tobacco: Never   Vaping Use   • Vaping status: Never Used   Substance and Sexual Activity   • Alcohol use: Not Currently     Comment: social on occasion   • Drug use: No   • Sexual activity: Not Currently     Partners: Female     Birth control/protection: Abstinence, OCP   Other Topics Concern   • Not on file   Social History Narrative    Denied: History of alcohol use (history) - as per [employer]    Always uses seat belt    Former smoker - as per [employer]    Never a smoker - as per [employer]     Social Drivers of Health     Financial Resource Strain: Low Risk  (2024)    Overall Financial Resource Strain (CARDIA)    • Difficulty of Paying Living Expenses: Not very hard   Food Insecurity: No Food Insecurity (2024)    Hunger Vital Sign    • Worried About Running Out of Food in the Last Year: Never true    • Ran Out of Food in the  "Last Year: Never true   Transportation Needs: Unmet Transportation Needs (11/21/2024)    PRAPARE - Transportation    • Lack of Transportation (Medical): Yes    • Lack of Transportation (Non-Medical): No   Physical Activity: Not on file   Stress: Stress Concern Present (10/23/2023)    [employer] - Occupational Stress Questionnaire    • Feeling of Stress : To some extent   Social Connections: Not on file   Intimate Partner Violence: Not on file   Housing Stability: High Risk (11/21/2024)    Housing Stability Vital Sign    • Unable to Pay for Housing in the Last Year: No    • Number of Times Moved in the Last Year: 1    • Homeless in the Last Year: Yes         I have reviewed the past medical, surgical, social and family history, medications and allergies as documented in the EMR.    Review of systems: ROS is negative other than that noted in the HPI.  Constitutional: Negative for fatigue and fever.   HENT: Negative for sore throat.    Respiratory: Negative for shortness of breath.    Cardiovascular: Negative for chest pain.   Gastrointestinal: Negative for abdominal pain.   Endocrine: Negative for cold intolerance and heat intolerance.   Genitourinary: Negative for flank pain.   Musculoskeletal: Negative for back pain. ***  Skin: Negative for rash.   Allergic/Immunologic: Negative for immunocompromised state.   Neurological: Negative for dizziness. ***  Psychiatric/Behavioral: Negative for agitation.      Physical Exam:    Height 5' 9\" (1.753 m), weight 113 kg (249 lb).    General/Constitutional: NAD, well developed, well nourished  HENT: Normocephalic, atraumatic  CV: Intact distal pulses, regular rate  Resp: No respiratory distress or labored breathing  Abdomen: soft, nondistended   Lymphatic: No lymphadenopathy palpated  Neuro: Alert and Oriented x 3, no focal deficits  Psych: Normal mood, normal affect  Skin: Warm, dry, no rashes, no erythema      Knee Exam:   ***No significant skin " lesions or deformity  Range of motion from *** to ***   *** joint line tenderness   Knee is stable to varus stress, valgus stress, Lachman, and posterior drawer.    Neurovascularly intact distally    Knee Imaging    X-rays of the *** reviewed and interpreted today. These show ***    MRI of the ***knee reviewed and interpreted today showing ***

## 2024-12-27 ENCOUNTER — RESULTS FOLLOW-UP (OUTPATIENT)
Age: 44
End: 2024-12-27

## 2024-12-27 DIAGNOSIS — E55.9 VITAMIN D INSUFFICIENCY: Primary | ICD-10-CM

## 2024-12-27 LAB
HIV 1+2 AB+HIV1 P24 AG SERPL QL IA: NORMAL
HIV 2 AB SERPL QL IA: NORMAL
HIV1 AB SERPL QL IA: NORMAL
HIV1 P24 AG SERPL QL IA: NORMAL

## 2025-01-14 ENCOUNTER — APPOINTMENT (OUTPATIENT)
Dept: LAB | Facility: CLINIC | Age: 45
End: 2025-01-14
Payer: COMMERCIAL

## 2025-01-14 LAB
CHOLEST SERPL-MCNC: 181 MG/DL (ref ?–200)
HDLC SERPL-MCNC: 42 MG/DL
LDLC SERPL CALC-MCNC: 100 MG/DL (ref 0–100)
NONHDLC SERPL-MCNC: 139 MG/DL
TRIGL SERPL-MCNC: 196 MG/DL (ref ?–150)

## 2025-01-22 NOTE — PROGRESS NOTES
Assessment & Plan  Encounter for immunization    Orders:    TD VACCINE GREATER THAN OR EQUAL TO 8YO PRESERVATIVE FREE IM    Benign essential hypertension  Fair bp control - now that has housing, is improving diet, which should improve bp    Orders:    metoprolol succinate (TOPROL-XL) 25 mg 24 hr tablet; Take 1 tablet (25 mg total) by mouth 2 (two) times a day    PTSD (post-traumatic stress disorder)  Clinically stable.   Continue therapy       Mixed hyperlipidemia  He is working on lifestyle improvement. Already walks extensively. Main focus now is more veggies, less processed food       Obesity, Class II, BMI 35-39.9  Lifestyle changes as above       Chronic right-sided low back pain without sciatica  Mild and manageable       MANUELA (obstructive sleep apnea)  Appears to be resolved with wt loss           Reviewed and reinforced basics of healthy lifestyle  Risks and benefits of therapeutic plan discussed, answered all patient questions and concerns and patient expressed understanding and agreement of therapeutic plan.    Return in about 3 months (around 4/23/2025) for Chronic dis. f/u 1/2 hr..      Plan next visit: BP, wt, adeq nutrition s/p bariatric surgery    Chief complaint and HPI: Ben Erickson is a 44 y.o. male who presents for follow up of multiple chronic medical problems, as listed below in problem list. All problems are relatively stable except for the following issues:   Now has secure housing.   Walking a lot.   Eating better.   Walks dog also. About 2 miles a day.   Mood is good. Talking with therapist.   Some issues with lower back still. Has referrals for PT.   Starting in Feb will be in a work program such as [employer]. Helps with [employer] program.     His mom was at [employer] and had osteonecrosis, possibly due to benzene exposure. She was pregnant with Yvan at the time.     Review of systems:  No fever/chills/sweats/unexplained weight loss  No chest pain/shortness of breath  No  change in digestion or bowel movements  No change in urination  No new musculoskeletal or neurological symptoms      Chart reviewed for relevant medical, surgical and psychosocial history, medications and allergies, labs and studies, and relevant ones discussed with patient as needed    WBC   Date/Time Value Ref Range Status   12/26/2024 02:31 PM 5.48 4.31 - 10.16 Thousand/uL Final     Hemoglobin   Date/Time Value Ref Range Status   12/26/2024 02:31 PM 16.2 12.0 - 17.0 g/dL Final     Hematocrit   Date/Time Value Ref Range Status   12/26/2024 02:31 PM 50.0 (H) 36.5 - 49.3 % Final     MCV   Date/Time Value Ref Range Status   12/26/2024 02:31 PM 92 82 - 98 fL Final     Platelets   Date/Time Value Ref Range Status   12/26/2024 02:31  149 - 390 Thousands/uL Final     Sodium   Date/Time Value Ref Range Status   12/26/2024 02:31  135 - 147 mmol/L Final   04/18/2024 09:51  134 - 144 mmol/L Final     Potassium   Date/Time Value Ref Range Status   12/26/2024 02:31 PM 4.0 3.5 - 5.3 mmol/L Final   04/18/2024 09:51 AM 4.8 3.5 - 5.2 mmol/L Final     Chloride   Date/Time Value Ref Range Status   12/26/2024 02:31  96 - 108 mmol/L Final   04/18/2024 09:51  96 - 106 mmol/L Final     CO2   Date/Time Value Ref Range Status   12/26/2024 02:31 PM 25 21 - 32 mmol/L Final   04/18/2024 09:51 AM 23 20 - 29 mmol/L Final     ANION GAP   Date/Time Value Ref Range Status   12/26/2024 02:31 PM 10 4 - 13 mmol/L Final     BUN   Date/Time Value Ref Range Status   12/26/2024 02:31 PM 12 5 - 25 mg/dL Final   04/18/2024 09:51 AM 11 6 - 24 mg/dL Final     Creatinine   Date/Time Value Ref Range Status   12/26/2024 02:31 PM 0.76 0.60 - 1.30 mg/dL Final     Comment:     Standardized to IDMS reference method   07/25/2017 09:44 AM 0.74 (L) 0.76 - 1.27 mg/dL Final     Glucose   Date/Time Value Ref Range Status   12/26/2024 02:31  65 - 140 mg/dL Final     Comment:     If the patient is fasting, the ADA then defines impaired  fasting glucose as > 100 mg/dL and diabetes as > or equal to 123 mg/dL.     Glucose, Random   Date/Time Value Ref Range Status   04/18/2024 09:51 AM 91 70 - 99 mg/dL Final     Calcium   Date/Time Value Ref Range Status   12/26/2024 02:31 PM 9.5 8.4 - 10.2 mg/dL Final   07/25/2017 09:44 AM 9.6 8.7 - 10.2 mg/dL Final     AST   Date/Time Value Ref Range Status   12/26/2024 02:31 PM 42 (H) 13 - 39 U/L Final   04/18/2024 09:51 AM 24 0 - 40 IU/L Final     ALT   Date/Time Value Ref Range Status   12/26/2024 02:31 PM 45 7 - 52 U/L Final     Comment:     Specimen collection should occur prior to Sulfasalazine administration due to the potential for falsely depressed results.    04/18/2024 09:51 AM 20 0 - 44 IU/L Final     Alkaline Phosphatase   Date/Time Value Ref Range Status   12/26/2024 02:31 PM 58 34 - 104 U/L Final   07/25/2017 09:44 AM 74 39 - 117 IU/L Final     Total Protein   Date/Time Value Ref Range Status   12/26/2024 02:31 PM 7.3 6.4 - 8.4 g/dL Final     Protein, Total   Date/Time Value Ref Range Status   04/18/2024 09:51 AM 7.4 6.0 - 8.5 g/dL Final     Albumin   Date/Time Value Ref Range Status   12/26/2024 02:31 PM 4.3 3.5 - 5.0 g/dL Final   07/25/2017 09:44 AM 4.1 3.5 - 5.5 g/dL Final     Total Bilirubin   Date/Time Value Ref Range Status   12/26/2024 02:31 PM 0.74 0.20 - 1.00 mg/dL Final     Comment:     Use of this assay is not recommended for patients undergoing treatment with eltrombopag due to the potential for falsely elevated results.  N-acetyl-p-benzoquinone imine (metabolite of Acetaminophen) will generate erroneously low results in samples for patients that have taken an overdose of Acetaminophen.     TOTAL BILIRUBIN   Date/Time Value Ref Range Status   04/18/2024 09:51 AM 0.7 0.0 - 1.2 mg/dL Final     eGFR   Date/Time Value Ref Range Status   12/26/2024 02:31  ml/min/1.73sq m Final       Hemoglobin A1C   Date/Time Value Ref Range Status   11/22/2024 02:08 PM 5.1 <=6.5 Final   05/10/2023 09:56  AM 5.1 4.8 - 5.6 % Final     Comment:              Prediabetes: 5.7 - 6.4           Diabetes: >6.4           Glycemic control for adults with diabetes: <7.0       Cholesterol   Date/Time Value Ref Range Status   01/14/2025 09:38  See Comment mg/dL Final     Comment:     Cholesterol:         Pediatric <18 Years        Desirable          <170 mg/dL      Borderline High    170-199 mg/dL      High               >=200 mg/dL        Adult >=18 Years            Desirable        <200 mg/dL      Borderline High  200-239 mg/dL      High             >239 mg/dL       Cholesterol, Total   Date/Time Value Ref Range Status   05/10/2023 09:56  100 - 199 mg/dL Final     LDL Calculated   Date/Time Value Ref Range Status   01/14/2025 09:38  0 - 100 mg/dL Final     Comment:     LDL Cholesterol:     Optimal           <100 mg/dl     Near Optimal      100-129 mg/dl     Above Optimal       Borderline High 130-159 mg/dl       High            160-189 mg/dl       Very High       >189 mg/dl         This screening LDL is a calculated result.   It does not have the accuracy of the Direct Measured LDL in the monitoring of patients with hyperlipidemia and/or statin therapy.   Direct Measure LDL ([id number]) must be ordered separately in these patients.   05/10/2023 09:56  (H) 0 - 99 mg/dL Final     HDL   Date/Time Value Ref Range Status   05/10/2023 09:56 AM 36 (L) >39 mg/dL Final     HDL, Direct   Date/Time Value Ref Range Status   01/14/2025 09:38 AM 42 >=40 mg/dL Final     T. Chol/HDL Ratio   Date/Time Value Ref Range Status   05/10/2023 09:56 AM 4.9 0.0 - 5.0 ratio Final     Comment:                                       T. Chol/HDL Ratio                                              Men  Women                                1/2 Avg.Risk  3.4    3.3                                    Avg.Risk  5.0    4.4                                 2X Avg.Risk  9.6    7.1                                 3X Avg.Risk 23.4   11.0    "    Triglycerides   Date/Time Value Ref Range Status   01/14/2025 09:38  (H) See Comment mg/dL Final     Comment:     Triglyceride:     0-9Y            <75mg/dL     10Y-17Y         <90 mg/dL       >=18Y     Normal          <150 mg/dL     Borderline High 150-199 mg/dL     High            200-499 mg/dL        Very High       >499 mg/dL    Specimen collection should occur prior to Metamizole administration due to the potential for falsely depressed results.   05/10/2023 09:56  (H) 0 - 149 mg/dL Final     TSH   Date/Time Value Ref Range Status   10/15/2021 11:03 AM 1.560 0.450 - 4.500 uIU/mL Final     Magnesium   Date/Time Value Ref Range Status   10/06/2023 06:21 AM 1.9 1.9 - 2.7 mg/dL Final       Patient Active Problem List   Diagnosis    Benign essential hypertension    Anxiety    GERD (gastroesophageal reflux disease)    Irritable bowel syndrome with diarrhea    Migraine headache    Mixed hyperlipidemia    Ventral hernia without obstruction or gangrene    Abnormal flow volume loop concerning for fixed airway obstruction    MANUELA (obstructive sleep apnea)    Postsurgical malabsorption    Dizziness    Intertriginous candidiasis    Encounter for surgical aftercare following surgery of digestive system    Hyperhidrosis    Obesity, Class II, BMI 35-39.9    History of diabetes mellitus, type II    Hematoma    Housing instability, housed, homelessness in past 12 months    Acute stress reaction    Personality disorder, unspecified (HCC)    Chronic right-sided low back pain without sciatica    Iron deficiency    PTSD (post-traumatic stress disorder)             EXAM:  The patient appears well, in no apparent distress.  Alert and oriented times three, pleasant and cooperative. Vital signs are as noted by the nurse. /85 (BP Location: Left arm, Patient Position: Sitting, Cuff Size: Large)   Pulse 81   Temp 98.3 °F (36.8 °C) (Tympanic)   Resp 17   Ht 5' 9\" (1.753 m)   Wt 114 kg (252 lb)   SpO2 97%   BMI " 37.21 kg/m²       Head: normocephalic, atraumatic  Eyes: well perfused conjunctiva, not clinically pale, not jaundiced  Ears: external ear: no gross lesions  Nose: no rhinorrhea  Neck: supple, trachea in the midline and no concerning cervical lymphadenopathy  Lungs: No respiratory labor. Clear to auscultation  Heart: Regular rate and rhythm, no murmurs, gallops or rubs  Abdomen: Benign: soft, non-tender, non-distended.  No guarding or rebound. No masses or organomegaly. Normal bowel sounds,   Skin: No pallor. No rashes noted  Extremities: Moves all extremities normally. No pedal edema  Neuro: Grossly normal

## 2025-01-22 NOTE — ASSESSMENT & PLAN NOTE
Fair bp control - now that has housing, is improving diet, which should improve bp    Orders:    metoprolol succinate (TOPROL-XL) 25 mg 24 hr tablet; Take 1 tablet (25 mg total) by mouth 2 (two) times a day

## 2025-01-22 NOTE — ASSESSMENT & PLAN NOTE
He is working on lifestyle improvement. Already walks extensively. Main focus now is more veggies, less processed food

## 2025-01-23 ENCOUNTER — OFFICE VISIT (OUTPATIENT)
Age: 45
End: 2025-01-23

## 2025-01-23 VITALS
BODY MASS INDEX: 37.33 KG/M2 | HEIGHT: 69 IN | HEART RATE: 81 BPM | OXYGEN SATURATION: 97 % | SYSTOLIC BLOOD PRESSURE: 143 MMHG | WEIGHT: 252 LBS | TEMPERATURE: 98.3 F | DIASTOLIC BLOOD PRESSURE: 85 MMHG | RESPIRATION RATE: 17 BRPM

## 2025-01-23 DIAGNOSIS — G47.33 OSA (OBSTRUCTIVE SLEEP APNEA): ICD-10-CM

## 2025-01-23 DIAGNOSIS — E66.812 OBESITY, CLASS II, BMI 35-39.9: ICD-10-CM

## 2025-01-23 DIAGNOSIS — M54.50 CHRONIC RIGHT-SIDED LOW BACK PAIN WITHOUT SCIATICA: ICD-10-CM

## 2025-01-23 DIAGNOSIS — G89.29 CHRONIC RIGHT-SIDED LOW BACK PAIN WITHOUT SCIATICA: ICD-10-CM

## 2025-01-23 DIAGNOSIS — F43.10 PTSD (POST-TRAUMATIC STRESS DISORDER): ICD-10-CM

## 2025-01-23 DIAGNOSIS — E78.2 MIXED HYPERLIPIDEMIA: ICD-10-CM

## 2025-01-23 DIAGNOSIS — Z23 ENCOUNTER FOR IMMUNIZATION: ICD-10-CM

## 2025-01-23 DIAGNOSIS — I10 BENIGN ESSENTIAL HYPERTENSION: Primary | ICD-10-CM

## 2025-01-23 PROCEDURE — 90471 IMMUNIZATION ADMIN: CPT

## 2025-01-23 PROCEDURE — 90714 TD VACC NO PRESV 7 YRS+ IM: CPT

## 2025-01-23 PROCEDURE — 99214 OFFICE O/P EST MOD 30 MIN: CPT | Performed by: FAMILY MEDICINE

## 2025-01-23 RX ORDER — METOPROLOL SUCCINATE 25 MG/1
25 TABLET, EXTENDED RELEASE ORAL
Qty: 60 TABLET | Refills: 1 | Status: SHIPPED | OUTPATIENT
Start: 2025-01-23

## 2025-04-03 DIAGNOSIS — I10 BENIGN ESSENTIAL HYPERTENSION: ICD-10-CM

## 2025-04-04 RX ORDER — METOPROLOL SUCCINATE 25 MG/1
25 TABLET, EXTENDED RELEASE ORAL 2 TIMES DAILY
Qty: 60 TABLET | Refills: 0 | Status: SHIPPED | OUTPATIENT
Start: 2025-04-04

## 2025-04-30 ENCOUNTER — OFFICE VISIT (OUTPATIENT)
Age: 45
End: 2025-04-30

## 2025-04-30 VITALS
WEIGHT: 248.8 LBS | HEIGHT: 69 IN | DIASTOLIC BLOOD PRESSURE: 77 MMHG | OXYGEN SATURATION: 97 % | HEART RATE: 72 BPM | SYSTOLIC BLOOD PRESSURE: 117 MMHG | BODY MASS INDEX: 36.85 KG/M2 | TEMPERATURE: 74 F

## 2025-04-30 DIAGNOSIS — Z59.812 HOUSING INSTABILITY, HOUSED, HOMELESSNESS IN PAST 12 MONTHS: ICD-10-CM

## 2025-04-30 DIAGNOSIS — I10 BENIGN ESSENTIAL HYPERTENSION: Primary | ICD-10-CM

## 2025-04-30 DIAGNOSIS — F43.10 PTSD (POST-TRAUMATIC STRESS DISORDER): ICD-10-CM

## 2025-04-30 PROCEDURE — 99214 OFFICE O/P EST MOD 30 MIN: CPT | Performed by: FAMILY MEDICINE

## 2025-04-30 SDOH — ECONOMIC STABILITY - HOUSING INSECURITY: HOMELESS IN THE PAST 12 MONTHS: Z59.812

## 2025-04-30 NOTE — ASSESSMENT & PLAN NOTE
"/77 (BP Location: Left arm, Patient Position: Sitting, Cuff Size: Extra-Large)   Pulse 72   Temp (!) 74 °F (23.3 °C)   Ht 5' 9\" (1.753 m)   Wt 113 kg (248 lb 12.8 oz)   SpO2 97%   BMI 36.74 kg/m²   Good blood pressure control.  Continue magnesium and metoprolol at present dose.     "

## 2025-04-30 NOTE — PROGRESS NOTES
"Patient reported good compliance with medication- his bp was 117/77 today. He does not   Hx Neurocardiac syncope- has not had any episodes of syncope recently. Pt avoids abrupt positional changes  Pt reports a history of abnormal cardiac rhythm and a family hx or vtach and a fib so cardiologist had him on a halter monitor for a month. He was told that he has a \"consistent extra beat\" and recommended that he stays on metoprolol  Pt has a history of orthostatic HTN did not get worse with the dosage change  Pt has a hx of occasional migraines, he says that that they are well managed by occasional NSAIDs and acetaminophen and are usually precipitated by stress or lack of sleep and he would like to continue to manage them without medicine.   Pt had a gastric bypass surgery 3 years ago- he says he has been doing. Pt has been taking his calcium, D3, and iron but not his B12 and B9 due to financial hardship  "

## 2025-04-30 NOTE — PROGRESS NOTES
"  Assessment & Plan  Benign essential hypertension  /77 (BP Location: Left arm, Patient Position: Sitting, Cuff Size: Extra-Large)   Pulse 72   Temp (!) 74 °F (23.3 °C)   Ht 5' 9\" (1.753 m)   Wt 113 kg (248 lb 12.8 oz)   SpO2 97%   BMI 36.74 kg/m²   Good blood pressure control.  Continue magnesium and metoprolol at present dose.       PTSD (post-traumatic stress disorder)  Working with therapist       Housing instability, housed, homelessness in past 12 months  Now has housing and able to cook           Reviewed and reinforced basics of healthy lifestyle  Risks and benefits of therapeutic plan discussed, answered all patient questions and concerns and patient expressed understanding and agreement of therapeutic plan.    Return in about 3 months (around 7/30/2025) for Chronic dis. f/u 1/2 hr..      Plan next visit: f/u on llifestyle progress and BP        Chief complaint and HPI: Ben Erickson is a 44 y.o. male who presents for follow up of multiple chronic medical problems, as listed below in problem list. All problems are relatively stable except for the following issues:   Chief Complaint   Patient presents with   • Follow-up     Patient is here today for a Follow up for Chronic Conditions and medication review.       Review of systems:  No fever/chills/sweats/unexplained weight loss  No chest pain/shortness of breath  No change in digestion or bowel movements  No change in urination  No new musculoskeletal or neurological symptoms      Chart reviewed for relevant medical, surgical and psychosocial history, medications and allergies, labs and studies, and relevant ones discussed with patient as needed    WBC   Date/Time Value Ref Range Status   12/26/2024 02:31 PM 5.48 4.31 - 10.16 Thousand/uL Final     Hemoglobin   Date/Time Value Ref Range Status   12/26/2024 02:31 PM 16.2 12.0 - 17.0 g/dL Final     Hematocrit   Date/Time Value Ref Range Status   12/26/2024 02:31 PM 50.0 (H) 36.5 - 49.3 % Final "     MCV   Date/Time Value Ref Range Status   12/26/2024 02:31 PM 92 82 - 98 fL Final     Platelets   Date/Time Value Ref Range Status   12/26/2024 02:31  149 - 390 Thousands/uL Final     Sodium   Date/Time Value Ref Range Status   12/26/2024 02:31  135 - 147 mmol/L Final   04/18/2024 09:51  134 - 144 mmol/L Final     Potassium   Date/Time Value Ref Range Status   12/26/2024 02:31 PM 4.0 3.5 - 5.3 mmol/L Final   04/18/2024 09:51 AM 4.8 3.5 - 5.2 mmol/L Final     Chloride   Date/Time Value Ref Range Status   12/26/2024 02:31  96 - 108 mmol/L Final   04/18/2024 09:51  96 - 106 mmol/L Final     CO2   Date/Time Value Ref Range Status   12/26/2024 02:31 PM 25 21 - 32 mmol/L Final   04/18/2024 09:51 AM 23 20 - 29 mmol/L Final     ANION GAP   Date/Time Value Ref Range Status   12/26/2024 02:31 PM 10 4 - 13 mmol/L Final     BUN   Date/Time Value Ref Range Status   12/26/2024 02:31 PM 12 5 - 25 mg/dL Final   04/18/2024 09:51 AM 11 6 - 24 mg/dL Final     Creatinine   Date/Time Value Ref Range Status   12/26/2024 02:31 PM 0.76 0.60 - 1.30 mg/dL Final     Comment:     Standardized to IDMS reference method   07/25/2017 09:44 AM 0.74 (L) 0.76 - 1.27 mg/dL Final     Glucose   Date/Time Value Ref Range Status   12/26/2024 02:31  65 - 140 mg/dL Final     Comment:     If the patient is fasting, the ADA then defines impaired fasting glucose as > 100 mg/dL and diabetes as > or equal to 123 mg/dL.     Glucose, Random   Date/Time Value Ref Range Status   04/18/2024 09:51 AM 91 70 - 99 mg/dL Final     Calcium   Date/Time Value Ref Range Status   12/26/2024 02:31 PM 9.5 8.4 - 10.2 mg/dL Final   07/25/2017 09:44 AM 9.6 8.7 - 10.2 mg/dL Final     AST   Date/Time Value Ref Range Status   12/26/2024 02:31 PM 42 (H) 13 - 39 U/L Final   04/18/2024 09:51 AM 24 0 - 40 IU/L Final     ALT   Date/Time Value Ref Range Status   12/26/2024 02:31 PM 45 7 - 52 U/L Final     Comment:     Specimen collection should occur  prior to Sulfasalazine administration due to the potential for falsely depressed results.    04/18/2024 09:51 AM 20 0 - 44 IU/L Final     Alkaline Phosphatase   Date/Time Value Ref Range Status   12/26/2024 02:31 PM 58 34 - 104 U/L Final   07/25/2017 09:44 AM 74 39 - 117 IU/L Final     Total Protein   Date/Time Value Ref Range Status   12/26/2024 02:31 PM 7.3 6.4 - 8.4 g/dL Final     Protein, Total   Date/Time Value Ref Range Status   04/18/2024 09:51 AM 7.4 6.0 - 8.5 g/dL Final     Albumin   Date/Time Value Ref Range Status   12/26/2024 02:31 PM 4.3 3.5 - 5.0 g/dL Final   07/25/2017 09:44 AM 4.1 3.5 - 5.5 g/dL Final     Total Bilirubin   Date/Time Value Ref Range Status   12/26/2024 02:31 PM 0.74 0.20 - 1.00 mg/dL Final     Comment:     Use of this assay is not recommended for patients undergoing treatment with eltrombopag due to the potential for falsely elevated results.  N-acetyl-p-benzoquinone imine (metabolite of Acetaminophen) will generate erroneously low results in samples for patients that have taken an overdose of Acetaminophen.     TOTAL BILIRUBIN   Date/Time Value Ref Range Status   04/18/2024 09:51 AM 0.7 0.0 - 1.2 mg/dL Final     eGFR   Date/Time Value Ref Range Status   12/26/2024 02:31  ml/min/1.73sq m Final       Hemoglobin A1C   Date/Time Value Ref Range Status   11/22/2024 02:08 PM 5.1 <=6.5 Final   05/10/2023 09:56 AM 5.1 4.8 - 5.6 % Final     Comment:              Prediabetes: 5.7 - 6.4           Diabetes: >6.4           Glycemic control for adults with diabetes: <7.0       Cholesterol   Date/Time Value Ref Range Status   01/14/2025 09:38  See Comment mg/dL Final     Comment:     Cholesterol:         Pediatric <18 Years        Desirable          <170 mg/dL      Borderline High    170-199 mg/dL      High               >=200 mg/dL        Adult >=18 Years            Desirable        <200 mg/dL      Borderline High  200-239 mg/dL      High             >239 mg/dL       Cholesterol, Total    Date/Time Value Ref Range Status   05/10/2023 09:56  100 - 199 mg/dL Final     LDL Calculated   Date/Time Value Ref Range Status   01/14/2025 09:38  0 - 100 mg/dL Final     Comment:     LDL Cholesterol:     Optimal           <100 mg/dl     Near Optimal      100-129 mg/dl     Above Optimal       Borderline High 130-159 mg/dl       High            160-189 mg/dl       Very High       >189 mg/dl         This screening LDL is a calculated result.   It does not have the accuracy of the Direct Measured LDL in the monitoring of patients with hyperlipidemia and/or statin therapy.   Direct Measure LDL ([id number]) must be ordered separately in these patients.   05/10/2023 09:56  (H) 0 - 99 mg/dL Final     HDL   Date/Time Value Ref Range Status   05/10/2023 09:56 AM 36 (L) >39 mg/dL Final     HDL, Direct   Date/Time Value Ref Range Status   01/14/2025 09:38 AM 42 >=40 mg/dL Final     T. Chol/HDL Ratio   Date/Time Value Ref Range Status   05/10/2023 09:56 AM 4.9 0.0 - 5.0 ratio Final     Comment:                                       T. Chol/HDL Ratio                                              Men  Women                                1/2 Avg.Risk  3.4    3.3                                    Avg.Risk  5.0    4.4                                 2X Avg.Risk  9.6    7.1                                 3X Avg.Risk 23.4   11.0       Triglycerides   Date/Time Value Ref Range Status   01/14/2025 09:38  (H) See Comment mg/dL Final     Comment:     Triglyceride:     0-9Y            <75mg/dL     10Y-17Y         <90 mg/dL       >=18Y     Normal          <150 mg/dL     Borderline High 150-199 mg/dL     High            200-499 mg/dL        Very High       >499 mg/dL    Specimen collection should occur prior to Metamizole administration due to the potential for falsely depressed results.   05/10/2023 09:56  (H) 0 - 149 mg/dL Final     TSH   Date/Time Value Ref Range Status   10/15/2021 11:03 AM 1.560 0.450 -  "4.500 uIU/mL Final     Magnesium   Date/Time Value Ref Range Status   10/06/2023 06:21 AM 1.9 1.9 - 2.7 mg/dL Final       Patient Active Problem List   Diagnosis   • Benign essential hypertension   • Anxiety   • GERD (gastroesophageal reflux disease)   • Irritable bowel syndrome with diarrhea   • Migraine headache   • Mixed hyperlipidemia   • Ventral hernia without obstruction or gangrene   • Abnormal flow volume loop concerning for fixed airway obstruction   • Postsurgical malabsorption   • Dizziness   • Intertriginous candidiasis   • Encounter for surgical aftercare following surgery of digestive system   • Hyperhidrosis   • Obesity, Class II, BMI 35-39.9   • History of diabetes mellitus, type II   • Hematoma   • Housing instability, housed, homelessness in past 12 months   • Acute stress reaction   • Personality disorder, unspecified (HCC)   • Chronic right-sided low back pain without sciatica   • Iron deficiency   • PTSD (post-traumatic stress disorder)             EXAM:  The patient appears well, in no apparent distress.  Alert and oriented times three, pleasant and cooperative. Vital signs are as noted by the nurse. /77 (BP Location: Left arm, Patient Position: Sitting, Cuff Size: Extra-Large)   Pulse 72   Temp (!) 74 °F (23.3 °C)   Ht 5' 9\" (1.753 m)   Wt 113 kg (248 lb 12.8 oz)   SpO2 97%   BMI 36.74 kg/m²     Head: normocephalic, atraumatic  Eyes: well perfused conjunctiva, not clinically pale, not jaundiced  Ears: external ear: no gross lesions  Nose: no rhinorrhea  Neck: supple, trachea in the midline and no concerning cervical lymphadenopathy  Lungs: No respiratory labor. Clear to auscultation  Heart: Regular rate and rhythm, no murmurs, gallops or rubs  Abdomen: Benign: soft, non-tender, non-distended.  No guarding or rebound. No masses or organomegaly. Normal bowel sounds,   Skin: No pallor. No rashes noted  Extremities: Moves all extremities normally. No pedal edema  Neuro: Grossly " normal

## 2025-05-01 DIAGNOSIS — I10 BENIGN ESSENTIAL HYPERTENSION: ICD-10-CM

## 2025-05-01 RX ORDER — METOPROLOL SUCCINATE 25 MG/1
25 TABLET, EXTENDED RELEASE ORAL 2 TIMES DAILY
Qty: 60 TABLET | Refills: 0 | Status: SHIPPED | OUTPATIENT
Start: 2025-05-01

## 2025-05-08 ENCOUNTER — OFFICE VISIT (OUTPATIENT)
Age: 45
End: 2025-05-08

## 2025-05-08 VITALS
SYSTOLIC BLOOD PRESSURE: 125 MMHG | DIASTOLIC BLOOD PRESSURE: 78 MMHG | OXYGEN SATURATION: 97 % | HEART RATE: 65 BPM | WEIGHT: 247.3 LBS | HEIGHT: 69 IN | TEMPERATURE: 97.4 F | RESPIRATION RATE: 18 BRPM | BODY MASS INDEX: 36.63 KG/M2

## 2025-05-08 DIAGNOSIS — L08.9 SKIN INFECTION: ICD-10-CM

## 2025-05-08 DIAGNOSIS — Z23 ENCOUNTER FOR IMMUNIZATION: Primary | ICD-10-CM

## 2025-05-08 PROCEDURE — 99213 OFFICE O/P EST LOW 20 MIN: CPT | Performed by: FAMILY MEDICINE

## 2025-05-08 RX ORDER — DOXYCYCLINE 100 MG/1
100 CAPSULE ORAL EVERY 12 HOURS SCHEDULED
Qty: 20 CAPSULE | Refills: 0 | Status: SHIPPED | OUTPATIENT
Start: 2025-05-08 | End: 2025-05-18

## 2025-05-08 RX ORDER — MUPIROCIN 20 MG/G
OINTMENT TOPICAL 3 TIMES DAILY
Qty: 22 G | Refills: 3 | Status: SHIPPED | OUTPATIENT
Start: 2025-05-08

## 2025-05-08 NOTE — PROGRESS NOTES
"Name: Ben Erickson      : 1980      MRN: [id number]  Encounter Provider: Tim Castillo DO  Encounter Date: 2025   Encounter department: [facility]  :  Assessment & Plan  Encounter for immunization  Deferred today.        Skin infection  Has open lesions under his abdominal panus which are now weeping and red.    Discussed care of the area with him.  Rx mupirocin TID and also PO doxycycline.  He is to call us if he gets fevers or they spread.    Patient agrees with plan.     Orders:  •  doxycycline hyclate (VIBRAMYCIN) 100 mg capsule; Take 1 capsule (100 mg total) by mouth every 12 (twelve) hours for 10 days  •  mupirocin (BACTROBAN) 2 % ointment; Apply topically 3 (three) times a day           History of Present Illness   Seen last week for lesions under abdominal panus.  Monitored but now weeping and more reddened.     Rash  Pertinent negatives include no cough, fever, shortness of breath, sore throat or vomiting.     Review of Systems   Constitutional:  Negative for chills and fever.   HENT:  Negative for ear pain and sore throat.    Eyes:  Negative for pain and visual disturbance.   Respiratory:  Negative for cough and shortness of breath.    Cardiovascular:  Negative for chest pain and palpitations.   Gastrointestinal:  Negative for abdominal pain and vomiting.        Skin open lesions under abdominal fold   Genitourinary:  Negative for dysuria and hematuria.   Musculoskeletal:  Negative for arthralgias and back pain.   Skin:  Positive for rash. Negative for color change.   Neurological:  Negative for seizures and syncope.   All other systems reviewed and are negative.      Objective   /78 (BP Location: Left arm, Patient Position: Sitting, Cuff Size: Large)   Pulse 65   Temp (!) 97.4 °F (36.3 °C) (Tympanic)   Resp 18   Ht 5' 9\" (1.753 m)   Wt 112 kg (247 lb 4.8 oz)   SpO2 97%   BMI 36.52 kg/m²      Physical Exam  Cardiovascular:      Rate " and Rhythm: Normal rate and regular rhythm.   Pulmonary:      Effort: Pulmonary effort is normal.      Breath sounds: Normal breath sounds.   Abdominal:      General: Abdomen is flat.      Comments: 4 to 5 small 0.5 cm open lesions on lower abdomen in panus fold.  Red and inderated.

## 2025-05-08 NOTE — ASSESSMENT & PLAN NOTE
Has open lesions under his abdominal panus which are now weeping and red.    Discussed care of the area with him.  Rx mupirocin TID and also PO doxycycline.  He is to call us if he gets fevers or they spread.    Patient agrees with plan.     Orders:  •  doxycycline hyclate (VIBRAMYCIN) 100 mg capsule; Take 1 capsule (100 mg total) by mouth every 12 (twelve) hours for 10 days  •  mupirocin (BACTROBAN) 2 % ointment; Apply topically 3 (three) times a day

## 2025-05-28 DIAGNOSIS — I10 BENIGN ESSENTIAL HYPERTENSION: ICD-10-CM

## 2025-05-28 RX ORDER — METOPROLOL SUCCINATE 25 MG/1
25 TABLET, EXTENDED RELEASE ORAL 2 TIMES DAILY
Qty: 60 TABLET | Refills: 0 | Status: SHIPPED | OUTPATIENT
Start: 2025-05-28

## 2025-06-16 DIAGNOSIS — E55.9 VITAMIN D INSUFFICIENCY: ICD-10-CM

## 2025-06-16 RX ORDER — MULTIVIT-MIN/IRON/FOLIC ACID/K 18-600-40
CAPSULE ORAL
Qty: 90 TABLET | Refills: 0 | Status: SHIPPED | OUTPATIENT
Start: 2025-06-16

## 2025-06-28 DIAGNOSIS — I10 BENIGN ESSENTIAL HYPERTENSION: ICD-10-CM

## 2025-06-30 RX ORDER — METOPROLOL SUCCINATE 25 MG/1
25 TABLET, EXTENDED RELEASE ORAL 2 TIMES DAILY
Qty: 60 TABLET | Refills: 0 | Status: SHIPPED | OUTPATIENT
Start: 2025-06-30

## 2025-07-25 DIAGNOSIS — E55.9 VITAMIN D INSUFFICIENCY: ICD-10-CM

## 2025-07-25 RX ORDER — CHOLECALCIFEROL (VITAMIN D3) 25 MCG
TABLET ORAL
Qty: 90 TABLET | Refills: 0 | Status: SHIPPED | OUTPATIENT
Start: 2025-07-25

## 2025-07-29 DIAGNOSIS — I10 BENIGN ESSENTIAL HYPERTENSION: ICD-10-CM

## 2025-07-29 RX ORDER — METOPROLOL SUCCINATE 25 MG/1
25 TABLET, EXTENDED RELEASE ORAL 2 TIMES DAILY
Qty: 60 TABLET | Refills: 0 | Status: SHIPPED | OUTPATIENT
Start: 2025-07-29

## 2025-08-13 ENCOUNTER — OFFICE VISIT (OUTPATIENT)
Age: 45
End: 2025-08-13

## 2025-08-13 PROBLEM — Z59.9 PROBLEM RELATED TO HOUSING AND ECONOMIC CIRCUMSTANCES, UNSPECIFIED: Status: ACTIVE | Noted: 2023-10-20

## (undated) DEVICE — SUT STRATAFIX SPIRAL MONOCRYL PLUS 3-0 PS-1 30 X 30 CM SXMP2B410

## (undated) DEVICE — STAPLE RELOAD ENDO EGIA ARTICULATING MEDIUM TAN 2MM.5MM.3MM

## (undated) DEVICE — CHLORAPREP HI-LITE 26ML ORANGE

## (undated) DEVICE — SUT VICRYL 3-0 SH 27 IN J416H

## (undated) DEVICE — BAG SPECIMEN BIOHAZARD 10 X 10 ADHESIVE

## (undated) DEVICE — Device: Brand: OLYMPUS

## (undated) DEVICE — AIRLIFE™  ADULT CUSHION NASAL CANNULA WITH 7 FOOT (2.1 M) CRUSH-RESISTANT OXYGEN TUBING, AND U/CONNECT-IT ADAPTER: Brand: AIRLIFE™

## (undated) DEVICE — HARMONIC 1100 SHEARS, 36CM SHAFT LENGTH: Brand: HARMONIC

## (undated) DEVICE — GLOVE SRG BIOGEL 8

## (undated) DEVICE — TRAP POLY

## (undated) DEVICE — LIGACLIP 10-M/L, 10MM ENDOSCOPIC ROTATING MULTIPLE CLIP APPLIERS: Brand: LIGACLIP

## (undated) DEVICE — TIBURON LAPAROTOMY DRAPE: Brand: CONVERTORS

## (undated) DEVICE — LUBRICANT SURGILUBE TUBE 4 OZ  FLIP TOP

## (undated) DEVICE — 60 ML SYRINGE,REGULAR TIP: Brand: MONOJECT

## (undated) DEVICE — ASTOUND STANDARD SURGICAL GOWN, XL: Brand: CONVERTORS

## (undated) DEVICE — 1200CC GUARDIAN II: Brand: GUARDIAN

## (undated) DEVICE — GLOVE INDICATOR PI UNDERGLOVE SZ 7 BLUE

## (undated) DEVICE — TOWEL SURG XR DETECT GREEN STRL RFD

## (undated) DEVICE — ENDOPATH 5MM CURVED SCISSORS WITH MONOPOLAR CAUTERY: Brand: ENDOPATH

## (undated) DEVICE — GLOVE EXAM NON-STRL NTRL PLUS LRG PF

## (undated) DEVICE — NEEDLE SPINAL 20G X 3.5 LF

## (undated) DEVICE — TUBING BUBBLE CLEAR 5MM X 100 FT NS

## (undated) DEVICE — IRRIG ENDO FLO TUBING

## (undated) DEVICE — SINGLE-USE BIOPSY FORCEPS: Brand: RADIAL JAW 4

## (undated) DEVICE — PACK GENERAL LF

## (undated) DEVICE — CULTURE TUBE ANAEROBIC

## (undated) DEVICE — ANTIBACTERIAL UNDYED BRAIDED (POLYGLACTIN 910), SYNTHETIC ABSORBABLE SUTURE: Brand: COATED VICRYL

## (undated) DEVICE — 10 MM BABCOCKS WITH RATCHET HANDLES: Brand: ENDOPATH

## (undated) DEVICE — GLOVE INDICATOR PI UNDERGLOVE SZ 8 BLUE

## (undated) DEVICE — DRAPE UTILITY

## (undated) DEVICE — VAC DRESSING SENSATRAC LRG

## (undated) DEVICE — FORCEP ELECSURG RADIAL JAW4 2.2 X 240CM  HOT BX

## (undated) DEVICE — 34" SINGLE PATIENT USE HOVERMATT: Brand: SINGLE PATIENT USE HOVERMATT

## (undated) DEVICE — "MB-142 MOUTHPIECE": Brand: MOUTHPIECE

## (undated) DEVICE — WEBRIL 6 IN UNSTERILE

## (undated) DEVICE — SPECIMEN CONTAINER STERILE PEEL PACK

## (undated) DEVICE — GLOVE SRG BIOGEL ECLIPSE 7.5

## (undated) DEVICE — SUT PROLENE 1 TP-1 60 IN 8824G

## (undated) DEVICE — MARKER SPOT EX  BOWEL TATTOO SYRINGE

## (undated) DEVICE — SOLIDIFIER FLUID WASTE CONTROL 1500ML

## (undated) DEVICE — VAC CANISTER 500ML

## (undated) DEVICE — SUT VICRYL 0 18 IN J906G

## (undated) DEVICE — SPONGE LAP 18 X 18 IN STRL RFD

## (undated) DEVICE — CARDINAL HEALTH LAP SPONGE  8 X 36 IN. PREWASHED X-RAY DETECTABLE SOFTPACK: Brand: CARDINAL HEALTH

## (undated) DEVICE — SUT PROLENE 2-0 CT-1 30 IN 8423H

## (undated) DEVICE — TROCAR: Brand: KII FIOS FIRST ENTRY

## (undated) DEVICE — VAC DRESSING SENSATRAC RND

## (undated) DEVICE — TRAVELKIT CONTAINS FIRST STEP KIT (200ML EP-4 KIT) AND SOILED SCOPE BAG - 1 KIT: Brand: TRAVELKIT CONTAINS FIRST STEP KIT AND SOILED SCOPE BAG

## (undated) DEVICE — INTENDED FOR TISSUE SEPARATION, AND OTHER PROCEDURES THAT REQUIRE A SHARP SURGICAL BLADE TO PUNCTURE OR CUT.: Brand: BARD-PARKER SAFETY BLADES SIZE 15, STERILE

## (undated) DEVICE — BRUSH CYTOLOGY 3 MM 240 CM

## (undated) DEVICE — SNARE BARBED 230CM

## (undated) DEVICE — NEPTUNE E-SEP SMOKE EVACUATION PENCIL, COATED, 70MM BLADE, PUSH BUTTON SWITCH: Brand: NEPTUNE E-SEP

## (undated) DEVICE — MEDI-VAC YANKAUER SUCTION HANDLE: Brand: CARDINAL HEALTH

## (undated) DEVICE — SURGICAL GOWN, XL SMARTSLEEVE: Brand: CONVERTORS

## (undated) DEVICE — JACKSON-PRATT 100CC BULB RESERVOIR: Brand: CARDINAL HEALTH

## (undated) DEVICE — DRAPE EQUIPMENT RF WAND

## (undated) DEVICE — COVIDIEN ENDO GIA PURPLE (MED) RELOAD 45MM

## (undated) DEVICE — SUT PDS II 3-0 SH 27 IN Z316H

## (undated) DEVICE — GLOVE INDICATOR PI UNDERGLOVE SZ 7.5 BLUE

## (undated) DEVICE — GROUNDING PAD UNIVERSAL SLW

## (undated) DEVICE — GAUZE SPONGES,16 PLY: Brand: CURITY

## (undated) DEVICE — SUT MONOCRYL 4-0 PS-2 27 IN Y426H

## (undated) DEVICE — ADHESIVE SKIN HIGH VISCOSITY EXOFIN 1ML

## (undated) DEVICE — [HIGH FLOW INSUFFLATOR,  DO NOT USE IF PACKAGE IS DAMAGED,  KEEP DRY,  KEEP AWAY FROM SUNLIGHT,  PROTECT FROM HEAT AND RADIOACTIVE SOURCES.]: Brand: PNEUMOSURE

## (undated) DEVICE — SMOKE REMOVAL SYSTEM: Brand: BOEHRINGER® SMOKE REMOVAL SYSTEM

## (undated) DEVICE — TRUE CONTENT TO BE POPULATED AS PART OF REBRANDING: Brand: ARGYLE

## (undated) DEVICE — FABRIC REINFORCED, SURGICAL GOWN, XL: Brand: CONVERTORS

## (undated) DEVICE — VAC VERALINK CASSETTE: Brand: V.A.C. VERAT.R.A.C. DUO™

## (undated) DEVICE — DISPOSABLE BIOPSY VALVE MAJ-1555: Brand: SINGLE USE BIOPSY VALVE (STERILE)

## (undated) DEVICE — VAC VERAFLO CLEANSE CHOICE DRESSING- MEDIUM: Brand: V.A.C. VERAFLO™

## (undated) DEVICE — POWER SHELL SIGNIA

## (undated) DEVICE — BASIC DOUBLE BASIN 2-LF: Brand: MEDLINE INDUSTRIES, INC.

## (undated) DEVICE — VISUALIZATION SYSTEM: Brand: CLEARIFY

## (undated) DEVICE — GLOVE SRG BIOGEL 7

## (undated) DEVICE — TISSUE RETRIEVAL SYSTEM: Brand: INZII RETRIEVAL SYSTEM

## (undated) DEVICE — PMI DISPOSABLE PUNCTURE CLOSURE DEVICE / SUTURE GRASPER: Brand: PMI

## (undated) DEVICE — KERLIX BANDAGE ROLL: Brand: KERLIX

## (undated) DEVICE — "MH-438 A/W VLVE F/140 EVIS-140": Brand: AIR/WATER VALVE

## (undated) DEVICE — "MH-443 SUCTION VALVE F/EVIS140 EVIS160": Brand: SUCTION VALVE

## (undated) DEVICE — MAYO STAND COVER: Brand: CONVERTORS

## (undated) DEVICE — JP PERF DRN SIL FLT 10MM FULL: Brand: CARDINAL HEALTH

## (undated) DEVICE — CULTURE TUBE AEROBIC

## (undated) DEVICE — BITE BLOCK MAXI 60FR LF STRAP

## (undated) DEVICE — DRAPE LAPAROTOMY W/POUCHES

## (undated) DEVICE — COVIDIEN ENDO GIA PURPLE (MED) RELOAD 60MM

## (undated) DEVICE — ADHESIVE SKIN CLSR DERMABOND NX

## (undated) DEVICE — Device

## (undated) DEVICE — GLOVE SRG BIOGEL 7.5

## (undated) DEVICE — INTENDED FOR TISSUE SEPARATION, AND OTHER PROCEDURES THAT REQUIRE A SHARP SURGICAL BLADE TO PUNCTURE OR CUT.: Brand: BARD-PARKER SAFETY BLADES SIZE 11, STERILE

## (undated) DEVICE — ENDOPATH XCEL BLADELESS TROCARS WITH STABILITY SLEEVES: Brand: ENDOPATH XCEL

## (undated) DEVICE — SYRINGE 20ML LL

## (undated) DEVICE — BRUSH ENDO CLEANING DBL-HEADER

## (undated) DEVICE — HANDPIECE SET WITH HIGH FLOW TIP AND SUCTION TUBE: Brand: INTERPULSE

## (undated) DEVICE — "MAJ-901 WATER CONTAINER SET CV-160/140": Brand: WATER CONTAINER

## (undated) DEVICE — SUT ETHIBOND 2-0 SH/SH 36 IN X523H

## (undated) DEVICE — TUBING AUX CHANNEL